# Patient Record
Sex: MALE | Race: WHITE | Employment: FULL TIME | URBAN - METROPOLITAN AREA
[De-identification: names, ages, dates, MRNs, and addresses within clinical notes are randomized per-mention and may not be internally consistent; named-entity substitution may affect disease eponyms.]

---

## 2020-02-09 ENCOUNTER — HOSPITAL ENCOUNTER (OUTPATIENT)
Age: 60
Discharge: HOME | End: 2020-02-09
Payer: COMMERCIAL

## 2020-02-09 PROCEDURE — 80307 DRUG TEST PRSMV CHEM ANLYZR: CPT

## 2020-02-09 PROCEDURE — 36415 COLL VENOUS BLD VENIPUNCTURE: CPT

## 2020-02-12 LAB
AMPHETAMINE: NEGATIVE NG/ML
BARBITURATES: NEGATIVE NG/ML
BENZODIAZEPINES: NEGATIVE NG/ML
BUPRENORPHINE: NEGATIVE NG/ML
COCAINE: NEGATIVE NG/ML
DRUGS OF ABUSE COMMENT: NORMAL
METHADONE: NEGATIVE NG/ML
METHAMPHETAMINE: NEGATIVE NG/ML
OPIATES: NEGATIVE NG/ML
OXYCODONE: NEGATIVE NG/ML
PHENCYCLIDINE: NEGATIVE NG/ML
THC: NEGATIVE NG/ML

## 2021-08-26 ENCOUNTER — HOSPITAL ENCOUNTER (EMERGENCY)
Age: 61
Discharge: LEFT BEFORE DISCHARGE | End: 2021-08-26
Payer: COMMERCIAL

## 2021-08-26 VITALS
RESPIRATION RATE: 14 BRPM | TEMPERATURE: 97.8 F | DIASTOLIC BLOOD PRESSURE: 90 MMHG | WEIGHT: 137 LBS | HEIGHT: 69 IN | HEART RATE: 68 BPM | OXYGEN SATURATION: 95 % | SYSTOLIC BLOOD PRESSURE: 147 MMHG | BODY MASS INDEX: 20.29 KG/M2

## 2021-08-26 RX ORDER — INSULIN GLARGINE 100 [IU]/ML
12 INJECTION, SOLUTION SUBCUTANEOUS 2 TIMES DAILY
COMMUNITY
Start: 2021-08-19

## 2021-08-26 RX ORDER — INSULIN ASPART 100 [IU]/ML
INJECTION, SOLUTION INTRAVENOUS; SUBCUTANEOUS
Status: ON HOLD | COMMUNITY
Start: 2021-08-20 | End: 2021-12-13 | Stop reason: SDUPTHER

## 2021-08-26 RX ORDER — FLUOXETINE HYDROCHLORIDE 20 MG/1
40 CAPSULE ORAL 2 TIMES DAILY
COMMUNITY

## 2021-08-26 RX ORDER — SILDENAFIL 100 MG/1
TABLET, FILM COATED ORAL
COMMUNITY
Start: 2021-08-19

## 2021-08-26 ASSESSMENT — PAIN SCALES - GENERAL: PAINLEVEL_OUTOF10: 7

## 2021-11-30 ENCOUNTER — APPOINTMENT (OUTPATIENT)
Dept: GENERAL RADIOLOGY | Age: 61
DRG: 637 | End: 2021-11-30
Payer: COMMERCIAL

## 2021-11-30 ENCOUNTER — HOSPITAL ENCOUNTER (INPATIENT)
Age: 61
LOS: 13 days | Discharge: HOME | DRG: 637 | End: 2021-12-13
Attending: EMERGENCY MEDICINE | Admitting: INTERNAL MEDICINE
Payer: COMMERCIAL

## 2021-11-30 DIAGNOSIS — E10.10 TYPE 1 DIABETES MELLITUS WITH KETOACIDOSIS WITHOUT COMA (HCC): Primary | ICD-10-CM

## 2021-11-30 DIAGNOSIS — J96.01 ACUTE RESPIRATORY FAILURE WITH HYPOXIA (HCC): ICD-10-CM

## 2021-11-30 DIAGNOSIS — J18.9 PNEUMONIA OF LEFT LOWER LOBE DUE TO INFECTIOUS ORGANISM: ICD-10-CM

## 2021-11-30 LAB
-: ABNORMAL
ABSOLUTE EOS #: 0 K/UL (ref 0–0.44)
ABSOLUTE IMMATURE GRANULOCYTE: 0.26 K/UL (ref 0–0.3)
ABSOLUTE LYMPH #: 2.1 K/UL (ref 1.1–3.7)
ABSOLUTE MONO #: 1.83 K/UL (ref 0.1–1.2)
ACETAMINOPHEN LEVEL: <5 UG/ML (ref 10–30)
ALLEN TEST: ABNORMAL
AMORPHOUS: ABNORMAL
AMPHETAMINE SCREEN URINE: NEGATIVE
ANION GAP SERPL CALCULATED.3IONS-SCNC: 21 MMOL/L (ref 9–17)
ANION GAP SERPL CALCULATED.3IONS-SCNC: ABNORMAL MMOL/L (ref 9–17)
ANION GAP SERPL CALCULATED.3IONS-SCNC: ABNORMAL MMOL/L (ref 9–17)
ANION GAP: 16 MMOL/L (ref 7–16)
BACTERIA: ABNORMAL
BARBITURATE SCREEN URINE: NEGATIVE
BASOPHILS # BLD: 0 % (ref 0–2)
BASOPHILS ABSOLUTE: 0 K/UL (ref 0–0.2)
BENZODIAZEPINE SCREEN, URINE: NEGATIVE
BETA-HYDROXYBUTYRATE: 14.69 MMOL/L (ref 0.02–0.27)
BILIRUBIN URINE: NEGATIVE
BUN BLDV-MCNC: 78 MG/DL (ref 6–20)
BUN BLDV-MCNC: 86 MG/DL (ref 6–20)
BUN BLDV-MCNC: 96 MG/DL (ref 6–20)
BUN/CREAT BLD: ABNORMAL (ref 9–20)
BUPRENORPHINE URINE: ABNORMAL
CALCIUM SERPL-MCNC: 7.2 MG/DL (ref 8.6–10.4)
CALCIUM SERPL-MCNC: 7.3 MG/DL (ref 8.6–10.4)
CALCIUM SERPL-MCNC: 8.7 MG/DL (ref 8.6–10.4)
CANNABINOID SCREEN URINE: POSITIVE
CARBOXYHEMOGLOBIN: 1.2 % (ref 0–5)
CASTS UA: ABNORMAL /LPF (ref 0–2)
CASTS UA: ABNORMAL /LPF (ref 0–2)
CHLORIDE BLD-SCNC: 105 MMOL/L (ref 98–107)
CHLORIDE BLD-SCNC: 113 MMOL/L (ref 98–107)
CHLORIDE BLD-SCNC: 82 MMOL/L (ref 98–107)
CO2: 8 MMOL/L (ref 20–31)
CO2: <6 MMOL/L (ref 20–31)
CO2: <6 MMOL/L (ref 20–31)
COCAINE METABOLITE, URINE: NEGATIVE
COLOR: YELLOW
COMMENT UA: ABNORMAL
CREAT SERPL-MCNC: 1.68 MG/DL (ref 0.7–1.2)
CREAT SERPL-MCNC: 1.92 MG/DL (ref 0.7–1.2)
CREAT SERPL-MCNC: 2.65 MG/DL (ref 0.7–1.2)
CRYSTALS, UA: ABNORMAL /HPF
DIFFERENTIAL TYPE: ABNORMAL
EOSINOPHILS RELATIVE PERCENT: 0 % (ref 1–4)
EPITHELIAL CELLS UA: ABNORMAL /HPF (ref 0–5)
ETHANOL PERCENT: <0.01 %
ETHANOL: <10 MG/DL
FIO2: 4
FIO2: ABNORMAL
FIO2: ABNORMAL
GFR AFRICAN AMERICAN: 30 ML/MIN
GFR AFRICAN AMERICAN: 44 ML/MIN
GFR AFRICAN AMERICAN: 51 ML/MIN
GFR NON-AFRICAN AMERICAN: 22 ML/MIN
GFR NON-AFRICAN AMERICAN: 25 ML/MIN
GFR NON-AFRICAN AMERICAN: 36 ML/MIN
GFR NON-AFRICAN AMERICAN: 42 ML/MIN
GFR SERPL CREATININE-BSD FRML MDRD: 27 ML/MIN
GFR SERPL CREATININE-BSD FRML MDRD: ABNORMAL ML/MIN/{1.73_M2}
GLUCOSE BLD-MCNC: 1009 MG/DL (ref 70–99)
GLUCOSE BLD-MCNC: 240 MG/DL (ref 75–110)
GLUCOSE BLD-MCNC: 242 MG/DL (ref 75–110)
GLUCOSE BLD-MCNC: 253 MG/DL (ref 75–110)
GLUCOSE BLD-MCNC: 286 MG/DL (ref 74–100)
GLUCOSE BLD-MCNC: 298 MG/DL (ref 75–110)
GLUCOSE BLD-MCNC: 337 MG/DL (ref 75–110)
GLUCOSE BLD-MCNC: 350 MG/DL (ref 70–99)
GLUCOSE BLD-MCNC: 405 MG/DL (ref 75–110)
GLUCOSE BLD-MCNC: 450 MG/DL (ref 75–110)
GLUCOSE BLD-MCNC: 505 MG/DL (ref 70–99)
GLUCOSE BLD-MCNC: 621 MG/DL (ref 70–99)
GLUCOSE BLD-MCNC: 715 MG/DL (ref 70–99)
GLUCOSE BLD-MCNC: 737 MG/DL (ref 70–99)
GLUCOSE BLD-MCNC: >700 MG/DL (ref 74–100)
GLUCOSE URINE: ABNORMAL
HCO3 VENOUS: 4.5 MMOL/L (ref 22–29)
HCO3 VENOUS: 5.2 MMOL/L (ref 24–30)
HCT VFR BLD CALC: 38.2 % (ref 40.7–50.3)
HEMOGLOBIN: 11.8 G/DL (ref 13–17)
IMMATURE GRANULOCYTES: 1 %
KETONES, URINE: ABNORMAL
LACTIC ACID, WHOLE BLOOD: 1.1 MMOL/L (ref 0.7–2.1)
LEUKOCYTE ESTERASE, URINE: NEGATIVE
LYMPHOCYTES # BLD: 8 % (ref 24–43)
MAGNESIUM: 2.6 MG/DL (ref 1.6–2.6)
MAGNESIUM: 2.6 MG/DL (ref 1.6–2.6)
MCH RBC QN AUTO: 29.4 PG (ref 25.2–33.5)
MCHC RBC AUTO-ENTMCNC: 30.9 G/DL (ref 28.4–34.8)
MCV RBC AUTO: 95 FL (ref 82.6–102.9)
MDMA URINE: ABNORMAL
METHADONE SCREEN, URINE: NEGATIVE
METHAMPHETAMINE, URINE: ABNORMAL
METHEMOGLOBIN: ABNORMAL % (ref 0–1.5)
MODE: ABNORMAL
MONOCYTES # BLD: 7 % (ref 3–12)
MORPHOLOGY: NORMAL
MUCUS: ABNORMAL
NEGATIVE BASE EXCESS, ART: 14 (ref 0–2)
NEGATIVE BASE EXCESS, VEN: 26.2 MMOL/L (ref 0–2)
NEGATIVE BASE EXCESS, VEN: 27 (ref 0–2)
NITRITE, URINE: NEGATIVE
NOTIFICATION TIME: ABNORMAL
NOTIFICATION: ABNORMAL
NRBC AUTOMATED: 0 PER 100 WBC
O2 DEVICE/FLOW/%: ABNORMAL
O2 SAT, VEN: 58 % (ref 60–85)
O2 SAT, VEN: 88.4 % (ref 60–85)
OPIATES, URINE: NEGATIVE
OTHER OBSERVATIONS UA: ABNORMAL
OXYCODONE SCREEN URINE: NEGATIVE
OXYHEMOGLOBIN: ABNORMAL % (ref 95–98)
PATIENT TEMP: 37
PATIENT TEMP: ABNORMAL
PATIENT TEMP: ABNORMAL
PCO2, VEN, TEMP ADJ: ABNORMAL MMHG (ref 39–55)
PCO2, VEN: 22.2 MM HG (ref 41–51)
PCO2, VEN: 24.4 (ref 39–55)
PDW BLD-RTO: 13.7 % (ref 11.8–14.4)
PEEP/CPAP: ABNORMAL
PH UA: 5 (ref 5–8)
PH VENOUS: 6.91 (ref 7.32–7.43)
PH VENOUS: 6.96 (ref 7.32–7.42)
PH, VEN, TEMP ADJ: ABNORMAL (ref 7.32–7.42)
PHENCYCLIDINE, URINE: NEGATIVE
PHOSPHORUS: 2.7 MG/DL (ref 2.5–4.5)
PHOSPHORUS: 4 MG/DL (ref 2.5–4.5)
PLATELET # BLD: 367 K/UL (ref 138–453)
PLATELET ESTIMATE: ABNORMAL
PMV BLD AUTO: 9.4 FL (ref 8.1–13.5)
PO2, VEN, TEMP ADJ: ABNORMAL MMHG (ref 30–50)
PO2, VEN: 48.5 MM HG (ref 30–50)
PO2, VEN: 81.1 (ref 30–50)
POC BUN: 92 MG/DL (ref 8–26)
POC CHLORIDE: 97 MMOL/L (ref 98–107)
POC CREATININE: 2.91 MG/DL (ref 0.51–1.19)
POC HCO3: 14.4 MMOL/L (ref 21–28)
POC HEMATOCRIT: 37 % (ref 41–53)
POC HEMOGLOBIN: 12.5 G/DL (ref 13.5–17.5)
POC IONIZED CALCIUM: 1.09 MMOL/L (ref 1.15–1.33)
POC LACTIC ACID: 1.39 MMOL/L (ref 0.56–1.39)
POC O2 SATURATION: 83 % (ref 94–98)
POC PCO2 TEMP: ABNORMAL MM HG
POC PCO2 TEMP: ABNORMAL MM HG
POC PCO2: 42.6 MM HG (ref 35–48)
POC PH TEMP: ABNORMAL
POC PH TEMP: ABNORMAL
POC PH: 7.14 (ref 7.35–7.45)
POC PO2 TEMP: ABNORMAL MM HG
POC PO2 TEMP: ABNORMAL MM HG
POC PO2: 61.6 MM HG (ref 83–108)
POC POTASSIUM: 6.2 MMOL/L (ref 3.5–5.1)
POC SODIUM: 118 MMOL/L (ref 138–146)
POC TCO2: 6 MMOL/L (ref 22–30)
POSITIVE BASE EXCESS, ART: ABNORMAL (ref 0–3)
POSITIVE BASE EXCESS, VEN: ABNORMAL (ref 0–3)
POSITIVE BASE EXCESS, VEN: ABNORMAL MMOL/L (ref 0–2)
POTASSIUM SERPL-SCNC: 4.1 MMOL/L (ref 3.7–5.3)
POTASSIUM SERPL-SCNC: 4.2 MMOL/L (ref 3.7–5.3)
POTASSIUM SERPL-SCNC: 6.2 MMOL/L (ref 3.7–5.3)
PROPOXYPHENE, URINE: ABNORMAL
PROTEIN UA: ABNORMAL
PSV: ABNORMAL
PT. POSITION: ABNORMAL
RBC # BLD: 4.02 M/UL (ref 4.21–5.77)
RBC # BLD: ABNORMAL 10*6/UL
RBC UA: ABNORMAL /HPF (ref 0–2)
RENAL EPITHELIAL, UA: ABNORMAL /HPF
RESPIRATORY RATE: ABNORMAL
SALICYLATE LEVEL: <1 MG/DL (ref 3–10)
SAMPLE SITE: ABNORMAL
SARS-COV-2, RAPID: NOT DETECTED
SEG NEUTROPHILS: 84 % (ref 36–65)
SEGMENTED NEUTROPHILS ABSOLUTE COUNT: 22.01 K/UL (ref 1.5–8.1)
SET RATE: ABNORMAL
SODIUM BLD-SCNC: 123 MMOL/L (ref 135–144)
SODIUM BLD-SCNC: 135 MMOL/L (ref 135–144)
SODIUM BLD-SCNC: 142 MMOL/L (ref 135–144)
SPECIFIC GRAVITY UA: 1.02 (ref 1–1.03)
SPECIMEN DESCRIPTION: NORMAL
TCO2 (CALC), ART: ABNORMAL MMOL/L (ref 22–29)
TEST INFORMATION: ABNORMAL
TEXT FOR RESPIRATORY: ABNORMAL
TOTAL CO2, VENOUS: ABNORMAL MMOL/L (ref 23–30)
TOTAL HB: ABNORMAL G/DL (ref 12–16)
TOTAL RATE: ABNORMAL
TOXIC TRICYCLIC SC,BLOOD: NEGATIVE
TRICHOMONAS: ABNORMAL
TRICYCLIC ANTIDEPRESSANTS, UR: ABNORMAL
TURBIDITY: ABNORMAL
URINE HGB: ABNORMAL
UROBILINOGEN, URINE: NORMAL
VT: ABNORMAL
WBC # BLD: 26.2 K/UL (ref 3.5–11.3)
WBC # BLD: ABNORMAL 10*3/UL
WBC UA: ABNORMAL /HPF (ref 0–5)
YEAST: ABNORMAL

## 2021-11-30 PROCEDURE — 2500000003 HC RX 250 WO HCPCS

## 2021-11-30 PROCEDURE — 85014 HEMATOCRIT: CPT

## 2021-11-30 PROCEDURE — 87641 MR-STAPH DNA AMP PROBE: CPT

## 2021-11-30 PROCEDURE — 80048 BASIC METABOLIC PNL TOTAL CA: CPT

## 2021-11-30 PROCEDURE — 2580000003 HC RX 258: Performed by: HEALTH CARE PROVIDER

## 2021-11-30 PROCEDURE — 82947 ASSAY GLUCOSE BLOOD QUANT: CPT

## 2021-11-30 PROCEDURE — 2700000000 HC OXYGEN THERAPY PER DAY

## 2021-11-30 PROCEDURE — 99285 EMERGENCY DEPT VISIT HI MDM: CPT

## 2021-11-30 PROCEDURE — 36415 COLL VENOUS BLD VENIPUNCTURE: CPT

## 2021-11-30 PROCEDURE — 85025 COMPLETE CBC W/AUTO DIFF WBC: CPT

## 2021-11-30 PROCEDURE — 81001 URINALYSIS AUTO W/SCOPE: CPT

## 2021-11-30 PROCEDURE — 82010 KETONE BODYS QUAN: CPT

## 2021-11-30 PROCEDURE — 80051 ELECTROLYTE PANEL: CPT

## 2021-11-30 PROCEDURE — 80307 DRUG TEST PRSMV CHEM ANLYZR: CPT

## 2021-11-30 PROCEDURE — 82565 ASSAY OF CREATININE: CPT

## 2021-11-30 PROCEDURE — 80143 DRUG ASSAY ACETAMINOPHEN: CPT

## 2021-11-30 PROCEDURE — 6370000000 HC RX 637 (ALT 250 FOR IP): Performed by: HEALTH CARE PROVIDER

## 2021-11-30 PROCEDURE — 6360000002 HC RX W HCPCS: Performed by: HEALTH CARE PROVIDER

## 2021-11-30 PROCEDURE — 94660 CPAP INITIATION&MGMT: CPT

## 2021-11-30 PROCEDURE — 87635 SARS-COV-2 COVID-19 AMP PRB: CPT

## 2021-11-30 PROCEDURE — 84100 ASSAY OF PHOSPHORUS: CPT

## 2021-11-30 PROCEDURE — 82803 BLOOD GASES ANY COMBINATION: CPT

## 2021-11-30 PROCEDURE — 71045 X-RAY EXAM CHEST 1 VIEW: CPT

## 2021-11-30 PROCEDURE — 99291 CRITICAL CARE FIRST HOUR: CPT | Performed by: INTERNAL MEDICINE

## 2021-11-30 PROCEDURE — 83605 ASSAY OF LACTIC ACID: CPT

## 2021-11-30 PROCEDURE — 82330 ASSAY OF CALCIUM: CPT

## 2021-11-30 PROCEDURE — 6370000000 HC RX 637 (ALT 250 FOR IP): Performed by: GENERAL PRACTICE

## 2021-11-30 PROCEDURE — 83735 ASSAY OF MAGNESIUM: CPT

## 2021-11-30 PROCEDURE — 2580000003 HC RX 258: Performed by: GENERAL PRACTICE

## 2021-11-30 PROCEDURE — G0480 DRUG TEST DEF 1-7 CLASSES: HCPCS

## 2021-11-30 PROCEDURE — 80179 DRUG ASSAY SALICYLATE: CPT

## 2021-11-30 PROCEDURE — 37799 UNLISTED PX VASCULAR SURGERY: CPT

## 2021-11-30 PROCEDURE — 82805 BLOOD GASES W/O2 SATURATION: CPT

## 2021-11-30 PROCEDURE — 2500000003 HC RX 250 WO HCPCS: Performed by: GENERAL PRACTICE

## 2021-11-30 PROCEDURE — 6360000002 HC RX W HCPCS: Performed by: STUDENT IN AN ORGANIZED HEALTH CARE EDUCATION/TRAINING PROGRAM

## 2021-11-30 PROCEDURE — 6360000002 HC RX W HCPCS

## 2021-11-30 PROCEDURE — 94761 N-INVAS EAR/PLS OXIMETRY MLT: CPT

## 2021-11-30 PROCEDURE — 51702 INSERT TEMP BLADDER CATH: CPT

## 2021-11-30 PROCEDURE — 84520 ASSAY OF UREA NITROGEN: CPT

## 2021-11-30 PROCEDURE — 2000000000 HC ICU R&B

## 2021-11-30 PROCEDURE — 93005 ELECTROCARDIOGRAM TRACING: CPT | Performed by: GENERAL PRACTICE

## 2021-11-30 PROCEDURE — 2500000003 HC RX 250 WO HCPCS: Performed by: HEALTH CARE PROVIDER

## 2021-11-30 RX ORDER — POTASSIUM CHLORIDE 7.45 MG/ML
10 INJECTION INTRAVENOUS PRN
Status: DISCONTINUED | OUTPATIENT
Start: 2021-11-30 | End: 2021-11-30

## 2021-11-30 RX ORDER — MAGNESIUM SULFATE 1 G/100ML
1000 INJECTION INTRAVENOUS PRN
Status: DISCONTINUED | OUTPATIENT
Start: 2021-11-30 | End: 2021-11-30

## 2021-11-30 RX ORDER — ONDANSETRON 2 MG/ML
4 INJECTION INTRAMUSCULAR; INTRAVENOUS EVERY 6 HOURS PRN
Status: DISCONTINUED | OUTPATIENT
Start: 2021-11-30 | End: 2021-12-13 | Stop reason: HOSPADM

## 2021-11-30 RX ORDER — LORAZEPAM 2 MG/ML
1 INJECTION INTRAMUSCULAR ONCE
Status: COMPLETED | OUTPATIENT
Start: 2021-11-30 | End: 2021-11-30

## 2021-11-30 RX ORDER — LISINOPRIL 10 MG/1
10 TABLET ORAL DAILY
Status: ON HOLD | COMMUNITY
End: 2021-12-13 | Stop reason: HOSPADM

## 2021-11-30 RX ORDER — 0.9 % SODIUM CHLORIDE 0.9 %
1000 INTRAVENOUS SOLUTION INTRAVENOUS ONCE
Status: COMPLETED | OUTPATIENT
Start: 2021-11-30 | End: 2021-11-30

## 2021-11-30 RX ORDER — LORAZEPAM 2 MG/ML
INJECTION INTRAMUSCULAR
Status: COMPLETED
Start: 2021-11-30 | End: 2021-11-30

## 2021-11-30 RX ORDER — SODIUM CHLORIDE 9 MG/ML
25 INJECTION, SOLUTION INTRAVENOUS PRN
Status: DISCONTINUED | OUTPATIENT
Start: 2021-11-30 | End: 2021-12-13 | Stop reason: HOSPADM

## 2021-11-30 RX ORDER — ACETAMINOPHEN 650 MG/1
650 SUPPOSITORY RECTAL EVERY 6 HOURS PRN
Status: DISCONTINUED | OUTPATIENT
Start: 2021-11-30 | End: 2021-12-13 | Stop reason: HOSPADM

## 2021-11-30 RX ORDER — ACETAMINOPHEN 325 MG/1
650 TABLET ORAL EVERY 6 HOURS PRN
Status: DISCONTINUED | OUTPATIENT
Start: 2021-11-30 | End: 2021-12-13 | Stop reason: HOSPADM

## 2021-11-30 RX ORDER — SODIUM CHLORIDE 0.9 % (FLUSH) 0.9 %
5-40 SYRINGE (ML) INJECTION PRN
Status: DISCONTINUED | OUTPATIENT
Start: 2021-11-30 | End: 2021-12-13 | Stop reason: HOSPADM

## 2021-11-30 RX ORDER — DEXTROSE MONOHYDRATE 25 G/50ML
12.5 INJECTION, SOLUTION INTRAVENOUS PRN
Status: DISCONTINUED | OUTPATIENT
Start: 2021-11-30 | End: 2021-12-13 | Stop reason: HOSPADM

## 2021-11-30 RX ORDER — HEPARIN SODIUM 5000 [USP'U]/ML
5000 INJECTION, SOLUTION INTRAVENOUS; SUBCUTANEOUS EVERY 8 HOURS SCHEDULED
Status: DISCONTINUED | OUTPATIENT
Start: 2021-11-30 | End: 2021-12-04

## 2021-11-30 RX ORDER — ONDANSETRON 4 MG/1
4 TABLET, ORALLY DISINTEGRATING ORAL EVERY 8 HOURS PRN
Status: DISCONTINUED | OUTPATIENT
Start: 2021-11-30 | End: 2021-12-13 | Stop reason: HOSPADM

## 2021-11-30 RX ORDER — FLUOXETINE HYDROCHLORIDE 20 MG/1
40 CAPSULE ORAL DAILY
Status: DISCONTINUED | OUTPATIENT
Start: 2021-11-30 | End: 2021-12-07

## 2021-11-30 RX ORDER — DEXTROSE AND SODIUM CHLORIDE 5; .45 G/100ML; G/100ML
INJECTION, SOLUTION INTRAVENOUS CONTINUOUS PRN
Status: DISCONTINUED | OUTPATIENT
Start: 2021-11-30 | End: 2021-12-13 | Stop reason: HOSPADM

## 2021-11-30 RX ORDER — FENTANYL CITRATE 50 UG/ML
INJECTION, SOLUTION INTRAMUSCULAR; INTRAVENOUS
Status: COMPLETED
Start: 2021-11-30 | End: 2021-11-30

## 2021-11-30 RX ORDER — NOREPINEPHRINE BIT/0.9 % NACL 16MG/250ML
2-100 INFUSION BOTTLE (ML) INTRAVENOUS CONTINUOUS
Status: DISCONTINUED | OUTPATIENT
Start: 2021-11-30 | End: 2021-12-06

## 2021-11-30 RX ORDER — POLYETHYLENE GLYCOL 3350 17 G/17G
17 POWDER, FOR SOLUTION ORAL DAILY PRN
Status: DISCONTINUED | OUTPATIENT
Start: 2021-11-30 | End: 2021-12-13 | Stop reason: HOSPADM

## 2021-11-30 RX ORDER — SODIUM CHLORIDE 0.9 % (FLUSH) 0.9 %
5-40 SYRINGE (ML) INJECTION EVERY 12 HOURS SCHEDULED
Status: DISCONTINUED | OUTPATIENT
Start: 2021-11-30 | End: 2021-12-13 | Stop reason: HOSPADM

## 2021-11-30 RX ORDER — SODIUM CHLORIDE 9 MG/ML
INJECTION, SOLUTION INTRAVENOUS CONTINUOUS
Status: DISCONTINUED | OUTPATIENT
Start: 2021-11-30 | End: 2021-11-30

## 2021-11-30 RX ORDER — NICOTINE POLACRILEX 4 MG
15 LOZENGE BUCCAL PRN
Status: DISCONTINUED | OUTPATIENT
Start: 2021-11-30 | End: 2021-12-13 | Stop reason: HOSPADM

## 2021-11-30 RX ORDER — LISINOPRIL 10 MG/1
10 TABLET ORAL DAILY
Status: DISCONTINUED | OUTPATIENT
Start: 2021-11-30 | End: 2021-11-30

## 2021-11-30 RX ORDER — 0.9 % SODIUM CHLORIDE 0.9 %
15 INTRAVENOUS SOLUTION INTRAVENOUS ONCE
Status: COMPLETED | OUTPATIENT
Start: 2021-11-30 | End: 2021-11-30

## 2021-11-30 RX ORDER — DEXTROSE MONOHYDRATE 50 MG/ML
100 INJECTION, SOLUTION INTRAVENOUS PRN
Status: DISCONTINUED | OUTPATIENT
Start: 2021-11-30 | End: 2021-12-13 | Stop reason: HOSPADM

## 2021-11-30 RX ORDER — FENTANYL CITRATE 50 UG/ML
50 INJECTION, SOLUTION INTRAMUSCULAR; INTRAVENOUS ONCE
Status: COMPLETED | OUTPATIENT
Start: 2021-11-30 | End: 2021-11-30

## 2021-11-30 RX ADMIN — SODIUM CHLORIDE 1000 ML: 9 INJECTION, SOLUTION INTRAVENOUS at 09:29

## 2021-11-30 RX ADMIN — DEXTROSE AND SODIUM CHLORIDE: 5; 450 INJECTION, SOLUTION INTRAVENOUS at 21:51

## 2021-11-30 RX ADMIN — SODIUM CHLORIDE 873 ML: 9 INJECTION, SOLUTION INTRAVENOUS at 13:35

## 2021-11-30 RX ADMIN — SODIUM CHLORIDE 4.95 UNITS/HR: 9 INJECTION, SOLUTION INTRAVENOUS at 13:50

## 2021-11-30 RX ADMIN — LORAZEPAM 1 MG: 2 INJECTION INTRAMUSCULAR; INTRAVENOUS at 19:39

## 2021-11-30 RX ADMIN — FENTANYL CITRATE 50 MCG: 50 INJECTION, SOLUTION INTRAMUSCULAR; INTRAVENOUS at 14:32

## 2021-11-30 RX ADMIN — HEPARIN SODIUM 5000 UNITS: 5000 INJECTION INTRAVENOUS; SUBCUTANEOUS at 17:06

## 2021-11-30 RX ADMIN — LORAZEPAM 1 MG: 2 INJECTION INTRAMUSCULAR; INTRAVENOUS at 12:13

## 2021-11-30 RX ADMIN — SODIUM CHLORIDE: 9 INJECTION, SOLUTION INTRAVENOUS at 15:13

## 2021-11-30 RX ADMIN — SODIUM BICARBONATE 50 MEQ: 84 INJECTION, SOLUTION INTRAVENOUS at 23:16

## 2021-11-30 RX ADMIN — SODIUM CHLORIDE 1000 ML: 9 INJECTION, SOLUTION INTRAVENOUS at 08:00

## 2021-11-30 RX ADMIN — Medication 8 MCG/MIN: at 09:57

## 2021-11-30 RX ADMIN — FAMOTIDINE 20 MG: 10 INJECTION INTRAVENOUS at 17:06

## 2021-11-30 RX ADMIN — SODIUM CHLORIDE 1000 ML: 9 INJECTION, SOLUTION INTRAVENOUS at 09:00

## 2021-11-30 RX ADMIN — Medication 50 MEQ: at 23:16

## 2021-11-30 RX ADMIN — SODIUM CHLORIDE 2.47 UNITS/HR: 9 INJECTION, SOLUTION INTRAVENOUS at 16:13

## 2021-11-30 RX ADMIN — SODIUM CHLORIDE 0.1 UNITS/KG/HR: 9 INJECTION, SOLUTION INTRAVENOUS at 09:32

## 2021-11-30 RX ADMIN — ENOXAPARIN SODIUM 30 MG: 30 INJECTION SUBCUTANEOUS at 15:13

## 2021-11-30 RX ADMIN — HEPARIN SODIUM 5000 UNITS: 5000 INJECTION INTRAVENOUS; SUBCUTANEOUS at 21:44

## 2021-11-30 RX ADMIN — LORAZEPAM 1 MG: 2 INJECTION INTRAMUSCULAR; INTRAVENOUS at 14:20

## 2021-11-30 RX ADMIN — LORAZEPAM 1 MG: 2 INJECTION INTRAMUSCULAR; INTRAVENOUS at 20:10

## 2021-11-30 RX ADMIN — LORAZEPAM 1 MG: 2 INJECTION INTRAMUSCULAR at 12:13

## 2021-11-30 RX ADMIN — SODIUM CHLORIDE: 9 INJECTION, SOLUTION INTRAVENOUS at 19:04

## 2021-11-30 RX ADMIN — SODIUM CHLORIDE, PRESERVATIVE FREE 10 ML: 5 INJECTION INTRAVENOUS at 19:34

## 2021-11-30 NOTE — CARE COORDINATION
Attempt to do initial transitional planning, pt not alert at this time, in soft restraints, no family at bedside

## 2021-11-30 NOTE — ED NOTES
Bed: 34  Expected date:   Expected time:   Means of arrival:   Comments:  JESSY Nielson 430, RN  11/30/21 7209

## 2021-11-30 NOTE — ED PROVIDER NOTES
[address] ED  Emergency Department Encounter  EmergencyMedicine Resident     Pt Beth James  MRN: [id number]  [place] 1960  Date of evaluation: 11/30/21  PCP:  MD Millie Powell       Chief Complaint   Patient presents with    Altered Mental Status    Hyperglycemia       HISTORY OF PRESENT ILLNESS  (Location/Symptom, Timing/Onset, Context/Setting, Quality, Duration, Modifying Factors, Severity.)      Honorio Erwin is a 63 y.o. male who presents with AMS. Type 1 DM and non-compliant with meds for the past 3 days. Refused to come to the ER per wife. On arrival, patient is moving all 4 extremities. Mildly tachycardic with normal blood pressure. He is confused and nonverbal.  No obvious focal deficits. Glucose reading greater than 600 on point-of-care. PAST MEDICAL / SURGICAL / SOCIAL / FAMILY HISTORY      has a past medical history of Diabetes mellitus ([facility] [address] Denies further past medical hx     has no past surgical history on file. Denies further past surgical hx    Social History     Socioeconomic History    Marital status:      Spouse name: Not on file    Number of children: Not on file    Years of education: Not on file    Highest education level: Not on file   Occupational History    Not on file   Tobacco Use    Smoking status: Never Smoker    Smokeless tobacco: Never Used   Substance and Sexual Activity    Alcohol use: Not Currently    Drug use: Not on file    Sexual activity: Not on file   Other Topics Concern    Not on file   Social History Narrative    Not on file     Social Determinants of Health     Financial Resource Strain:     Difficulty of Paying Living Expenses: Not on file   Food Insecurity:     Worried About Running Out of Food in the Last Year: Not on file    [place] of Food in the Last Year: Not on file   Transportation Needs:     Lack of Transportation (Medical):  Not on file    Lack of Transportation (Non-Medical): Not on file   Physical Activity:     Days of Exercise per Week: Not on file    Minutes of Exercise per Session: Not on file   Stress:     Feeling of Stress : Not on file   Social Connections:     Frequency of Communication with Friends and Family: Not on file    Frequency of Social Gatherings with Friends and Family: Not on file    Attends Restoration Services: Not on file    Active Member of [address] [place] [address] or Organizations: Not on file    Attends Club or Organization Meetings: Not on file    Marital Status: Not on file   Intimate Partner Violence:     Fear of Current or Ex-Partner: Not on file    Emotionally Abused: Not on file    Physically Abused: Not on file    Sexually Abused: Not on file   Housing Stability:     Unable to Pay for Housing in the Last Year: Not on file    Number of [place] in the Last Year: Not on file    Unstable Housing in the Last Year: Not on file       History reviewed. No pertinent family history. Allergies:  Patient has no known allergies. Home Medications:  Prior to Admission medications    Medication Sig Start Date End Date Taking? Authorizing Provider   NOVOLOG FLEXPEN 100 UNIT/ML injection pen  8/20/21   Historical Provider, MD   LANTUS 100 UNIT/ML injection vial  8/19/21   Historical Provider, MD   FLUoxetine (PROZAC) 20 MG capsule Take 40 mg by mouth daily    Historical Provider, MD   sildenafil (VIAGRA) 100 MG tablet  8/19/21   Historical Provider, MD       REVIEW OF SYSTEMS    (2-9 systems for level 4, 10 or more for level 5)      Review of Systems  Cannot be obtained at this time due to altered mentation  PHYSICAL EXAM   (up to 7 for level 4, 8 or more for level 5)      INITIAL VITALS:   BP (!) 85/40   Pulse 69   Resp 26   Wt 145 lb (65.8 kg)   SpO2 100% Comment: Lung sounds clear at this time. BMI 21.41 kg/m²     Physical Exam   Gen. Appearance: patient appears clinically dehydrated. Altered  Head: head atraumatic, normocephalic.   Nipples equal and reactive bilaterally  Eyes: Extraocular movements intact. No scleral icterus  Mouth: Oropharynx clear. Dry mucous membranes  Neck: Supple. No lymphadenopathy. Pulmonary: Lungs clear to auscultation bilaterally. No wheezing, rales or rhonchi   Cardiovascular: Tachycardic. Regular. No murmurs  Abdomen: Soft, mild tenderness throughout abdomen. , no guarding or rebound, normal bowel sounds  Neurology: Moving all 4 extremities. Agitated and confused.   Nonverbal   Skin: Warm, dry, well perfused        DIFFERENTIAL  DIAGNOSIS     PLAN (Otelia Hum / Rudolfo Francesca / EKG):  Orders Placed This Encounter   Procedures    XR CHEST PORTABLE    CBC Auto Differential    Basic Metabolic Panel w/ Reflex to MG    BLOOD GAS, VENOUS    BETA-HYDROXYBUTERATE    ELECTROLYTES PLUS    Hemoglobin and hematocrit, blood    CALCIUM, IONIC (POC)    HYPOGLYCEMIA TREATMENT: blood glucose less than 50 mg/dL and patient  ALERT and TOLERATING PO    HYPOGLYCEMIA TREATMENT: blood glucose less than 70 mg/dL and patient ALERT and TOLERATING PO    HYPOGLYCEMIA TREATMENT: blood glucose less than 70 mg/dL and patient NOT ALERT or NPO    Inpatient consult to Critical Care    POCT Glucose    POCT Glucose    Venous Blood Gas, POC    Creatinine W/GFR Point of Care    POCT urea (BUN)    Lactic Acid, POC    POCT Glucose    EKG 12 Lead       MEDICATIONS ORDERED:  Orders Placed This Encounter   Medications    0.9 % sodium chloride bolus    glucose (GLUTOSE) 40 % oral gel 15 g    dextrose 50 % IV solution    glucagon (rDNA) injection 1 mg    dextrose 5 % solution    insulin regular (HUMULIN R;NOVOLIN R) 100 Units in sodium chloride 0.9 % 100 mL infusion           DIAGNOSTIC RESULTS / EMERGENCY DEPARTMENT COURSE / MDM     LABS:  Results for orders placed or performed during the hospital encounter of 11/30/21   CBC Auto Differential   Result Value Ref Range    WBC 26.2 (H) 3.5 - 11.3 k/uL    RBC 4.02 (L) 4.21 - 5.77 m/uL    Hemoglobin 11.8 (L) 13.0 - 17.0 g/dL    Hematocrit 38.2 (L) 40.7 - 50.3 %    MCV 95.0 82.6 - 102.9 fL    MCH 29.4 25.2 - 33.5 pg    MCHC 30.9 28.4 - 34.8 g/dL    RDW 13.7 11.8 - 14.4 %    Platelets 707 520 - 066 k/uL    MPV 9.4 8.1 - 13.5 fL    NRBC Automated 0.0 0.0 per 100 WBC    Differential Type NOT REPORTED     WBC Morphology NOT REPORTED     RBC Morphology NOT REPORTED     Platelet Estimate NOT REPORTED     Immature Granulocytes 1 (H) 0 %    Seg Neutrophils 84 (H) 36 - 65 %    Lymphocytes 8 (L) 24 - 43 %    Monocytes 7 3 - 12 %    Eosinophils % 0 (L) 1 - 4 %    Basophils 0 0 - 2 %    Absolute Immature Granulocyte 0.26 0.00 - 0.30 k/uL    Segs Absolute 22.01 (H) 1.50 - 8.10 k/uL    Absolute Lymph # 2.10 1.10 - 3.70 k/uL    Absolute Mono # 1.83 (H) 0.10 - 1.20 k/uL    Absolute Eos # 0.00 0.00 - 0.44 k/uL    Basophils Absolute 0.00 0.00 - 0.20 k/uL    Morphology Normal    Basic Metabolic Panel w/ Reflex to MG   Result Value Ref Range    Glucose 1,009 (HH) 70 - 99 mg/dL    BUN 96 (HH) 6 - 20 mg/dL    CREATININE 2.65 (H) 0.70 - 1.20 mg/dL    Bun/Cre Ratio NOT REPORTED 9 - 20    Calcium 8.7 8.6 - 10.4 mg/dL    Sodium 123 (L) 135 - 144 mmol/L    Potassium 6.2 (HH) 3.7 - 5.3 mmol/L    Chloride 82 (L) 98 - 107 mmol/L    CO2 <6 (LL) 20 - 31 mmol/L    Anion Gap  9 - 17 mmol/L     Unable to calculate anion gap due to CO2 less than 6.     GFR Non-African American 25 (L) >60 mL/min    GFR African American 30 (L) >60 mL/min    GFR Comment          GFR Staging NOT REPORTED    BLOOD GAS, VENOUS   Result Value Ref Range    pH, Vidal 6.956 (LL) 7.320 - 7.420    pCO2, Vidal 24.4 (L) 39 - 55    pO2, Vidal 81.1 (H) 30 - 50    HCO3, Venous 5.2 (L) 24 - 30 mmol/L    Positive Base Excess, Vidal NOT REPORTED 0.0 - 2.0 mmol/L    Negative Base Excess, Vidal 26.2 (H) 0.0 - 2.0 mmol/L    O2 Sat, Vidal 88.4 (H) 60.0 - 85.0 %    Total Hb NOT REPORTED 12.0 - 16.0 g/dl    Oxyhemoglobin NOT REPORTED 95.0 - 98.0 %    Carboxyhemoglobin 1.2 0 - 5 %    Methemoglobin NOT REPORTED 0.0 - 1.5 %    Pt Temp 37.0     pH, Vidal, Temp Adj NOT REPORTED 7.320 - 7.420    pCO2, Vidal, Temp Adj NOT REPORTED 39 - 55 mmHg    pO2, Vidal, Temp Adj NOT REPORTED 30 - 50 mmHg    O2 Device/Flow/% NOT REPORTED     Respiratory Rate NOT REPORTED     Leo Test NOT REPORTED     Sample Site NOT REPORTED     Pt.  Position NOT REPORTED     Mode NOT REPORTED     Set Rate NOT REPORTED     Total Rate NOT REPORTED     VT NOT REPORTED     FIO2 INFORMATION NOT PROVIDED     Peep/Cpap NOT REPORTED     PSV NOT REPORTED     Text for Respiratory NOT REPORTED     NOTIFICATION NOT REPORTED     NOTIFICATION TIME NOT REPORTED    ELECTROLYTES PLUS   Result Value Ref Range    POC Sodium 118 (LL) 138 - 146 mmol/L    POC Potassium 6.2 (HH) 3.5 - 4.5 mmol/L    POC Chloride 97 (L) 98 - 107 mmol/L    POC TCO2 6 (LL) 22 - 30 mmol/L    Anion Gap 16 7 - 16 mmol/L   Hemoglobin and hematocrit, blood   Result Value Ref Range    POC Hemoglobin 12.5 (L) 13.5 - 17.5 g/dL    POC Hematocrit 37 (L) 41 - 53 %   CALCIUM, IONIC (POC)   Result Value Ref Range    POC Ionized Calcium 1.09 (L) 1.15 - 1.33 mmol/L   Venous Blood Gas, POC   Result Value Ref Range    pH, Vidal 6.912 (LL) 7.320 - 7.430    pCO2, Vidal 22.2 (L) 41.0 - 51.0 mm Hg    pO2, Vidal 48.5 30.0 - 50.0 mm Hg    HCO3, Venous 4.5 (L) 22.0 - 29.0 mmol/L    Total CO2, Venous NOT REPORTED 23.0 - 30.0 mmol/L    Negative Base Excess, Vidal 27 (H) 0.0 - 2.0    Positive Base Excess, Vidal NOT REPORTED 0.0 - 3.0    O2 Sat, Vidal 58 (L) 60.0 - 85.0 %    O2 Device/Flow/% NOT REPORTED     Leo Test NOT REPORTED     Sample Site NOT REPORTED     Mode NOT REPORTED     FIO2 NOT REPORTED     Pt Temp NOT REPORTED     POC pH Temp NOT REPORTED     POC pCO2 Temp NOT REPORTED mm Hg    POC pO2 Temp NOT REPORTED mm Hg   Creatinine W/GFR Point of Care   Result Value Ref Range    POC Creatinine 2.91 (H) 0.51 - 1.19 mg/dL    GFR Comment 27 (L) >60 mL/min    GFR Non-African American 22 (L) >60 mL/min    GFR Comment         POCT urea (BUN)   Result Value Ref Range    POC BUN 92 (HH) 8 - 26 mg/dL   Lactic Acid, POC   Result Value Ref Range    POC Lactic Acid 1.39 0.56 - 1.39 mmol/L   POCT Glucose   Result Value Ref Range    POC Glucose >700 (HH) 74 - 100 mg/dL       RADIOLOGY:  None    EKG  None    All EKG's are interpreted by the Emergency Department Physician who either signs or Co-signs this chart in the absence of a cardiologist.    [address] MDM:  63 y.o. male who presents with altered mentation, agitated. DKA on laboratory work-up. Started on insulin drip and 30 cc/kg IV fluid bolused. Requiring restraints. Medical ICU following and will be admitting patient. Potassium mildly elevated, however no hyperkalemia treatment at this time is EKG shows normal sinus without significant T wave changes        ED Course as of 12/03/21 0920   Tue Nov 30, 2021   0951 Patient requiring soft restraints due to pulling at equipment and lines [KENYATTA]      ED Course User Index  [KENYATTA] Martín Herring DO         PROCEDURES:  None    CONSULTS:  IP CONSULT TO CRITICAL CARE    CRITICAL CARE:  None    FINAL IMPRESSION      1. Type 1 diabetes mellitus with ketoacidosis without coma ([facility] [address]            DISPOSITION / PLAN     DISPOSITION Decision To Admit 11/30/2021 08:39:37 AM      PATIENT REFERRED TO:  No follow-up provider specified.     DISCHARGE MEDICATIONS:  New Prescriptions    No medications on file       Martín Herring DO  Emergency Medicine Resident    (Please note that portions of thisnote were completed with a voice recognition program.  Efforts were made to edit the dictations but occasionally words are mis-transcribed.)        Martín Herring DO  Resident  12/03/21 4926

## 2021-11-30 NOTE — H&P
Critical Care - History and Physical Examination    Patient's name:  Sp Hinton  Medical Record Number: [id number]  Patient's account/billing number: [de-identified]  Patient's YOB: 1960  Age: 63 y.o. Date of Admission: 11/30/2021  7:39 AM  Date of History and Physical Examination: 11/30/2021    Primary Care Physician: Nikkie Licona MD  Attending Physician: Abdiel Kunz MD     Code Status: No Order    Chief complaint:   Chief Complaint   Patient presents with    Altered Mental Status    Hyperglycemia       HISTORY OF PRESENT ILLNESS:   Sp Hinton is a 63 y.o. male history of type 1 diabetes who is noncompliant with his medication. Patient was brought in by his wife for altered mental status. Patient reportedly had not taken any of his medications last 3 days. Symptoms had started on Friday with nausea and vomiting, which progressed to complaints of of chest pain leg pain, as well as ataxia. Patient's wife found him lying naked on the floor this morning and called EMS. On arrival to ED, blood glucose was found to be 1009, potassium of 6.2 and creatinine of 2.65. Patient was hypotensive on arrival and received a total of 5 L of normal saline in the ED, but    Past Medical History:        Diagnosis Date    Diabetes mellitus ([address]     Hypertension        Past Surgical History:  History reviewed. No pertinent surgical history. Allergies:    No Known Allergies      Home Meds:   Prior to Admission medications    Medication Sig Start Date End Date Taking?  Authorizing Provider   lisinopril (PRINIVIL;ZESTRIL) 10 MG tablet Take 10 mg by mouth daily   Yes Historical Provider, MD   NOVOLOG FLEXPEN 100 UNIT/ML injection pen  8/20/21   Historical Provider, MD   LANTUS 100 UNIT/ML injection vial 12 Units 2 times daily  8/19/21      FLUoxetine (PROZAC) 20 MG capsule Take 40 mg by mouth daily    Historical Provider, MD   sildenafil (VIAGRA) 100 MG tablet  8/19/21   Historical Provider, MD Social History:   TOBACCO:   reports that he has never smoked. He has never used smokeless tobacco.  ETOH:   reports previous alcohol use. DRUGS:  reports current drug use. Drug: Marijuana Charmayne Stai). OCCUPATION:      Family History:   History reviewed. No pertinent family history. REVIEW OF SYSTEMS (ROS):  Could not be evaluated due to altered mental status    Physical Exam:    Vitals: BP (!) 85/40   Pulse 69   Resp 26   Wt 145 lb (65.8 kg)   SpO2 100% Comment: Lung sounds clear at this time. BMI 21.41 kg/m²     Last Body weight:   Wt Readings from Last 3 Encounters:   11/30/21 145 lb (65.8 kg)       Body Mass Index : Body mass index is 21.41 kg/m². PHYSICAL EXAMINATION :  Constitutional: Acutely ill-appearing,obvious distress  EENT: PERRLA, EOMI, sclera clear, anicteric, oropharynx clear, no lesions, neck supple with midline trachea. Neck: Supple, symmetrical, trachea midline, no adenopathy, thyroid symmetric, no jvd skin normal  Respiratory: tachypnea with Kussmaul breathing, lungs clear to auscultation, no wheezes or rales.  No intercostal tenderness  Cardiovascular: regular rate and rhythm, normal S1, S2, no murmur noted and 2+ pulses throughout  Abdomen: soft, nontender, nondistended, no masses or organomegaly  Extremities:  peripheral pulses normal, no pedal edema, no clubbing or cyanosis    MEDICATIONS:  Scheduled Meds:   [COMPLETED] sodium chloride  1,000 mL IntraVENous Once       Continuous Infusions:   dextrose      insulin         PRN Meds:   glucose, 15 g, PRN  dextrose, 12.5 g, PRN  glucagon (rDNA), 1 mg, PRN  dextrose, 100 mL/hr, PRN        SUPPORT DEVICES: [] Ventilator [] BIPAP  [] Nasal Cannula [x] Room Air      LABS:   Venous Blood Gas result:  pH 6.19; pCO2 22.2; pO2 48.5; HCO3 4.5; BE-27; %O2 Sat 58%  No results found for: PH, PCO2, PO2, HCO3, O2SAT    CBC:   Recent Labs     11/30/21  0800   WBC 26.2*   HGB 11.8*        BMP:    Recent Labs     11/30/21  0754 11/30/21  0800   NA  --  123*   K  --  6.2*   CL  --  82*   CO2  --  <6*   BUN  --  96*   CREATININE 2.91* 2.65*   GLUCOSE  --  1,009*     S. Calcium:  Recent Labs     11/30/21  0800   CALCIUM 8.7     S. Ionized Calcium:No results for input(s): IONCA in the last 72 hours. S. Magnesium:No results for input(s): MG in the last 72 hours. S. Phosphorus:No results for input(s): PHOS in the last 72 hours. S. Glucose:  Recent Labs     11/30/21  0754   POCGLU >700*     Glycosylated hemoglobin A1C: No results for input(s): LABA1C in the last 72 hours. INR: No results for input(s): INR in the last 72 hours. Hepatic functions: No results for input(s): ALKPHOS, ALT, AST, PROT, BILITOT, BILIDIR, LABALBU in the last 72 hours. Pancreatic functions:No results for input(s): LACTA, AMYLASE in the last 72 hours. S. Lactic Acid: No results for input(s): LACTA in the last 72 hours. Cardiac enzymes:No results for input(s): CKTOTAL, CKMB, CKMBINDEX, TROPONINI in the last 72 hours. BNP:No results for input(s): BNP in the last 72 hours. Lipid profile: No results for input(s): CHOL, TRIG, HDL, LDLCALC in the last 72 hours. Invalid input(s): LDL  Blood Gases: No results found for: PH, PCO2, PO2, HCO3, O2SAT  Thyroid functions: No results found for: TSH     Urinalysis:   No results for input(s): COLORU, PHUR, LABCAST, WBCUA, RBCUA, MUCUS, TRICHOMONAS, YEAST, BACTERIA, CLARITYU, SPECGRAV, LEUKOCYTESUR, UROBILINOGEN, BILIRUBINUR, BLOODU in the last 72 hours. Invalid input(s): NITRATE, GLUCOSEUKETONESUAMORPHOUS        RADIOLOGY:  XR CHEST PORTABLE   Final Result   No acute cardiopulmonary process. CARDIOLOGY:  Recent Cardiac Catheterization summary:   No results found for this or any previous visit. Last Echocardiogram findings:   No results found for this or any previous visit. No results found for this or any previous visit.       Assessment and Plan     There is no problem list on file for this patient.       PLAN/MEDICAL DECISION MAKING:    DKA  - Resuscitate via DKA protocol  - q4h [email or web address]    Neurologic:   · GCS 13, agitated  · 1mg Ativan given - improved    Cardiovascular:  · Hypotensive - levophed gtt  · Place central line for pressor infusion  · Place arterial line for BP monitoring  Pulmonary:  · Maintain oxygen sats >92%  · Pulmonary toilet    GI/Nutrition  · Glycolax  · Ulcer Prophylaxis: H2 blockers not indicated  · Diet:No diet orders on file  Renal/Fluid/Electrolyte  · IV Fluids: 0.9NS @ 250 mL/Hr   · I/O: In: 2000 [I.V.:2000]  · Out: -   · UOP: Monitor  · Monitor electrolytes, replace PRN   ID  WBC:   Lab Results   Component Value Date    WBC 26.2 (H) 11/30/2021   · Reactive  · Monitor for signs of infection  · Antibiotics not indicated    Hematology:  Recent Labs     11/30/21  0800   HGB 11.8*   ·  stable  Endocrine:   glucose controlled - most recent BGL is   Recent Labs     11/30/21  0800   GLUCOSE 1,009*   ·   DVT Prophylaxis  · SCD sleeves -thigh high and Heparin  Discharge Needs:  PT, OT, ST, SW and Case Management      CODE STATUS: No Order    DISPOSITION:  [x] To remain ICU: Yes  [] OK for out of ICU from Critical Care standpoint    Sophie Feliz MD, MPH  Emergency Medicine Resident  Critical Care Service

## 2021-11-30 NOTE — PROGRESS NOTES
Pharmacy Note     Renal Dose Adjustment    Yvonne Marcial is a 63 y.o. male. Pharmacist assessment of renally cleared medications. Recent Labs     11/30/21  0800   BUN 96*       Recent Labs     11/30/21  0754 11/30/21  0800   CREATININE 2.91* 2.65*       Estimated Creatinine Clearance: 25 mL/min (A) (based on SCr of 2.65 mg/dL (H)).   Estimated CrCl using Ideal Body Weight: 24 mL/min (based on IBW  70 kg)    Height:   Ht Readings from Last 1 Encounters:   No data found for Ht     Weight:  Wt Readings from Last 1 Encounters:   11/30/21 128 lb 3.2 oz (58.2 kg)       The following medication dose has been adjusted based upon renal function per P&T Guidelines:             Famotidine adjusted to 20 mg daily     Thank you  Ildefonso Santana, PharmD, [facility]  Inpatient Clinical Pharmacist  [phone]

## 2021-11-30 NOTE — PROCEDURES
Central Line Placement Procedure Note    Performed by: Silvina Wells MD    Indication: hypovolemia and centrally administered medications    Consent: Unable to be obtained due to patient's condition. Time out performed: Immediately prior to the procedure a \"time out\" was called to verify the correct patient, the correct procedure, equipment, support staff and site/side marked as required. All elements of maximal sterile barrier techniques were followed. Procedure: The patient was positioned appropriately and the skin over the right femoral vein was prepped with Chloraprep and draped in a sterile fashion. Local anesthesia was obtained by infiltration using 1% Lidocaine without epinephrine. A large bore needle was used to identify the vein with ultrasound guidance. A guide wire was then inserted into the vein through the needle. A triple lumen catheter was then inserted into the vessel over the guide wire using the Seldinger technique. All ports showed good, free flowing blood return and were flushed with saline solution. The catheter was then securely fastened to the skin with sutures and with an adhesive dressing and covered with a sterile dressing. The patient tolerated the procedure well. Complications: None        Arterial Line Placement Procedure Note          Performed by: Silvina Wells MD               Indication: metabolic derangement, arterial blood gases and cardiovascular instability    Consent: Unable to be obtained due to patient's condition. Time out performed: Immediately prior to the procedure a \"time out\" was called to verify the correct patient, the correct procedure, equipment, support staff and site/side marked as required. All elements of maximal sterile barrier techniques were followed. Procedure: The skin over the right femoral artery was prepped with Chloraprep. Local anesthesia was obtained by infiltration using 1% Lidocaine without epinephrine.   A 20 gauge arterial line catheter was then inserted, using a modified Seldinger technique, into the vessel. The transducer set was then attached and securely fastened to the skin with sutures. Waveforms on the monitor were observed and found to be adequate. The patient had good distal perfusion after the procedure. The site was then dressed in a sterile fashion. The patient tolerated the procedure well.      Complications: None

## 2021-11-30 NOTE — PROGRESS NOTES
PHARMACY NOTE:    The electrolyte replacement protocol for potassium/magnesium has been discontinued per P&T guidelines because the patient has reduced renal function (CrCl < 30 mL/min). The patient's most recent potassium & magnesium levels are:  Recent Labs     11/30/21  0800   K 6.2*     Estimated Creatinine Clearance: 25 mL/min (A) (based on SCr of 2.65 mg/dL (H)). For patients with decreased renal function (below 30ml/min) needing potassium/magnesium supplementation, please order individual bolus doses with appropriate monitoring. Please contact the inpatient pharmacy with any concerns. Thank you.   Mari Yoon, PharmD  11/30/2021  1:56 PM

## 2021-11-30 NOTE — ED PROVIDER NOTES
[facility] ED     Emergency Department     Faculty Attestation        I performed a history and physical examination of the patient and discussed management with the resident. I reviewed the residents note and agree with the documented findings and plan of care. Any areas of disagreement are noted on the chart. I was personally present for the key portions of any procedures. I have documented in the chart those procedures where I was not present during the key portions. I have reviewed the emergency nurses triage note. I agree with the chief complaint, past medical history, past surgical history, allergies, medications, social and family history as documented unless otherwise noted below. For Physician Assistant/ Nurse Practitioner cases/documentation I have personally evaluated this patient and have completed at least one if not all key elements of the E/M (history, physical exam, and MDM). Additional findings are as noted. Vital Signs: BP: (!) 85/40  Pulse: 69  Resp: 26    SpO2: 100 % (Lung sounds clear at this time.)  PCP:  Cassi Yin MD    Pertinent Comments:     Patient is a 79-year-old male with type 1 diabetes with wife worried that he was in DKA secondary to noncompliance with his insulin. Patient found to be in DKA with pH of 6.91, PCO2 22, bicarb 4.5, and blood glucose greater than 700. Potassium high at 6.2 with EKG showing some peaked T waves however no other concerning findings. Starting aggressive IV fluids given degree of dehydration with BUN of 92 over creatinine of 2.91.     Insulin drip as well        EKG Interpretation    Interpreted by emergency department physician    Rhythm: normal sinus   Rate: normal at 70 bpm  Axis: Rightward axis deviation  Conduction: normal except for slightly prolonged QTC at 481 ms  ST Segments: no acute change  T Waves: no acute change  Q Waves: no acute change    Clinical Impression: nonspecific EKG. CRITICAL CARE: There was a high probability of clinically significant/life threatening deterioration in this patient's condition which required my urgent intervention. Total critical care time was 33 minutes. This excludes any time for separately reportable procedures. This patient was evaluated in the Emergency Department for symptoms described in the history of present illness. He/she was evaluated in the context of the global COVID-19 pandemic, which necessitated consideration that the patient might be at risk for infection with the SARS-CoV-2 virus that causes COVID-19. Institutional protocols and algorithms that pertain to the evaluation of patients at risk for COVID-19 are in a state of rapid change based on information released by regulatory bodies including the CDC and federal and state organizations. These policies and algorithms were followed during the patient's care in the ED. (Please note that portions of this note were completed with a voice recognition program. Efforts were made to edit the dictations but occasionally words are mis-transcribed.  Whenever words are used in this note in any gender, they shall be construed as though they were used in the gender appropriate to the circumstances; and whenever words are used in this note in the singular or plural form, they shall be construed as though they were used in the form appropriate to the circumstances.)    MD Charlette Shelton  Attending Emergency Medicine Physician            Usha Hook MD  11/30/21 4338

## 2021-11-30 NOTE — PLAN OF CARE
Problem: Skin Integrity:  Goal: Will show no infection signs and symptoms  Description: Will show no infection signs and symptoms  11/30/2021 1744 by Jennifer Magallanes RN  Outcome: Ongoing  11/30/2021 1743 by Jennifer Magallanes RN  Outcome: Ongoing  Goal: Absence of new skin breakdown  Description: Absence of new skin breakdown  11/30/2021 1744 by Jennifer Magallanes RN  Outcome: Ongoing  11/30/2021 1743 by Jennifer Magallanes RN  Outcome: Ongoing     Problem: Falls - Risk of:  Goal: Will remain free from falls  Description: Will remain free from falls  11/30/2021 1744 by Jennifer Magallanes RN  Outcome: Ongoing  11/30/2021 1743 by Jennifer Magallanes RN  Outcome: Ongoing  Goal: Absence of physical injury  Description: Absence of physical injury  11/30/2021 1744 by Jennifer Magallanes RN  Outcome: Ongoing  11/30/2021 1743 by Jennifer Magallanes RN  Outcome: Ongoing     Problem: Fluid Volume - Imbalance:  Goal: Will remain free of signs and symptoms of dehydration  Description: Will remain free of signs and symptoms of dehydration  Outcome: Ongoing  Goal: Absence of imbalanced fluid volume signs and symptoms  Description: Absence of imbalanced fluid volume signs and symptoms  Outcome: Ongoing     Problem: Serum Glucose Level - Abnormal:  Goal: Ability to maintain appropriate glucose levels will improve  Description: Ability to maintain appropriate glucose levels will improve  Outcome: Ongoing     Problem: Injury - Acid Base Imbalance:  Goal: Acid-base balance  Description: Acid-base balance  Outcome: Not Met This Shift     Problem: Non-Violent Restraints  Goal: Removal from restraints as soon as assessed to be safe  Outcome: Completed  Goal: No harm/injury to patient while restraints in use  Outcome: Completed  Goal: Patient's dignity will be maintained  Outcome: Completed

## 2021-12-01 ENCOUNTER — APPOINTMENT (OUTPATIENT)
Dept: GENERAL RADIOLOGY | Age: 61
DRG: 637 | End: 2021-12-01
Payer: COMMERCIAL

## 2021-12-01 ENCOUNTER — ANESTHESIA EVENT (OUTPATIENT)
Dept: ICU | Age: 61
DRG: 637 | End: 2021-12-01
Payer: COMMERCIAL

## 2021-12-01 ENCOUNTER — ANESTHESIA (OUTPATIENT)
Dept: ICU | Age: 61
DRG: 637 | End: 2021-12-01
Payer: COMMERCIAL

## 2021-12-01 LAB
ABSOLUTE EOS #: <0.03 K/UL (ref 0–0.44)
ABSOLUTE IMMATURE GRANULOCYTE: 0.28 K/UL (ref 0–0.3)
ABSOLUTE LYMPH #: 0.7 K/UL (ref 1.1–3.7)
ABSOLUTE MONO #: 0.69 K/UL (ref 0.1–1.2)
ACTION: NORMAL
ALLEN TEST: ABNORMAL
ANION GAP SERPL CALCULATED.3IONS-SCNC: 10 MMOL/L (ref 9–17)
ANION GAP SERPL CALCULATED.3IONS-SCNC: 12 MMOL/L (ref 9–17)
ANION GAP SERPL CALCULATED.3IONS-SCNC: 14 MMOL/L (ref 9–17)
ANION GAP SERPL CALCULATED.3IONS-SCNC: 7 MMOL/L (ref 9–17)
ANION GAP SERPL CALCULATED.3IONS-SCNC: 8 MMOL/L (ref 9–17)
ANION GAP SERPL CALCULATED.3IONS-SCNC: 9 MMOL/L (ref 9–17)
BASOPHILS # BLD: 0 % (ref 0–2)
BASOPHILS ABSOLUTE: 0.03 K/UL (ref 0–0.2)
BUN BLDV-MCNC: 46 MG/DL (ref 6–20)
BUN BLDV-MCNC: 49 MG/DL (ref 6–20)
BUN BLDV-MCNC: 50 MG/DL (ref 6–20)
BUN BLDV-MCNC: 58 MG/DL (ref 6–20)
BUN BLDV-MCNC: 61 MG/DL (ref 6–20)
BUN BLDV-MCNC: 72 MG/DL (ref 6–20)
BUN/CREAT BLD: ABNORMAL (ref 9–20)
CALCIUM SERPL-MCNC: 6.7 MG/DL (ref 8.6–10.4)
CALCIUM SERPL-MCNC: 6.8 MG/DL (ref 8.6–10.4)
CALCIUM SERPL-MCNC: 7.2 MG/DL (ref 8.6–10.4)
CALCIUM SERPL-MCNC: 7.2 MG/DL (ref 8.6–10.4)
CALCIUM SERPL-MCNC: 7.3 MG/DL (ref 8.6–10.4)
CALCIUM SERPL-MCNC: 7.4 MG/DL (ref 8.6–10.4)
CHLORIDE BLD-SCNC: 116 MMOL/L (ref 98–107)
CHLORIDE BLD-SCNC: 117 MMOL/L (ref 98–107)
CHLORIDE BLD-SCNC: 119 MMOL/L (ref 98–107)
CHLORIDE BLD-SCNC: 122 MMOL/L (ref 98–107)
CHLORIDE BLD-SCNC: 122 MMOL/L (ref 98–107)
CHLORIDE BLD-SCNC: 123 MMOL/L (ref 98–107)
CO2: 14 MMOL/L (ref 20–31)
CO2: 16 MMOL/L (ref 20–31)
CO2: 17 MMOL/L (ref 20–31)
CO2: 17 MMOL/L (ref 20–31)
CO2: 19 MMOL/L (ref 20–31)
CO2: 20 MMOL/L (ref 20–31)
CREAT SERPL-MCNC: 0.88 MG/DL (ref 0.7–1.2)
CREAT SERPL-MCNC: 0.94 MG/DL (ref 0.7–1.2)
CREAT SERPL-MCNC: 1.02 MG/DL (ref 0.7–1.2)
CREAT SERPL-MCNC: 1.04 MG/DL (ref 0.7–1.2)
CREAT SERPL-MCNC: 1.23 MG/DL (ref 0.7–1.2)
CREAT SERPL-MCNC: 1.5 MG/DL (ref 0.7–1.2)
DATE AND TIME: NORMAL
DIFFERENTIAL TYPE: ABNORMAL
EKG ATRIAL RATE: 70 BPM
EKG P AXIS: 79 DEGREES
EKG P-R INTERVAL: 154 MS
EKG Q-T INTERVAL: 446 MS
EKG QRS DURATION: 112 MS
EKG QTC CALCULATION (BAZETT): 481 MS
EKG R AXIS: 90 DEGREES
EKG T AXIS: -31 DEGREES
EKG VENTRICULAR RATE: 70 BPM
EOSINOPHILS RELATIVE PERCENT: 0 % (ref 1–4)
FIO2: 100
FIO2: 100
FIO2: 45
FIO2: 50
FIO2: 50
GFR AFRICAN AMERICAN: 58 ML/MIN
GFR AFRICAN AMERICAN: >60 ML/MIN
GFR NON-AFRICAN AMERICAN: 48 ML/MIN
GFR NON-AFRICAN AMERICAN: >60 ML/MIN
GFR SERPL CREATININE-BSD FRML MDRD: ABNORMAL ML/MIN/{1.73_M2}
GLUCOSE BLD-MCNC: 101 MG/DL (ref 75–110)
GLUCOSE BLD-MCNC: 113 MG/DL (ref 75–110)
GLUCOSE BLD-MCNC: 114 MG/DL (ref 75–110)
GLUCOSE BLD-MCNC: 115 MG/DL (ref 70–99)
GLUCOSE BLD-MCNC: 121 MG/DL (ref 74–100)
GLUCOSE BLD-MCNC: 123 MG/DL (ref 74–100)
GLUCOSE BLD-MCNC: 123 MG/DL (ref 75–110)
GLUCOSE BLD-MCNC: 137 MG/DL (ref 75–110)
GLUCOSE BLD-MCNC: 139 MG/DL (ref 75–110)
GLUCOSE BLD-MCNC: 153 MG/DL (ref 70–99)
GLUCOSE BLD-MCNC: 174 MG/DL (ref 75–110)
GLUCOSE BLD-MCNC: 176 MG/DL (ref 75–110)
GLUCOSE BLD-MCNC: 213 MG/DL (ref 70–99)
GLUCOSE BLD-MCNC: 219 MG/DL (ref 75–110)
GLUCOSE BLD-MCNC: 252 MG/DL (ref 75–110)
GLUCOSE BLD-MCNC: 266 MG/DL (ref 70–99)
GLUCOSE BLD-MCNC: 269 MG/DL (ref 74–100)
GLUCOSE BLD-MCNC: 287 MG/DL (ref 70–99)
GLUCOSE BLD-MCNC: 289 MG/DL (ref 70–99)
GLUCOSE BLD-MCNC: 292 MG/DL (ref 74–100)
GLUCOSE BLD-MCNC: >600 MG/DL (ref 75–110)
GLUCOSE BLD-MCNC: >600 MG/DL (ref 75–110)
HCT VFR BLD CALC: 35.3 % (ref 40.7–50.3)
HEMOGLOBIN: 12.1 G/DL (ref 13–17)
IMMATURE GRANULOCYTES: 2 %
LYMPHOCYTES # BLD: 4 % (ref 24–43)
MAGNESIUM: 2 MG/DL (ref 1.6–2.6)
MAGNESIUM: 2.2 MG/DL (ref 1.6–2.6)
MAGNESIUM: 2.2 MG/DL (ref 1.6–2.6)
MAGNESIUM: 2.3 MG/DL (ref 1.6–2.6)
MAGNESIUM: 2.5 MG/DL (ref 1.6–2.6)
MAGNESIUM: 2.5 MG/DL (ref 1.6–2.6)
MCH RBC QN AUTO: 29.4 PG (ref 25.2–33.5)
MCHC RBC AUTO-ENTMCNC: 34.3 G/DL (ref 28.4–34.8)
MCV RBC AUTO: 85.7 FL (ref 82.6–102.9)
MODE: ABNORMAL
MONOCYTES # BLD: 4 % (ref 3–12)
MRSA, DNA, NASAL: NORMAL
NEGATIVE BASE EXCESS, ART: 4 (ref 0–2)
NEGATIVE BASE EXCESS, ART: 4 (ref 0–2)
NEGATIVE BASE EXCESS, ART: 5 (ref 0–2)
NEGATIVE BASE EXCESS, ART: 6 (ref 0–2)
NEGATIVE BASE EXCESS, ART: 9 (ref 0–2)
NOTIFY: NORMAL
NRBC AUTOMATED: 0 PER 100 WBC
O2 DEVICE/FLOW/%: ABNORMAL
PATIENT TEMP: ABNORMAL
PDW BLD-RTO: 13.4 % (ref 11.8–14.4)
PHOSPHORUS: 0.9 MG/DL (ref 2.5–4.5)
PHOSPHORUS: 1.6 MG/DL (ref 2.5–4.5)
PHOSPHORUS: 2.3 MG/DL (ref 2.5–4.5)
PHOSPHORUS: 2.3 MG/DL (ref 2.5–4.5)
PHOSPHORUS: 3.2 MG/DL (ref 2.5–4.5)
PHOSPHORUS: 3.3 MG/DL (ref 2.5–4.5)
PLATELET # BLD: 250 K/UL (ref 138–453)
PLATELET ESTIMATE: ABNORMAL
PMV BLD AUTO: 8.9 FL (ref 8.1–13.5)
POC HCO3: 16.5 MMOL/L (ref 21–28)
POC HCO3: 18.5 MMOL/L (ref 21–28)
POC HCO3: 19.7 MMOL/L (ref 21–28)
POC HCO3: 22.1 MMOL/L (ref 21–28)
POC HCO3: 23.9 MMOL/L (ref 21–28)
POC LACTIC ACID: 0.67 MMOL/L (ref 0.56–1.39)
POC LACTIC ACID: 0.9 MMOL/L (ref 0.56–1.39)
POC O2 SATURATION: 100 % (ref 94–98)
POC O2 SATURATION: 78 % (ref 94–98)
POC O2 SATURATION: 90 % (ref 94–98)
POC O2 SATURATION: 91 % (ref 94–98)
POC O2 SATURATION: 98 % (ref 94–98)
POC PCO2 TEMP: ABNORMAL MM HG
POC PCO2: 29.7 MM HG (ref 35–48)
POC PCO2: 30.6 MM HG (ref 35–48)
POC PCO2: 32.7 MM HG (ref 35–48)
POC PCO2: 44.2 MM HG (ref 35–48)
POC PCO2: 65.7 MM HG (ref 35–48)
POC PH TEMP: ABNORMAL
POC PH: 7.17 (ref 7.35–7.45)
POC PH: 7.31 (ref 7.35–7.45)
POC PH: 7.31 (ref 7.35–7.45)
POC PH: 7.4 (ref 7.35–7.45)
POC PH: 7.42 (ref 7.35–7.45)
POC PO2 TEMP: ABNORMAL MM HG
POC PO2: 202.6 MM HG (ref 83–108)
POC PO2: 54.2 MM HG (ref 83–108)
POC PO2: 57.4 MM HG (ref 83–108)
POC PO2: 65.1 MM HG (ref 83–108)
POC PO2: 94.3 MM HG (ref 83–108)
POSITIVE BASE EXCESS, ART: ABNORMAL (ref 0–3)
POTASSIUM SERPL-SCNC: 3.5 MMOL/L (ref 3.7–5.3)
POTASSIUM SERPL-SCNC: 3.6 MMOL/L (ref 3.7–5.3)
POTASSIUM SERPL-SCNC: 3.9 MMOL/L (ref 3.7–5.3)
POTASSIUM SERPL-SCNC: 4.1 MMOL/L (ref 3.7–5.3)
POTASSIUM SERPL-SCNC: 4.4 MMOL/L (ref 3.7–5.3)
POTASSIUM SERPL-SCNC: 4.6 MMOL/L (ref 3.7–5.3)
RBC # BLD: 4.12 M/UL (ref 4.21–5.77)
RBC # BLD: ABNORMAL 10*6/UL
READ BACK: YES
SAMPLE SITE: ABNORMAL
SEG NEUTROPHILS: 91 % (ref 36–65)
SEGMENTED NEUTROPHILS ABSOLUTE COUNT: 16.36 K/UL (ref 1.5–8.1)
SODIUM BLD-SCNC: 142 MMOL/L (ref 135–144)
SODIUM BLD-SCNC: 145 MMOL/L (ref 135–144)
SODIUM BLD-SCNC: 147 MMOL/L (ref 135–144)
SODIUM BLD-SCNC: 149 MMOL/L (ref 135–144)
SODIUM BLD-SCNC: 149 MMOL/L (ref 135–144)
SODIUM BLD-SCNC: 150 MMOL/L (ref 135–144)
SPECIMEN DESCRIPTION: NORMAL
TCO2 (CALC), ART: ABNORMAL MMOL/L (ref 22–29)
TROPONIN INTERP: ABNORMAL
TROPONIN T: ABNORMAL NG/ML
TROPONIN, HIGH SENSITIVITY: 84 NG/L (ref 0–22)
WBC # BLD: 18.1 K/UL (ref 3.5–11.3)
WBC # BLD: ABNORMAL 10*3/UL

## 2021-12-01 PROCEDURE — 94002 VENT MGMT INPAT INIT DAY: CPT

## 2021-12-01 PROCEDURE — 2500000003 HC RX 250 WO HCPCS: Performed by: HEALTH CARE PROVIDER

## 2021-12-01 PROCEDURE — 6360000002 HC RX W HCPCS: Performed by: HEALTH CARE PROVIDER

## 2021-12-01 PROCEDURE — 37799 UNLISTED PX VASCULAR SURGERY: CPT

## 2021-12-01 PROCEDURE — 6370000000 HC RX 637 (ALT 250 FOR IP): Performed by: STUDENT IN AN ORGANIZED HEALTH CARE EDUCATION/TRAINING PROGRAM

## 2021-12-01 PROCEDURE — 94660 CPAP INITIATION&MGMT: CPT

## 2021-12-01 PROCEDURE — 94761 N-INVAS EAR/PLS OXIMETRY MLT: CPT

## 2021-12-01 PROCEDURE — 3E0G76Z INTRODUCTION OF NUTRITIONAL SUBSTANCE INTO UPPER GI, VIA NATURAL OR ARTIFICIAL OPENING: ICD-10-PCS | Performed by: INTERNAL MEDICINE

## 2021-12-01 PROCEDURE — 93010 ELECTROCARDIOGRAM REPORT: CPT | Performed by: INTERNAL MEDICINE

## 2021-12-01 PROCEDURE — 2700000000 HC OXYGEN THERAPY PER DAY

## 2021-12-01 PROCEDURE — 2580000003 HC RX 258: Performed by: HEALTH CARE PROVIDER

## 2021-12-01 PROCEDURE — 82803 BLOOD GASES ANY COMBINATION: CPT

## 2021-12-01 PROCEDURE — 84484 ASSAY OF TROPONIN QUANT: CPT

## 2021-12-01 PROCEDURE — 74018 RADEX ABDOMEN 1 VIEW: CPT

## 2021-12-01 PROCEDURE — 2000000000 HC ICU R&B

## 2021-12-01 PROCEDURE — 2500000003 HC RX 250 WO HCPCS: Performed by: GENERAL PRACTICE

## 2021-12-01 PROCEDURE — 0BH17EZ INSERTION OF ENDOTRACHEAL AIRWAY INTO TRACHEA, VIA NATURAL OR ARTIFICIAL OPENING: ICD-10-PCS | Performed by: INTERNAL MEDICINE

## 2021-12-01 PROCEDURE — 71045 X-RAY EXAM CHEST 1 VIEW: CPT

## 2021-12-01 PROCEDURE — 84100 ASSAY OF PHOSPHORUS: CPT

## 2021-12-01 PROCEDURE — 83735 ASSAY OF MAGNESIUM: CPT

## 2021-12-01 PROCEDURE — 94003 VENT MGMT INPAT SUBQ DAY: CPT

## 2021-12-01 PROCEDURE — 6360000002 HC RX W HCPCS: Performed by: STUDENT IN AN ORGANIZED HEALTH CARE EDUCATION/TRAINING PROGRAM

## 2021-12-01 PROCEDURE — 2500000003 HC RX 250 WO HCPCS: Performed by: STUDENT IN AN ORGANIZED HEALTH CARE EDUCATION/TRAINING PROGRAM

## 2021-12-01 PROCEDURE — 5A1955Z RESPIRATORY VENTILATION, GREATER THAN 96 CONSECUTIVE HOURS: ICD-10-PCS | Performed by: INTERNAL MEDICINE

## 2021-12-01 PROCEDURE — 83605 ASSAY OF LACTIC ACID: CPT

## 2021-12-01 PROCEDURE — 2580000003 HC RX 258: Performed by: STUDENT IN AN ORGANIZED HEALTH CARE EDUCATION/TRAINING PROGRAM

## 2021-12-01 PROCEDURE — 80048 BASIC METABOLIC PNL TOTAL CA: CPT

## 2021-12-01 PROCEDURE — 99291 CRITICAL CARE FIRST HOUR: CPT | Performed by: INTERNAL MEDICINE

## 2021-12-01 PROCEDURE — 6370000000 HC RX 637 (ALT 250 FOR IP): Performed by: HEALTH CARE PROVIDER

## 2021-12-01 PROCEDURE — 85025 COMPLETE CBC W/AUTO DIFF WBC: CPT

## 2021-12-01 RX ORDER — DEXTROSE AND SODIUM CHLORIDE 5; .45 G/100ML; G/100ML
INJECTION, SOLUTION INTRAVENOUS CONTINUOUS
Status: DISCONTINUED | OUTPATIENT
Start: 2021-12-01 | End: 2021-12-03

## 2021-12-01 RX ORDER — LEVOFLOXACIN 5 MG/ML
750 INJECTION, SOLUTION INTRAVENOUS
Status: DISCONTINUED | OUTPATIENT
Start: 2021-12-01 | End: 2021-12-01

## 2021-12-01 RX ORDER — LEVOFLOXACIN 5 MG/ML
750 INJECTION, SOLUTION INTRAVENOUS EVERY 24 HOURS
Status: DISCONTINUED | OUTPATIENT
Start: 2021-12-02 | End: 2021-12-07

## 2021-12-01 RX ORDER — ALBUTEROL SULFATE 2.5 MG/3ML
2.5 SOLUTION RESPIRATORY (INHALATION) EVERY 4 HOURS PRN
Status: DISCONTINUED | OUTPATIENT
Start: 2021-12-01 | End: 2021-12-13 | Stop reason: HOSPADM

## 2021-12-01 RX ORDER — CLINDAMYCIN PHOSPHATE 600 MG/50ML
600 INJECTION INTRAVENOUS 3 TIMES DAILY
Status: DISCONTINUED | OUTPATIENT
Start: 2021-12-01 | End: 2021-12-08

## 2021-12-01 RX ORDER — INSULIN GLARGINE 100 [IU]/ML
20 INJECTION, SOLUTION SUBCUTANEOUS NIGHTLY
Status: DISCONTINUED | OUTPATIENT
Start: 2021-12-01 | End: 2021-12-03

## 2021-12-01 RX ORDER — POTASSIUM CHLORIDE 29.8 MG/ML
20 INJECTION INTRAVENOUS
Status: COMPLETED | OUTPATIENT
Start: 2021-12-01 | End: 2021-12-01

## 2021-12-01 RX ORDER — INSULIN GLARGINE 100 [IU]/ML
10 INJECTION, SOLUTION SUBCUTANEOUS ONCE
Status: COMPLETED | OUTPATIENT
Start: 2021-12-01 | End: 2021-12-01

## 2021-12-01 RX ORDER — PROPOFOL 10 MG/ML
5-50 INJECTION, EMULSION INTRAVENOUS
Status: DISCONTINUED | OUTPATIENT
Start: 2021-12-01 | End: 2021-12-04

## 2021-12-01 RX ADMIN — DEXTROSE AND SODIUM CHLORIDE: 5; 450 INJECTION, SOLUTION INTRAVENOUS at 16:43

## 2021-12-01 RX ADMIN — INSULIN GLARGINE 10 UNITS: 100 INJECTION, SOLUTION SUBCUTANEOUS at 09:52

## 2021-12-01 RX ADMIN — SODIUM CHLORIDE, PRESERVATIVE FREE 10 ML: 5 INJECTION INTRAVENOUS at 19:31

## 2021-12-01 RX ADMIN — HEPARIN SODIUM 5000 UNITS: 5000 INJECTION INTRAVENOUS; SUBCUTANEOUS at 06:23

## 2021-12-01 RX ADMIN — FAMOTIDINE 20 MG: 10 INJECTION INTRAVENOUS at 19:31

## 2021-12-01 RX ADMIN — INSULIN GLARGINE 20 UNITS: 100 INJECTION, SOLUTION SUBCUTANEOUS at 19:31

## 2021-12-01 RX ADMIN — INSULIN LISPRO 9 UNITS: 100 INJECTION, SOLUTION INTRAVENOUS; SUBCUTANEOUS at 17:07

## 2021-12-01 RX ADMIN — INSULIN LISPRO 5 UNITS: 100 INJECTION, SOLUTION INTRAVENOUS; SUBCUTANEOUS at 19:38

## 2021-12-01 RX ADMIN — POTASSIUM CHLORIDE 20 MEQ: 29.8 INJECTION, SOLUTION INTRAVENOUS at 08:08

## 2021-12-01 RX ADMIN — PROPOFOL 10 MCG/KG/MIN: 10 INJECTION, EMULSION INTRAVENOUS at 04:44

## 2021-12-01 RX ADMIN — Medication 15 MCG/MIN: at 15:07

## 2021-12-01 RX ADMIN — Medication 25 MCG/HR: at 14:32

## 2021-12-01 RX ADMIN — Medication: at 15:24

## 2021-12-01 RX ADMIN — DEXTROSE AND SODIUM CHLORIDE: 5; 450 INJECTION, SOLUTION INTRAVENOUS at 23:08

## 2021-12-01 RX ADMIN — PROPOFOL 50 MCG/KG/MIN: 10 INJECTION, EMULSION INTRAVENOUS at 12:50

## 2021-12-01 RX ADMIN — CLINDAMYCIN IN 5 PERCENT DEXTROSE 600 MG: 12 INJECTION, SOLUTION INTRAVENOUS at 16:43

## 2021-12-01 RX ADMIN — FLUOXETINE HYDROCHLORIDE 40 MG: 20 CAPSULE ORAL at 08:12

## 2021-12-01 RX ADMIN — SODIUM CHLORIDE, PRESERVATIVE FREE 10 ML: 5 INJECTION INTRAVENOUS at 08:13

## 2021-12-01 RX ADMIN — ACETAMINOPHEN 650 MG: 325 TABLET ORAL at 15:15

## 2021-12-01 RX ADMIN — FAMOTIDINE 20 MG: 10 INJECTION INTRAVENOUS at 08:12

## 2021-12-01 RX ADMIN — Medication: at 00:01

## 2021-12-01 RX ADMIN — PROPOFOL 40 MCG/KG/MIN: 10 INJECTION, EMULSION INTRAVENOUS at 20:07

## 2021-12-01 RX ADMIN — CLINDAMYCIN IN 5 PERCENT DEXTROSE 600 MG: 12 INJECTION, SOLUTION INTRAVENOUS at 21:00

## 2021-12-01 RX ADMIN — Medication: at 07:30

## 2021-12-01 RX ADMIN — HEPARIN SODIUM 5000 UNITS: 5000 INJECTION INTRAVENOUS; SUBCUTANEOUS at 14:30

## 2021-12-01 RX ADMIN — LEVOFLOXACIN 750 MG: 5 INJECTION, SOLUTION INTRAVENOUS at 01:32

## 2021-12-01 RX ADMIN — HEPARIN SODIUM 5000 UNITS: 5000 INJECTION INTRAVENOUS; SUBCUTANEOUS at 21:32

## 2021-12-01 RX ADMIN — POTASSIUM PHOSPHATE, MONOBASIC AND POTASSIUM PHOSPHATE, DIBASIC 30 MMOL: 224; 236 INJECTION, SOLUTION, CONCENTRATE INTRAVENOUS at 15:34

## 2021-12-01 RX ADMIN — Medication 12 MCG/MIN: at 23:08

## 2021-12-01 RX ADMIN — POTASSIUM CHLORIDE 20 MEQ: 29.8 INJECTION, SOLUTION INTRAVENOUS at 09:52

## 2021-12-01 ASSESSMENT — PULMONARY FUNCTION TESTS
PIF_VALUE: 33
PIF_VALUE: 37
PIF_VALUE: 34
PIF_VALUE: 29
PIF_VALUE: 28
PIF_VALUE: 29
PIF_VALUE: 35
PIF_VALUE: 31
PIF_VALUE: 34
PIF_VALUE: 37
PIF_VALUE: 25
PIF_VALUE: 33
PIF_VALUE: 34
PIF_VALUE: 37

## 2021-12-01 NOTE — PROGRESS NOTES
Decision was made to intubate the patient based on declining respiratory status. 0426- 10 mg etomidate  0426- 100 mg succ. 0427- 8.0 ETT placed 23 at the lip with positive color change and bilat. Breath sounds.

## 2021-12-01 NOTE — PROGRESS NOTES
Patient admitted on Mechanical Ventilator Protocol. Patients height measured at 66 for an IBW 63.8    Patient placed on the ventilator on settings as charted on flowsheeet. Ventilator Bronchodilator assessment    Breath sounds: clear/diminished  Inspiratory Pressure: 32  Plateau Pressure: 30    Patient assessed at level 1          [x]    Bronchodilator Assessment    BRONCHODILATOR ASSESSMENT SCORE  Score 0 (Home) 1 2 3 4   Breath Sounds   []  Chronic Ventilator: Patient at baseline [x]  Mild Wheezes/ Clear []  Intermittent wheezes with good air entry []  Bilateral/unilateral wheezing with diminished air entry []  Insp/Exp wheeze and/or poor aeration   Ventilator Pressures   []  Chronic Ventilator []  Insp. Pressure less than 25 cm H20 []  Insp. Pressure less than 25 cm H20 []  Insp. Pressure exceeds 25 cm H20 [x]  Insp.  Pressure exceeds 30 cm H20   Plateau Pressure []  NA   [x]  Plateau Pressure less than 4  []  Plateau Pressure less than or equal to 5 []  Plateau Pressure greater than or equal to 6 []  Plateau Pressure greater than or equal to 100 [facility]  5:17 AM

## 2021-12-01 NOTE — PLAN OF CARE
Problem: OXYGENATION/RESPIRATORY FUNCTION  Goal: Patient will maintain patent airway  12/1/2021 0846 by Deidra Burden RCP  Outcome: Ongoing     Problem: OXYGENATION/RESPIRATORY FUNCTION  Goal: Patient will achieve/maintain normal respiratory rate/effort  Description: Respiratory rate and effort will be within normal limits for the patient  12/1/2021 0846 by Deidra Burden RCP  Outcome: Ongoing     Problem: MECHANICAL VENTILATION  Goal: Patient will maintain patent airway  12/1/2021 0846 by Deidra Burden RCP  Outcome: Ongoing     Problem: MECHANICAL VENTILATION  Goal: Oral health is maintained or improved  12/1/2021 0846 by Deidra Burden RCP  Outcome: Ongoing     Problem: MECHANICAL VENTILATION  Goal: ET tube will be managed safely  12/1/2021 0846 by Deidra Burden RCP  Outcome: Ongoing     Problem: MECHANICAL VENTILATION  Goal: Ability to express needs and understand communication  12/1/2021 0846 by Deidra Burden RCP  Outcome: Ongoing     Problem: MECHANICAL VENTILATION  Goal: Mobility/activity is maintained at optimum level for patient  12/1/2021 0846 by Deidra Burden RCP  Outcome: Ongoing

## 2021-12-01 NOTE — PLAN OF CARE
Problem: OXYGENATION/RESPIRATORY FUNCTION  Goal: Patient will maintain patent airway  Outcome: Ongoing     Problem: OXYGENATION/RESPIRATORY FUNCTION  Goal: Patient will achieve/maintain normal respiratory rate/effort  Description: Respiratory rate and effort will be within normal limits for the patient  Outcome: Ongoing     Problem: MECHANICAL VENTILATION  Goal: Patient will maintain patent airway  Outcome: Ongoing     Problem: MECHANICAL VENTILATION  Goal: Oral health is maintained or improved  Outcome: Ongoing     Problem: MECHANICAL VENTILATION  Goal: ET tube will be managed safely  Outcome: Ongoing     Problem: MECHANICAL VENTILATION  Goal: Ability to express needs and understand communication  Outcome: Ongoing     Problem: MECHANICAL VENTILATION  Goal: Mobility/activity is maintained at optimum level for patient  Outcome: Ongoing     Problem: SKIN INTEGRITY  Goal: Skin integrity is maintained or improved  Outcome: Ongoing     Problem: NUTRITION  Goal: Nutritional status is improving  Outcome: Ongoing

## 2021-12-01 NOTE — PROGRESS NOTES
Pharmacy Note     Renal Dose Adjustment    Bassam Key is a 63 y.o. male. Pharmacist assessment of renally cleared medications. Recent Labs     11/30/21 1951 11/30/21 2339   BUN 78* 72*       Recent Labs     11/30/21 1951 11/30/21 2339   CREATININE 1.68* 1.50*       Estimated Creatinine Clearance: 44 mL/min (A) (based on SCr of 1.5 mg/dL (H)). Estimated CrCl using Ideal Body Weight: 44 mL/min (based on IBW 58.2 kg)    Height:   Ht Readings from Last 1 Encounters:   No data found for Ht     Weight:  Wt Readings from Last 1 Encounters:   11/30/21 128 lb 3.2 oz (58.2 kg)       The following medication dose has been adjusted based upon renal function per P&T Guidelines:             Levofloxacin 750 mg IV every 24 hours changed to levofloxacin 750 mg IV every 48 hours.

## 2021-12-01 NOTE — PLAN OF CARE
Pt in DKA with decreased mentation. On admission, agitated, received ativan x3 became drowsy. ABG showing persistent M acidosis with bicarb of 8  On Levo with hypotension, MAP around 60  Tachypnea upper 30s, HR 100s    O/E, diffuse rhonchi, no wheezing, no rales. UO 75 ml/hr  On D5 half NS with Insulin drip. CXR showing signs of aspiration pneumonitis Vs pneumonia    Plan:  Started on bicarb drip with 2 am bicarb bolus. Repeat ABG still showing hypoxia, pH of 7.1  MAP at 62 on Levo. Fluids running @250 ml/hr, good UO. Lactate normal.  No prior Echo. Decreased mentation, accessory muscle use with tachypnea and hypoxia  Decision made to intubate the patient.   Pressor support  RAAD resolving      Raven Munguia MD, PGY-3  Critical care Resident  Internal Medicine [facility]  12/1/2021 4:25 AM

## 2021-12-01 NOTE — ANESTHESIA PROCEDURE NOTES
Airway  Date/Time: 12/1/2021 4:15 AM  Urgency: elective    Airway not difficult    General Information and Staff    Patient location during procedure: OR  Anesthesiologist: Christiano Pierson MD  Resident/CRNA: PAVAN Skinner - CRNA  Performed: anesthesiologist and resident/CRNA     Indications and Patient Condition  Indications for airway management: respiratory failure and cardiovascular instability  Spontaneous ventilation: present  Sedation level: minimal  Preoxygenated: yes  Patient position: sniffing  MILS not maintained throughout  Mask difficulty assessment: vent by bag mask    Final Airway Details  Final airway type: endotracheal airway      Successful airway: ETT  Cuffed: yes   Successful intubation technique: video laryngoscopy  Endotracheal tube insertion site: oral  Blade: Rm  Blade size: #4  ETT size (mm): 8.0  Cormack-Lehane Classification: grade I - full view of glottis  Placement verified by: chest auscultation and capnometry   Measured from: lips  ETT to lips (cm): 23  Number of attempts at approach: 1  Ventilation between attempts: bag mask  Number of other approaches attempted: 0    Additional Comments  Uneventful intubation.     Non-anticipated difficult airway: yes

## 2021-12-01 NOTE — PLAN OF CARE
Problem: Skin Integrity:  Goal: Will show no infection signs and symptoms  Description: Will show no infection signs and symptoms  11/30/2021 2110 by Nayely Anderson RN  Outcome: Ongoing     Problem: Skin Integrity:  Goal: Absence of new skin breakdown  Description: Absence of new skin breakdown  11/30/2021 2110 by Nayely Anderson RN  Outcome: Ongoing     Problem: Falls - Risk of:  Goal: Will remain free from falls  Description: Will remain free from falls  11/30/2021 2110 by Nayely Anderson RN  Outcome: Ongoing     Problem: Falls - Risk of:  Goal: Absence of physical injury  Description: Absence of physical injury  11/30/2021 2110 by Nayely Anderson RN  Outcome: Ongoing     Problem: Fluid Volume - Imbalance:  Goal: Will remain free of signs and symptoms of dehydration  Description: Will remain free of signs and symptoms of dehydration  11/30/2021 2110 by Nayely Anderson RN  Outcome: Ongoing     Problem: Fluid Volume - Imbalance:  Goal: Absence of imbalanced fluid volume signs and symptoms  Description: Absence of imbalanced fluid volume signs and symptoms  11/30/2021 2110 by Nayely Anderson RN  Outcome: Ongoing     Problem: Serum Glucose Level - Abnormal:  Goal: Ability to maintain appropriate glucose levels will improve  Description: Ability to maintain appropriate glucose levels will improve  11/30/2021 2110 by Nayely Anderson RN  Outcome: Ongoing     Problem: Injury - Acid Base Imbalance:  Goal: Acid-base balance  Description: Acid-base balance  11/30/2021 2110 by Nayely Anderson RN  Outcome: Ongoing

## 2021-12-01 NOTE — PROGRESS NOTES
Date: 12/1/2021  Time: 0427  Patient identity confirmed:  Yes  Indications: respiratory failure  Preoxygenation: yes    Laryngoscope size and type Glidescope 4  Airway introducer used: No  Evac: No  ETT size:an 8.0 cuffed  Number of attempts:1   Cords visualized:  [x] Clearly  [] Poorly  Breath sounds present bilaterally: Yes   ETCO2   [x] Positive   ETT secured at  23    ETT secured with leola  Chest x-ray ordered: Yes     Difficult airway:    No       If yes, was red tape placed around ETT:   No    Was this a Code Situation:    No             BP: 102/60        Procedure performed by: Kaz Orosco RCP  4:39 AM

## 2021-12-01 NOTE — PROGRESS NOTES
Critical Care Team - Daily Progress Note      Date and time: 2021 8:15 AM  Patient's name:  Brandon Louis  Medical Record Number: [id number]  Patient's account/billing number: [de-identified]  Patient's YOB: 1960  Age: 63 y.o. Date of Admission: 2021  7:39 AM  Length of stay during current admission: 1      Primary Care Physician: Dante Hernandez MD  ICU Attending Physician: Dr. Madelaine Schroeder Status: Full Code    Reason for ICU admission: Severe DKA      SUBJECTIVE:     OVERNIGHT EVENTS:       Patient remained severely acidotic and was started on bicarbonate drip. Continue to have decreased mental status and was not responsive. Patient was transitioned to high flow oxygen, then BiPAP and then eventually intubated for airway protection. Fever:-  Temp (24hrs), Av.1 °F (36.7 °C), Min:97.4 °F (36.3 °C), Max:98.8 °F (37.1 °C)      Blood pressure: MAP 62-75  Vasopressors:Levo at 15  Systolic (40UYG), QAJ:432 , Min:102 , EDM:374     Diastolic (61DVP), SSB:86, Min:32, Max:67      Urine output in the last 24 hours:  In: 2006 [I.V.:4495]  Out: 6532 [Urine:2505]  Net since admission:+2.9L  Patient Vitals for the past 96 hrs (Last 3 readings):   Weight   21 0244 121 lb 7.6 oz (55.1 kg)   21 1200 128 lb 3.2 oz (58.2 kg)   21 0827 145 lb (65.8 kg)         ABG/Ventilator settings   PH 7.31,PCO2 44.2,PO2 202,HCO3 44.2  FIO2 50%, RR 22 ,PEEP 10,[id number]        AWAKE & FOLLOWING COMMANDS:  [x] No   [] Yes    CURRENT VENTILATION STATUS:     [x] Ventilator  [] BIPAP  [] Nasal Cannula [] Room Air        SECRETIONS Amount:  [x] Small [] Moderate  [] Large  [] None  Color:     [x] White [] Colored  [] Bloody    SEDATION:  RAAS Score:  [x] Propofol gtt  [] Versed gtt  [] Ativan gtt   [] No Sedation    PARALYZED:  [x] No    [] Yes    DIARRHEA:                [x] No                [] Yes  (C. Difficile status: [] positive [] negative                                                                                                                     [] pending)    VASOPRESSORS:  [] No    [x] Yes    If yes -   [x] Levophed       [] Dopamine     [] Vasopressin       [] Dobutamine  [] Phenylephrine         [] Epinephrine    CENTRAL LINES:     [] No   [x] Yes   (Date of Insertion:  )           If yes -     [] Right IJ     [] Left IJ [x] Right Femoral [] Left Femoral                   [] Right Subclavian [] Left Subclavian       SANTANA'S CATHETER:   [] No   [x] Yes  (Date of Insertion:   )     URINE OUTPUT:            [x] Good   [] Low              [] Anuric      OBJECTIVE:     VITAL SIGNS:  BP (!) 103/58   Pulse 94   Temp 98.2 °F (36.8 °C) (Bladder)   Resp 26   Wt 121 lb 7.6 oz (55.1 kg)   SpO2 100%   BMI 17.94 kg/m²   Tmax over 24 hours:  Temp (24hrs), Av.1 °F (36.7 °C), Min:97.4 °F (36.3 °C), Max:98.8 °F (37.1 °C)      Patient Vitals for the past 6 hrs:   BP Temp Temp src Pulse Resp SpO2 Weight   2113 -- -- -- -- 26 100 % --   2145 -- -- -- 94 (!) 37 98 % --   2130 -- -- -- 101 30 99 % --   2115 -- -- -- 95 25 100 % --   21 0700 (!) 103/58 -- -- 89 (!) 34 98 % --   2145 -- -- -- 89 (!) 38 100 % --   21 -- -- -- 88 19 100 % --   2115 -- -- -- 94 22 99 % --   21 0600 109/60 98.2 °F (36.8 °C) Bladder 93 19 100 % --   2145 -- -- -- 92 22 100 % --   21 -- -- -- 94 22 100 % --   2115 -- -- -- 92 22 100 % --   21 0500 111/60 -- -- 99 22 100 % --   21 0445 -- -- -- 110 22 100 % --   215 -- -- -- 115 22 100 % --   21 -- -- -- 102 24 100 % --   215 -- -- -- 105 22 95 % --   21 0400 (!) 131/46 98.8 °F (37.1 °C) Bladder 104 (!) 38 (!) 88 % --   215 -- -- -- 104 (!) 39 (!) 89 % --   210 -- -- -- 103 20 97 % --   21 0315 -- -- -- 101 (!) 36 97 % --   12/01/21 0300 102/60 -- -- 99 27 96 % --   12/01/21 0248 -- -- -- -- 23 -- --   12/01/21 0245 -- -- -- 100 25 99 % --   12/01/21 0244 -- -- -- -- -- -- 121 lb 7.6 oz (55.1 kg)   12/01/21 0230 -- -- -- 98 25 99 % --         Intake/Output Summary (Last 24 hours) at 12/1/2021 0815  Last data filed at 12/1/2021 0600  Gross per 24 hour   Intake 6495 ml   Output 3575 ml   Net 2920 ml     Wt Readings from Last 2 Encounters:   12/01/21 121 lb 7.6 oz (55.1 kg)     Body mass index is 17.94 kg/m². PHYSICAL EXAMINATION:    Constitutional: Intubated and sedated  EENT: ETT in place, PERRLA, EOMI, sclera clear, anicteric, oropharynx clear, no lesions, neck supple with midline trachea. Neck: Supple, symmetrical, trachea midline, no adenopathy, thyroid symmetric, no jvd skin normal  Respiratory: tachypnea, lungs clear to auscultation, no wheezes or rales.  No intercostal tenderness  Cardiovascular: regular rate and rhythm, normal S1, S2, no murmur noted and 2+ pulses throughout  Abdomen: soft, nontender, nondistended, no masses or organomegaly  Extremities:  peripheral pulses normal, no pedal edema, no clubbing or cyanosis    MEDICATIONS:    Scheduled Meds:   levofloxacin  750 mg IntraVENous Q48H    potassium chloride  20 mEq IntraVENous Q1H    sodium chloride flush  5-40 mL IntraVENous 2 times per day    FLUoxetine  40 mg Oral Daily    heparin (porcine)  5,000 Units SubCUTAneous 3 times per day    famotidine (PEPCID) injection  20 mg IntraVENous Daily     Continuous Infusions:   propofol 40 mcg/kg/min (12/01/21 0803)    dextrose      norepinephrine 14 mcg/min (12/01/21 0802)    sodium chloride      dextrose 5 % and 0.45 % NaCl Stopped (12/01/21 0000)    insulin 0.53 Units/hr (12/01/21 0626)    sodium bicarbonate infusion 150 mL/hr at 12/01/21 0730     PRN Meds:   albuterol, 2.5 mg, Q4H PRN  glucose, 15 g, PRN  dextrose, 12.5 g, PRN  glucagon (rDNA), 1 mg, PRN  dextrose, 100 mL/hr, PRN  dextrose, 12.5 g, PRN  polyethylene glycol, 17 g, Daily PRN  sodium chloride flush, 5-40 mL, PRN  sodium chloride, 25 mL, PRN  ondansetron, 4 mg, Q8H PRN   Or  ondansetron, 4 mg, Q6H PRN  acetaminophen, 650 mg, Q6H PRN   Or  acetaminophen, 650 mg, Q6H PRN  dextrose 5 % and 0.45 % NaCl, , Continuous PRN          VENT SETTINGS (Comprehensive) (if applicable):  Vent Information  $Ventilation: $Initial Day  Skin Assessment: Clean, dry, & intact  Suction Catheter Diameter: 14  Vent Type: Servo i  Vent Mode: PRVC  Vt Ordered: 510 mL  Rate Set: 22 bmp  FiO2 : 50 %  SpO2: 100 %  SpO2/FiO2 ratio: 200  Sensitivity: 2  PEEP/CPAP: 10  I Time/ I Time %: 0.9 s  Humidification Source: HME  Nitric Oxide/Epoprostenol In Use?: No  Mask Type: Full face mask  Mask Size: Medium  Additional Respiratory  Assessments  Pulse: 94  Resp: 26  SpO2: 100 %  End Tidal CO2: 34 (%)  Position: Semi-Powell's  Humidification Source: HME  Oral Care Completed?: Yes  Oral Care: Mouth suctioned  Subglottic Suction Done?: No    ABGs:     Laboratory findings:    Complete Blood Count:   Recent Labs     11/30/21  0800 12/01/21  0431   WBC 26.2* 18.1*   HGB 11.8* 12.1*   HCT 38.2* 35.3*    250        Last 3 Blood Glucose:   Recent Labs     11/30/21 1951 11/30/21 2339 12/01/21  0431   GLUCOSE 350* 266* 115*        PT/INR:  No results found for: PROTIME, INR  PTT:  No results found for: APTT, PTT    Comprehensive Metabolic Profile:   Recent Labs     11/30/21 1951 11/30/21 2339 12/01/21  0431    147* 150*   K 4.2 4.1 3.5*   * 119* 123*   CO2 8* 14* 20   BUN 78* 72* 61*   CREATININE 1.68* 1.50* 1.23*   GLUCOSE 350* 266* 115*   CALCIUM 7.2* 7.3* 7.4*      Magnesium:   Lab Results   Component Value Date    MG 2.5 12/01/2021     Phosphorus:   Lab Results   Component Value Date    PHOS 2.3 12/01/2021     Ionized Calcium: No results found for: CAION     Urinalysis:     Troponin: No results for input(s): TROPONINI in the last 72 hours.    Microbiology:    Cultures during this admission:     Blood cultures:                 [] None drawn      [x] Negative             []  Positive (Details:  )  Urine Culture:                   [] None drawn      [x] Negative             []  Positive (Details:  )  Sputum Culture:               [] None drawn       [x] Negative             []  Positive (Details:  )   Endotracheal aspirate:     [] None drawn       [x] Negative             []  Positive (Details:  )     Other pertinent Labs:       Radiology/Imaging:     Chest Xray (12/1/2021):    ASSESSMENT:     · Active Problems:  ·   Type 1 diabetes mellitus with ketoacidosis without coma ([facility] [address]  · Resolved Problems:  ·   * No resolved hospital problems. *  ·   ·         PLAN:     DKA  - AG now closed and BG normalized  - Resuscitate via DKA protocol  - Wean bicarb drip as able    Neurologic:   · Wean sedation  · Consider CT if still unresponsive     Cardiovascular:  · Hypotensive - levophed gtt  · Wean pressors as able    Pulmonary:  · Wean vent settings as able and extubated if possible     GI/Nutrition  · Glycolax  · Ulcer Prophylaxis: H2 blockers  · Diet: NPO    Renal/Fluid/Electrolyte  · IV Fluids: change to D5 1/2 NS @150  · I/O: In: 2000 [I.V.:2000]  · Out: - 3575, 1.9 to 3.6cc/kg/hr  · UOP: Monitor  · Monitor electrolytes, replace PRN   ID  WBC:         · Lab Results   Component Value Date     WBC 18.1 from 26.2 (H) 12/01/2021   · Possible aspiration PNA  · On Levaquin     Hematology:      · Recent Labs     12/01/21  0800   HGB 12.1   ·  stable  Endocrine:   ·  BG now normalized    DVT Prophylaxis  · SCD sleeves -thigh high and Heparin    Discharge Needs:  PT, OT, ST, SW and Case Management       CODE STATUS: No Order     DISPOSITION:  [x]? To remain ICU: Yes  []? OK for out of ICU from [address] standpoint    1. Feeding Diet: Diet NPO    2. Fluids D5 1/2 NS @150    3. Family updated     4. Analgesic acetaminophen    5.  Sedation propofol    6. Thrombo-prophylaxis LMW heparin     7. Mobility difficult to assess     8. Heads up 30 Degrees    9. Ulcer prophylaxis ? Pepcid    10. Glycemic control insulin    11. Spontaneous breathing trial  100% tube compensation and PEEP 5 cm water    12. Bowel regimen/urine output Glycolax     13. Indwelling catheter/lines R fem TLC, R fem A-line, PIV, Choi, ETT    14.  De-escalation Wean sedation and vent - extubated if possible.       Marry Huynh MD, MPH  Emergency Medicine Resident  Critical Care Service                12/1/2021, 8:15 AM

## 2021-12-02 ENCOUNTER — APPOINTMENT (OUTPATIENT)
Dept: GENERAL RADIOLOGY | Age: 61
DRG: 637 | End: 2021-12-02
Payer: COMMERCIAL

## 2021-12-02 LAB
ABSOLUTE EOS #: 0 K/UL (ref 0–0.4)
ABSOLUTE IMMATURE GRANULOCYTE: 0.12 K/UL (ref 0–0.3)
ABSOLUTE LYMPH #: 0.83 K/UL (ref 1–4.8)
ABSOLUTE MONO #: 0.12 K/UL (ref 0.1–0.8)
ALBUMIN SERPL-MCNC: 2.4 G/DL (ref 3.5–5.2)
ALBUMIN/GLOBULIN RATIO: 1 (ref 1–2.5)
ALLEN TEST: ABNORMAL
ALP BLD-CCNC: 108 U/L (ref 40–129)
ALT SERPL-CCNC: 127 U/L (ref 5–41)
ANION GAP SERPL CALCULATED.3IONS-SCNC: 7 MMOL/L (ref 9–17)
ANION GAP SERPL CALCULATED.3IONS-SCNC: 8 MMOL/L (ref 9–17)
AST SERPL-CCNC: 109 U/L
BASOPHILS # BLD: 0 % (ref 0–2)
BASOPHILS ABSOLUTE: 0 K/UL (ref 0–0.2)
BILIRUB SERPL-MCNC: 0.74 MG/DL (ref 0.3–1.2)
BILIRUBIN DIRECT: 0.23 MG/DL
BILIRUBIN, INDIRECT: 0.51 MG/DL (ref 0–1)
BUN BLDV-MCNC: 26 MG/DL (ref 6–20)
BUN BLDV-MCNC: 30 MG/DL (ref 6–20)
BUN BLDV-MCNC: 40 MG/DL (ref 6–20)
BUN BLDV-MCNC: 42 MG/DL (ref 6–20)
BUN/CREAT BLD: ABNORMAL (ref 9–20)
CALCIUM SERPL-MCNC: 6.7 MG/DL (ref 8.6–10.4)
CALCIUM SERPL-MCNC: 6.9 MG/DL (ref 8.6–10.4)
CALCIUM SERPL-MCNC: 6.9 MG/DL (ref 8.6–10.4)
CALCIUM SERPL-MCNC: 7.2 MG/DL (ref 8.6–10.4)
CHLORIDE BLD-SCNC: 116 MMOL/L (ref 98–107)
CHLORIDE BLD-SCNC: 118 MMOL/L (ref 98–107)
CHLORIDE BLD-SCNC: 119 MMOL/L (ref 98–107)
CHLORIDE BLD-SCNC: 119 MMOL/L (ref 98–107)
CO2: 17 MMOL/L (ref 20–31)
CO2: 19 MMOL/L (ref 20–31)
CO2: 19 MMOL/L (ref 20–31)
CO2: 20 MMOL/L (ref 20–31)
CREAT SERPL-MCNC: 0.63 MG/DL (ref 0.7–1.2)
CREAT SERPL-MCNC: 0.68 MG/DL (ref 0.7–1.2)
CREAT SERPL-MCNC: 0.83 MG/DL (ref 0.7–1.2)
CREAT SERPL-MCNC: 0.89 MG/DL (ref 0.7–1.2)
DIFFERENTIAL TYPE: ABNORMAL
EOSINOPHILS RELATIVE PERCENT: 0 % (ref 1–4)
FIO2: 50
GFR AFRICAN AMERICAN: >60 ML/MIN
GFR NON-AFRICAN AMERICAN: >60 ML/MIN
GFR SERPL CREATININE-BSD FRML MDRD: ABNORMAL ML/MIN/{1.73_M2}
GLOBULIN: ABNORMAL G/DL (ref 1.5–3.8)
GLUCOSE BLD-MCNC: 114 MG/DL (ref 70–99)
GLUCOSE BLD-MCNC: 121 MG/DL (ref 75–110)
GLUCOSE BLD-MCNC: 123 MG/DL (ref 75–110)
GLUCOSE BLD-MCNC: 142 MG/DL (ref 70–99)
GLUCOSE BLD-MCNC: 184 MG/DL (ref 70–99)
GLUCOSE BLD-MCNC: 189 MG/DL (ref 74–100)
GLUCOSE BLD-MCNC: 66 MG/DL (ref 75–110)
GLUCOSE BLD-MCNC: 83 MG/DL (ref 75–110)
GLUCOSE BLD-MCNC: 94 MG/DL (ref 70–99)
HCT VFR BLD CALC: 33.6 % (ref 40.7–50.3)
HEMOGLOBIN: 11.8 G/DL (ref 13–17)
IMMATURE GRANULOCYTES: 1 %
LYMPHOCYTES # BLD: 7 % (ref 24–44)
MAGNESIUM: 2 MG/DL (ref 1.6–2.6)
MAGNESIUM: 2.1 MG/DL (ref 1.6–2.6)
MAGNESIUM: 2.1 MG/DL (ref 1.6–2.6)
MAGNESIUM: 2.2 MG/DL (ref 1.6–2.6)
MCH RBC QN AUTO: 29.4 PG (ref 25.2–33.5)
MCHC RBC AUTO-ENTMCNC: 35.1 G/DL (ref 28.4–34.8)
MCV RBC AUTO: 83.6 FL (ref 82.6–102.9)
MODE: ABNORMAL
MONOCYTES # BLD: 1 % (ref 1–7)
MORPHOLOGY: NORMAL
NEGATIVE BASE EXCESS, ART: 4 (ref 0–2)
NRBC AUTOMATED: 0 PER 100 WBC
O2 DEVICE/FLOW/%: ABNORMAL
PATIENT TEMP: 38.1
PDW BLD-RTO: 14.1 % (ref 11.8–14.4)
PHOSPHORUS: 1.4 MG/DL (ref 2.5–4.5)
PHOSPHORUS: 1.7 MG/DL (ref 2.5–4.5)
PHOSPHORUS: 1.7 MG/DL (ref 2.5–4.5)
PHOSPHORUS: 2.2 MG/DL (ref 2.5–4.5)
PLATELET # BLD: 148 K/UL (ref 138–453)
PLATELET ESTIMATE: ABNORMAL
PMV BLD AUTO: 9.7 FL (ref 8.1–13.5)
POC HCO3: 20.6 MMOL/L (ref 21–28)
POC O2 SATURATION: 99 % (ref 94–98)
POC PCO2 TEMP: 35 MM HG
POC PCO2: 33.5 MM HG (ref 35–48)
POC PH TEMP: 7.38
POC PH: 7.4 (ref 7.35–7.45)
POC PO2 TEMP: 123 MM HG
POC PO2: 115.5 MM HG (ref 83–108)
POSITIVE BASE EXCESS, ART: ABNORMAL (ref 0–3)
POTASSIUM SERPL-SCNC: 3.7 MMOL/L (ref 3.7–5.3)
POTASSIUM SERPL-SCNC: 4 MMOL/L (ref 3.7–5.3)
POTASSIUM SERPL-SCNC: 4.1 MMOL/L (ref 3.7–5.3)
POTASSIUM SERPL-SCNC: 4.3 MMOL/L (ref 3.7–5.3)
RBC # BLD: 4.02 M/UL (ref 4.21–5.77)
RBC # BLD: ABNORMAL 10*6/UL
SAMPLE SITE: ABNORMAL
SEG NEUTROPHILS: 91 % (ref 36–66)
SEGMENTED NEUTROPHILS ABSOLUTE COUNT: 10.83 K/UL (ref 1.8–7.7)
SODIUM BLD-SCNC: 143 MMOL/L (ref 135–144)
SODIUM BLD-SCNC: 143 MMOL/L (ref 135–144)
SODIUM BLD-SCNC: 146 MMOL/L (ref 135–144)
SODIUM BLD-SCNC: 146 MMOL/L (ref 135–144)
TCO2 (CALC), ART: ABNORMAL MMOL/L (ref 22–29)
TOTAL PROTEIN: 4.9 G/DL (ref 6.4–8.3)
WBC # BLD: 11.9 K/UL (ref 3.5–11.3)
WBC # BLD: ABNORMAL 10*3/UL

## 2021-12-02 PROCEDURE — 82803 BLOOD GASES ANY COMBINATION: CPT

## 2021-12-02 PROCEDURE — 99291 CRITICAL CARE FIRST HOUR: CPT | Performed by: INTERNAL MEDICINE

## 2021-12-02 PROCEDURE — 6370000000 HC RX 637 (ALT 250 FOR IP): Performed by: STUDENT IN AN ORGANIZED HEALTH CARE EDUCATION/TRAINING PROGRAM

## 2021-12-02 PROCEDURE — 37799 UNLISTED PX VASCULAR SURGERY: CPT

## 2021-12-02 PROCEDURE — 82947 ASSAY GLUCOSE BLOOD QUANT: CPT

## 2021-12-02 PROCEDURE — 2500000003 HC RX 250 WO HCPCS: Performed by: STUDENT IN AN ORGANIZED HEALTH CARE EDUCATION/TRAINING PROGRAM

## 2021-12-02 PROCEDURE — 6370000000 HC RX 637 (ALT 250 FOR IP): Performed by: HEALTH CARE PROVIDER

## 2021-12-02 PROCEDURE — 2500000003 HC RX 250 WO HCPCS: Performed by: GENERAL PRACTICE

## 2021-12-02 PROCEDURE — 80048 BASIC METABOLIC PNL TOTAL CA: CPT

## 2021-12-02 PROCEDURE — 2000000000 HC ICU R&B

## 2021-12-02 PROCEDURE — 94761 N-INVAS EAR/PLS OXIMETRY MLT: CPT

## 2021-12-02 PROCEDURE — 6360000002 HC RX W HCPCS: Performed by: STUDENT IN AN ORGANIZED HEALTH CARE EDUCATION/TRAINING PROGRAM

## 2021-12-02 PROCEDURE — 83735 ASSAY OF MAGNESIUM: CPT

## 2021-12-02 PROCEDURE — 80076 HEPATIC FUNCTION PANEL: CPT

## 2021-12-02 PROCEDURE — 85025 COMPLETE CBC W/AUTO DIFF WBC: CPT

## 2021-12-02 PROCEDURE — 6360000002 HC RX W HCPCS: Performed by: HEALTH CARE PROVIDER

## 2021-12-02 PROCEDURE — 94003 VENT MGMT INPAT SUBQ DAY: CPT

## 2021-12-02 PROCEDURE — 84100 ASSAY OF PHOSPHORUS: CPT

## 2021-12-02 PROCEDURE — 2700000000 HC OXYGEN THERAPY PER DAY

## 2021-12-02 PROCEDURE — 2580000003 HC RX 258: Performed by: HEALTH CARE PROVIDER

## 2021-12-02 PROCEDURE — 2580000003 HC RX 258: Performed by: STUDENT IN AN ORGANIZED HEALTH CARE EDUCATION/TRAINING PROGRAM

## 2021-12-02 PROCEDURE — 71045 X-RAY EXAM CHEST 1 VIEW: CPT

## 2021-12-02 RX ORDER — CALCIUM GLUCONATE 20 MG/ML
1000 INJECTION, SOLUTION INTRAVENOUS ONCE
Status: COMPLETED | OUTPATIENT
Start: 2021-12-02 | End: 2021-12-02

## 2021-12-02 RX ADMIN — LEVOFLOXACIN 750 MG: 5 INJECTION, SOLUTION INTRAVENOUS at 01:43

## 2021-12-02 RX ADMIN — DEXTROSE AND SODIUM CHLORIDE: 5; 450 INJECTION, SOLUTION INTRAVENOUS at 13:05

## 2021-12-02 RX ADMIN — CLINDAMYCIN IN 5 PERCENT DEXTROSE 600 MG: 12 INJECTION, SOLUTION INTRAVENOUS at 21:01

## 2021-12-02 RX ADMIN — FLUOXETINE HYDROCHLORIDE 40 MG: 20 CAPSULE ORAL at 08:18

## 2021-12-02 RX ADMIN — POTASSIUM & SODIUM PHOSPHATES POWDER PACK 280-160-250 MG 500 MG: 280-160-250 PACK at 16:19

## 2021-12-02 RX ADMIN — SODIUM CHLORIDE, PRESERVATIVE FREE 10 ML: 5 INJECTION INTRAVENOUS at 21:01

## 2021-12-02 RX ADMIN — Medication 25 MCG/HR: at 13:48

## 2021-12-02 RX ADMIN — CLINDAMYCIN IN 5 PERCENT DEXTROSE 600 MG: 12 INJECTION, SOLUTION INTRAVENOUS at 14:29

## 2021-12-02 RX ADMIN — FAMOTIDINE 20 MG: 10 INJECTION INTRAVENOUS at 08:50

## 2021-12-02 RX ADMIN — SODIUM CHLORIDE, PRESERVATIVE FREE 10 ML: 5 INJECTION INTRAVENOUS at 08:18

## 2021-12-02 RX ADMIN — POTASSIUM & SODIUM PHOSPHATES POWDER PACK 280-160-250 MG 500 MG: 280-160-250 PACK at 23:36

## 2021-12-02 RX ADMIN — FAMOTIDINE 20 MG: 10 INJECTION INTRAVENOUS at 21:01

## 2021-12-02 RX ADMIN — POTASSIUM PHOSPHATE, MONOBASIC AND POTASSIUM PHOSPHATE, DIBASIC 30 MMOL: 224; 236 INJECTION, SOLUTION, CONCENTRATE INTRAVENOUS at 10:07

## 2021-12-02 RX ADMIN — HEPARIN SODIUM 5000 UNITS: 5000 INJECTION INTRAVENOUS; SUBCUTANEOUS at 21:01

## 2021-12-02 RX ADMIN — CLINDAMYCIN IN 5 PERCENT DEXTROSE 600 MG: 12 INJECTION, SOLUTION INTRAVENOUS at 08:19

## 2021-12-02 RX ADMIN — DEXTROSE MONOHYDRATE 12.5 G: 25 INJECTION, SOLUTION INTRAVENOUS at 17:47

## 2021-12-02 RX ADMIN — HEPARIN SODIUM 5000 UNITS: 5000 INJECTION INTRAVENOUS; SUBCUTANEOUS at 14:29

## 2021-12-02 RX ADMIN — PROPOFOL 30 MCG/KG/MIN: 10 INJECTION, EMULSION INTRAVENOUS at 03:48

## 2021-12-02 RX ADMIN — ACETAMINOPHEN 650 MG: 325 TABLET ORAL at 16:19

## 2021-12-02 RX ADMIN — CALCIUM GLUCONATE 1000 MG: 20 INJECTION, SOLUTION INTRAVENOUS at 16:06

## 2021-12-02 RX ADMIN — HEPARIN SODIUM 5000 UNITS: 5000 INJECTION INTRAVENOUS; SUBCUTANEOUS at 05:46

## 2021-12-02 ASSESSMENT — PULMONARY FUNCTION TESTS
PIF_VALUE: 24
PIF_VALUE: 25
PIF_VALUE: 25
PIF_VALUE: 24
PIF_VALUE: 31
PIF_VALUE: 26
PIF_VALUE: 23
PIF_VALUE: 25
PIF_VALUE: 25
PIF_VALUE: 31
PIF_VALUE: 28
PIF_VALUE: 26
PIF_VALUE: 25
PIF_VALUE: 47
PIF_VALUE: 25
PIF_VALUE: 29
PIF_VALUE: 20
PIF_VALUE: 28
PIF_VALUE: 25
PIF_VALUE: 26
PIF_VALUE: 25
PIF_VALUE: 26
PIF_VALUE: 25
PIF_VALUE: 26
PIF_VALUE: 24
PIF_VALUE: 28
PIF_VALUE: 57
PIF_VALUE: 25
PIF_VALUE: 32
PIF_VALUE: 26
PIF_VALUE: 23
PIF_VALUE: 24
PIF_VALUE: 24
PIF_VALUE: 26
PIF_VALUE: 32

## 2021-12-02 NOTE — PLAN OF CARE
Problem: OXYGENATION/RESPIRATORY FUNCTION  Goal: Patient will maintain patent airway  12/2/2021 0808 by GRACIELA TijerinaP  Outcome: Ongoing  12/1/2021 2105 by Ruddy Heller RN  Outcome: Ongoing  12/1/2021 2029 by GRACIELA NewmanP  Outcome: Ongoing  Goal: Patient will achieve/maintain normal respiratory rate/effort  Description: Respiratory rate and effort will be within normal limits for the patient  12/2/2021 8319 by Cheri Ordoñez, RCP  Outcome: Ongoing  12/1/2021 2105 by Ruddy Heller RN  Outcome: Ongoing  12/1/2021 2029 by Yao Esquivel RCP  Outcome: Ongoing     Problem: MECHANICAL VENTILATION  Goal: Patient will maintain patent airway  12/2/2021 0808 by Cheri Ordoñez RCP  Outcome: Ongoing  12/1/2021 2105 by Ruddy Heller RN  Outcome: Ongoing  12/1/2021 2029 by Yao Esquivel RCP  Outcome: Ongoing  Goal: Oral health is maintained or improved  12/2/2021 0808 by Cheri Ordoñez RCP  Outcome: Ongoing  12/1/2021 2105 by Ruddy Heller RN  Outcome: Ongoing  12/1/2021 2029 by Yao Esquivel RCP  Outcome: Ongoing  Goal: ET tube will be managed safely  12/2/2021 0808 by Cheri Ordoñez RCP  Outcome: Ongoing  12/1/2021 2105 by Ruddy Heller RN  Outcome: Ongoing  12/1/2021 2029 by Yao Esquivel RCP  Outcome: Ongoing  Goal: Ability to express needs and understand communication  12/2/2021 0808 by Cheri Ordoñez RCP  Outcome: Ongoing  12/1/2021 2105 by Ruddy Heller RN  Outcome: Ongoing  12/1/2021 2029 by GRACIELA NewmanP  Outcome: Ongoing  Goal: Mobility/activity is maintained at optimum level for patient  12/2/2021 5882 by Cheri Ordoñez RCP  Outcome: Ongoing  12/1/2021 2105 by Ruddy Heller RN  Outcome: Ongoing  12/1/2021 2029 by Yao Esquivel RCP  Outcome: Ongoing

## 2021-12-02 NOTE — PLAN OF CARE
Problem: Non-Violent Restraints  Goal: Removal from restraints as soon as assessed to be safe  Outcome: Ongoing     Problem: Non-Violent Restraints  Goal: No harm/injury to patient while restraints in use  Outcome: Ongoing     Problem: Non-Violent Restraints  Goal: Patient's dignity will be maintained  Outcome: Ongoing     Problem: Skin Integrity:  Goal: Will show no infection signs and symptoms  Description: Will show no infection signs and symptoms  12/1/2021 2105 by Louie Chacko RN  Outcome: Ongoing     Problem: Skin Integrity:  Goal: Absence of new skin breakdown  Description: Absence of new skin breakdown  12/1/2021 2105 by Louie Chacko RN  Outcome: Ongoing     Problem: Falls - Risk of:  Goal: Will remain free from falls  Description: Will remain free from falls  Outcome: Ongoing     Problem: Falls - Risk of:  Goal: Absence of physical injury  Description: Absence of physical injury  Outcome: Ongoing     Problem: Fluid Volume - Imbalance:  Goal: Will remain free of signs and symptoms of dehydration  Description: Will remain free of signs and symptoms of dehydration  Outcome: Ongoing     Problem: Fluid Volume - Imbalance:  Goal: Absence of imbalanced fluid volume signs and symptoms  Description: Absence of imbalanced fluid volume signs and symptoms  Outcome: Ongoing     Problem: Serum Glucose Level - Abnormal:  Goal: Ability to maintain appropriate glucose levels will improve  Description: Ability to maintain appropriate glucose levels will improve  Outcome: Ongoing     Problem: Injury - Acid Base Imbalance:  Goal: Acid-base balance  Description: Acid-base balance  Outcome: Ongoing     Problem: OXYGENATION/RESPIRATORY FUNCTION  Goal: Patient will maintain patent airway  12/1/2021 2105 by Louie Chacko RN  Outcome: Ongoing     Problem: OXYGENATION/RESPIRATORY FUNCTION  Goal: Patient will achieve/maintain normal respiratory rate/effort  Description: Respiratory rate and effort will be within normal limits for the patient  12/1/2021 2105 by Harmeet Estevez RN  Outcome: Ongoing     Problem: MECHANICAL VENTILATION  Goal: Patient will maintain patent airway  12/1/2021 2105 by Harmeet Estevez RN  Outcome: Ongoing     Problem: MECHANICAL VENTILATION  Goal: Oral health is maintained or improved  12/1/2021 2105 by Harmeet Estevez RN  Outcome: Ongoing     Problem: MECHANICAL VENTILATION  Goal: ET tube will be managed safely  12/1/2021 2105 by Harmeet Estevez RN  Outcome: Ongoing     Problem: MECHANICAL VENTILATION  Goal: Ability to express needs and understand communication  12/1/2021 2105 by Harmeet Estevez RN  Outcome: Ongoing     Problem: MECHANICAL VENTILATION  Goal: Mobility/activity is maintained at optimum level for patient  12/1/2021 2105 by Harmeet Estevez RN  Outcome: Ongoing     Problem: SKIN INTEGRITY  Goal: Skin integrity is maintained or improved  12/1/2021 2105 by Harmeet Estevez RN  Outcome: Ongoing     Problem: NUTRITION  Goal: Nutritional status is improving  Outcome: Ongoing

## 2021-12-02 NOTE — PROGRESS NOTES
Family updated:    Writer called the spouse at the given number, patient name and date of birth was confirmed from the spouse. Spouse was updated about yesterday and today proceedings. She was informed about why the patient is admitted to the ICU and how he is doing right now. All the questions were answered appropriately at the best of writer's knowledge. Spouse said that probably she will be here tomorrow to visit the patient in person.       Noel Park  PGY-2  Family Medicine     12/2/2021  5:37 PM

## 2021-12-02 NOTE — PLAN OF CARE
Problem: Non-Violent Restraints  Goal: No harm/injury to patient while restraints in use  Outcome: Met This Shift     Problem: Non-Violent Restraints  Goal: Patient's dignity will be maintained  Outcome: Met This Shift     Problem: Skin Integrity:  Goal: Will show no infection signs and symptoms  Description: Will show no infection signs and symptoms  Outcome: Ongoing     Problem: Skin Integrity:  Goal: Absence of new skin breakdown  Description: Absence of new skin breakdown  Outcome: Ongoing     Problem: Falls - Risk of:  Goal: Will remain free from falls  Description: Will remain free from falls  Outcome: Ongoing     Problem: Falls - Risk of:  Goal: Absence of physical injury  Description: Absence of physical injury  Outcome: Ongoing     Problem: Fluid Volume - Imbalance:  Goal: Will remain free of signs and symptoms of dehydration  Description: Will remain free of signs and symptoms of dehydration  Outcome: Ongoing     Problem: Fluid Volume - Imbalance:  Goal: Absence of imbalanced fluid volume signs and symptoms  Description: Absence of imbalanced fluid volume signs and symptoms  Outcome: Ongoing     Problem: Serum Glucose Level - Abnormal:  Goal: Ability to maintain appropriate glucose levels will improve  Description: Ability to maintain appropriate glucose levels will improve  Outcome: Ongoing     Problem: Injury - Acid Base Imbalance:  Goal: Acid-base balance  Description: Acid-base balance  Outcome: Ongoing     Problem: OXYGENATION/RESPIRATORY FUNCTION  Goal: Patient will maintain patent airway  12/2/2021 1827 by Alvaro Daniel RN  Outcome: Ongoing     Problem: OXYGENATION/RESPIRATORY FUNCTION  Goal: Patient will achieve/maintain normal respiratory rate/effort  Description: Respiratory rate and effort will be within normal limits for the patient  12/2/2021 1827 by Esdras Daniel RN  Outcome: Ongoing     Problem: MECHANICAL VENTILATION  Goal: Patient will maintain patent airway  12/2/2021 1827 by Alvaro Larry ROHAN Daniel  Outcome: Ongoing     Problem: MECHANICAL VENTILATION  Goal: Oral health is maintained or improved  12/2/2021 1827 by Lisa Oropeza RN  Outcome: Ongoing     Problem: MECHANICAL VENTILATION  Goal: ET tube will be managed safely  12/2/2021 1827 by Lisa Oropeza RN  Outcome: Ongoing     Problem: MECHANICAL VENTILATION  Goal: Ability to express needs and understand communication  12/2/2021 1827 by Lisa Oropeza RN  Outcome: Ongoing     Problem: MECHANICAL VENTILATION  Goal: Mobility/activity is maintained at optimum level for patient  12/2/2021 1827 by Lisa Oropeza RN  Outcome: Ongoing     Problem: SKIN INTEGRITY  Goal: Skin integrity is maintained or improved  12/2/2021 1827 by Esdras Daniel RN  Outcome: Ongoing     Problem: NUTRITION  Goal: Nutritional status is improving  Outcome: Ongoing     Problem: Non-Violent Restraints  Goal: Removal from restraints as soon as assessed to be safe  Outcome: Not Met This Shift

## 2021-12-02 NOTE — PLAN OF CARE
Problem: Skin Integrity:  Goal: Will show no infection signs and symptoms  Description: Will show no infection signs and symptoms  Outcome: Ongoing     Problem: Skin Integrity:  Goal: Absence of new skin breakdown  Description: Absence of new skin breakdown  Outcome: Ongoing     Problem: OXYGENATION/RESPIRATORY FUNCTION  Goal: Patient will maintain patent airway  12/1/2021 2029 by Sandra Wright, RCP  Outcome: Ongoing     Problem: OXYGENATION/RESPIRATORY FUNCTION  Goal: Patient will achieve/maintain normal respiratory rate/effort  Description: Respiratory rate and effort will be within normal limits for the patient  12/1/2021 2029 by Sandra Zafar 43, RCP  Outcome: Ongoing     Problem: MECHANICAL VENTILATION  Goal: Patient will maintain patent airway  12/1/2021 2029 by Sandra Wright, RCP  Outcome: Ongoing     Problem: MECHANICAL VENTILATION  Goal: Oral health is maintained or improved  12/1/2021 2029 by Sandra Zafar 43, RCP  Outcome: Ongoing     Problem: MECHANICAL VENTILATION  Goal: ET tube will be managed safely  12/1/2021 2029 by Sandra Zafar 43, RCP  Outcome: Ongoing     Problem: MECHANICAL VENTILATION  Goal: Ability to express needs and understand communication  12/1/2021 2029 by Sandra Zafar 43, RCP  Outcome: Ongoing     Problem: MECHANICAL VENTILATION  Goal: Mobility/activity is maintained at optimum level for patient  12/1/2021 2029 by Sandra Zafar 43, RCP  Outcome: Ongoing

## 2021-12-02 NOTE — PROGRESS NOTES
Critical Care Team - Daily Progress Note      Date and time: 12/2/2021 8:30 AM  Patient's name:  Hannah Beavers  Medical Record Number: [id number]  Patient's account/billing number: [de-identified]  Patient's YOB: 1960  Age: 63 y.o. Date of Admission: 11/30/2021  7:39 AM  Length of stay during current admission: 2      Primary Care Physician: Savanah Mancilla MD    Code Status: Full Code    Reason for ICU admission:  DKA with coma      SUBJECTIVE:     OVERNIGHT EVENTS:       No acute events mentioned by the team and RN. Patient vent requirement is down trending, patient did have a tmax of 100.3. Patient was started on fentanyl, is already on propofol. UO is 1315mL in last shift. F.A[facility]. [facility] B.I.D.  Feeding Diet: Diet NPO   Fluids: D% and 1/2NS @ 125 mL   Family: Will update later   Analgesic: Fentanyl and Propofol   Sedation: Fentanyl and Propofol   Thrombo-prophylaxis: [] Enoxaparin, [x] Unfract. Heparin Subcutaneously, [] EPC Cuffs   Mobility: None   Heads up: 30 degree   Ulcer prophylaxis: [] PPI Agent, [x] T5Dcofo, [] Sucralfate, [] Other:   Glycemic control: Good   Spontaneous breathing trial: None, on sedation holiday   Bowel regimen/urine output: 1315 mL per last shift   Indwelling catheter/lines: CVC right femoral 11/30/2021, cath 11/30/2021   De-escalation: Sedation holiday, will see how patient do on that and try SBT.            AWAKE & FOLLOWING COMMANDS:  [x] No   [] Yes    CURRENT VENTILATION STATUS:     [x] Ventilator  [] BIPAP  [] Nasal Cannula [] Room Air        SECRETIONS Amount:  [x] Small [] Moderate  [] Large  [] None  Color:     [x] White [] Colored  [] Bloody    SEDATION:  Fentanyl and propofol  [x] Propofol gtt  [] Versed gtt  [] Ativan gtt   [] No Sedation    PARALYZED:  [x] No    [] Yes    DIARRHEA:                [x] No                [] Yes  (C. Difficile status: [] positive [] negative                                                                                                                     [] pending)    VASOPRESSORS:  [] No    [x] Yes    If yes -   [x] Levophed       [] Dopamine     [] Vasopressin       [] Dobutamine  [] Phenylephrine         [] Epinephrine    CENTRAL LINES:     [] No   [] Yes   (Date of Insertion:   )           If yes -     [] Right IJ     [] Left IJ [x] Right Femoral [] Left Femoral                   [] Right Subclavian [] Left Subclavian        SANTANA'S CATHETER:   [] No   [x] Yes  Ureteral cathetar(Date of Insertion:  2021 )     URINE OUTPUT:            [x] Good   [] Low              [] Anuric      OBJECTIVE:     VITAL SIGNS:  /66   Pulse 79   Temp 100.2 °F (37.9 °C) (Bladder)   Resp 28   Wt 128 lb 1.4 oz (58.1 kg)   SpO2 100%   BMI 18.92 kg/m²   Tmax over 24 hours:  Temp (24hrs), Av.1 °F (37.8 °C), Min:99.5 °F (37.5 °C), Max:100.6 °F (38.1 °C)      Patient Vitals for the past 6 hrs:   BP Temp Temp src Pulse Resp SpO2 Weight   21 0816 -- -- -- -- 28 100 % --   21 0807 -- -- -- 79 28 100 % --   21 0645 -- -- -- 82 28 100 % --   21 0630 -- -- -- 82 28 100 % --   21 0615 -- -- -- 82 24 100 % --   21 0600 115/66 100.2 °F (37.9 °C) Bladder 81 26 100 % --   21 0545 -- -- -- 82 20 100 % --   21 0530 -- -- -- 82 28 100 % --   21 0515 -- -- -- 82 21 100 % --   21 0500 133/70 -- -- 82 22 100 % --   21 0445 -- -- -- 79 25 100 % --   21 0431 -- -- -- -- -- -- 128 lb 1.4 oz (58.1 kg)   21 0430 -- -- -- 82 19 100 % --   21 0415 -- -- -- 84 21 100 % --   21 0400 115/69 100.6 °F (38.1 °C) Bladder 83 22 100 % --   21 0345 -- -- -- 84 25 100 % --   21 0330 -- -- -- 84 22 100 % --   21 0315 -- -- -- 84 25 100 % --   21 0300 104/66 -- -- 85 28 100 % --   21 0245 -- -- -- 85 22 100 % -- Intake/Output Summary (Last 24 hours) at 12/2/2021 0830  Last data filed at 12/2/2021 0600  Gross per 24 hour   Intake 3832.33 ml   Output 1260 ml   Net 2572.33 ml     Wt Readings from Last 2 Encounters:   12/02/21 128 lb 1.4 oz (58.1 kg)     Body mass index is 18.92 kg/m². PHYSICAL EXAMINATION:  Constitutional: Intubated and sedated  Neck: Supple, symmetrical, trachea midline, no adenopathy,  no jvd, skin normal  Respiratory: clear to auscultation, no wheezes or rales and unlabored breathing.   Cardiovascular: regular rate and rhythm, normal S1, S2, no murmur noted   Abdomen: soft, nontender, nondistended, no masses or organomegaly  Extremities:  peripheral pulses normal, no pedal edema, no clubbing or cyanosis     Any additional physical findings:    MEDICATIONS:    Scheduled Meds:   famotidine (PEPCID) injection  20 mg IntraVENous BID    levofloxacin  750 mg IntraVENous Q24H    clindamycin (CLEOCIN) IV  600 mg IntraVENous TID    insulin lispro  0-18 Units SubCUTAneous TID WC    insulin lispro  0-9 Units SubCUTAneous Nightly    insulin glargine  20 Units SubCUTAneous Nightly    sodium chloride flush  5-40 mL IntraVENous 2 times per day    FLUoxetine  40 mg Oral Daily    heparin (porcine)  5,000 Units SubCUTAneous 3 times per day     Continuous Infusions:   propofol Stopped (12/02/21 0745)    fentaNYL Stopped (12/02/21 0746)    dextrose 5 % and 0.45 % NaCl 125 mL/hr at 12/01/21 2308    dextrose      norepinephrine 8 mcg/min (12/02/21 0554)    sodium chloride      dextrose 5 % and 0.45 % NaCl Stopped (12/01/21 0000)     PRN Meds:   albuterol, 2.5 mg, Q4H PRN  glucose, 15 g, PRN  dextrose, 12.5 g, PRN  glucagon (rDNA), 1 mg, PRN  dextrose, 100 mL/hr, PRN  dextrose, 12.5 g, PRN  polyethylene glycol, 17 g, Daily PRN  sodium chloride flush, 5-40 mL, PRN  sodium chloride, 25 mL, PRN  ondansetron, 4 mg, Q8H PRN   Or  ondansetron, 4 mg, Q6H PRN  acetaminophen, 650 mg, Q6H PRN Or  acetaminophen, 650 mg, Q6H PRN  dextrose 5 % and 0.45 % NaCl, , Continuous PRN          VENT SETTINGS (Comprehensive) (if applicable):  Vent Information  $Ventilation: $Subsequent Day  Skin Assessment: Clean, dry, & intact  Suction Catheter Diameter: 14  Equipment Changed: Expiratory Filter  Vent Type: Servo i  Vent Mode: PRVC  Vt Ordered: 510 mL  Rate Set: 28 bmp  FiO2 : 40 %  SpO2: 100 %  SpO2/FiO2 ratio: 250  Sensitivity: 2  PEEP/CPAP: 8  I Time/ I Time %: 0.7 s  Humidification Source: HME  Nitric Oxide/Epoprostenol In Use?: No  Mask Type: Full face mask  Mask Size: Medium  Additional Respiratory  Assessments  Pulse: 79  Resp: 28  SpO2: 100 %  End Tidal CO2: 22 (%)  Position: Semi-Powell's  Humidification Source: HME  Oral Care Completed?: Yes  Oral Care: Mouth swabbed, Mouth moisturizer, Mouth suctioned, Lip moisturizer applied, Suction toothette  Subglottic Suction Done?: Yes      Laboratory findings:    Complete Blood Count:   Recent Labs     11/30/21  0800 12/01/21  0431 12/02/21  0409   WBC 26.2* 18.1* 11.9*   HGB 11.8* 12.1* 11.8*   HCT 38.2* 35.3* 33.6*    250 148        Last 3 Blood Glucose:   Recent Labs     12/01/21 1939 12/02/21  0409 12/02/21  0750   GLUCOSE 287* 184* 142*        PT/INR:  No results found for: PROTIME, INR  PTT:  No results found for: APTT, PTT    Comprehensive Metabolic Profile:   Recent Labs     12/01/21  1939 12/02/21  0409 12/02/21  0750    143 146*   K 4.4 4.3 4.1   * 118* 119*   CO2 17* 17* 19*   BUN 46* 42* 40*   CREATININE 1.04 0.89 0.83   GLUCOSE 287* 184* 142*   CALCIUM 6.7* 6.7* 6.9*      Magnesium:   Lab Results   Component Value Date    MG 2.2 12/02/2021     Phosphorus:   Lab Results   Component Value Date    PHOS 1.4 12/02/2021     Ionized Calcium: No results found for: CAION     Urinalysis:     Troponin: No results for input(s): TROPONINI in the last 72 hours.     Microbiology:    Cultures during this admission:     Blood cultures: [] None drawn      [] Negative             []  Positive (Details:  )  Urine Culture:                   [] None drawn      [] Negative             []  Positive (Details:  )  Sputum Culture:               [] None drawn       [] Negative             []  Positive (Details:  )   Endotracheal aspirate:     [] None drawn       [] Negative             []  Positive (Details:  )     Other pertinent Labs:       Radiology/Imaging:       ASSESSMENT:     Active Problems:    Type 1 diabetes mellitus with ketoacidosis without coma ([facility] [address]  Resolved Problems:    * No resolved hospital problems. *            PLAN:     Neurological:  - Intubated and sedated on propofol and fentanyl, now on sedation holiday. - Wean sedation as needed.   -We will consider CT if patient is not responding well to SBT and sedation holiday. Cardiovascular:  -BP under control.  -Intermittently was on pressor, now off pressors. Pulmonary:  -Wean vent setting as needed. GI/nutrition  N.p.o.  Pepcid 20 mg daily. GlycoLax as needed for bowel movements. Renal/fluids/electrolyte  - NS and D5 @ 125 mL  - Na: 146 Cl: 119 PO4: 1.4, Ca: 6.9  - Will replace per protocol, will check albumin to assess the hypocalcemia.  - I/O: 4583/1315: +[id number]    ID:   - WBC: 18.1 --> 11.9 (T)  - Concern of aspiration PNA. - Levofloxacin 750 mg daily  - Clindamycin 600 mg TID.     DVT: Heparin 5000 units TID        Katie Bailon MD      Department of [facility]         12/2/2021, 8:30 AM

## 2021-12-03 ENCOUNTER — APPOINTMENT (OUTPATIENT)
Dept: GENERAL RADIOLOGY | Age: 61
DRG: 637 | End: 2021-12-03
Payer: COMMERCIAL

## 2021-12-03 LAB
ABSOLUTE EOS #: 0.13 K/UL (ref 0–0.44)
ABSOLUTE IMMATURE GRANULOCYTE: 0.13 K/UL (ref 0–0.3)
ABSOLUTE LYMPH #: 1.06 K/UL (ref 1.1–3.7)
ABSOLUTE MONO #: 0.53 K/UL (ref 0.1–1.2)
ALLEN TEST: ABNORMAL
ALLEN TEST: ABNORMAL
ANION GAP SERPL CALCULATED.3IONS-SCNC: 6 MMOL/L (ref 9–17)
BASOPHILS # BLD: 0 % (ref 0–2)
BASOPHILS ABSOLUTE: 0 K/UL (ref 0–0.2)
BUN BLDV-MCNC: 24 MG/DL (ref 6–20)
BUN/CREAT BLD: ABNORMAL (ref 9–20)
CALCIUM IONIZED: 1.04 MMOL/L (ref 1.13–1.33)
CALCIUM SERPL-MCNC: 7.2 MG/DL (ref 8.6–10.4)
CHLORIDE BLD-SCNC: 115 MMOL/L (ref 98–107)
CO2: 20 MMOL/L (ref 20–31)
CREAT SERPL-MCNC: 0.5 MG/DL (ref 0.7–1.2)
DIFFERENTIAL TYPE: ABNORMAL
EOSINOPHILS RELATIVE PERCENT: 1 % (ref 1–4)
FIO2: 30
FIO2: 30
GFR AFRICAN AMERICAN: >60 ML/MIN
GFR NON-AFRICAN AMERICAN: >60 ML/MIN
GFR SERPL CREATININE-BSD FRML MDRD: ABNORMAL ML/MIN/{1.73_M2}
GFR SERPL CREATININE-BSD FRML MDRD: ABNORMAL ML/MIN/{1.73_M2}
GLUCOSE BLD-MCNC: 110 MG/DL (ref 75–110)
GLUCOSE BLD-MCNC: 121 MG/DL (ref 75–110)
GLUCOSE BLD-MCNC: 138 MG/DL (ref 70–99)
GLUCOSE BLD-MCNC: 197 MG/DL (ref 75–110)
GLUCOSE BLD-MCNC: 200 MG/DL (ref 74–100)
GLUCOSE BLD-MCNC: 231 MG/DL (ref 75–110)
GLUCOSE BLD-MCNC: 247 MG/DL (ref 75–110)
HCT VFR BLD CALC: 30 % (ref 40.7–50.3)
HEMOGLOBIN: 10.2 G/DL (ref 13–17)
IMMATURE GRANULOCYTES: 1 %
LYMPHOCYTES # BLD: 8 % (ref 24–43)
MAGNESIUM: 1.9 MG/DL (ref 1.6–2.6)
MCH RBC QN AUTO: 28.8 PG (ref 25.2–33.5)
MCHC RBC AUTO-ENTMCNC: 34 G/DL (ref 28.4–34.8)
MCV RBC AUTO: 84.7 FL (ref 82.6–102.9)
MODE: ABNORMAL
MODE: ABNORMAL
MONOCYTES # BLD: 4 % (ref 3–12)
MORPHOLOGY: ABNORMAL
NEGATIVE BASE EXCESS, ART: 2 (ref 0–2)
NEGATIVE BASE EXCESS, ART: 3 (ref 0–2)
NRBC AUTOMATED: 0 PER 100 WBC
O2 DEVICE/FLOW/%: ABNORMAL
O2 DEVICE/FLOW/%: ABNORMAL
PATIENT TEMP: ABNORMAL
PATIENT TEMP: ABNORMAL
PDW BLD-RTO: 14.6 % (ref 11.8–14.4)
PHOSPHORUS: 1.7 MG/DL (ref 2.5–4.5)
PLATELET # BLD: ABNORMAL K/UL (ref 138–453)
PLATELET ESTIMATE: ABNORMAL
PLATELET, FLUORESCENCE: 97 K/UL (ref 138–453)
PLATELET, IMMATURE FRACTION: 2.4 % (ref 1.1–10.3)
PMV BLD AUTO: ABNORMAL FL (ref 8.1–13.5)
POC HCO3: 20.1 MMOL/L (ref 21–28)
POC HCO3: 21 MMOL/L (ref 21–28)
POC LACTIC ACID: 0.9 MMOL/L (ref 0.56–1.39)
POC O2 SATURATION: 92 % (ref 94–98)
POC O2 SATURATION: 98 % (ref 94–98)
POC PCO2 TEMP: ABNORMAL MM HG
POC PCO2 TEMP: ABNORMAL MM HG
POC PCO2: 26.1 MM HG (ref 35–48)
POC PCO2: 32.4 MM HG (ref 35–48)
POC PH TEMP: ABNORMAL
POC PH TEMP: ABNORMAL
POC PH: 7.42 (ref 7.35–7.45)
POC PH: 7.5 (ref 7.35–7.45)
POC PO2 TEMP: ABNORMAL MM HG
POC PO2 TEMP: ABNORMAL MM HG
POC PO2: 62.1 MM HG (ref 83–108)
POC PO2: 84.9 MM HG (ref 83–108)
POSITIVE BASE EXCESS, ART: ABNORMAL (ref 0–3)
POSITIVE BASE EXCESS, ART: ABNORMAL (ref 0–3)
POTASSIUM SERPL-SCNC: 3.6 MMOL/L (ref 3.7–5.3)
RBC # BLD: 3.54 M/UL (ref 4.21–5.77)
RBC # BLD: ABNORMAL 10*6/UL
SAMPLE SITE: ABNORMAL
SAMPLE SITE: ABNORMAL
SEG NEUTROPHILS: 86 % (ref 36–65)
SEGMENTED NEUTROPHILS ABSOLUTE COUNT: 11.35 K/UL (ref 1.5–8.1)
SODIUM BLD-SCNC: 141 MMOL/L (ref 135–144)
TCO2 (CALC), ART: ABNORMAL MMOL/L (ref 22–29)
TCO2 (CALC), ART: ABNORMAL MMOL/L (ref 22–29)
WBC # BLD: 13.2 K/UL (ref 3.5–11.3)
WBC # BLD: ABNORMAL 10*3/UL

## 2021-12-03 PROCEDURE — 82330 ASSAY OF CALCIUM: CPT

## 2021-12-03 PROCEDURE — 6360000002 HC RX W HCPCS: Performed by: HEALTH CARE PROVIDER

## 2021-12-03 PROCEDURE — 6360000002 HC RX W HCPCS: Performed by: STUDENT IN AN ORGANIZED HEALTH CARE EDUCATION/TRAINING PROGRAM

## 2021-12-03 PROCEDURE — 37799 UNLISTED PX VASCULAR SURGERY: CPT

## 2021-12-03 PROCEDURE — 2000000000 HC ICU R&B

## 2021-12-03 PROCEDURE — 82803 BLOOD GASES ANY COMBINATION: CPT

## 2021-12-03 PROCEDURE — 2580000003 HC RX 258: Performed by: STUDENT IN AN ORGANIZED HEALTH CARE EDUCATION/TRAINING PROGRAM

## 2021-12-03 PROCEDURE — 82947 ASSAY GLUCOSE BLOOD QUANT: CPT

## 2021-12-03 PROCEDURE — 80048 BASIC METABOLIC PNL TOTAL CA: CPT

## 2021-12-03 PROCEDURE — 2500000003 HC RX 250 WO HCPCS: Performed by: STUDENT IN AN ORGANIZED HEALTH CARE EDUCATION/TRAINING PROGRAM

## 2021-12-03 PROCEDURE — 6370000000 HC RX 637 (ALT 250 FOR IP): Performed by: HEALTH CARE PROVIDER

## 2021-12-03 PROCEDURE — 83735 ASSAY OF MAGNESIUM: CPT

## 2021-12-03 PROCEDURE — 83605 ASSAY OF LACTIC ACID: CPT

## 2021-12-03 PROCEDURE — 84100 ASSAY OF PHOSPHORUS: CPT

## 2021-12-03 PROCEDURE — 2700000000 HC OXYGEN THERAPY PER DAY

## 2021-12-03 PROCEDURE — 99291 CRITICAL CARE FIRST HOUR: CPT | Performed by: INTERNAL MEDICINE

## 2021-12-03 PROCEDURE — 6370000000 HC RX 637 (ALT 250 FOR IP): Performed by: STUDENT IN AN ORGANIZED HEALTH CARE EDUCATION/TRAINING PROGRAM

## 2021-12-03 PROCEDURE — 94003 VENT MGMT INPAT SUBQ DAY: CPT

## 2021-12-03 PROCEDURE — 2580000003 HC RX 258: Performed by: HEALTH CARE PROVIDER

## 2021-12-03 PROCEDURE — 85025 COMPLETE CBC W/AUTO DIFF WBC: CPT

## 2021-12-03 PROCEDURE — 71045 X-RAY EXAM CHEST 1 VIEW: CPT

## 2021-12-03 PROCEDURE — 94761 N-INVAS EAR/PLS OXIMETRY MLT: CPT

## 2021-12-03 PROCEDURE — 85055 RETICULATED PLATELET ASSAY: CPT

## 2021-12-03 RX ORDER — MAGNESIUM SULFATE IN WATER 40 MG/ML
2000 INJECTION, SOLUTION INTRAVENOUS ONCE
Status: COMPLETED | OUTPATIENT
Start: 2021-12-03 | End: 2021-12-03

## 2021-12-03 RX ORDER — LORAZEPAM 2 MG/1
4 TABLET ORAL EVERY 4 HOURS
Status: DISCONTINUED | OUTPATIENT
Start: 2021-12-03 | End: 2021-12-04

## 2021-12-03 RX ORDER — CALCIUM GLUCONATE 20 MG/ML
2000 INJECTION, SOLUTION INTRAVENOUS ONCE
Status: DISCONTINUED | OUTPATIENT
Start: 2021-12-03 | End: 2021-12-03

## 2021-12-03 RX ORDER — INSULIN GLARGINE 100 [IU]/ML
15 INJECTION, SOLUTION SUBCUTANEOUS NIGHTLY
Status: DISCONTINUED | OUTPATIENT
Start: 2021-12-03 | End: 2021-12-04

## 2021-12-03 RX ADMIN — CLINDAMYCIN IN 5 PERCENT DEXTROSE 600 MG: 12 INJECTION, SOLUTION INTRAVENOUS at 08:57

## 2021-12-03 RX ADMIN — CLINDAMYCIN IN 5 PERCENT DEXTROSE 600 MG: 12 INJECTION, SOLUTION INTRAVENOUS at 20:30

## 2021-12-03 RX ADMIN — SODIUM CHLORIDE, PRESERVATIVE FREE 10 ML: 5 INJECTION INTRAVENOUS at 20:30

## 2021-12-03 RX ADMIN — INSULIN LISPRO 3 UNITS: 100 INJECTION, SOLUTION INTRAVENOUS; SUBCUTANEOUS at 20:31

## 2021-12-03 RX ADMIN — PROPOFOL 10 MCG/KG/MIN: 10 INJECTION, EMULSION INTRAVENOUS at 00:00

## 2021-12-03 RX ADMIN — PROPOFOL 10 MCG/KG/MIN: 10 INJECTION, EMULSION INTRAVENOUS at 22:15

## 2021-12-03 RX ADMIN — FAMOTIDINE 20 MG: 10 INJECTION INTRAVENOUS at 20:30

## 2021-12-03 RX ADMIN — DEXTROSE AND SODIUM CHLORIDE: 5; 450 INJECTION, SOLUTION INTRAVENOUS at 09:06

## 2021-12-03 RX ADMIN — INSULIN LISPRO 6 UNITS: 100 INJECTION, SOLUTION INTRAVENOUS; SUBCUTANEOUS at 08:15

## 2021-12-03 RX ADMIN — LEVOFLOXACIN 750 MG: 5 INJECTION, SOLUTION INTRAVENOUS at 01:33

## 2021-12-03 RX ADMIN — SODIUM CHLORIDE, PRESERVATIVE FREE 10 ML: 5 INJECTION INTRAVENOUS at 08:59

## 2021-12-03 RX ADMIN — HEPARIN SODIUM 5000 UNITS: 5000 INJECTION INTRAVENOUS; SUBCUTANEOUS at 13:30

## 2021-12-03 RX ADMIN — DEXTROSE AND SODIUM CHLORIDE: 5; 450 INJECTION, SOLUTION INTRAVENOUS at 22:16

## 2021-12-03 RX ADMIN — CLINDAMYCIN IN 5 PERCENT DEXTROSE 600 MG: 12 INJECTION, SOLUTION INTRAVENOUS at 13:35

## 2021-12-03 RX ADMIN — LORAZEPAM 4 MG: 2 TABLET ORAL at 18:18

## 2021-12-03 RX ADMIN — INSULIN GLARGINE 15 UNITS: 100 INJECTION, SOLUTION SUBCUTANEOUS at 20:31

## 2021-12-03 RX ADMIN — LORAZEPAM 4 MG: 2 TABLET ORAL at 14:30

## 2021-12-03 RX ADMIN — SODIUM PHOSPHATE, MONOBASIC, MONOHYDRATE 30 MMOL: 276; 142 INJECTION, SOLUTION INTRAVENOUS at 09:22

## 2021-12-03 RX ADMIN — HEPARIN SODIUM 5000 UNITS: 5000 INJECTION INTRAVENOUS; SUBCUTANEOUS at 21:57

## 2021-12-03 RX ADMIN — FLUOXETINE HYDROCHLORIDE 40 MG: 20 CAPSULE ORAL at 08:57

## 2021-12-03 RX ADMIN — HEPARIN SODIUM 5000 UNITS: 5000 INJECTION INTRAVENOUS; SUBCUTANEOUS at 05:19

## 2021-12-03 RX ADMIN — LORAZEPAM 4 MG: 2 TABLET ORAL at 21:57

## 2021-12-03 RX ADMIN — MAGNESIUM SULFATE 2000 MG: 2 INJECTION INTRAVENOUS at 09:15

## 2021-12-03 RX ADMIN — FAMOTIDINE 20 MG: 10 INJECTION INTRAVENOUS at 09:22

## 2021-12-03 RX ADMIN — INSULIN LISPRO 3 UNITS: 100 INJECTION, SOLUTION INTRAVENOUS; SUBCUTANEOUS at 12:04

## 2021-12-03 ASSESSMENT — PULMONARY FUNCTION TESTS
PIF_VALUE: 33
PIF_VALUE: 26
PIF_VALUE: 27
PIF_VALUE: 17
PIF_VALUE: 38
PIF_VALUE: 20
PIF_VALUE: 27
PIF_VALUE: 33
PIF_VALUE: 29
PIF_VALUE: 29
PIF_VALUE: 16
PIF_VALUE: 23
PIF_VALUE: 17
PIF_VALUE: 28
PIF_VALUE: 34
PIF_VALUE: 24
PIF_VALUE: 25
PIF_VALUE: 22
PIF_VALUE: 13
PIF_VALUE: 17
PIF_VALUE: 30
PIF_VALUE: 21
PIF_VALUE: 31
PIF_VALUE: 30
PIF_VALUE: 26
PIF_VALUE: 25
PIF_VALUE: 14
PIF_VALUE: 28
PIF_VALUE: 19
PIF_VALUE: 27
PIF_VALUE: 28
PIF_VALUE: 29
PIF_VALUE: 21
PIF_VALUE: 32
PIF_VALUE: 22
PIF_VALUE: 28
PIF_VALUE: 28
PIF_VALUE: 31
PIF_VALUE: 36
PIF_VALUE: 31
PIF_VALUE: 38
PIF_VALUE: 31
PIF_VALUE: 18
PIF_VALUE: 50
PIF_VALUE: 30
PIF_VALUE: 19
PIF_VALUE: 30
PIF_VALUE: 24
PIF_VALUE: 25
PIF_VALUE: 27

## 2021-12-03 NOTE — PLAN OF CARE
Pt.on CMV,FiO2-30%,PEEP of 8,lungs sound diminished,suctioning small amount of thick beige secretion. VSS,afebrile,abd.soft,bowel sound hypoactive,tube feeding at 40cc/hr with goal of 50-tolerating well. Pt.does not follow,continues on propofol and fentanyl gtt.   Problem: Non-Violent Restraints  Goal: Removal from restraints as soon as assessed to be safe  10/7/0034 8733 by Jeronimo Rodriguez, RN  Outcome: Ongoing  18/8/0975 3934 by Jeronimo Rodriguez, RN  Outcome: Ongoing  11/3/8537 2597 by Jeronimo Rodriguez, RN  Outcome: Ongoing  Goal: No harm/injury to patient while restraints in use  12/8/5714 1797 by Jeronimo Rodriguez, RN  Outcome: Ongoing  13/4/8883 4096 by Jeronimo Rodriguez, RN  Outcome: Ongoing  65/5/8153 7878 by Jeronimo Rodriguez, RN  Outcome: Ongoing  Goal: Patient's dignity will be maintained  82/4/7859 3105 by Jeronimo Rodriguez, RN  Outcome: Ongoing  59/5/2755 5253 by Jeronimo Rodriguez, RN  Outcome: Ongoing  65/3/9938 4731 by Jeronimo Rodriguez, RN  Outcome: Ongoing     Problem: Skin Integrity:  Goal: Will show no infection signs and symptoms  Description: Will show no infection signs and symptoms  23/6/4436 3111 by Jeronimo Rodriguez, RN  Outcome: Ongoing  21/6/1180 4870 by Jeronimo Rodriguez, RN  Outcome: Ongoing  85/9/4792 8255 by Jeronimo Rodriguez, RN  Outcome: Ongoing  Goal: Absence of new skin breakdown  Description: Absence of new skin breakdown  90/1/0078 0576 by Jeronimo Rodriguez, RN  Outcome: Ongoing  37/4/7273 2358 by Jeronimo Rodriguez, RN  Outcome: Ongoing  10/7/9703 2872 by Jeronimo Rodriguez, RN  Outcome: Ongoing     Problem: Falls - Risk of:  Goal: Will remain free from falls  Description: Will remain free from falls  85/7/5686 5214 by Jeronimo Rodriguez, RN  Outcome: Ongoing  51/1/8140 7428 by Jeronimo Rodriguez, RN  Outcome: Ongoing  07/9/2311 3642 by Jeronimo Rodriguez, RN  Outcome: Ongoing  Goal: Absence of physical injury  Description: Absence of physical injury  51/3/5461 9951 by Jeronimo Rodriguez, RN  Outcome: Ongoing  12/3/2021 4079 by Rain Glez RN  Outcome: Ongoing  61/1/5594 6913 by Rain Glez RN  Outcome: Ongoing     Problem: Fluid Volume - Imbalance:  Goal: Will remain free of signs and symptoms of dehydration  Description: Will remain free of signs and symptoms of dehydration  90/8/9525 4201 by Rain Glez RN  Outcome: Ongoing  78/3/5293 9835 by Rain Glez RN  Outcome: Ongoing  26/9/8001 8014 by Rain Glez RN  Outcome: Ongoing  Goal: Absence of imbalanced fluid volume signs and symptoms  Description: Absence of imbalanced fluid volume signs and symptoms  64/8/7081 8260 by Rain Glez RN  Outcome: Ongoing  79/5/9920 1374 by Rain Glez RN  Outcome: Ongoing  33/2/5083 2136 by Rain Glez RN  Outcome: Ongoing     Problem: Serum Glucose Level - Abnormal:  Goal: Ability to maintain appropriate glucose levels will improve  Description: Ability to maintain appropriate glucose levels will improve  90/8/4024 7055 by Rain Glez RN  Outcome: Ongoing  16/6/3318 9677 by Rain Glez RN  Outcome: Ongoing  53/7/3998 8410 by Rain Glez RN  Outcome: Ongoing     Problem: Injury - Acid Base Imbalance:  Goal: Acid-base balance  Description: Acid-base balance  14/2/2781 6099 by Rain Glez RN  Outcome: Ongoing  27/7/0886 2304 by Rain Glez RN  Outcome: Ongoing  66/8/8180 4595 by Rain Glez RN  Outcome: Ongoing     Problem: OXYGENATION/RESPIRATORY FUNCTION  Goal: Patient will maintain patent airway  56/2/8254 8218 by Rain Glez RN  Outcome: Ongoing  01/9/9745 9414 by Rain Glez RN  Outcome: Ongoing  82/9/2471 8896 by Rain Glez RN  Outcome: Ongoing  12/3/2021 0814 by Eagle Wilcox RCP  Outcome: Ongoing  Goal: Patient will achieve/maintain normal respiratory rate/effort  Description: Respiratory rate and effort will be within normal limits for the patient  87/8/8568 2574 by Rain Glez, ROHAN  Outcome: Ongoing  43/1/4482 7059 by Rain Glez RN  Outcome: Ongoing  73/0/5499 1936 by Carri Edge RN  Outcome: Ongoing  12/3/2021 0814 by Jacqueline Harris RCP  Outcome: Ongoing     Problem: MECHANICAL VENTILATION  Goal: Patient will maintain patent airway  37/6/3824 6929 by Carri Edge RN  Outcome: Ongoing  83/3/7850 8612 by Carri Edge RN  Outcome: Ongoing  58/6/8341 8425 by Carri Edge RN  Outcome: Ongoing  12/3/2021 0814 by Jacqueline Harris RCP  Outcome: Ongoing  Goal: Oral health is maintained or improved  41/0/9834 1493 by Carri Edge RN  Outcome: Ongoing  59/1/5626 7838 by Carri Edge RN  Outcome: Ongoing  21/2/5841 0478 by Carri Edge RN  Outcome: Ongoing  12/3/2021 0814 by Jacqueline Harris RCP  Outcome: Ongoing  Goal: ET tube will be managed safely  19/3/9914 6680 by Carri Edge RN  Outcome: Ongoing  16/7/3553 5906 by Carri Edge RN  Outcome: Ongoing  82/2/5866 8407 by Carri Edge RN  Outcome: Ongoing  12/3/2021 0814 by Jacqueline Harris RCP  Outcome: Ongoing  Goal: Ability to express needs and understand communication  56/2/9095 1841 by Carri Edge RN  Outcome: Ongoing  34/9/8898 3411 by Carri Edge RN  Outcome: Ongoing  61/0/7019 8527 by Carri Edge RN  Outcome: Ongoing  12/3/2021 0814 by Jacqueline Harris RCP  Outcome: Ongoing  Goal: Mobility/activity is maintained at optimum level for patient  19/4/8543 4806 by Carri Edge RN  Outcome: Ongoing  97/0/2403 8269 by Carri Edge RN  Outcome: Ongoing  19/3/4297 6873 by Carri Edge RN  Outcome: Ongoing  12/3/2021 0814 by Jacqueline Harris RCP  Outcome: Ongoing     Problem: SKIN INTEGRITY  Goal: Skin integrity is maintained or improved  89/9/8399 9788 by Carri Edge RN  Outcome: Ongoing  67/7/7307 2986 by Carri Edge RN  Outcome: Ongoing  07/0/0802 9816 by Carri Edge RN  Outcome: Ongoing  12/3/2021 0814 by Jacqueline Harris RCP  Outcome: Ongoing     Problem: NUTRITION  Goal: Nutritional status is

## 2021-12-03 NOTE — CARE COORDINATION
Received message from ED SW that wife called last evening stating she needed a ride here today as pt was having a procedure and he \"may \"  Spoke with RN and she stated wife called at 7:30a and stated she was already here and no procedures planned.   Seems wife is talking about when pt is extubated  SW available if needs arise

## 2021-12-03 NOTE — PLAN OF CARE
Problem: OXYGENATION/RESPIRATORY FUNCTION  Goal: Patient will maintain patent airway  12/2/2021 2000 by Esther Granda RCP  Outcome: Ongoing     Problem: OXYGENATION/RESPIRATORY FUNCTION  Goal: Patient will achieve/maintain normal respiratory rate/effort  Description: Respiratory rate and effort will be within normal limits for the patient  12/2/2021 2000 by Esther Granda RCP  Outcome: Ongoing     Problem: MECHANICAL VENTILATION  Goal: Patient will maintain patent airway  12/2/2021 2000 by Esther Granda RCP  Outcome: Ongoing     Problem: MECHANICAL VENTILATION  Goal: Oral health is maintained or improved  12/2/2021 2000 by Esther Granda RCP  Outcome: Ongoing     Problem: MECHANICAL VENTILATION  Goal: ET tube will be managed safely  12/2/2021 2000 by Esther Granda RCP  Outcome: Ongoing     Problem: MECHANICAL VENTILATION  Goal: Ability to express needs and understand communication  12/2/2021 2000 by Esther Granda RCP  Outcome: Ongoing     Problem: MECHANICAL VENTILATION  Goal: Mobility/activity is maintained at optimum level for patient  12/2/2021 2000 by Esther Granda RCP  Outcome: Ongoing     Problem: SKIN INTEGRITY  Goal: Skin integrity is maintained or improved  12/2/2021 2000 by Esther Granda RCP  Outcome: Ongoing

## 2021-12-03 NOTE — PROGRESS NOTES
12/03/21 0803   Vent Information   Rate Set 20 bmp     RR decreased per morning ABG. Repeat ABG in 1 hour.

## 2021-12-03 NOTE — CARE COORDINATION
Attempted to complete initial Intake/Assessment. Pt currently intubated/sedated.   Contacted patient's wife Alexa Murray ([phone]), left  requesting CB

## 2021-12-03 NOTE — PROGRESS NOTES
SECRETIONS Amount:  [x] Small [] Moderate  [] Large  [] None  Color:     [x] White [] Colored  [] Bloody    SEDATION:  Fentanyl and propofol  [x] Propofol gtt  [] Versed gtt  [] Ativan gtt   [] No Sedation    PARALYZED:  [x] No    [] Yes    DIARRHEA:                [x] No                [] Yes  (C. Difficile status: [] positive                                                                                                                       [] negative                                                                                                                     [] pending)    VASOPRESSORS:  [x] No    [x] Yes    If yes -   [] Levophed       [] Dopamine     [] Vasopressin       [] Dobutamine  [] Phenylephrine         [] Epinephrine    CENTRAL LINES:     [] No   [] Yes   (Date of Insertion:   )           If yes -     [] Right IJ     [] Left IJ [x] Right Femoral [] Left Femoral                   [] Right Subclavian [] Left Subclavian        SANTANA'S CATHETER:   [] No   [x] Yes  Ureteral cathetar(Date of Insertion:  2021 )     URINE OUTPUT:            [x] Good   [] Low              [] Anuric      OBJECTIVE:     VITAL SIGNS:  /62   Pulse 73   Temp 100 °F (37.8 °C) (Bladder)   Resp 20   Wt 132 lb 7.9 oz (60.1 kg)   SpO2 100%   BMI 19.57 kg/m²   Tmax over 24 hours:  Temp (24hrs), Av.1 °F (37.8 °C), Min:99.9 °F (37.7 °C), Max:100.4 °F (38 °C)      Patient Vitals for the past 6 hrs:   BP Pulse Resp SpO2 Weight   21 0803 -- 73 20 100 % --   21 0600 118/62 74 28 100 % 132 lb 7.9 oz (60.1 kg)   21 0545 -- 73 28 100 % --   21 0530 -- 73 22 100 % --   21 0515 -- 73 22 100 % --   21 0500 122/67 76 30 100 % --   21 0445 -- 74 22 100 % --   21 0430 -- 74 26 100 % --   21 0415 -- 74 27 100 % --   21 0400 125/67 74 26 100 % --   21 0345 -- 75 30 100 % --   21 0330 -- 74 25 100 % --         Intake/Output Summary (Last 24 hours) at 12/3/2021 0917  Last data filed at 12/3/2021 0400  Gross per 24 hour   Intake 3321.27 ml   Output 1405 ml   Net 1916.27 ml     Wt Readings from Last 2 Encounters:   12/03/21 132 lb 7.9 oz (60.1 kg)     Body mass index is 19.57 kg/m². PHYSICAL EXAMINATION:  Constitutional: Intubated and sedated  Neck: Supple, symmetrical, trachea midline, no adenopathy,  no jvd, skin normal  Respiratory: clear to auscultation, no wheezes or rales and unlabored breathing.   Cardiovascular: regular rate and rhythm, normal S1, S2, no murmur noted   Abdomen: soft, nontender, nondistended, no masses or organomegaly  Extremities:  peripheral pulses normal, no pedal edema, no clubbing or cyanosis     Any additional physical findings:    MEDICATIONS:    Scheduled Meds:   magnesium sulfate  2,000 mg IntraVENous Once    sodium phosphate IVPB  30 mmol IntraVENous Once    famotidine (PEPCID) injection  20 mg IntraVENous BID    levofloxacin  750 mg IntraVENous Q24H    clindamycin (CLEOCIN) IV  600 mg IntraVENous TID    insulin lispro  0-18 Units SubCUTAneous TID WC    insulin lispro  0-9 Units SubCUTAneous Nightly    [Held by provider] insulin glargine  20 Units SubCUTAneous Nightly    sodium chloride flush  5-40 mL IntraVENous 2 times per day    FLUoxetine  40 mg Oral Daily    heparin (porcine)  5,000 Units SubCUTAneous 3 times per day     Continuous Infusions:   IV infusion builder      propofol 25 mcg/kg/min (12/03/21 0300)    fentaNYL 25 mcg/hr (12/03/21 0127)    dextrose      norepinephrine Stopped (12/02/21 1354)    sodium chloride      dextrose 5 % and 0.45 % NaCl Stopped (12/01/21 0000)     PRN Meds:   albuterol, 2.5 mg, Q4H PRN  glucose, 15 g, PRN  dextrose, 12.5 g, PRN  glucagon (rDNA), 1 mg, PRN  dextrose, 100 mL/hr, PRN  dextrose, 12.5 g, PRN  polyethylene glycol, 17 g, Daily PRN  sodium chloride flush, 5-40 mL, PRN  sodium chloride, 25 mL, PRN  ondansetron, 4 mg, Q8H PRN   Or  ondansetron, 4 mg, Q6H PRN  acetaminophen, 650 mg, Q6H PRN   Or  acetaminophen, 650 mg, Q6H PRN  dextrose 5 % and 0.45 % NaCl, , Continuous PRN          VENT SETTINGS (Comprehensive) (if applicable):  Vent Information  $Ventilation: $Subsequent Day  Skin Assessment: Clean, dry, & intact  Suction Catheter Diameter: 14  Equipment Changed: HME  Vent Type: Servo i  Vent Mode: PRVC  Vt Ordered: 510 mL  Rate Set: (S) 20 bmp  FiO2 : 30 %  SpO2: 100 %  SpO2/FiO2 ratio: 333.33  Sensitivity: 2  PEEP/CPAP: 8  I Time/ I Time %: 0.85 s  Humidification Source: HME  Nitric Oxide/Epoprostenol In Use?: No  Mask Type: Full face mask  Mask Size: Medium  Additional Respiratory  Assessments  Pulse: 73  Resp: 20  SpO2: 100 %  End Tidal CO2: 30 (%)  Position: Semi-Powell's  Humidification Source: HME  Oral Care Completed?: Yes  Oral Care: Teeth brushed, Mouthwash, Suction toothette  Subglottic Suction Done?: Yes  Cuff Pressure (cm H2O):  (MLT)      Laboratory findings:    Complete Blood Count:   Recent Labs     12/01/21  0431 12/02/21  0409 12/03/21  0522   WBC 18.1* 11.9* 13.2*   HGB 12.1* 11.8* 10.2*   HCT 35.3* 33.6* 30.0*    148 See Reflexed IPF Result        Last 3 Blood Glucose:   Recent Labs     12/02/21  1449 12/02/21  1938 12/03/21  0152   GLUCOSE 114* 94 138*        PT/INR:  No results found for: PROTIME, INR  PTT:  No results found for: APTT, PTT    Comprehensive Metabolic Profile:   Recent Labs     12/02/21  0750 12/02/21  0750 12/02/21  1449 12/02/21  1938 12/03/21  0152   *   < > 146* 143 141   K 4.1   < > 4.0 3.7 3.6*   *   < > 119* 116* 115*   CO2 19*   < > 19* 20 20   BUN 40*   < > 30* 26* 24*   CREATININE 0.83   < > 0.68* 0.63* 0.50*   GLUCOSE 142*   < > 114* 94 138*   CALCIUM 6.9*   < > 6.9* 7.2* 7.2*   PROT 4.9*  --   --   --   --    LABALBU 2.4*  --   --   --   --    BILITOT 0.74  --   --   --   --    ALKPHOS 108  --   --   --   --    *  --   --   --   --    *  --   --   --   --     < > = values in this interval not displayed. Magnesium:   Lab Results   Component Value Date    MG 1.9 12/03/2021     Phosphorus:   Lab Results   Component Value Date    PHOS 1.7 12/03/2021     Ionized Calcium:   Lab Results   Component Value Date    CAION 1.04 12/03/2021        Urinalysis:     Troponin: No results for input(s): TROPONINI in the last 72 hours. Microbiology:    Cultures during this admission:     Blood cultures:                 [x] None drawn      [] Negative             []  Positive (Details:  )  Urine Culture:                   [x] None drawn      [] Negative             []  Positive (Details:  )  Sputum Culture:               [x] None drawn       [] Negative             []  Positive (Details:  )   Endotracheal aspirate:     [x] None drawn       [] Negative             []  Positive (Details:  )     Other pertinent Labs:       Radiology/Imaging:       ASSESSMENT:     Active Problems:    Type 1 diabetes mellitus with ketoacidosis without coma ([facility] [address]  Resolved Problems:    * No resolved hospital problems. *            PLAN:     Neurological:  - Intubated and sedated on propofol and fentanyl, now on sedation holiday. - Wean sedation as needed. Cardiovascular:  -BP under control.  -Intermittently was on pressor, now off pressors. Pulmonary:  - Wean vent setting as needed. - Chest x-ray done today showed bilateral airspace disease appear to be more prominent as compared to yesterday. GI/nutrition  Tube feeding started, 30 mL/h, goal is 50 mL/h. Pepcid 20 mg daily. GlycoLax as needed for bowel movements. Renal/fluids/electrolyte  - NS and D5 with 40 mEq K@ 75 mL  - Na: 146 Cl: 119 PO4: 1.7, Ca: 7.2, corrected calcium: 8.5  - I/O: 4583/1315: +3268    ID:   - WBC:  11.9 --> 13.2  - Concern of aspiration PNA. - Levofloxacin 750 mg daily  - Clindamycin 600 mg TID.     Hematological:  - WBC: 11.5 --> 13.2 (T)  - Hb: 10.2, down trended likely secondary to dilution, will continue to monitor.  - Platelets: 148--->97 (T), repeat platelets ordered.     DVT: Heparin 5000 units TID        Ezequiel Barakat MD      [facility] of [facility]         12/3/2021, 9:17 AM

## 2021-12-03 NOTE — PLAN OF CARE
Problem: Non-Violent Restraints  Goal: No harm/injury to patient while restraints in use  12/2/2021 2021 by Ethan Chau RN  Outcome: Ongoing  12/2/2021 1827 by Suzi Shelton RN  Outcome: Met This Shift  Goal: Patient's dignity will be maintained  12/2/2021 2021 by Ethan Chau RN  Outcome: Ongoing  12/2/2021 1827 by Suzi Shelton RN  Outcome: Met This Shift     Problem: Skin Integrity:  Goal: Will show no infection signs and symptoms  Description: Will show no infection signs and symptoms  12/2/2021 2021 by Ethan Chau RN  Outcome: Ongoing  12/2/2021 1827 by Suzi Shelton RN  Outcome: Ongoing  Goal: Absence of new skin breakdown  Description: Absence of new skin breakdown  12/2/2021 2021 by Ethan Chau RN  Outcome: Ongoing  12/2/2021 1827 by Suzi Shelton RN  Outcome: Ongoing     Problem: Falls - Risk of:  Goal: Will remain free from falls  Description: Will remain free from falls  12/2/2021 2021 by Ethan Chau RN  Outcome: Ongoing  12/2/2021 1827 by Suzi Shelton RN  Outcome: Ongoing  Goal: Absence of physical injury  Description: Absence of physical injury  12/2/2021 2021 by Ethan Chau RN  Outcome: Ongoing  12/2/2021 1827 by Suzi Shelton RN  Outcome: Ongoing     Problem: Fluid Volume - Imbalance:  Goal: Will remain free of signs and symptoms of dehydration  Description: Will remain free of signs and symptoms of dehydration  12/2/2021 2021 by Ethan Chau RN  Outcome: Ongoing  12/2/2021 1827 by Suzi Shelton RN  Outcome: Ongoing  Goal: Absence of imbalanced fluid volume signs and symptoms  Description: Absence of imbalanced fluid volume signs and symptoms  12/2/2021 2021 by Ethan Chau RN  Outcome: Ongoing  12/2/2021 1827 by Suzi Shelton RN  Outcome: Ongoing     Problem: Serum Glucose Level - Abnormal:  Goal: Ability to maintain appropriate glucose levels will improve  Description: Ability to maintain appropriate glucose levels will improve  12/2/2021 2021 by Ethan Chau RN  Outcome: Ongoing  12/2/2021 1827 by Sonal Salmon RN  Outcome: Ongoing     Problem: Injury - Acid Base Imbalance:  Goal: Acid-base balance  Description: Acid-base balance  12/2/2021 2021 by Viktor Allison RN  Outcome: Ongoing  12/2/2021 1827 by Sonal Salmon RN  Outcome: Ongoing     Problem: OXYGENATION/RESPIRATORY FUNCTION  Goal: Patient will maintain patent airway  12/2/2021 2021 by Viktro Allison RN  Outcome: Ongoing  12/2/2021 2000 by GRACIELA NickP  Outcome: Ongoing  12/2/2021 1827 by Sonal Salmon RN  Outcome: Ongoing  12/2/2021 0808 by Ren Collins RCP  Outcome: Ongoing  Goal: Patient will achieve/maintain normal respiratory rate/effort  Description: Respiratory rate and effort will be within normal limits for the patient  12/2/2021 2021 by Viktor Allison RN  Outcome: Ongoing  12/2/2021 2000 by GRACIELA NickP  Outcome: Ongoing  12/2/2021 1827 by Sonal Salmon RN  Outcome: Ongoing  12/2/2021 0808 by Ren Collins RCP  Outcome: Ongoing     Problem: MECHANICAL VENTILATION  Goal: Patient will maintain patent airway  12/2/2021 2021 by Viktor Allison RN  Outcome: Ongoing  12/2/2021 2000 by GRACIELA NickP  Outcome: Ongoing  12/2/2021 1827 by Sonal Salmon RN  Outcome: Ongoing  12/2/2021 0808 by Ren Collins RCP  Outcome: Ongoing  Goal: Oral health is maintained or improved  12/2/2021 2021 by Viktor Allison RN  Outcome: Ongoing  12/2/2021 2000 by Gem GRACIELA WatkinsP  Outcome: Ongoing  12/2/2021 1827 by Sonal Salmon RN  Outcome: Ongoing  12/2/2021 0808 by Ren Collins RCP  Outcome: Ongoing  Goal: ET tube will be managed safely  12/2/2021 2021 by Viktor Allison RN  Outcome: Ongoing  12/2/2021 2000 by Gembrenden Watkins RCP  Outcome: Ongoing  12/2/2021 1827 by Sonal Salmon RN  Outcome: Ongoing  12/2/2021 0808 by Ren Collins RCP  Outcome: Ongoing  Goal: Ability to express needs and understand communication  12/2/2021 2021 by Viktor Allison RN  Outcome: Ongoing  12/2/2021 2000 by Brandy Burrell RCP  Outcome: Ongoing  12/2/2021 1827 by Sarah Escudero RN  Outcome: Ongoing  12/2/2021 0808 by Feliciana Mcburney, RCP  Outcome: Ongoing  Goal: Mobility/activity is maintained at optimum level for patient  12/2/2021 2021 by Wally Barbosa RN  Outcome: Ongoing  12/2/2021 2000 by Brandy Burrell RCP  Outcome: Ongoing  12/2/2021 1827 by Sarah Escudero RN  Outcome: Ongoing  12/2/2021 0808 by Feliciana Mcburney, RCP  Outcome: Ongoing     Problem: SKIN INTEGRITY  Goal: Skin integrity is maintained or improved  12/2/2021 2021 by Wally Barbosa RN  Outcome: Ongoing  12/2/2021 2000 by Brandy Burrell RCP  Outcome: Ongoing  12/2/2021 1827 by Sarah Escudero RN  Outcome: Ongoing  12/2/2021 0808 by Feliciana Mcburney, RCP  Outcome: Ongoing     Problem: NUTRITION  Goal: Nutritional status is improving  12/2/2021 2021 by Wally Barbosa RN  Outcome: Ongoing  12/2/2021 1827 by Sarah Escudero RN  Outcome: Ongoing     Problem: Non-Violent Restraints  Goal: Removal from restraints as soon as assessed to be safe  12/2/2021 2021 by Wally Barbosa RN  Outcome: Not Met This Shift  12/2/2021 1827 by Sarah Escudero RN  Outcome: Not Met This Shift

## 2021-12-03 NOTE — ED NOTES
Received call from spouse, Saturnino Rubinstein [phone], who inquired initially on transportation to the hospital as her  is having a procedure tomorrow and \"may \". She reported that the patient was her primary caregiver as she has multiple sclerosis. She confirmed she is able to walk short distances and has the ability to enter and exit safely from a vehicle. Her friend is unable to assist with transportation tomorrow. She has contacted [employer], but was advised it could take 30 days for the application process. Rule out for senior transportation services. She asserts that [employer] and [employer] not accept cash or credit because they are booked with Medicaid cases\". Offered to try alternative cab services, but when asked if she can pay privately, she denied. Conferred with the MICU RN who denied  acknowledgement of potential end of life procedure or discussion tomorrow, only noting that the patient is in the MICU. MICU RN noted that it was reported in previous shifts that the spouse was present with the patient during his hospitalization. Correspondence made with the inpatient social workers, requesting follow up on 12/3/2021 for further assessment.      Taylor ABBASIS, ACSW, [address] 80, RAMONW  21

## 2021-12-03 NOTE — PLAN OF CARE
Problem: Non-Violent Restraints  Goal: No harm/injury to patient while restraints in use  90/8/6567 2530 by Reuben Paniagua RN  Outcome: Met This Shift  69/2/4259 3728 by Reuben Paniagua RN  Outcome: Ongoing  56/5/0277 5086 by Reuben Paniagua RN  Outcome: Ongoing  00/5/2591 4715 by Reuben Paniagua RN  Outcome: Ongoing

## 2021-12-03 NOTE — CARE COORDINATION
Case Management Initial Discharge Plan  Hiren             Met with:spouse Nura Cervantes to discuss discharge plans. Pt is intubated and sedated  Information verified: address, contacts, phone number, , insurance Yes  Insurance Provider: Christine Champion 150    Emergency Contact/Next of Kin name & number: Patient's wife Nura Cervantes [phone]   Who are involved in patient's support system? Family    PCP: Toni Cadena  Date of last visit: 3-4 months ago      Discharge Planning    Living Arrangements:  Spouse/Significant Other     Home has 2 stories  3 stairs to climb to get into front door, 13 stairs to climb to reach second floor  Location of bedroom/bathroom in home  Upper    Patient able to perform ADL's:Independent    Current Services (outpatient & in home) None  DME equipment: None  DME provider: N/A    Is patient receiving oral anticoagulation therapy? No    If indicated:   Physician managing anticoagulation treatment: N/A  Where does patient obtain lab work for ATC treatment? N/A      Potential Assistance Needed:  Ryan Gar, Transitional Care, Outpatient PT/OT, Transportation    Patient agreeable to home care: Yes  Freedom of choice provided:  yes    Prior SNF/Rehab Placement and Facility: None  Agreeable to SNF/Rehab: Yes  [place] of choice provided: yes     Evaluation: yes    Expected Discharge date:       Patient expects to be discharged to:   Home    If home: is the family and/or caregiver wiling & able to provide support at home? Yes  Who will be providing this support?  Patient's wife    Follow Up Appointment: Best Day/ Time:      Transportation provider: Patient does all the driving, his wife has MS  Transportation arrangements needed for discharge: Yes, will need cab if going home    Readmission Risk              Risk of Unplanned Readmission:  18             Does patient have a readmission risk score greater than 14?: Yes  If yes, follow-up appointment must be made within 7 days of discharge. Goals of Care: Safety      Educated patient's wife on transitional options, provided freedom of choice and are agreeable with plan      Discharge Plan: Goal is home, will need cab. Monitor for needs, potential SNF vs ARU  Wife is inquiring if SW can assist with her transport to/from the hospital.  She has MS and can not drive, fixed income.   Used cab previous three days          Electronically signed by Sabrina Clay RN on 12/3/21 at 12:30 PM EST

## 2021-12-03 NOTE — PLAN OF CARE
Problem: OXYGENATION/RESPIRATORY FUNCTION  Goal: Patient will maintain patent airway  12/3/2021 0814 by Jeremias Hennessy RCP  Outcome: Ongoing  12/2/2021 2021 by Vinay Frank RN  Outcome: Ongoing  12/2/2021 2000 by Ashley Cassidy RCP  Outcome: Ongoing  12/2/2021 1827 by Cristina Ho RN  Outcome: Ongoing  Goal: Patient will achieve/maintain normal respiratory rate/effort  Description: Respiratory rate and effort will be within normal limits for the patient  12/3/2021 0814 by Jeremias Hennessy RCP  Outcome: Ongoing  12/2/2021 2021 by Vinay Frank RN  Outcome: Ongoing  12/2/2021 2000 by Ashley Cassidy RCP  Outcome: Ongoing  12/2/2021 1827 by Cristina Ho RN  Outcome: Ongoing     Problem: MECHANICAL VENTILATION  Goal: Patient will maintain patent airway  12/3/2021 0814 by Jeremias Hennessy RCP  Outcome: Ongoing  12/2/2021 2021 by Vinay Frank RN  Outcome: Ongoing  12/2/2021 2000 by Ashley Cassidy RCP  Outcome: Ongoing  12/2/2021 1827 by Cristina Ho RN  Outcome: Ongoing  Goal: Oral health is maintained or improved  12/3/2021 0814 by GRACIELA MarieP  Outcome: Ongoing  12/2/2021 2021 by Vinay Frank RN  Outcome: Ongoing  12/2/2021 2000 by Ashley Cassidy RCP  Outcome: Ongoing  12/2/2021 1827 by Cristina Ho RN  Outcome: Ongoing  Goal: ET tube will be managed safely  12/3/2021 0814 by Jeremias Hennessy RCP  Outcome: Ongoing  12/2/2021 2021 by Vinay Frank RN  Outcome: Ongoing  12/2/2021 2000 by Ashley Cassidy RCP  Outcome: Ongoing  12/2/2021 1827 by Cristina Ho RN  Outcome: Ongoing  Goal: Ability to express needs and understand communication  12/3/2021 0814 by GRACIELA MarieP  Outcome: Ongoing  12/2/2021 2021 by Vinay Frank RN  Outcome: Ongoing  12/2/2021 2000 by Ashley Cassidy RCP  Outcome: Ongoing  12/2/2021 1827 by Cristina Ho RN  Outcome: Ongoing  Goal: Mobility/activity is maintained at optimum level for patient  12/3/2021 0814 by Chilo Goins JOJO Tejada  Outcome: Ongoing  12/2/2021 2021 by Zachery Carpenter RN  Outcome: Ongoing  12/2/2021 2000 by Jay Jay Mckeon RCP  Outcome: Ongoing  12/2/2021 1827 by Kev Billy RN  Outcome: Ongoing     Problem: SKIN INTEGRITY  Goal: Skin integrity is maintained or improved  12/3/2021 0814 by Louie Flores RCP  Outcome: Ongoing  12/2/2021 2021 by Zachery Carpenter RN  Outcome: Ongoing  12/2/2021 2000 by Jay Jay Mckeon RCP  Outcome: Ongoing  12/2/2021 1827 by Kev Billy RN  Outcome: Ongoing

## 2021-12-04 ENCOUNTER — APPOINTMENT (OUTPATIENT)
Dept: GENERAL RADIOLOGY | Age: 61
DRG: 637 | End: 2021-12-04
Payer: COMMERCIAL

## 2021-12-04 LAB
ABSOLUTE EOS #: 0.13 K/UL (ref 0–0.4)
ABSOLUTE IMMATURE GRANULOCYTE: 0 K/UL (ref 0–0.3)
ABSOLUTE LYMPH #: 0.51 K/UL (ref 1–4.8)
ABSOLUTE MONO #: 1.15 K/UL (ref 0.1–0.8)
ALLEN TEST: POSITIVE
ANION GAP SERPL CALCULATED.3IONS-SCNC: 9 MMOL/L (ref 9–17)
BASOPHILS # BLD: 0 % (ref 0–2)
BASOPHILS ABSOLUTE: 0 K/UL (ref 0–0.2)
BUN BLDV-MCNC: 22 MG/DL (ref 6–20)
BUN/CREAT BLD: ABNORMAL (ref 9–20)
CALCIUM SERPL-MCNC: 7.3 MG/DL (ref 8.6–10.4)
CHLORIDE BLD-SCNC: 115 MMOL/L (ref 98–107)
CO2: 19 MMOL/L (ref 20–31)
CREAT SERPL-MCNC: 0.58 MG/DL (ref 0.7–1.2)
DIFFERENTIAL TYPE: ABNORMAL
EOSINOPHILS RELATIVE PERCENT: 1 % (ref 1–4)
FIO2: 30
GFR AFRICAN AMERICAN: >60 ML/MIN
GFR NON-AFRICAN AMERICAN: >60 ML/MIN
GFR SERPL CREATININE-BSD FRML MDRD: ABNORMAL ML/MIN/{1.73_M2}
GFR SERPL CREATININE-BSD FRML MDRD: ABNORMAL ML/MIN/{1.73_M2}
GLUCOSE BLD-MCNC: 118 MG/DL (ref 75–110)
GLUCOSE BLD-MCNC: 164 MG/DL (ref 75–110)
GLUCOSE BLD-MCNC: 173 MG/DL (ref 75–110)
GLUCOSE BLD-MCNC: 205 MG/DL (ref 75–110)
GLUCOSE BLD-MCNC: 323 MG/DL (ref 75–110)
GLUCOSE BLD-MCNC: 361 MG/DL (ref 70–99)
GLUCOSE BLD-MCNC: 397 MG/DL (ref 74–100)
HCT VFR BLD CALC: 32.2 % (ref 40.7–50.3)
HEMOGLOBIN: 10.5 G/DL (ref 13–17)
IMMATURE GRANULOCYTES: 0 %
LYMPHOCYTES # BLD: 4 % (ref 24–44)
MCH RBC QN AUTO: 29.2 PG (ref 25.2–33.5)
MCHC RBC AUTO-ENTMCNC: 32.6 G/DL (ref 28.4–34.8)
MCV RBC AUTO: 89.4 FL (ref 82.6–102.9)
MODE: ABNORMAL
MONOCYTES # BLD: 9 % (ref 1–7)
MORPHOLOGY: ABNORMAL
NEGATIVE BASE EXCESS, ART: 2 (ref 0–2)
NRBC AUTOMATED: 0 PER 100 WBC
O2 DEVICE/FLOW/%: ABNORMAL
PATIENT TEMP: ABNORMAL
PDW BLD-RTO: 15 % (ref 11.8–14.4)
PHOSPHORUS: 2.4 MG/DL (ref 2.5–4.5)
PHOSPHORUS: 3.8 MG/DL (ref 2.5–4.5)
PLATELET # BLD: 109 K/UL (ref 138–453)
PLATELET ESTIMATE: ABNORMAL
PMV BLD AUTO: 10.5 FL (ref 8.1–13.5)
POC HCO3: 22.1 MMOL/L (ref 21–28)
POC LACTIC ACID: 0.95 MMOL/L (ref 0.56–1.39)
POC O2 SATURATION: 92 % (ref 94–98)
POC PCO2 TEMP: ABNORMAL MM HG
POC PCO2: 34.3 MM HG (ref 35–48)
POC PH TEMP: ABNORMAL
POC PH: 7.42 (ref 7.35–7.45)
POC PO2 TEMP: ABNORMAL MM HG
POC PO2: 62.4 MM HG (ref 83–108)
POSITIVE BASE EXCESS, ART: ABNORMAL (ref 0–3)
POTASSIUM SERPL-SCNC: 4.7 MMOL/L (ref 3.7–5.3)
RBC # BLD: 3.6 M/UL (ref 4.21–5.77)
RBC # BLD: ABNORMAL 10*6/UL
SAMPLE SITE: ABNORMAL
SEG NEUTROPHILS: 86 % (ref 36–66)
SEGMENTED NEUTROPHILS ABSOLUTE COUNT: 11.01 K/UL (ref 1.8–7.7)
SODIUM BLD-SCNC: 143 MMOL/L (ref 135–144)
TCO2 (CALC), ART: ABNORMAL MMOL/L (ref 22–29)
WBC # BLD: 12.8 K/UL (ref 3.5–11.3)
WBC # BLD: ABNORMAL 10*3/UL

## 2021-12-04 PROCEDURE — 2500000003 HC RX 250 WO HCPCS: Performed by: STUDENT IN AN ORGANIZED HEALTH CARE EDUCATION/TRAINING PROGRAM

## 2021-12-04 PROCEDURE — 36600 WITHDRAWAL OF ARTERIAL BLOOD: CPT

## 2021-12-04 PROCEDURE — 85025 COMPLETE CBC W/AUTO DIFF WBC: CPT

## 2021-12-04 PROCEDURE — 6360000002 HC RX W HCPCS: Performed by: STUDENT IN AN ORGANIZED HEALTH CARE EDUCATION/TRAINING PROGRAM

## 2021-12-04 PROCEDURE — 82947 ASSAY GLUCOSE BLOOD QUANT: CPT

## 2021-12-04 PROCEDURE — 2580000003 HC RX 258: Performed by: STUDENT IN AN ORGANIZED HEALTH CARE EDUCATION/TRAINING PROGRAM

## 2021-12-04 PROCEDURE — 2000000000 HC ICU R&B

## 2021-12-04 PROCEDURE — 37799 UNLISTED PX VASCULAR SURGERY: CPT

## 2021-12-04 PROCEDURE — 6360000002 HC RX W HCPCS: Performed by: HEALTH CARE PROVIDER

## 2021-12-04 PROCEDURE — 94003 VENT MGMT INPAT SUBQ DAY: CPT

## 2021-12-04 PROCEDURE — 6370000000 HC RX 637 (ALT 250 FOR IP): Performed by: STUDENT IN AN ORGANIZED HEALTH CARE EDUCATION/TRAINING PROGRAM

## 2021-12-04 PROCEDURE — 83605 ASSAY OF LACTIC ACID: CPT

## 2021-12-04 PROCEDURE — 82803 BLOOD GASES ANY COMBINATION: CPT

## 2021-12-04 PROCEDURE — 2700000000 HC OXYGEN THERAPY PER DAY

## 2021-12-04 PROCEDURE — 71045 X-RAY EXAM CHEST 1 VIEW: CPT

## 2021-12-04 PROCEDURE — 80048 BASIC METABOLIC PNL TOTAL CA: CPT

## 2021-12-04 PROCEDURE — 2580000003 HC RX 258: Performed by: HEALTH CARE PROVIDER

## 2021-12-04 PROCEDURE — 84100 ASSAY OF PHOSPHORUS: CPT

## 2021-12-04 PROCEDURE — 99291 CRITICAL CARE FIRST HOUR: CPT | Performed by: INTERNAL MEDICINE

## 2021-12-04 PROCEDURE — 86022 PLATELET ANTIBODIES: CPT

## 2021-12-04 PROCEDURE — 6370000000 HC RX 637 (ALT 250 FOR IP): Performed by: HEALTH CARE PROVIDER

## 2021-12-04 PROCEDURE — 36415 COLL VENOUS BLD VENIPUNCTURE: CPT

## 2021-12-04 PROCEDURE — 94761 N-INVAS EAR/PLS OXIMETRY MLT: CPT

## 2021-12-04 RX ORDER — INSULIN GLARGINE 100 [IU]/ML
25 INJECTION, SOLUTION SUBCUTANEOUS NIGHTLY
Status: DISCONTINUED | OUTPATIENT
Start: 2021-12-04 | End: 2021-12-04

## 2021-12-04 RX ORDER — FUROSEMIDE 10 MG/ML
20 INJECTION INTRAMUSCULAR; INTRAVENOUS ONCE
Status: DISCONTINUED | OUTPATIENT
Start: 2021-12-04 | End: 2021-12-04

## 2021-12-04 RX ORDER — DEXTROSE, SODIUM CHLORIDE, AND POTASSIUM CHLORIDE 5; .45; .3 G/100ML; G/100ML; G/100ML
INJECTION INTRAVENOUS CONTINUOUS
Status: DISCONTINUED | OUTPATIENT
Start: 2021-12-04 | End: 2021-12-04

## 2021-12-04 RX ORDER — FONDAPARINUX SODIUM 2.5 MG/.5ML
2.5 INJECTION SUBCUTANEOUS DAILY
Status: DISCONTINUED | OUTPATIENT
Start: 2021-12-04 | End: 2021-12-11

## 2021-12-04 RX ORDER — FUROSEMIDE 10 MG/ML
20 INJECTION INTRAMUSCULAR; INTRAVENOUS DAILY
Status: COMPLETED | OUTPATIENT
Start: 2021-12-04 | End: 2021-12-06

## 2021-12-04 RX ORDER — INSULIN GLARGINE 100 [IU]/ML
10 INJECTION, SOLUTION SUBCUTANEOUS NIGHTLY
Status: DISCONTINUED | OUTPATIENT
Start: 2021-12-04 | End: 2021-12-12

## 2021-12-04 RX ORDER — LORAZEPAM 2 MG/1
2 TABLET ORAL EVERY 4 HOURS
Status: DISCONTINUED | OUTPATIENT
Start: 2021-12-04 | End: 2021-12-05

## 2021-12-04 RX ORDER — FONDAPARINUX SODIUM 7.5 MG/.6ML
7.5 INJECTION SUBCUTANEOUS DAILY
Status: DISCONTINUED | OUTPATIENT
Start: 2021-12-04 | End: 2021-12-04

## 2021-12-04 RX ORDER — INSULIN GLARGINE 100 [IU]/ML
20 INJECTION, SOLUTION SUBCUTANEOUS NIGHTLY
Status: DISCONTINUED | OUTPATIENT
Start: 2021-12-04 | End: 2021-12-04

## 2021-12-04 RX ORDER — SODIUM CHLORIDE 9 MG/ML
INJECTION, SOLUTION INTRAVENOUS CONTINUOUS
Status: DISCONTINUED | OUTPATIENT
Start: 2021-12-04 | End: 2021-12-04

## 2021-12-04 RX ORDER — DEXMEDETOMIDINE HYDROCHLORIDE 4 UG/ML
.2-1.4 INJECTION, SOLUTION INTRAVENOUS CONTINUOUS
Status: DISCONTINUED | OUTPATIENT
Start: 2021-12-04 | End: 2021-12-13

## 2021-12-04 RX ADMIN — DEXMEDETOMIDINE HYDROCHLORIDE 0.2 MCG/KG/HR: 4 INJECTION, SOLUTION INTRAVENOUS at 11:28

## 2021-12-04 RX ADMIN — INSULIN LISPRO 3 UNITS: 100 INJECTION, SOLUTION INTRAVENOUS; SUBCUTANEOUS at 11:50

## 2021-12-04 RX ADMIN — SODIUM CHLORIDE, PRESERVATIVE FREE 10 ML: 5 INJECTION INTRAVENOUS at 20:23

## 2021-12-04 RX ADMIN — INSULIN LISPRO 6 UNITS: 100 INJECTION, SOLUTION INTRAVENOUS; SUBCUTANEOUS at 20:19

## 2021-12-04 RX ADMIN — FUROSEMIDE 20 MG: 10 INJECTION, SOLUTION INTRAMUSCULAR; INTRAVENOUS at 12:35

## 2021-12-04 RX ADMIN — LORAZEPAM 3 MG: 1 TABLET ORAL at 12:10

## 2021-12-04 RX ADMIN — FONDAPARINUX SODIUM 2.5 MG: 2.5 INJECTION, SOLUTION SUBCUTANEOUS at 12:37

## 2021-12-04 RX ADMIN — LORAZEPAM 4 MG: 2 TABLET ORAL at 06:08

## 2021-12-04 RX ADMIN — ONDANSETRON 4 MG: 2 INJECTION INTRAMUSCULAR; INTRAVENOUS at 20:29

## 2021-12-04 RX ADMIN — CLINDAMYCIN IN 5 PERCENT DEXTROSE 600 MG: 12 INJECTION, SOLUTION INTRAVENOUS at 14:04

## 2021-12-04 RX ADMIN — FAMOTIDINE 20 MG: 10 INJECTION INTRAVENOUS at 08:42

## 2021-12-04 RX ADMIN — Medication 25 MCG/HR: at 02:02

## 2021-12-04 RX ADMIN — HEPARIN SODIUM 5000 UNITS: 5000 INJECTION INTRAVENOUS; SUBCUTANEOUS at 06:08

## 2021-12-04 RX ADMIN — LORAZEPAM 2 MG: 2 TABLET ORAL at 20:09

## 2021-12-04 RX ADMIN — DEXMEDETOMIDINE HYDROCHLORIDE 0.5 MCG/KG/HR: 4 INJECTION, SOLUTION INTRAVENOUS at 22:36

## 2021-12-04 RX ADMIN — LEVOFLOXACIN 750 MG: 5 INJECTION, SOLUTION INTRAVENOUS at 01:54

## 2021-12-04 RX ADMIN — INSULIN GLARGINE 10 UNITS: 100 INJECTION, SOLUTION SUBCUTANEOUS at 20:19

## 2021-12-04 RX ADMIN — CLINDAMYCIN IN 5 PERCENT DEXTROSE 600 MG: 12 INJECTION, SOLUTION INTRAVENOUS at 20:25

## 2021-12-04 RX ADMIN — LORAZEPAM 4 MG: 2 TABLET ORAL at 01:54

## 2021-12-04 RX ADMIN — SODIUM PHOSPHATE, MONOBASIC, MONOHYDRATE 30 MMOL: 276; 142 INJECTION, SOLUTION INTRAVENOUS at 11:56

## 2021-12-04 RX ADMIN — FLUOXETINE HYDROCHLORIDE 40 MG: 20 CAPSULE ORAL at 08:42

## 2021-12-04 RX ADMIN — CLINDAMYCIN IN 5 PERCENT DEXTROSE 600 MG: 12 INJECTION, SOLUTION INTRAVENOUS at 08:42

## 2021-12-04 RX ADMIN — SODIUM CHLORIDE, PRESERVATIVE FREE 10 ML: 5 INJECTION INTRAVENOUS at 08:42

## 2021-12-04 RX ADMIN — POTASSIUM CHLORIDE, DEXTROSE MONOHYDRATE AND SODIUM CHLORIDE: 300; 5; 450 INJECTION, SOLUTION INTRAVENOUS at 02:57

## 2021-12-04 RX ADMIN — INSULIN LISPRO 3 UNITS: 100 INJECTION, SOLUTION INTRAVENOUS; SUBCUTANEOUS at 16:16

## 2021-12-04 RX ADMIN — INSULIN LISPRO 12 UNITS: 100 INJECTION, SOLUTION INTRAVENOUS; SUBCUTANEOUS at 07:51

## 2021-12-04 RX ADMIN — FAMOTIDINE 20 MG: 10 INJECTION INTRAVENOUS at 20:23

## 2021-12-04 RX ADMIN — LORAZEPAM 2 MG: 2 TABLET ORAL at 23:55

## 2021-12-04 ASSESSMENT — PULMONARY FUNCTION TESTS
PIF_VALUE: 14
PIF_VALUE: 22
PIF_VALUE: 17
PIF_VALUE: 20
PIF_VALUE: 22
PIF_VALUE: 13
PIF_VALUE: 21
PIF_VALUE: 11
PIF_VALUE: 16
PIF_VALUE: 16
PIF_VALUE: 18
PIF_VALUE: 14
PIF_VALUE: 27
PIF_VALUE: 18
PIF_VALUE: 16
PIF_VALUE: 21
PIF_VALUE: 25
PIF_VALUE: 24
PIF_VALUE: 12
PIF_VALUE: 12
PIF_VALUE: 19
PIF_VALUE: 18
PIF_VALUE: 16
PIF_VALUE: 16
PIF_VALUE: 15
PIF_VALUE: 14
PIF_VALUE: 19
PIF_VALUE: 14
PIF_VALUE: 19
PIF_VALUE: 13
PIF_VALUE: 16
PIF_VALUE: 28

## 2021-12-04 NOTE — PLAN OF CARE
Problem: OXYGENATION/RESPIRATORY FUNCTION  Goal: Patient will maintain patent airway  12/4/2021 0913 by Jenny Rai RCP  Outcome: Ongoing     Problem: OXYGENATION/RESPIRATORY FUNCTION  Goal: Patient will achieve/maintain normal respiratory rate/effort  Description: Respiratory rate and effort will be within normal limits for the patient  12/4/2021 0913 by Jenny Rai RCP  Outcome: Ongoing     Problem: MECHANICAL VENTILATION  Goal: Patient will maintain patent airway  12/4/2021 0913 by Jenny Rai RCP  Outcome: Ongoing     Problem: MECHANICAL VENTILATION  Goal: Oral health is maintained or improved  12/4/2021 0913 by Jenny Rai RCP  Outcome: Ongoing     Problem: MECHANICAL VENTILATION  Goal: ET tube will be managed safely  12/4/2021 0913 by Jenny Rai RCP  Outcome: Ongoing     Problem: MECHANICAL VENTILATION  Goal: Ability to express needs and understand communication  12/4/2021 0913 by Jenny Rai RCP  Outcome: Ongoing     Problem: MECHANICAL VENTILATION  Goal: Mobility/activity is maintained at optimum level for patient  12/4/2021 0913 by Jenny Rai RCP  Outcome: Ongoing     Problem: SKIN INTEGRITY  Goal: Skin integrity is maintained or improved  12/4/2021 0913 by Jenny Rai RCP  Outcome: Ongoing

## 2021-12-04 NOTE — PROGRESS NOTES
Critical Care Team - Daily Progress Note      Date and time: 2021 8:44 AM  Patient's name:  Alicia Monet  Medical Record Number: [id number]  Patient's account/billing number: [de-identified]  Patient's YOB: 1960  Age: 63 y.o. Date of Admission: 2021  7:39 AM  Length of stay during current admission: 4      Primary Care Physician: Liset Arredondo    Code Status: Full Code    Reason for ICU admission:  DKA with coma      SUBJECTIVE:     OVERNIGHT EVENTS:      No acute events mentioned by the RN. Patient Is still intubated and sedated on fentanyl. Patient was started on scheduled ativan, sedation requirement are going down. Tmax: 99.5     Labs showed patient potassium of 4.7, PO4: 2.4, M.1   Glucose: 361. WBC: 12.8 downtrended as compared to yesterday, hemoglobin around the baseline. Platelet count 060>155>576>72>766. DC heparin, added fondaparinux, HIT panel sent. F.A.S.T. M. H.U.G.S. B.I.D.     ADULT TUBE FEEDING; Orogastric; Diabetic; Continuous; 55; Yes; 10; Q 6 hours; 55; 30; Before and after each bolus   Fluids: DC fluids   Family: Will update later   Analgesic: Precedex and ativan   Sedation: Precedex and ativan   Thrombo-prophylaxis: [] Enoxaparin, [] Unfract.  Heparin Subcutaneously, [] EPC Cuffs, Fondaparinux   Mobility: None   Heads up: 30 degree   Ulcer prophylaxis: [] PPI Agent, [x] [id number], [] Sucralfate, [] Other:   Glycemic control: 361, adjusted lantus with HDISS   Spontaneous breathing trial: None, on sedation holiday   Bowel regimen/urine output: 1315 mL per last shift   Indwelling catheter/lines: Peripheral IV  De-escalation: Dc propofol, fentanyl, dc fluids         AWAKE & FOLLOWING COMMANDS:  [x] No   [] Yes    CURRENT VENTILATION STATUS:     [x] Ventilator  [] BIPAP  [] Nasal Cannula [] Room Air        SECRETIONS Amount:  [x] Small [] Moderate  [] Large  [] None  Color:     [x] White [] Colored  [] Bloody    SEDATION:  Fentanyl and propofol  [x] Propofol gtt  [] Versed gtt  [] Ativan gtt   [] No Sedation    PARALYZED:  [x] No    [] Yes    DIARRHEA:                [x] No                [] Yes  (C. Difficile status: [] positive                                                                                                                       [] negative                                                                                                                     [] pending)    VASOPRESSORS:  [x] No    [x] Yes    If yes -   [] Levophed       [] Dopamine     [] Vasopressin       [] Dobutamine  [] Phenylephrine         [] Epinephrine    CENTRAL LINES:     [] No   [] Yes   (Date of Insertion:   )           If yes -     [] Right IJ     [] Left IJ [x] Right Femoral [] Left Femoral                   [] Right Subclavian [] Left Subclavian        SANTANA'S CATHETER:   [] No   [x] Yes  Ureteral cathetar(Date of Insertion:  2021 )     URINE OUTPUT:            [x] Good   [] Low              [] Anuric      OBJECTIVE:     VITAL SIGNS:  /60   Pulse 79   Temp 99.5 °F (37.5 °C) (Oral)   Resp 20   Wt 131 lb 13.4 oz (59.8 kg)   SpO2 100%   BMI 19.47 kg/m²   Tmax over 24 hours:  Temp (24hrs), Av.2 °F (37.3 °C), Min:98.8 °F (37.1 °C), Max:99.5 °F (37.5 °C)      Patient Vitals for the past 6 hrs:   BP Temp Temp src Pulse Resp SpO2 Weight   21 0615 117/60 -- -- 79 20 100 % --   21 0600 123/63 99.5 °F (37.5 °C) Oral 80 22 100 % 131 lb 13.4 oz (59.8 kg)   21 0545 136/65 -- -- 80 19 99 % --   21 0530 (!) 117/59 -- -- 82 19 99 % --   21 0515 130/74 -- -- 81 19 100 % --   21 0500 (!) 113/57 -- -- 79 20 99 % --   21 0446 -- -- -- -- 19 100 % --   21 0445 121/62 -- -- 79 21 100 % --   21 0430 (!) 121/59 -- -- 80 19 99 % --   21 0415 129/63 -- -- 83 22 99 % --   21 0400 (!) 162/69 99.5 °F (37.5 °C) Oral 87 29 98 % --   21 0345 (!) 115/58 -- -- 79 21 99 % --   21 0330 (!) 105/58 -- -- 80 22 98 % --   12/04/21 0315 (!) 109/56 -- -- 80 22 97 % --   12/04/21 0305 -- -- -- 83 23 95 % --   12/04/21 0300 126/61 -- -- 82 20 97 % --   12/04/21 0245 121/64 -- -- 84 19 97 % --         Intake/Output Summary (Last 24 hours) at 12/4/2021 0844  Last data filed at 12/4/2021 0400  Gross per 24 hour   Intake 3333.47 ml   Output 1375 ml   Net 1958.47 ml     Wt Readings from Last 2 Encounters:   12/04/21 131 lb 13.4 oz (59.8 kg)     Body mass index is 19.47 kg/m². PHYSICAL EXAMINATION:  Constitutional: Intubated and sedated  Neck: Supple, symmetrical, trachea midline, no adenopathy,  no jvd, skin normal  Respiratory: clear to auscultation, no wheezes or rales and unlabored breathing.   Cardiovascular: regular rate and rhythm, normal S1, S2, no murmur noted   Abdomen: soft, nontender, nondistended, no masses or organomegaly  Extremities:  peripheral pulses normal, no pedal edema, no clubbing or cyanosis     Any additional physical findings:    MEDICATIONS:    Scheduled Meds:   insulin glargine  20 Units SubCUTAneous Nightly    LORazepam  4 mg Oral Q4H    famotidine (PEPCID) injection  20 mg IntraVENous BID    levofloxacin  750 mg IntraVENous Q24H    clindamycin (CLEOCIN) IV  600 mg IntraVENous TID    insulin lispro  0-18 Units SubCUTAneous TID     insulin lispro  0-9 Units SubCUTAneous Nightly    sodium chloride flush  5-40 mL IntraVENous 2 times per day    FLUoxetine  40 mg Oral Daily    heparin (porcine)  5,000 Units SubCUTAneous 3 times per day     Continuous Infusions:   propofol Stopped (12/04/21 0500)    fentaNYL Stopped (12/04/21 0813)    dextrose      norepinephrine Stopped (12/02/21 1354)    sodium chloride      dextrose 5 % and 0.45 % NaCl Stopped (12/01/21 0000)     PRN Meds:   albuterol, 2.5 mg, Q4H PRN  glucose, 15 g, PRN  dextrose, 12.5 g, PRN  glucagon (rDNA), 1 mg, PRN  dextrose, 100 mL/hr, PRN  dextrose, 12.5 g, PRN  polyethylene glycol, 17 g, Daily PRN  sodium chloride flush, 5-40 mL, PRN  sodium chloride, 25 mL, PRN  ondansetron, 4 mg, Q8H PRN   Or  ondansetron, 4 mg, Q6H PRN  acetaminophen, 650 mg, Q6H PRN   Or  acetaminophen, 650 mg, Q6H PRN  dextrose 5 % and 0.45 % NaCl, , Continuous PRN          VENT SETTINGS (Comprehensive) (if applicable):  Vent Information  $Ventilation: $Subsequent Day  Skin Assessment: Clean, dry, & intact  Suction Catheter Diameter: 14  Equipment Changed: HME  Vent Type: Servo i  Vent Mode: PRVC  Vt Ordered: 510 mL  Rate Set: 20 bmp  FiO2 : 30 %  SpO2: 100 %  SpO2/FiO2 ratio: 333.33  Sensitivity: 2  PEEP/CPAP: 8  I Time/ I Time %: 0.85 s  Humidification Source: HME  Nitric Oxide/Epoprostenol In Use?: No  Mask Type: Full face mask  Mask Size: Medium  Additional Respiratory  Assessments  Pulse: 79  Resp: 20  SpO2: 100 %  End Tidal CO2: 39 (%)  Position: Semi-Powell's  Humidification Source: HME  Oral Care Completed?: Yes  Oral Care: Lip moisturizer applied, Mouth moisturizer, Mouth suctioned  Subglottic Suction Done?: No  Cuff Pressure (cm H2O):  (MLT)      Laboratory findings:    Complete Blood Count:   Recent Labs     12/02/21  0409 12/03/21  0522 12/04/21  0516   WBC 11.9* 13.2* 12.8*   HGB 11.8* 10.2* 10.5*   HCT 33.6* 30.0* 32.2*    See Reflexed IPF Result 109*        Last 3 Blood Glucose:   Recent Labs     12/02/21  1938 12/03/21  0152 12/04/21  0516   GLUCOSE 94 138* 361*        PT/INR:  No results found for: PROTIME, INR  PTT:  No results found for: APTT, PTT    Comprehensive Metabolic Profile:   Recent Labs     12/02/21  0750 12/02/21  1449 12/02/21  1938 12/03/21  0152 12/04/21  0516   *   < > 143 141 143   K 4.1   < > 3.7 3.6* 4.7   *   < > 116* 115* 115*   CO2 19*   < > 20 20 19*   BUN 40*   < > 26* 24* 22*   CREATININE 0.83   < > 0.63* 0.50* 0.58*   GLUCOSE 142*   < > 94 138* 361*   CALCIUM 6.9*   < > 7.2* 7.2* 7.3*   PROT 4.9*  --   --   --   --    LABALBU 2.4*  --   --   --   --    BILITOT 0.74  --   --   --   --    ALKPHOS 108 --   --   --   --    *  --   --   --   --    *  --   --   --   --     < > = values in this interval not displayed. Magnesium:   Lab Results   Component Value Date    MG 1.9 12/03/2021     Phosphorus:   Lab Results   Component Value Date    PHOS 1.7 12/03/2021     Ionized Calcium:   Lab Results   Component Value Date    CAION 1.04 12/03/2021        Urinalysis:     Troponin: No results for input(s): TROPONINI in the last 72 hours. Microbiology:    Cultures during this admission:     Blood cultures:                 [x] None drawn      [] Negative             []  Positive (Details:  )  Urine Culture:                   [x] None drawn      [] Negative             []  Positive (Details:  )  Sputum Culture:               [x] None drawn       [] Negative             []  Positive (Details:  )   Endotracheal aspirate:     [x] None drawn       [] Negative             []  Positive (Details:  )     Other pertinent Labs:       Radiology/Imaging:       ASSESSMENT:     Active Problems:    Type 1 diabetes mellitus with ketoacidosis without coma ([facility] [address]  Resolved Problems:    * No resolved hospital problems. *            PLAN:     Neurological:  - Intubated and sedated on Precedex and Ativan. Dc fentanyl and propofol (12/4)   - Decreased ativan from 4 mg to 3 mg q4h. - Wean sedation as tolerated. Cardiovascular:  - BP under control.  - Intermittently was on pressor, now off pressors. Pulmonary:  - Wean vent setting as needed. - Chest x-ray done today showed bilateral airspace disease appear to be more prominent as compared to yesterday. GI/nutrition  Tube feeding @ 55ml/h. Pepcid 20 mg daily. GlycoLax as needed for bowel movements. Renal/fluids/electrolyte  - DC IVF  - Na: 143 Cl: 119 PO4: 2.4, Ca: 7.3, corrected calcium: 8.5  - I/O: 573/825: -252 per last 24 h. Positive fluid balance. - Added lasix 20 mg daily for 3 doses. Will monitor creatinine.   - Replace as needed.      ID:   - WBC: 13.2--->12.8 (T)  - CXR showed Bibasilar effusion with left basilar consolidation.   - Concern of aspiration PNA. - Levofloxacin 750 mg daily  - Clindamycin 600 mg TID. Hematological:  - WBC: 13.2--->12.8 (T)  - Hb: 10.5, down trended likely secondary to dilution, will continue to monitor.  - Platelet count 416>116>245>33>763.  - Concern of HIT, HIT panel sent. - On fondaparinux.              Jannet Dent MD      Department of [facility]         12/4/2021, 8:44 AM

## 2021-12-04 NOTE — PROGRESS NOTES
Comprehensive Nutrition Assessment    Type and Reason for Visit:  Initial (New Tube Feeding)    Nutrition Recommendations/Plan: Modify Diabetic (Glucerna 1.5) TF goal rate to 45 mL/hr to more appropriately meet estimated needs. Will provide 1620 kcals, 89 gm protein per day. Monitor TF tolerance/adequacy of intakes - modify as needed. Will monitor labs, weights, and plan of care. Nutrition Assessment:  Pt seen due to new Tube Feeding order. Pt admitted with AMS and hyperglycemia. PMH includes: Type 1 DM, HTN. Pt was intubated on 12/1. Currently remains intubated and sedated. Propofol running at low rate ot 3.3 mL/hr. Pt has been started on Diabetic TF - currently running at ordered goal rate of 55 mL/hr. RN reports pt tolerating TF well. Discussed decreasing goal rate to 45 mL/hr to more appropriately meet estimated nutrition needs. No recorded BM since admission. Labs reviewed: Phos 2.4 mg/dL, Glucose 323-397 mg/dL. Meds reviewed. Malnutrition Assessment:  Malnutrition Status:  Insufficient data    Context:  Chronic Illness     Findings of the 6 clinical characteristics of malnutrition:  Energy Intake:  Mild decrease in energy intake - Meeting estimated needs with Tube Feedings. Weight Loss:  Unable to assess     Body Fat Loss:  1 - Mild body fat loss Orbital   Muscle Mass Loss:  1 - Mild muscle mass loss Temples (temporalis)  Fluid Accumulation:  No significant fluid accumulation   Strength:  Not Performed    Estimated Daily Nutrient Needs:  Energy (kcal):  25-28 kcal/kg = 9707-7221 kcals/day; Weight Used for Energy Requirements:  Current     Protein (g):  1.1-1.3 gm/kg = 80-95 gm pro/day; Weight Used for Protein Requirements:  Ideal        Fluid (ml/day):  30 mL/kg = 1800 mL/day or per MD; Method Used for Fluid Requirements:  ml/Kg      Nutrition Related Findings:  Labs reviewed. Meds: Lasix, Lantus, Humalog SS, 30 mmol NaPhos replacement given. No recorded BM since admission. EST    Contact: [phone]

## 2021-12-04 NOTE — PROGRESS NOTES
Family Update: Wife was called and updated on patient progress till this time. All questions were addressed to the best of writer knowledge.        Noel Campbell  PGY-2  Family Medicine     12/4/2021  1:27 PM

## 2021-12-04 NOTE — PLAN OF CARE
Problem: Non-Violent Restraints  Goal: No harm/injury to patient while restraints in use  Outcome: Met This Shift  Goal: Patient's dignity will be maintained  Outcome: Met This Shift     Problem: Skin Integrity:  Goal: Will show no infection signs and symptoms  Description: Will show no infection signs and symptoms  Outcome: Ongoing  Goal: Absence of new skin breakdown  Description: Absence of new skin breakdown  Outcome: Ongoing     Problem: Falls - Risk of:  Goal: Will remain free from falls  Description: Will remain free from falls  Outcome: Ongoing  Goal: Absence of physical injury  Description: Absence of physical injury  Outcome: Ongoing     Problem: Fluid Volume - Imbalance:  Goal: Will remain free of signs and symptoms of dehydration  Description: Will remain free of signs and symptoms of dehydration  Outcome: Ongoing  Goal: Absence of imbalanced fluid volume signs and symptoms  Description: Absence of imbalanced fluid volume signs and symptoms  Outcome: Ongoing     Problem: Serum Glucose Level - Abnormal:  Goal: Ability to maintain appropriate glucose levels will improve  Description: Ability to maintain appropriate glucose levels will improve  Outcome: Ongoing     Problem: Injury - Acid Base Imbalance:  Goal: Acid-base balance  Description: Acid-base balance  Outcome: Ongoing     Problem: OXYGENATION/RESPIRATORY FUNCTION  Goal: Patient will maintain patent airway  12/4/2021 1050 by Deadra Councilman  Outcome: Ongoing  12/4/2021 0913 by Evan Duarte RCP  Outcome: Ongoing  12/3/2021 2130 by Kenneth Winter RCP  Outcome: Ongoing  Goal: Patient will achieve/maintain normal respiratory rate/effort  Description: Respiratory rate and effort will be within normal limits for the patient  12/4/2021 1050 by Deadra Councilman  Outcome: Ongoing  12/4/2021 0913 by Evan Duarte RCP  Outcome: Ongoing  12/3/2021 2130 by Kenneth Winter RCP  Outcome: Ongoing     Problem: MECHANICAL VENTILATION  Goal: Patient will maintain patent airway  12/4/2021 1050 by Lora Jaimes  Outcome: Ongoing  12/4/2021 0913 by Mary Ann Barr [facility]  Outcome: Ongoing  12/3/2021 2130 by Andrey Maxwell [facility]  Outcome: Ongoing  Goal: Oral health is maintained or improved  12/4/2021 1050 by Lora Jaimes  Outcome: Ongoing  12/4/2021 0913 by Mary Ann Barr [facility]  Outcome: Ongoing  12/3/2021 2130 by Andrey Maxwell [facility]  Outcome: Ongoing  Goal: ET tube will be managed safely  12/4/2021 1050 by Lora Jaimes  Outcome: Ongoing  12/4/2021 0913 by Mary Ann Barr [facility]  Outcome: Ongoing  12/3/2021 2130 by Andrey Maxwell [facility]  Outcome: Ongoing  Goal: Ability to express needs and understand communication  12/4/2021 1050 by Lora Jaimes  Outcome: Ongoing  12/4/2021 0913 by Mary Ann Barr [facility]  Outcome: Ongoing  12/3/2021 2130 by Andrey Maxwell [facility]  Outcome: Ongoing  Goal: Mobility/activity is maintained at optimum level for patient  12/4/2021 1050 by Lora Jaimes  Outcome: Ongoing  12/4/2021 0913 by Mary Ann Barr [facility]  Outcome: Ongoing  12/3/2021 2130 by Andrey Maxwell [facility]  Outcome: Ongoing     Problem: SKIN INTEGRITY  Goal: Skin integrity is maintained or improved  12/4/2021 1050 by Lora Jaimes  Outcome: Ongoing  12/4/2021 0913 by Mary Ann Barr [facility]  Outcome: Ongoing  12/3/2021 2130 by Andrey January [facility]  Outcome: Ongoing     Problem: NUTRITION  Goal: Nutritional status is improving  Outcome: Ongoing     Problem: Non-Violent Restraints  Goal: Removal from restraints as soon as assessed to be safe  Outcome: Not Met This Shift

## 2021-12-04 NOTE — PLAN OF CARE
Problem: Non-Violent Restraints  Goal: No harm/injury to patient while restraints in use  12/3/2021 1927 by Janene Oakes RN  Outcome: Ongoing  77/9/3704 6485 by Jeronimo Rodriguez, RN  Outcome: Met This Shift  77/7/8633 2436 by Jeronimo Rodriguez, RN  Outcome: Ongoing  87/1/8642 2280 by Jeronimo Rodriguez, RN  Outcome: Ongoing  84/4/4359 0092 by Jeronimo Rodriguez, RN  Outcome: Ongoing  Goal: Patient's dignity will be maintained  12/3/2021 1927 by Janene Oakes, RN  Outcome: Ongoing  41/9/3569 1394 by Jeronimo Rodriguez, RN  Outcome: Met This Shift  94/6/5255 8238 by Jeronimo Rodriguez, RN  Outcome: Ongoing  06/1/6226 8409 by Jeronimo Rodriguez, RN  Outcome: Ongoing  38/4/2527 5174 by Jeronimo Rodriguez, RN  Outcome: Ongoing     Problem: Skin Integrity:  Goal: Will show no infection signs and symptoms  Description: Will show no infection signs and symptoms  12/3/2021 1927 by Janene Oakes RN  Outcome: Ongoing  99/3/6355 0741 by Jeronimo Rodriguez, RN  Outcome: Ongoing  53/8/0791 7200 by Jeronimo Rodriguez, RN  Outcome: Ongoing  36/3/1131 8832 by Jeronimo Rodriguez, RN  Outcome: Ongoing  Goal: Absence of new skin breakdown  Description: Absence of new skin breakdown  12/3/2021 1927 by Janene Oakes RN  Outcome: Ongoing  77/1/2837 1373 by Jeronimo Rodriguez, RN  Outcome: Ongoing  29/9/6972 3399 by Jeronimo Rodriguez, RN  Outcome: Ongoing  36/6/5670 7059 by Jeronimo Rodriguez, RN  Outcome: Ongoing     Problem: Falls - Risk of:  Goal: Will remain free from falls  Description: Will remain free from falls  12/3/2021 1927 by Janene Oakes RN  Outcome: Ongoing  32/3/6403 2272 by Jeronimo Rodriguez, RN  Outcome: Ongoing  96/9/2870 4969 by Jeronimo Rodriguez, RN  Outcome: Ongoing  56/2/3116 4095 by Jeronimo Rodriguez, RN  Outcome: Ongoing  Goal: Absence of physical injury  Description: Absence of physical injury  12/3/2021 1927 by Janene Oakes, RN  Outcome: Ongoing  07/2/9595 5640 by Jeronimo Rodriguez RN  Outcome: Ongoing  14/8/1646 0338 by Jeronimo Rodriguez, RN  Outcome: Ongoing  89/5/4705 8152 by Nayana Noyola RN  Outcome: Ongoing     Problem: Fluid Volume - Imbalance:  Goal: Will remain free of signs and symptoms of dehydration  Description: Will remain free of signs and symptoms of dehydration  12/3/2021 1927 by Romulo Martinez RN  Outcome: Ongoing  27/1/1136 1602 by Nayana Noyola RN  Outcome: Ongoing  79/2/6660 0283 by Nayana Nyoola RN  Outcome: Ongoing  22/9/0775 6720 by Nayana Noyola RN  Outcome: Ongoing  Goal: Absence of imbalanced fluid volume signs and symptoms  Description: Absence of imbalanced fluid volume signs and symptoms  12/3/2021 1927 by Romulo Martinez RN  Outcome: Ongoing  81/3/5815 0329 by Nayana Noyola RN  Outcome: Ongoing  30/8/7136 4008 by Nayana Noyola RN  Outcome: Ongoing  71/1/3191 9781 by Nayana Noyola RN  Outcome: Ongoing     Problem: Serum Glucose Level - Abnormal:  Goal: Ability to maintain appropriate glucose levels will improve  Description: Ability to maintain appropriate glucose levels will improve  12/3/2021 1927 by Romulo Martinez RN  Outcome: Ongoing  01/3/0041 3322 by Nayana Noyola RN  Outcome: Ongoing  43/0/6978 1786 by Nayana Noyola RN  Outcome: Ongoing  35/1/3064 5688 by Nayana Noyola RN  Outcome: Ongoing     Problem: Injury - Acid Base Imbalance:  Goal: Acid-base balance  Description: Acid-base balance  12/3/2021 1927 by Romulo Martinez RN  Outcome: Ongoing  90/3/3682 4977 by Nayana Noyola RN  Outcome: Ongoing  09/4/7536 1059 by Nayana Noyola RN  Outcome: Ongoing  00/3/3630 4954 by Nayana Noyola RN  Outcome: Ongoing     Problem: OXYGENATION/RESPIRATORY FUNCTION  Goal: Patient will maintain patent airway  12/3/2021 1927 by Romulo Martinez RN  Outcome: Ongoing  38/3/8974 9630 by Nayana Noyola RN  Outcome: Ongoing  46/0/7555 6049 by Nayana Noyola RN  Outcome: Ongoing  48/3/3100 6312 by Nayana Noyola RN  Outcome: Ongoing  12/3/2021 0814 by Nilda Singleton RCP  Outcome: Ongoing  Goal: Patient will achieve/maintain normal respiratory rate/effort  Description: Respiratory rate and effort will be within normal limits for the patient  12/3/2021 1927 by Romulo Martinez RN  Outcome: Ongoing  61/4/7980 2245 by Nayana Noyola RN  Outcome: Ongoing  37/5/5865 1779 by Nayana Noyola RN  Outcome: Ongoing  49/6/3680 1687 by Nayana Noyola RN  Outcome: Ongoing  12/3/2021 0814 by GRACIELA ClarkP  Outcome: Ongoing     Problem: MECHANICAL VENTILATION  Goal: Patient will maintain patent airway  12/3/2021 1927 by Romulo Martinez RN  Outcome: Ongoing  06/9/4798 4053 by Nayana Noyola RN  Outcome: Ongoing  04/3/1684 6838 by Nayana Noyola RN  Outcome: Ongoing  31/4/7146 7003 by Nayana Noyola RN  Outcome: Ongoing  12/3/2021 0814 by Nilda Singleton RCP  Outcome: Ongoing  Goal: Oral health is maintained or improved  12/3/2021 1927 by Romulo Martinez RN  Outcome: Ongoing  48/0/4558 6119 by Nayana Noyola RN  Outcome: Ongoing  30/0/7997 1735 by Nayana Noyola RN  Outcome: Ongoing  34/1/6170 1733 by Nayana Noyola RN  Outcome: Ongoing  12/3/2021 0814 by Nilda Singleton RCP  Outcome: Ongoing  Goal: ET tube will be managed safely  12/3/2021 1927 by Romulo Martinez RN  Outcome: Ongoing  26/6/7391 1443 by Nayana Noyola RN  Outcome: Ongoing  79/5/6071 9760 by Nayana Noyola RN  Outcome: Ongoing  96/1/0284 2286 by Nayana Noyola RN  Outcome: Ongoing  12/3/2021 0814 by Nilda Singleton RCP  Outcome: Ongoing  Goal: Ability to express needs and understand communication  12/3/2021 1927 by Romulo Martinez RN  Outcome: Ongoing  56/5/6398 8006 by Nayana Noyola RN  Outcome: Ongoing  18/5/1245 1590 by Nayana Noyola RN  Outcome: Ongoing  67/6/7780 1287 by Nayana Noyola RN  Outcome: Ongoing  12/3/2021 0814 by Nilda Singleton RCP  Outcome: Ongoing  Goal: Mobility/activity is maintained at optimum level for patient  12/3/2021 1927 by Romulo Martinez RN  Outcome: Ongoing  21/6/4540 3358 by Crissie Soulier

## 2021-12-04 NOTE — PLAN OF CARE
Problem: OXYGENATION/RESPIRATORY FUNCTION  Goal: Patient will maintain patent airway  12/3/2021 2130 by Tanner Munoz RCP  Outcome: Ongoing     Problem: OXYGENATION/RESPIRATORY FUNCTION  Goal: Patient will achieve/maintain normal respiratory rate/effort  Description: Respiratory rate and effort will be within normal limits for the patient  12/3/2021 2130 by Tanner Munoz RCP  Outcome: Ongoing     Problem: MECHANICAL VENTILATION  Goal: Oral health is maintained or improved  12/3/2021 2130 by Tanner Munoz RCP  Outcome: Ongoing     Problem: MECHANICAL VENTILATION  Goal: ET tube will be managed safely  12/3/2021 2130 by Tanner Munoz RCP  Outcome: Ongoing     Problem: MECHANICAL VENTILATION  Goal: Ability to express needs and understand communication  12/3/2021 2130 by Tanner Munoz RCP  Outcome: Ongoing     Problem: MECHANICAL VENTILATION  Goal: Mobility/activity is maintained at optimum level for patient  12/3/2021 2130 by Tanner Munoz RCP  Outcome: Ongoing     Problem: SKIN INTEGRITY  Goal: Skin integrity is maintained or improved  12/3/2021 2130 by Tanner Munoz RCP  Outcome: Ongoing

## 2021-12-05 LAB
ABSOLUTE EOS #: 0.13 K/UL (ref 0–0.4)
ABSOLUTE IMMATURE GRANULOCYTE: 0 K/UL (ref 0–0.3)
ABSOLUTE LYMPH #: 1.15 K/UL (ref 1–4.8)
ABSOLUTE MONO #: 1.41 K/UL (ref 0.1–0.8)
ALLEN TEST: ABNORMAL
ANION GAP SERPL CALCULATED.3IONS-SCNC: 7 MMOL/L (ref 9–17)
BASOPHILS # BLD: 0 % (ref 0–2)
BASOPHILS ABSOLUTE: 0 K/UL (ref 0–0.2)
BUN BLDV-MCNC: 29 MG/DL (ref 6–20)
BUN/CREAT BLD: ABNORMAL (ref 9–20)
CALCIUM SERPL-MCNC: 7.8 MG/DL (ref 8.6–10.4)
CHLORIDE BLD-SCNC: 116 MMOL/L (ref 98–107)
CO2: 23 MMOL/L (ref 20–31)
CREAT SERPL-MCNC: 0.71 MG/DL (ref 0.7–1.2)
DIFFERENTIAL TYPE: ABNORMAL
EOSINOPHILS RELATIVE PERCENT: 1 % (ref 1–4)
ESTIMATED AVERAGE GLUCOSE: 286 MG/DL
FIO2: ABNORMAL
GFR AFRICAN AMERICAN: >60 ML/MIN
GFR NON-AFRICAN AMERICAN: >60 ML/MIN
GFR SERPL CREATININE-BSD FRML MDRD: ABNORMAL ML/MIN/{1.73_M2}
GFR SERPL CREATININE-BSD FRML MDRD: ABNORMAL ML/MIN/{1.73_M2}
GLUCOSE BLD-MCNC: 113 MG/DL (ref 75–110)
GLUCOSE BLD-MCNC: 146 MG/DL (ref 75–110)
GLUCOSE BLD-MCNC: 154 MG/DL (ref 75–110)
GLUCOSE BLD-MCNC: 160 MG/DL (ref 75–110)
GLUCOSE BLD-MCNC: 217 MG/DL (ref 70–99)
GLUCOSE BLD-MCNC: 230 MG/DL (ref 74–100)
GLUCOSE BLD-MCNC: 70 MG/DL (ref 75–110)
GLUCOSE BLD-MCNC: 72 MG/DL (ref 75–110)
HBA1C MFR BLD: 11.6 % (ref 4–6)
HCT VFR BLD CALC: 30.3 % (ref 40.7–50.3)
HEMOGLOBIN: 10 G/DL (ref 13–17)
HEPARIN INDUCED PLATELET ANTIBODY: 0.23 O.D. (ref 0–0.4)
IMMATURE GRANULOCYTES: 0 %
LYMPHOCYTES # BLD: 9 % (ref 24–44)
MCH RBC QN AUTO: 29.3 PG (ref 25.2–33.5)
MCHC RBC AUTO-ENTMCNC: 33 G/DL (ref 28.4–34.8)
MCV RBC AUTO: 88.9 FL (ref 82.6–102.9)
MODE: ABNORMAL
MONOCYTES # BLD: 11 % (ref 1–7)
MORPHOLOGY: ABNORMAL
NEGATIVE BASE EXCESS, ART: ABNORMAL (ref 0–2)
NRBC AUTOMATED: 0 PER 100 WBC
O2 DEVICE/FLOW/%: ABNORMAL
PATIENT TEMP: ABNORMAL
PDW BLD-RTO: 14.9 % (ref 11.8–14.4)
PLATELET # BLD: 144 K/UL (ref 138–453)
PLATELET ESTIMATE: ABNORMAL
PMV BLD AUTO: 10.9 FL (ref 8.1–13.5)
POC HCO3: 25.9 MMOL/L (ref 21–28)
POC O2 SATURATION: 94 % (ref 94–98)
POC PCO2 TEMP: ABNORMAL MM HG
POC PCO2: 38.2 MM HG (ref 35–48)
POC PH TEMP: ABNORMAL
POC PH: 7.44 (ref 7.35–7.45)
POC PO2 TEMP: ABNORMAL MM HG
POC PO2: 69.5 MM HG (ref 83–108)
POSITIVE BASE EXCESS, ART: 2 (ref 0–3)
POTASSIUM SERPL-SCNC: 4.1 MMOL/L (ref 3.7–5.3)
RBC # BLD: 3.41 M/UL (ref 4.21–5.77)
RBC # BLD: ABNORMAL 10*6/UL
SAMPLE SITE: ABNORMAL
SEG NEUTROPHILS: 79 % (ref 36–66)
SEGMENTED NEUTROPHILS ABSOLUTE COUNT: 10.11 K/UL (ref 1.8–7.7)
SODIUM BLD-SCNC: 146 MMOL/L (ref 135–144)
TCO2 (CALC), ART: ABNORMAL MMOL/L (ref 22–29)
WBC # BLD: 12.8 K/UL (ref 3.5–11.3)
WBC # BLD: ABNORMAL 10*3/UL

## 2021-12-05 PROCEDURE — 99291 CRITICAL CARE FIRST HOUR: CPT | Performed by: INTERNAL MEDICINE

## 2021-12-05 PROCEDURE — 80048 BASIC METABOLIC PNL TOTAL CA: CPT

## 2021-12-05 PROCEDURE — 83036 HEMOGLOBIN GLYCOSYLATED A1C: CPT

## 2021-12-05 PROCEDURE — 6370000000 HC RX 637 (ALT 250 FOR IP): Performed by: HEALTH CARE PROVIDER

## 2021-12-05 PROCEDURE — 2500000003 HC RX 250 WO HCPCS: Performed by: STUDENT IN AN ORGANIZED HEALTH CARE EDUCATION/TRAINING PROGRAM

## 2021-12-05 PROCEDURE — 6360000002 HC RX W HCPCS: Performed by: HEALTH CARE PROVIDER

## 2021-12-05 PROCEDURE — 6370000000 HC RX 637 (ALT 250 FOR IP): Performed by: STUDENT IN AN ORGANIZED HEALTH CARE EDUCATION/TRAINING PROGRAM

## 2021-12-05 PROCEDURE — 36600 WITHDRAWAL OF ARTERIAL BLOOD: CPT

## 2021-12-05 PROCEDURE — 2000000000 HC ICU R&B

## 2021-12-05 PROCEDURE — 85025 COMPLETE CBC W/AUTO DIFF WBC: CPT

## 2021-12-05 PROCEDURE — 6360000002 HC RX W HCPCS: Performed by: STUDENT IN AN ORGANIZED HEALTH CARE EDUCATION/TRAINING PROGRAM

## 2021-12-05 PROCEDURE — 6360000002 HC RX W HCPCS

## 2021-12-05 PROCEDURE — 82803 BLOOD GASES ANY COMBINATION: CPT

## 2021-12-05 PROCEDURE — 94761 N-INVAS EAR/PLS OXIMETRY MLT: CPT

## 2021-12-05 PROCEDURE — 94003 VENT MGMT INPAT SUBQ DAY: CPT

## 2021-12-05 PROCEDURE — 2700000000 HC OXYGEN THERAPY PER DAY

## 2021-12-05 PROCEDURE — 82947 ASSAY GLUCOSE BLOOD QUANT: CPT

## 2021-12-05 PROCEDURE — 36415 COLL VENOUS BLD VENIPUNCTURE: CPT

## 2021-12-05 PROCEDURE — 2500000003 HC RX 250 WO HCPCS: Performed by: HEALTH CARE PROVIDER

## 2021-12-05 PROCEDURE — 2580000003 HC RX 258: Performed by: HEALTH CARE PROVIDER

## 2021-12-05 RX ORDER — LORAZEPAM 1 MG/1
4 TABLET ORAL EVERY 6 HOURS
Status: DISCONTINUED | OUTPATIENT
Start: 2021-12-05 | End: 2021-12-06

## 2021-12-05 RX ORDER — MIDAZOLAM HYDROCHLORIDE 2 MG/2ML
2 INJECTION, SOLUTION INTRAMUSCULAR; INTRAVENOUS ONCE
Status: COMPLETED | OUTPATIENT
Start: 2021-12-05 | End: 2021-12-05

## 2021-12-05 RX ORDER — LORAZEPAM 2 MG/ML
2 INJECTION INTRAMUSCULAR ONCE
Status: COMPLETED | OUTPATIENT
Start: 2021-12-05 | End: 2021-12-05

## 2021-12-05 RX ORDER — LORAZEPAM 2 MG/ML
INJECTION INTRAMUSCULAR
Status: COMPLETED
Start: 2021-12-05 | End: 2021-12-05

## 2021-12-05 RX ORDER — MIDAZOLAM HYDROCHLORIDE 1 MG/ML
INJECTION INTRAMUSCULAR; INTRAVENOUS
Status: COMPLETED
Start: 2021-12-05 | End: 2021-12-05

## 2021-12-05 RX ORDER — LORAZEPAM 2 MG/1
4 TABLET ORAL EVERY 6 HOURS
Status: DISCONTINUED | OUTPATIENT
Start: 2021-12-05 | End: 2021-12-05

## 2021-12-05 RX ORDER — LORAZEPAM 2 MG/1
2 TABLET ORAL EVERY 4 HOURS
Status: CANCELLED | OUTPATIENT
Start: 2021-12-05

## 2021-12-05 RX ADMIN — FONDAPARINUX SODIUM 2.5 MG: 2.5 INJECTION, SOLUTION SUBCUTANEOUS at 07:46

## 2021-12-05 RX ADMIN — DEXTROSE MONOHYDRATE 12.5 G: 25 INJECTION, SOLUTION INTRAVENOUS at 11:08

## 2021-12-05 RX ADMIN — CLINDAMYCIN IN 5 PERCENT DEXTROSE 600 MG: 12 INJECTION, SOLUTION INTRAVENOUS at 07:46

## 2021-12-05 RX ADMIN — CLINDAMYCIN IN 5 PERCENT DEXTROSE 600 MG: 12 INJECTION, SOLUTION INTRAVENOUS at 20:31

## 2021-12-05 RX ADMIN — CLINDAMYCIN IN 5 PERCENT DEXTROSE 600 MG: 12 INJECTION, SOLUTION INTRAVENOUS at 13:02

## 2021-12-05 RX ADMIN — INSULIN LISPRO 3 UNITS: 100 INJECTION, SOLUTION INTRAVENOUS; SUBCUTANEOUS at 14:48

## 2021-12-05 RX ADMIN — DEXMEDETOMIDINE HYDROCHLORIDE 1.2 MCG/KG/HR: 4 INJECTION, SOLUTION INTRAVENOUS at 20:23

## 2021-12-05 RX ADMIN — INSULIN LISPRO 6 UNITS: 100 INJECTION, SOLUTION INTRAVENOUS; SUBCUTANEOUS at 03:59

## 2021-12-05 RX ADMIN — MIDAZOLAM 2 MG: 1 INJECTION INTRAMUSCULAR; INTRAVENOUS at 02:23

## 2021-12-05 RX ADMIN — LORAZEPAM 2 MG: 2 INJECTION INTRAMUSCULAR; INTRAVENOUS at 16:23

## 2021-12-05 RX ADMIN — ACETAMINOPHEN 650 MG: 325 TABLET ORAL at 20:17

## 2021-12-05 RX ADMIN — DEXMEDETOMIDINE HYDROCHLORIDE 1.2 MCG/KG/HR: 4 INJECTION, SOLUTION INTRAVENOUS at 14:42

## 2021-12-05 RX ADMIN — INSULIN LISPRO 3 UNITS: 100 INJECTION, SOLUTION INTRAVENOUS; SUBCUTANEOUS at 07:42

## 2021-12-05 RX ADMIN — LORAZEPAM 4 MG: 1 TABLET ORAL at 13:31

## 2021-12-05 RX ADMIN — ONDANSETRON 4 MG: 2 INJECTION INTRAMUSCULAR; INTRAVENOUS at 02:13

## 2021-12-05 RX ADMIN — LORAZEPAM 2 MG: 2 TABLET ORAL at 10:23

## 2021-12-05 RX ADMIN — SODIUM CHLORIDE, PRESERVATIVE FREE 10 ML: 5 INJECTION INTRAVENOUS at 20:09

## 2021-12-05 RX ADMIN — LORAZEPAM 2 MG: 2 TABLET ORAL at 03:59

## 2021-12-05 RX ADMIN — MIDAZOLAM HYDROCHLORIDE 2 MG: 2 INJECTION, SOLUTION INTRAMUSCULAR; INTRAVENOUS at 02:23

## 2021-12-05 RX ADMIN — LORAZEPAM 4 MG: 1 TABLET ORAL at 19:34

## 2021-12-05 RX ADMIN — FLUOXETINE HYDROCHLORIDE 40 MG: 20 CAPSULE ORAL at 07:45

## 2021-12-05 RX ADMIN — DEXMEDETOMIDINE HYDROCHLORIDE 0.8 MCG/KG/HR: 4 INJECTION, SOLUTION INTRAVENOUS at 07:42

## 2021-12-05 RX ADMIN — FAMOTIDINE 20 MG: 10 INJECTION INTRAVENOUS at 08:01

## 2021-12-05 RX ADMIN — LORAZEPAM 2 MG: 2 INJECTION INTRAMUSCULAR at 16:23

## 2021-12-05 RX ADMIN — FAMOTIDINE 20 MG: 10 INJECTION INTRAVENOUS at 22:15

## 2021-12-05 RX ADMIN — FUROSEMIDE 20 MG: 10 INJECTION, SOLUTION INTRAMUSCULAR; INTRAVENOUS at 07:46

## 2021-12-05 RX ADMIN — LEVOFLOXACIN 750 MG: 5 INJECTION, SOLUTION INTRAVENOUS at 01:51

## 2021-12-05 RX ADMIN — LORAZEPAM 2 MG: 2 TABLET ORAL at 08:05

## 2021-12-05 ASSESSMENT — PAIN SCALES - WONG BAKER
WONGBAKER_NUMERICALRESPONSE: 0

## 2021-12-05 ASSESSMENT — PULMONARY FUNCTION TESTS
PIF_VALUE: 23
PIF_VALUE: 16
PIF_VALUE: 20
PIF_VALUE: 26
PIF_VALUE: 21

## 2021-12-05 ASSESSMENT — PAIN SCALES - GENERAL: PAINLEVEL_OUTOF10: 0

## 2021-12-05 NOTE — PLAN OF CARE
Problem: Non-Violent Restraints  Goal: Removal from restraints as soon as assessed to be safe  12/5/2021 0728 by Doug Martinez RN  Outcome: Ongoing  12/4/2021 2155 by Ashley Schmidt RN  Outcome: Ongoing  Goal: No harm/injury to patient while restraints in use  12/5/2021 0728 by Doug Martinez RN  Outcome: Ongoing  12/4/2021 2155 by Ashley Schmidt RN  Outcome: Ongoing  Goal: Patient's dignity will be maintained  12/5/2021 0728 by Doug Martinez RN  Outcome: Ongoing  12/4/2021 2155 by Ashley Schmidt RN  Outcome: Ongoing     Problem: Skin Integrity:  Goal: Will show no infection signs and symptoms  Description: Will show no infection signs and symptoms  12/5/2021 0728 by Doug Martinez RN  Outcome: Ongoing  12/4/2021 2155 by Ashley Schmidt RN  Outcome: Ongoing  Goal: Absence of new skin breakdown  Description: Absence of new skin breakdown  12/5/2021 0728 by Doug Martinez RN  Outcome: Ongoing  12/4/2021 2155 by Ashley Schmidt RN  Outcome: Ongoing     Problem: Falls - Risk of:  Goal: Will remain free from falls  Description: Will remain free from falls  12/5/2021 0728 by Doug Martinez RN  Outcome: Ongoing  12/4/2021 2155 by Ashley Schmidt RN  Outcome: Ongoing  Goal: Absence of physical injury  Description: Absence of physical injury  12/5/2021 0728 by Doug Martinez RN  Outcome: Ongoing  12/4/2021 2155 by Ashley Schmidt RN  Outcome: Ongoing     Problem: Fluid Volume - Imbalance:  Goal: Will remain free of signs and symptoms of dehydration  Description: Will remain free of signs and symptoms of dehydration  12/5/2021 0728 by Doug Martinez RN  Outcome: Ongoing  12/4/2021 2155 by Ashley Schmidt RN  Outcome: Ongoing  Goal: Absence of imbalanced fluid volume signs and symptoms  Description: Absence of imbalanced fluid volume signs and symptoms  12/5/2021 0728 by Doug Martinez RN  Outcome: Ongoing  12/4/2021 2155 by Ashley Schmidt RN  Outcome: Ongoing     Problem: Serum Glucose Level - Abnormal:  Goal: Ability to maintain appropriate glucose levels will improve  Description: Ability to maintain appropriate glucose levels will improve  12/5/2021 0728 by Willow Alvarez RN  Outcome: Ongoing  12/4/2021 2155 by Jake Marsh RN  Outcome: Ongoing     Problem: Injury - Acid Base Imbalance:  Goal: Acid-base balance  Description: Acid-base balance  12/5/2021 0728 by Willow Alvarez RN  Outcome: Ongoing  12/4/2021 2155 by Jake Marsh RN  Outcome: Ongoing     Problem: OXYGENATION/RESPIRATORY FUNCTION  Goal: Patient will maintain patent airway  12/5/2021 0728 by Willow Alvarez RN  Outcome: Ongoing  12/4/2021 2155 by Jake Marsh RN  Outcome: Ongoing  Goal: Patient will achieve/maintain normal respiratory rate/effort  Description: Respiratory rate and effort will be within normal limits for the patient  12/5/2021 3806 by Willow Alvarez RN  Outcome: Ongoing  12/4/2021 2155 by Jake Marsh RN  Outcome: Ongoing     Problem: MECHANICAL VENTILATION  Goal: Patient will maintain patent airway  12/5/2021 0728 by Willow Alvarez RN  Outcome: Ongoing  12/4/2021 2155 by Jake Marsh RN  Outcome: Ongoing  Goal: Oral health is maintained or improved  12/5/2021 0728 by Willow Alvarez RN  Outcome: Ongoing  12/4/2021 2155 by Jake Marsh RN  Outcome: Ongoing  Goal: ET tube will be managed safely  12/5/2021 0728 by Willow Alvarez RN  Outcome: Ongoing  12/4/2021 2155 by Jake Marsh RN  Outcome: Ongoing  Goal: Ability to express needs and understand communication  12/5/2021 0728 by Willow Alvarez RN  Outcome: Ongoing  12/4/2021 2155 by Jake Marsh RN  Outcome: Ongoing  Goal: Mobility/activity is maintained at optimum level for patient  12/5/2021 7206 by Willow Alvarez RN  Outcome: Ongoing  12/4/2021 2155 by Jake Marsh RN  Outcome: Ongoing     Problem: SKIN INTEGRITY  Goal: Skin integrity is maintained or improved  12/5/2021 0728 by Trisha Saenz RN  Outcome: Ongoing  12/4/2021 2155 by Roxi Shoemaker RN  Outcome: Ongoing     Problem: NUTRITION  Goal: Nutritional status is improving  12/5/2021 0728 by Trisha Saenz RN  Outcome: Ongoing  12/4/2021 2155 by Roxi Shoemaker RN  Outcome: Ongoing     Problem: Nutrition  Goal: Optimal nutrition therapy  12/5/2021 0728 by Trisha Saenz RN  Outcome: Ongoing  12/4/2021 2155 by Roxi Shoemaker RN  Outcome: Ongoing

## 2021-12-05 NOTE — PROGRESS NOTES
Critical Care Team - Daily Progress Note      Date and time: 12/5/2021 8:01 AM  Patient's name:  Zaida Hendrickson  Medical Record Number: [id number]  Patient's account/billing number: [de-identified]  Patient's YOB: 1960  Age: 63 y.o. Date of Admission: 11/30/2021  7:39 AM  Length of stay during current admission: 5      Primary Care Physician: Ella Mark    Code Status: Full Code    Reason for ICU admission:  DKA with coma      SUBJECTIVE:     OVERNIGHT EVENTS:    NO acute overnight issues. Low grade fevers 100.2 F max, HR 60s,   BP stable on   ABG with P02 of 70,   WBC 12K, Hb 10 stable    Platelets started raising to 144K after stopping heparin. PRVC of 510/20/8/30%  On precedex.   Off sedation, withdraws to pain but not following commands    AWAKE & FOLLOWING COMMANDS:  [x] No   [] Yes    CURRENT VENTILATION STATUS:     [x] Ventilator  [] BIPAP  [] Nasal Cannula [] Room Air        SECRETIONS Amount:  [x] Small [] Moderate  [] Large  [] None  Color:     [x] White [] Colored  [] Bloody    SEDATION:  Fentanyl and propofol  [x] precedex gtt  [] Versed gtt  [] Ativan gtt   [] No Sedation    PARALYZED:  [x] No    [] Yes    DIARRHEA:                [x] No                [] Yes  (C. Difficile status: [] positive                                                                                                                       [] negative                                                                                                                     [] pending)    VASOPRESSORS:  [x] No    [x] Yes    If yes -   [] Levophed       [] Dopamine     [] Vasopressin       [] Dobutamine  [] Phenylephrine         [] Epinephrine    CENTRAL LINES:     [] No   [] Yes   (Date of Insertion:   )           If yes -     [] Right IJ     [] Left IJ [x] Right Femoral [] Left Femoral                   [] Right Subclavian [] Left Subclavian        SANTANA'S CATHETER:   [] No   [x] Yes  Ureteral cathetar(Date of Insertion: 2021 )     URINE OUTPUT:            [x] Good   [] Low              [] Anuric      OBJECTIVE:     VITAL SIGNS:  /62   Pulse 64   Temp 100.2 °F (37.9 °C) (Oral)   Resp 19   Ht 5' 9\" (1.753 m)   Wt 142 lb 3.2 oz (64.5 kg)   SpO2 100%   BMI 21.00 kg/m²   Tmax over 24 hours:  Temp (24hrs), Av.3 °F (37.4 °C), Min:98.1 °F (36.7 °C), Max:100.2 °F (37.9 °C)      Patient Vitals for the past 6 hrs:   BP Temp Temp src Pulse Resp SpO2   21 0700 110/62 -- -- 64 19 100 %   21 0630 -- 100.2 °F (37.9 °C) Oral 67 20 100 %   21 0600 (!) 108/59 99.7 °F (37.6 °C) Oral 68 20 100 %   21 0530 -- -- -- 69 26 100 %   21 0500 (!) 106/56 -- -- 72 25 99 %   21 0430 -- -- -- 80 23 96 %   21 0413 -- -- -- -- 22 96 %   21 0400 (!) 184/106 98.2 °F (36.8 °C) Oral 101 24 96 %   21 0330 -- -- -- 69 22 98 %   21 0300 109/62 -- -- 72 23 95 %   21 0230 (!) 109/56 -- -- 84 22 92 %   21 0225 (!) 189/77 -- -- 120 26 91 %         Intake/Output Summary (Last 24 hours) at 2021 0801  Last data filed at 2021 0750  Gross per 24 hour   Intake 2137 ml   Output 2450 ml   Net -313 ml     Wt Readings from Last 2 Encounters:   21 142 lb 3.2 oz (64.5 kg)     Body mass index is 21 kg/m². PHYSICAL EXAMINATION:  Constitutional: Intubated and sedated  Neck: Supple, symmetrical, trachea midline, no jvd, skin normal  Respiratory: Rhonchi, ventilatory breath sounds, no wheezing/rales  Cardiovascular: regular rate and rhythm, normal S1, S2, no murmur noted   Abdomen: soft, nondistended, no masses or organomegaly  Extremities:  peripheral pulses normal, no pedal edema, no clubbing or cyanosis  Neurological: Intubated, sedated. No involuntary movements. Pupils reactive. No rigidity.   Off sedation, withdraws to pain but not following commands    MEDICATIONS:    Scheduled Meds:   furosemide  20 mg IntraVENous Daily    insulin lispro  0-18 Units SubCUTAneous Q4H  fondaparinux  2.5 mg SubCUTAneous Daily    insulin glargine  10 Units SubCUTAneous Nightly    LORazepam  2 mg Oral Q4H    famotidine (PEPCID) injection  20 mg IntraVENous BID    levofloxacin  750 mg IntraVENous Q24H    clindamycin (CLEOCIN) IV  600 mg IntraVENous TID    sodium chloride flush  5-40 mL IntraVENous 2 times per day    FLUoxetine  40 mg Oral Daily     Continuous Infusions:   dexmedetomidine 0.8 mcg/kg/hr (12/05/21 0742)    dextrose      norepinephrine Stopped (12/02/21 1354)    sodium chloride      dextrose 5 % and 0.45 % NaCl Stopped (12/01/21 0000)     PRN Meds:   albuterol, 2.5 mg, Q4H PRN  glucose, 15 g, PRN  dextrose, 12.5 g, PRN  glucagon (rDNA), 1 mg, PRN  dextrose, 100 mL/hr, PRN  dextrose, 12.5 g, PRN  polyethylene glycol, 17 g, Daily PRN  sodium chloride flush, 5-40 mL, PRN  sodium chloride, 25 mL, PRN  ondansetron, 4 mg, Q8H PRN   Or  ondansetron, 4 mg, Q6H PRN  acetaminophen, 650 mg, Q6H PRN   Or  acetaminophen, 650 mg, Q6H PRN  dextrose 5 % and 0.45 % NaCl, , Continuous PRN          VENT SETTINGS (Comprehensive) (if applicable):  Vent Information  $Ventilation: $Subsequent Day  Skin Assessment: Clean, dry, & intact  Suction Catheter Diameter: 14  Equipment Changed: HME  Vent Type: Servo i  Vent Mode: PRVC  Vt Ordered: 510 mL  Rate Set: 20 bmp  Pressure Support: (S) 10 cmH20  FiO2 : 30 %  SpO2: 100 %  SpO2/FiO2 ratio: 333.33  Sensitivity: 2  PEEP/CPAP: 8  I Time/ I Time %: (S) 0.75 s  Humidification Source: HME  Nitric Oxide/Epoprostenol In Use?: No  Mask Type: Full face mask  Mask Size: Medium  Additional Respiratory  Assessments  Pulse: 64  Resp: 19  SpO2: 100 %  End Tidal CO2: 30 (%)  Position: Semi-Powell's  Humidification Source: HME  Oral Care Completed?: Yes  Oral Care: Mouthwash, Lip moisturizer applied, Mouth swabbed, Mouth moisturizer, Mouth suctioned  Subglottic Suction Done?: No  Cuff Pressure (cm H2O):  (MLT)      Laboratory findings:    Complete Blood Count: Recent Labs     12/03/21 0522 12/04/21  0516 12/05/21  0321   WBC 13.2* 12.8* 12.8*   HGB 10.2* 10.5* 10.0*   HCT 30.0* 32.2* 30.3*   PLT See Reflexed IPF Result 109* 144        Last 3 Blood Glucose:   Recent Labs     12/03/21  0152 12/04/21  0516 12/05/21  0321   GLUCOSE 138* 361* 217*        PT/INR:  No results found for: PROTIME, INR  PTT:  No results found for: APTT, PTT    Comprehensive Metabolic Profile:   Recent Labs     12/03/21 0152 12/04/21  0516 12/05/21  0321    143 146*   K 3.6* 4.7 4.1   * 115* 116*   CO2 20 19* 23   BUN 24* 22* 29*   CREATININE 0.50* 0.58* 0.71   GLUCOSE 138* 361* 217*   CALCIUM 7.2* 7.3* 7.8*      Magnesium:   Lab Results   Component Value Date    MG 1.9 12/03/2021     Phosphorus:   Lab Results   Component Value Date    PHOS 3.8 12/04/2021     Ionized Calcium:   Lab Results   Component Value Date    CAION 1.04 12/03/2021        Urinalysis:     Troponin: No results for input(s): TROPONINI in the last 72 hours. Microbiology:    Cultures during this admission:     Blood cultures:                 [x] None drawn      [] Negative             []  Positive (Details:  )  Urine Culture:                   [x] None drawn      [] Negative             []  Positive (Details:  )  Sputum Culture:               [x] None drawn       [] Negative             []  Positive (Details:  )   Endotracheal aspirate:     [x] None drawn       [] Negative             []  Positive (Details:  )     Other pertinent Labs:       Radiology/Imaging:       ASSESSMENT:     Active Problems:    Type 1 diabetes mellitus with ketoacidosis without coma ([facility] [address]  Resolved Problems:    * No resolved hospital problems. *            PLAN:     Endocrine:  DKA with encephalopathy:  Resolved. Continue Lantus 10 u nightly with HDSS. On Diabetic TFs. HbA1c pending. Neurological:  Acute metabolic encephalopathy:  - Intubated and sedated on Precedex and Ativan.   - Sedation holidays, maintaining circardian rhythm  - Wean sedation as tolerated. Cardiovascular:  - BP under control. Pulmonary:  Aspiration pneumonia:  Intubated to protect the airway. CPAP trials daily. CXR shown worsened bilateral airspace disease. Continue Levaquin and Clinda. GI/nutrition  Tube feeding @ 45ml/h. Pepcid IV 20 mg daily. GlycoLax as needed for bowel movements. Renal/fluids/electrolyte  - Net +ve 9 L  - Good UO.  - Continue IV lasix and strict I/O    ID:   Aspiration pneumonia  On Levo and Clindamycin. Decubitus sacral ulcer  Wound care consulted. Hematological:  Hb stable. Platelets recovering after stopping heparin. HIT panel pending. On fondaparinux. F.A.S.T. M. H.U.G.S. B.I.D.     ADULT TUBE FEEDING; Orogastric; Diabetic; Fluids: none   Family: Will update later   Analgesic: ativan   Sedation: Precedex and ativan   Thrombo-prophylaxis: [] Enoxaparin, [] Unfract.  Heparin Subcutaneously, [] EPC Cuffs, Fondaparinux   Mobility: None   Heads up: 30 degree   Ulcer prophylaxis: [] PPI Agent, [x] [id number], [] Sucralfate, [] Other:   Glycemic control: Good with Lantus 10 U with HDSS   Spontaneous breathing trial: on sedation holiday   Bowel regimen/urine output: 3L, overall net +9 L    Indwelling catheter/lines: Peripheral IV   De-escalation: Decrease pepcid to once daily        Alla Dowd MD      Department of [facility] [place], [place]         12/5/2021, 8:01 AM

## 2021-12-05 NOTE — PLAN OF CARE
Abnormal:  Goal: Ability to maintain appropriate glucose levels will improve  Description: Ability to maintain appropriate glucose levels will improve  12/4/2021 2155 by Tyesha Bishop RN  Outcome: Ongoing  12/4/2021 1050 by Isabel Del Cid  Outcome: Ongoing     Problem: Injury - Acid Base Imbalance:  Goal: Acid-base balance  Description: Acid-base balance  12/4/2021 2155 by Tyesha Bishop RN  Outcome: Ongoing  12/4/2021 1050 by Isabel Del Cid  Outcome: Ongoing     Problem: OXYGENATION/RESPIRATORY FUNCTION  Goal: Patient will maintain patent airway  12/4/2021 2155 by Tyesha Bishop RN  Outcome: Ongoing  12/4/2021 1050 by Isabel Del Cid  Outcome: Ongoing  12/4/2021 0913 by Bryan Taylor RCP  Outcome: Ongoing  Goal: Patient will achieve/maintain normal respiratory rate/effort  Description: Respiratory rate and effort will be within normal limits for the patient  12/4/2021 2155 by Tyesha Bishop RN  Outcome: Ongoing  12/4/2021 1050 by Isabel Del Cid  Outcome: Ongoing  12/4/2021 0913 by Bryan Taylor RCP  Outcome: Ongoing     Problem: MECHANICAL VENTILATION  Goal: Patient will maintain patent airway  12/4/2021 2155 by Tyesha Bishop RN  Outcome: Ongoing  12/4/2021 1050 by sIabel Del Cid  Outcome: Ongoing  12/4/2021 0913 by Bryan Taylor RCP  Outcome: Ongoing  Goal: Oral health is maintained or improved  12/4/2021 2155 by Tyesha Bishop RN  Outcome: Ongoing  12/4/2021 1050 by Isabel Del Cid  Outcome: Ongoing  12/4/2021 0913 by Bryan Taylor RCP  Outcome: Ongoing  Goal: ET tube will be managed safely  12/4/2021 2155 by Tyesha Bishop RN  Outcome: Ongoing  12/4/2021 1050 by Isabel Del Cid  Outcome: Ongoing  12/4/2021 0913 by Bryan Taylor RCP  Outcome: Ongoing  Goal: Ability to express needs and understand communication  12/4/2021 2155 by Tyesha Bishop RN  Outcome: Ongoing  12/4/2021 1050 by Isabel Del Cid  Outcome: Ongoing  12/4/2021 0913 by Bryan Taylor RCP  Outcome: Ongoing  Goal: Mobility/activity is maintained at optimum level for patient  12/4/2021 2155 by Parrsih Martino RN  Outcome: Ongoing  12/4/2021 1050 by Kimberly Harkins  Outcome: Ongoing  12/4/2021 0913 by Leroy Winter RCP  Outcome: Ongoing     Problem: SKIN INTEGRITY  Goal: Skin integrity is maintained or improved  12/4/2021 2155 by Parrish Martino RN  Outcome: Ongoing  12/4/2021 1050 by Kimberly Harkins  Outcome: Ongoing  12/4/2021 0913 by Leroy Winter RCP  Outcome: Ongoing     Problem: NUTRITION  Goal: Nutritional status is improving  12/4/2021 2155 by Parrish Martino RN  Outcome: Ongoing  12/4/2021 1050 by Kimberly Harkins  Outcome: Ongoing     Problem: Nutrition  Goal: Optimal nutrition therapy  12/4/2021 2155 by Parrish Martino RN  Outcome: Ongoing  12/4/2021 1133 by Michelle Peres RD, LD  Outcome: Ongoing  Note: Nutrition Problem #1: Inadequate oral intake  Intervention: Food and/or Nutrient Delivery: Continue NPO, Modify Tube Feeding (Suggest decreasing Diabetic TF goal rate to 45 mL/hr = 1620 kcals, 89 gm protein per day.)  Nutritional Goals: Meet % of estimated nutrition needs.

## 2021-12-06 ENCOUNTER — APPOINTMENT (OUTPATIENT)
Dept: GENERAL RADIOLOGY | Age: 61
DRG: 637 | End: 2021-12-06
Payer: COMMERCIAL

## 2021-12-06 PROBLEM — E44.0 MODERATE MALNUTRITION (HCC): Chronic | Status: ACTIVE | Noted: 2021-12-06

## 2021-12-06 LAB
ABSOLUTE EOS #: 0.12 K/UL (ref 0–0.4)
ABSOLUTE IMMATURE GRANULOCYTE: 0.24 K/UL (ref 0–0.3)
ABSOLUTE LYMPH #: 2.26 K/UL (ref 1–4.8)
ABSOLUTE MONO #: 1.43 K/UL (ref 0.1–0.8)
ALLEN TEST: POSITIVE
AMMONIA: 52 UMOL/L (ref 16–60)
ANION GAP SERPL CALCULATED.3IONS-SCNC: 11 MMOL/L (ref 9–17)
ANION GAP SERPL CALCULATED.3IONS-SCNC: 9 MMOL/L (ref 9–17)
BASOPHILS # BLD: 0 % (ref 0–2)
BASOPHILS ABSOLUTE: 0 K/UL (ref 0–0.2)
BUN BLDV-MCNC: 26 MG/DL (ref 6–20)
BUN BLDV-MCNC: 29 MG/DL (ref 6–20)
BUN/CREAT BLD: ABNORMAL (ref 9–20)
BUN/CREAT BLD: ABNORMAL (ref 9–20)
CALCIUM SERPL-MCNC: 7.6 MG/DL (ref 8.6–10.4)
CALCIUM SERPL-MCNC: 7.6 MG/DL (ref 8.6–10.4)
CHLORIDE BLD-SCNC: 112 MMOL/L (ref 98–107)
CHLORIDE BLD-SCNC: 119 MMOL/L (ref 98–107)
CO2: 21 MMOL/L (ref 20–31)
CO2: 24 MMOL/L (ref 20–31)
CREAT SERPL-MCNC: 0.64 MG/DL (ref 0.7–1.2)
CREAT SERPL-MCNC: 0.64 MG/DL (ref 0.7–1.2)
DIFFERENTIAL TYPE: ABNORMAL
EOSINOPHILS RELATIVE PERCENT: 1 % (ref 1–4)
FIO2: 30
GFR AFRICAN AMERICAN: >60 ML/MIN
GFR AFRICAN AMERICAN: >60 ML/MIN
GFR NON-AFRICAN AMERICAN: >60 ML/MIN
GFR NON-AFRICAN AMERICAN: >60 ML/MIN
GFR SERPL CREATININE-BSD FRML MDRD: ABNORMAL ML/MIN/{1.73_M2}
GLUCOSE BLD-MCNC: 108 MG/DL (ref 75–110)
GLUCOSE BLD-MCNC: 127 MG/DL (ref 75–110)
GLUCOSE BLD-MCNC: 128 MG/DL (ref 70–99)
GLUCOSE BLD-MCNC: 140 MG/DL (ref 75–110)
GLUCOSE BLD-MCNC: 151 MG/DL (ref 70–99)
GLUCOSE BLD-MCNC: 200 MG/DL (ref 74–100)
GLUCOSE BLD-MCNC: 226 MG/DL (ref 75–110)
GLUCOSE BLD-MCNC: 234 MG/DL (ref 75–110)
GLUCOSE BLD-MCNC: 68 MG/DL (ref 75–110)
HCT VFR BLD CALC: 30.9 % (ref 40.7–50.3)
HEMOGLOBIN: 10.1 G/DL (ref 13–17)
IMMATURE GRANULOCYTES: 2 %
LYMPHOCYTES # BLD: 19 % (ref 24–44)
MCH RBC QN AUTO: 29.1 PG (ref 25.2–33.5)
MCHC RBC AUTO-ENTMCNC: 32.7 G/DL (ref 28.4–34.8)
MCV RBC AUTO: 89 FL (ref 82.6–102.9)
MODE: ABNORMAL
MONOCYTES # BLD: 12 % (ref 1–7)
MORPHOLOGY: ABNORMAL
NEGATIVE BASE EXCESS, ART: ABNORMAL (ref 0–2)
NRBC AUTOMATED: 0 PER 100 WBC
O2 DEVICE/FLOW/%: ABNORMAL
PATIENT TEMP: ABNORMAL
PDW BLD-RTO: 14.6 % (ref 11.8–14.4)
PLATELET # BLD: 192 K/UL (ref 138–453)
PLATELET ESTIMATE: ABNORMAL
PMV BLD AUTO: 10.7 FL (ref 8.1–13.5)
POC HCO3: 27.6 MMOL/L (ref 21–28)
POC O2 SATURATION: 94 % (ref 94–98)
POC PCO2 TEMP: ABNORMAL MM HG
POC PCO2: 33.9 MM HG (ref 35–48)
POC PH TEMP: ABNORMAL
POC PH: 7.52 (ref 7.35–7.45)
POC PO2 TEMP: ABNORMAL MM HG
POC PO2: 62.9 MM HG (ref 83–108)
POSITIVE BASE EXCESS, ART: 5 (ref 0–3)
POTASSIUM SERPL-SCNC: 3.4 MMOL/L (ref 3.7–5.3)
POTASSIUM SERPL-SCNC: 3.8 MMOL/L (ref 3.7–5.3)
RBC # BLD: 3.47 M/UL (ref 4.21–5.77)
RBC # BLD: ABNORMAL 10*6/UL
SAMPLE SITE: ABNORMAL
SEG NEUTROPHILS: 66 % (ref 36–66)
SEGMENTED NEUTROPHILS ABSOLUTE COUNT: 7.85 K/UL (ref 1.8–7.7)
SODIUM BLD-SCNC: 145 MMOL/L (ref 135–144)
SODIUM BLD-SCNC: 151 MMOL/L (ref 135–144)
TCO2 (CALC), ART: ABNORMAL MMOL/L (ref 22–29)
WBC # BLD: 11.9 K/UL (ref 3.5–11.3)
WBC # BLD: ABNORMAL 10*3/UL

## 2021-12-06 PROCEDURE — 82947 ASSAY GLUCOSE BLOOD QUANT: CPT

## 2021-12-06 PROCEDURE — 2500000003 HC RX 250 WO HCPCS: Performed by: STUDENT IN AN ORGANIZED HEALTH CARE EDUCATION/TRAINING PROGRAM

## 2021-12-06 PROCEDURE — 6370000000 HC RX 637 (ALT 250 FOR IP): Performed by: HEALTH CARE PROVIDER

## 2021-12-06 PROCEDURE — 2700000000 HC OXYGEN THERAPY PER DAY

## 2021-12-06 PROCEDURE — 85025 COMPLETE CBC W/AUTO DIFF WBC: CPT

## 2021-12-06 PROCEDURE — 6370000000 HC RX 637 (ALT 250 FOR IP): Performed by: STUDENT IN AN ORGANIZED HEALTH CARE EDUCATION/TRAINING PROGRAM

## 2021-12-06 PROCEDURE — 82803 BLOOD GASES ANY COMBINATION: CPT

## 2021-12-06 PROCEDURE — 6360000002 HC RX W HCPCS: Performed by: STUDENT IN AN ORGANIZED HEALTH CARE EDUCATION/TRAINING PROGRAM

## 2021-12-06 PROCEDURE — 82140 ASSAY OF AMMONIA: CPT

## 2021-12-06 PROCEDURE — 94761 N-INVAS EAR/PLS OXIMETRY MLT: CPT

## 2021-12-06 PROCEDURE — 71045 X-RAY EXAM CHEST 1 VIEW: CPT

## 2021-12-06 PROCEDURE — 99213 OFFICE O/P EST LOW 20 MIN: CPT

## 2021-12-06 PROCEDURE — 36600 WITHDRAWAL OF ARTERIAL BLOOD: CPT

## 2021-12-06 PROCEDURE — 94003 VENT MGMT INPAT SUBQ DAY: CPT

## 2021-12-06 PROCEDURE — 2580000003 HC RX 258: Performed by: HEALTH CARE PROVIDER

## 2021-12-06 PROCEDURE — 99291 CRITICAL CARE FIRST HOUR: CPT | Performed by: INTERNAL MEDICINE

## 2021-12-06 PROCEDURE — 36415 COLL VENOUS BLD VENIPUNCTURE: CPT

## 2021-12-06 PROCEDURE — 80048 BASIC METABOLIC PNL TOTAL CA: CPT

## 2021-12-06 PROCEDURE — 2000000000 HC ICU R&B

## 2021-12-06 RX ORDER — LORAZEPAM 2 MG/1
2 TABLET ORAL EVERY 6 HOURS
Status: DISCONTINUED | OUTPATIENT
Start: 2021-12-06 | End: 2021-12-07

## 2021-12-06 RX ORDER — FAMOTIDINE 20 MG/1
20 TABLET, FILM COATED ORAL 2 TIMES DAILY
Status: DISCONTINUED | OUTPATIENT
Start: 2021-12-06 | End: 2021-12-13 | Stop reason: HOSPADM

## 2021-12-06 RX ADMIN — LEVOFLOXACIN 750 MG: 5 INJECTION, SOLUTION INTRAVENOUS at 01:46

## 2021-12-06 RX ADMIN — DEXMEDETOMIDINE HYDROCHLORIDE 1.2 MCG/KG/HR: 4 INJECTION, SOLUTION INTRAVENOUS at 00:53

## 2021-12-06 RX ADMIN — ACETAMINOPHEN 650 MG: 325 TABLET ORAL at 13:30

## 2021-12-06 RX ADMIN — FONDAPARINUX SODIUM 2.5 MG: 2.5 INJECTION, SOLUTION SUBCUTANEOUS at 08:26

## 2021-12-06 RX ADMIN — INSULIN LISPRO 3 UNITS: 100 INJECTION, SOLUTION INTRAVENOUS; SUBCUTANEOUS at 16:04

## 2021-12-06 RX ADMIN — LORAZEPAM 4 MG: 1 TABLET ORAL at 10:16

## 2021-12-06 RX ADMIN — LORAZEPAM 2 MG: 2 TABLET ORAL at 20:14

## 2021-12-06 RX ADMIN — LORAZEPAM 4 MG: 1 TABLET ORAL at 01:43

## 2021-12-06 RX ADMIN — CLINDAMYCIN IN 5 PERCENT DEXTROSE 600 MG: 12 INJECTION, SOLUTION INTRAVENOUS at 13:35

## 2021-12-06 RX ADMIN — DEXMEDETOMIDINE HYDROCHLORIDE 1 MCG/KG/HR: 4 INJECTION, SOLUTION INTRAVENOUS at 15:47

## 2021-12-06 RX ADMIN — LORAZEPAM 2 MG: 2 TABLET ORAL at 13:30

## 2021-12-06 RX ADMIN — INSULIN LISPRO 6 UNITS: 100 INJECTION, SOLUTION INTRAVENOUS; SUBCUTANEOUS at 00:11

## 2021-12-06 RX ADMIN — POTASSIUM BICARBONATE 40 MEQ: 782 TABLET, EFFERVESCENT ORAL at 20:13

## 2021-12-06 RX ADMIN — FAMOTIDINE 20 MG: 10 INJECTION INTRAVENOUS at 08:26

## 2021-12-06 RX ADMIN — FUROSEMIDE 20 MG: 10 INJECTION, SOLUTION INTRAMUSCULAR; INTRAVENOUS at 08:27

## 2021-12-06 RX ADMIN — CLINDAMYCIN IN 5 PERCENT DEXTROSE 600 MG: 12 INJECTION, SOLUTION INTRAVENOUS at 08:26

## 2021-12-06 RX ADMIN — DEXMEDETOMIDINE HYDROCHLORIDE 1.4 MCG/KG/HR: 4 INJECTION, SOLUTION INTRAVENOUS at 05:55

## 2021-12-06 RX ADMIN — INSULIN LISPRO 6 UNITS: 100 INJECTION, SOLUTION INTRAVENOUS; SUBCUTANEOUS at 03:46

## 2021-12-06 RX ADMIN — FLUOXETINE HYDROCHLORIDE 40 MG: 20 CAPSULE ORAL at 08:26

## 2021-12-06 RX ADMIN — SODIUM CHLORIDE, PRESERVATIVE FREE 10 ML: 5 INJECTION INTRAVENOUS at 08:27

## 2021-12-06 RX ADMIN — INSULIN LISPRO 6 UNITS: 100 INJECTION, SOLUTION INTRAVENOUS; SUBCUTANEOUS at 12:16

## 2021-12-06 RX ADMIN — DEXMEDETOMIDINE HYDROCHLORIDE 1.4 MCG/KG/HR: 4 INJECTION, SOLUTION INTRAVENOUS at 21:13

## 2021-12-06 RX ADMIN — CLINDAMYCIN IN 5 PERCENT DEXTROSE 600 MG: 12 INJECTION, SOLUTION INTRAVENOUS at 20:13

## 2021-12-06 RX ADMIN — FAMOTIDINE 20 MG: 20 TABLET, FILM COATED ORAL at 20:13

## 2021-12-06 ASSESSMENT — PULMONARY FUNCTION TESTS
PIF_VALUE: 21
PIF_VALUE: 19
PIF_VALUE: 21
PIF_VALUE: 20
PIF_VALUE: 21
PIF_VALUE: 24
PIF_VALUE: 13
PIF_VALUE: 20
PIF_VALUE: 21
PIF_VALUE: 27
PIF_VALUE: 28
PIF_VALUE: 16
PIF_VALUE: 15
PIF_VALUE: 16
PIF_VALUE: 20
PIF_VALUE: 15
PIF_VALUE: 22
PIF_VALUE: 20
PIF_VALUE: 21

## 2021-12-06 NOTE — PLAN OF CARE
Problem: OXYGENATION/RESPIRATORY FUNCTION  Goal: Patient will maintain patent airway  12/6/2021 0903 by Curtis Kumari RCP  Outcome: Ongoing     Problem: OXYGENATION/RESPIRATORY FUNCTION  Goal: Patient will achieve/maintain normal respiratory rate/effort  Description: Respiratory rate and effort will be within normal limits for the patient  12/6/2021 0903 by Curtis Kumari RCP  Outcome: Ongoing     Problem: MECHANICAL VENTILATION  Goal: Patient will maintain patent airway  12/6/2021 0903 by Curtis Kumari RCP  Outcome: Ongoing     Problem: MECHANICAL VENTILATION  Goal: Oral health is maintained or improved  12/6/2021 0903 by Curtis Kumari RCP  Outcome: Ongoing     Problem: MECHANICAL VENTILATION  Goal: Oral health is maintained or improved  12/6/2021 0903 by Curtis Kumari RCP  Outcome: Ongoing     Problem: MECHANICAL VENTILATION  Goal: ET tube will be managed safely  12/6/2021 0903 by Curtis Kumari RCP  Outcome: Ongoing     Problem: MECHANICAL VENTILATION  Goal: Ability to express needs and understand communication  12/6/2021 0903 by Curtis Kumari RCP  Outcome: Ongoing     Problem: MECHANICAL VENTILATION  Goal: Mobility/activity is maintained at optimum level for patient  12/6/2021 0903 by Curtis Kumari RCP  Outcome: Ongoing     Problem: SKIN INTEGRITY  Goal: Skin integrity is maintained or improved  12/6/2021 0903 by Curtis Kumari RCP  Outcome: Ongoing

## 2021-12-06 NOTE — PLAN OF CARE
Problem: Non-Violent Restraints  Goal: Removal from restraints as soon as assessed to be safe  Outcome: Ongoing  Goal: No harm/injury to patient while restraints in use  Outcome: Ongoing  Goal: Patient's dignity will be maintained  Outcome: Ongoing     Problem: Skin Integrity:  Goal: Will show no infection signs and symptoms  Description: Will show no infection signs and symptoms  Outcome: Ongoing  Goal: Absence of new skin breakdown  Description: Absence of new skin breakdown  Outcome: Ongoing     Problem: Falls - Risk of:  Goal: Will remain free from falls  Description: Will remain free from falls  Outcome: Ongoing  Goal: Absence of physical injury  Description: Absence of physical injury  Outcome: Ongoing     Problem: Fluid Volume - Imbalance:  Goal: Will remain free of signs and symptoms of dehydration  Description: Will remain free of signs and symptoms of dehydration  Outcome: Ongoing  Goal: Absence of imbalanced fluid volume signs and symptoms  Description: Absence of imbalanced fluid volume signs and symptoms  Outcome: Ongoing     Problem: Serum Glucose Level - Abnormal:  Goal: Ability to maintain appropriate glucose levels will improve  Description: Ability to maintain appropriate glucose levels will improve  Outcome: Ongoing     Problem: Injury - Acid Base Imbalance:  Goal: Acid-base balance  Description: Acid-base balance  Outcome: Ongoing     Problem: OXYGENATION/RESPIRATORY FUNCTION  Goal: Patient will maintain patent airway  12/6/2021 1821 by Yaritza Nath RN  Outcome: Ongoing  12/6/2021 0903 by Curtis Kumari RCP  Outcome: Ongoing  Goal: Patient will achieve/maintain normal respiratory rate/effort  Description: Respiratory rate and effort will be within normal limits for the patient  12/6/2021 1821 by Yaritza Nath RN  Outcome: Ongoing  12/6/2021 0903 by Curtis Kumari RCP  Outcome: Ongoing     Problem: MECHANICAL VENTILATION  Goal: Patient will maintain patent airway  12/6/2021 1821 by Maikol Madsen ROHAN Bedolla  Outcome: Ongoing  12/6/2021 0903 by Amelia Camarillo RCP  Outcome: Ongoing  Goal: Oral health is maintained or improved  12/6/2021 1821 by Adrian Barron RN  Outcome: Ongoing  12/6/2021 0903 by Amelia Camarillo RCP  Outcome: Ongoing  Goal: ET tube will be managed safely  12/6/2021 1821 by Adrian Barron RN  Outcome: Ongoing  12/6/2021 0903 by Amelia Camarillo RCP  Outcome: Ongoing  Goal: Ability to express needs and understand communication  12/6/2021 1821 by Adrian Barron RN  Outcome: Ongoing  12/6/2021 0903 by Amelia Camarillo RCP  Outcome: Ongoing  Goal: Mobility/activity is maintained at optimum level for patient  12/6/2021 1821 by Adrian Barron RN  Outcome: Ongoing  12/6/2021 0903 by Amelia Camarillo RCP  Outcome: Ongoing     Problem: SKIN INTEGRITY  Goal: Skin integrity is maintained or improved  12/6/2021 1821 by Adrian Barron RN  Outcome: Ongoing  12/6/2021 0903 by Amelia Camarillo RCP  Outcome: Ongoing     Problem: NUTRITION  Goal: Nutritional status is improving  Outcome: Ongoing     Problem: Nutrition  Goal: Optimal nutrition therapy  12/6/2021 1821 by Adrian Barron RN  Outcome: Ongoing  12/6/2021 1417 by Luis Jaramillo RD, LD  Note: Nutrition Problem #1: Inadequate oral intake  Intervention: Food and/or Nutrient Delivery: Continue NPO, Continue Current Tube Feeding  Nutritional Goals: Meet % of estimated nutrition needs.

## 2021-12-06 NOTE — PLAN OF CARE
ROHAN Coppola  Outcome: Ongoing  12/5/2021 2215 by Neto Segura RCP  Outcome: Ongoing  Goal: Oral health is maintained or improved  12/6/2021 0104 by Evie Marmolejo RN  Outcome: Ongoing  12/5/2021 2215 by Neto Segura RCP  Outcome: Ongoing  Goal: ET tube will be managed safely  12/6/2021 0104 by Evie Marmolejo RN  Outcome: Ongoing  12/5/2021 2215 by Neto Segura RCP  Outcome: Ongoing  Goal: Ability to express needs and understand communication  12/6/2021 0104 by Evie Marmolejo RN  Outcome: Ongoing  12/5/2021 2215 by Neto Segura RCP  Outcome: Ongoing  Goal: Mobility/activity is maintained at optimum level for patient  12/6/2021 0104 by Evie Marmolejo RN  Outcome: Ongoing  12/5/2021 2215 by Neto Segura RCP  Outcome: Ongoing     Problem: SKIN INTEGRITY  Goal: Skin integrity is maintained or improved  12/6/2021 0104 by vEie Marmolejo RN  Outcome: Ongoing  12/5/2021 2215 by Neto Segura RCP  Outcome: Ongoing     Problem: NUTRITION  Goal: Nutritional status is improving  12/6/2021 0104 by Evie Marmolejo RN  Outcome: Ongoing  12/5/2021 2215 by Neto Segura RCP  Outcome: Ongoing     Problem: Nutrition  Goal: Optimal nutrition therapy  12/6/2021 0104 by Evie Marmolejo RN  Outcome: Ongoing  12/5/2021 2215 by Neto Segura RCP  Outcome: Ongoing

## 2021-12-06 NOTE — PROGRESS NOTES
[facility] Wound Ostomy Continence Nurse  Consult Note       NAME:  Collin Toscano  MEDICAL RECORD NUMBER:  [id number]  AGE: 63 y.o. GENDER: male  : 1960  TODAY'S DATE:  2021    Subjective:     Reason for WOCN Evaluation and Assessment: \"sacral ulcer\"      Collin Toscano is a 63 y.o. male referred by:   [x] Physician  [] Nursing  [] Other:     Wound Identification:  Wound Type: pressure  Contributing Factors: diabetes, poor glucose control, chronic pressure, decreased mobility, shear force, decreased tissue oxygenation, malnutrition, incontinence of stool, incontinence of urine, poor hygiene and non-adherence        Per report: Found down at home. Very restless since admission despite sedation. Patient frequently moves self off of positioning wedges and down in the bed. Deep tissue injuries present as purple or maroon localized areas of discolored, intact skin or blood filled blisters due to damage of the capillaries at the bone muscle interface resulting in ischemia of the soft tissue due pressure and/or shear. The area may be preceded by tissue that is painful, firm, mushy, boggy, or warmer or cooler than adjacent tissue. Evolution may include a thin blister over a dark wound bed. The wound may further evolve and become covered by thin eschar. Evolution may be rapid exposing additional layers of tissue even with optimal treatment. These areas may \"suddenly\" appear as the visible skin color change can occur days after the initial insult and reperfusion. Careful skin assessment of bony prominences is critical to the early identification of these injuries.      Objective:      BP (!) 153/78   Pulse 74   Temp 99.5 °F (37.5 °C) (Oral)   Resp 30   Ht 5' 9\" (1.753 m)   Wt 141 lb 8.6 oz (64.2 kg)   SpO2 99%   BMI 20.90 kg/m²   Brayan Risk Score: Brayan Scale Score: 13    LABS    CBC:   Lab Results   Component Value Date    WBC 11.9 2021    RBC 3.47 2021    HGB 10.1 2021     CMP:  Albumin: Total Pressure Relief  [] Other:     Discharge Plan:  TBD    Patient/Caregiver Teaching:    [] Indicates understanding       [] Needs reinforcement  [] Unsuccessful      [] Verbal Understanding  [] Demonstrated understanding       [x] No evidence of learning  [] Refused teaching         [] N/A       Electronically signed by Ines Loyola RN, CWON on 12/6/2021 at 7:52 AM

## 2021-12-06 NOTE — FLOWSHEET NOTE
SPIRITUAL CARE DEPARTMENT - [facility]  PROGRESS NOTE    Shift date: 12/6/21  Shift day: Monday   Shift # 1    Room # 0127/0127-01   Name: Honorio Erwin            Age: 63 y.o. Gender: male          Judaism:    Place of Lutheran:     Referral: Routine Visit    Admit Date & Time: 11/30/2021  7:39 AM    PATIENT/EVENT DESCRIPTION:  Honorio Erwin is a 63 y.o. male         SPIRITUAL ASSESSMENT/INTERVENTION:  Family member did not appear to mind  presence and appeared to be anxious and coping.  was a ministry of presence. While family member did not  Engaged in manish/spirituality,  nurtured hope by expressing thoughts and prayers. SPIRITUAL CARE FOLLOW-UP PLAN:  Chaplains will remain available to offer spiritual and emotional support as needed. Electronically signed by Rafa Cotton, on 12/6/2021 at 10:53 AM.  Ryan Mendez  [phone]       12/06/21 1053   Encounter Summary   Services provided to: Family; Patient not available   Referral/Consult From: Rounding   Complexity of Encounter Moderate   Length of Encounter 15 minutes   Spiritual Assessment Completed Yes   Routine   Type Initial   Spiritual/Buddhism   Type Spiritual support   Assessment Tearful;  Anxious   Intervention Sustaining presence/ Ministry of presence   Outcome Receptive

## 2021-12-06 NOTE — PROGRESS NOTES
Comprehensive Nutrition Assessment    Type and Reason for Visit:  Reassess    Nutrition Recommendations/Plan:   - Continue NPO  - Continue tube feeding via OG tube with diabetic formula (Glucerna 1.5 Doc) with a goal rate of 45 mL/hr  - Monitor tube feeding tolerance/adequacy     Nutrition Assessment:  Patient remains on vent. Tolerating tube feeding well, per RN. Diabetic tube feeding running at goal rate of 45 mL/hr at time of visit. Labs reviewed include hypernatremia. Free water flushes of 300 mL Q4H started this morning. Blood glucose range x past 24 hours =  mg/dL. Last BM noted on 12/5. Malnutrition Assessment:  Malnutrition Status: Moderate malnutrition    Context:  Chronic Illness     Findings of the 6 clinical characteristics of malnutrition:  Energy Intake:  Mild decrease in energy intake   Weight Loss:  Unable to assess     Body Fat Loss:  1 - Mild body fat loss- Orbital   Muscle Mass Loss:  1 - Mild muscle mass loss- Temples (temporalis)  Fluid Accumulation:  1 - Mild- Generalized, Extremities   Strength:  Not Performed    Estimated Daily Nutrient Needs:  Energy (kcal):  25-28 kcal/kg = 0393-9731 kcals/day; Weight Used for Energy Requirements:  Current     Protein (g):  1.1-1.3 gm/kg = 80-95 gm pro/day; Weight Used for Protein Requirements:  Ideal        Fluid (ml/day):  30 mL/kg = 1800 mL/day or per MD; Method Used for Fluid Requirements:  ml/Kg      Nutrition Related Findings:  Labs: Na 151 (H). Meds: Lantus, Humalog. Last BM 12/5. +OG tube. +1 generalized, BLE edema.       Wounds:  Deep Tissue Injury       Current Nutrition Therapies:    Diet NPO  ADULT TUBE FEEDING; Orogastric; Diabetic; Continuous; 20; Yes; 10; Q 6 hours; 45; 30; Q 4 hours  Current Tube Feeding (TF) Orders:  · Feeding Route: Orogastric  · Formula: Diabetic (Glucerna 1.5 Doc)  · Schedule: Continuous  · Water Flushes: 300 mL Q4H (1800 mL total)  · Current TF & Flush Orders Provides: See below  · Goal TF & Flush Orders Provides: @ 45 mL/hr = 1620 kcal, 89 g protein, 2620 mL free water (from TF + flushes)    Anthropometric Measures:  · Height: 5' 9\" (175.3 cm)  · Current Body Weight: 141 lb 8.6 oz (64.2 kg)   · Admission Body Weight: 128 lb 3.2 oz (58.2 kg)    · Usual Body Weight: 137 lb 13 oz (62.5 kg) (7/23/2021)     · Ideal Body Weight: 160 lbs; % Ideal Body Weight 88.5 %   · BMI: 20.9   · BMI Categories: Normal Weight (BMI 18.5-24. 9)       Nutrition Diagnosis:   · Inadequate oral intake related to impaired respiratory function as evidenced by NPO or clear liquid status due to medical condition, intubation, nutrition support - enteral nutrition    Nutrition Interventions:   Food and/or Nutrient Delivery:  Continue NPO, Continue Current Tube Feeding  Nutrition Education/Counseling:  No recommendation at this time   Coordination of Nutrition Care:  Continue to monitor while inpatient    Goals:  Meet % of estimated nutrition needs. Nutrition Monitoring and Evaluation:   Behavioral-Environmental Outcomes:  None Identified   Food/Nutrient Intake Outcomes:  Enteral Nutrition Intake/Tolerance  Physical Signs/Symptoms Outcomes:  Biochemical Data, GI Status, Fluid Status or Edema, Nutrition Focused Physical Findings, Weight, Skin     Discharge Planning:     Too soon to determine     Electronically signed by Jesi Pickard RD, LD on 12/6/21 at 2:16 PM EST    Contact: [phone]

## 2021-12-06 NOTE — PROGRESS NOTES
Critical Care Team - Daily Progress Note      Date and time: 12/6/2021 8:48 AM  Patient's name:  Princess Moreno  Medical Record Number: [id number]  Patient's account/billing number: [de-identified]  Patient's YOB: 1960  Age: 63 y.o. Date of Admission: 11/30/2021  7:39 AM  Length of stay during current admission: 6      Primary Care Physician: Lul Salter    Code Status: Full Code    Reason for ICU admission:  DKA with coma      SUBJECTIVE:     OVERNIGHT EVENTS:    NO acute overnight issues. Na 150, not on fluids, on lasix and TFs  WBC 11k, hb stable at 10\  Platelets 462R  HIT negative    CPAP now tolerates well  ABG 7.52/33/27/62/94%  On precedex.  Po ativan  Off sedation, withdraws to pain but not following commands    AWAKE & FOLLOWING COMMANDS:  [x] No   [] Yes    CURRENT VENTILATION STATUS:     [x] Ventilator  [] BIPAP  [] Nasal Cannula [] Room Air        SECRETIONS Amount:  [x] Small [] Moderate  [] Large  [] None  Color:     [x] White [] Colored  [] Bloody    SEDATION:  Ativan po  [x] precedex gtt  [] Versed gtt  [] Ativan gtt   [] No Sedation    PARALYZED:  [x] No    [] Yes    DIARRHEA:                [x] No                [] Yes  (C. Difficile status: [] positive                                                                                                                       [] negative                                                                                                                     [] pending)    VASOPRESSORS:  [x] No    [x] Yes    If yes -   [] Levophed       [] Dopamine     [] Vasopressin       [] Dobutamine  [] Phenylephrine         [] Epinephrine    CENTRAL LINES:     [x] No   [] Yes   (Date of Insertion:   )           If yes -     [] Right IJ     [] Left IJ [] Right Femoral [] Left Femoral                   [] Right Subclavian [] Left Subclavian        SANTANA'S CATHETER:   [] No   [x] Yes  Ureteral cathetar(Date of Insertion:  11/30/2021 )     URINE OUTPUT: [x] Good   [] Low              [] Anuric      OBJECTIVE:     VITAL SIGNS:  BP (!) 153/78   Pulse 66   Temp 99.5 °F (37.5 °C) (Oral)   Resp 23   Ht 5' 9\" (1.753 m)   Wt 141 lb 8.6 oz (64.2 kg)   SpO2 97%   BMI 20.90 kg/m²   Tmax over 24 hours:  Temp (24hrs), Av.6 °F (37.6 °C), Min:98.2 °F (36.8 °C), Max:101.3 °F (38.5 °C)      Patient Vitals for the past 6 hrs:   BP Temp Temp src Pulse Resp SpO2   21 0839 -- -- -- 66 23 97 %   21 0828 -- -- -- 65 20 99 %   21 0600 (!) 153/78 99.5 °F (37.5 °C) Oral 74 30 99 %   21 0500 136/63 -- -- 75 25 97 %   21 0400 (!) 145/70 99.5 °F (37.5 °C) Oral 69 22 100 %   21 0300 (!) 155/72 -- -- 76 21 98 %         Intake/Output Summary (Last 24 hours) at 2021 0848  Last data filed at 2021 0600  Gross per 24 hour   Intake 2085 ml   Output 2550 ml   Net -465 ml     Wt Readings from Last 2 Encounters:   21 141 lb 8.6 oz (64.2 kg)     Body mass index is 20.9 kg/m². PHYSICAL EXAMINATION:  Constitutional: Intubated and sedated  Neck: Supple, symmetrical, trachea midline, no jvd, skin normal  Respiratory: Rhonchi, ventilatory breath sounds, no wheezing/rales  Cardiovascular: regular rate and rhythm, normal S1, S2, no murmur noted   Abdomen: soft, nondistended, no masses or organomegaly  Extremities:  peripheral pulses normal, no pedal edema, no clubbing or cyanosis  Neurological: Intubated, sedated. No involuntary movements. Pupils reactive. No rigidity.   Off sedation, withdraws to pain but not following commands    MEDICATIONS:    Scheduled Meds:   LORazepam  4 mg Oral Q6H    insulin lispro  0-18 Units SubCUTAneous Q4H    fondaparinux  2.5 mg SubCUTAneous Daily    insulin glargine  10 Units SubCUTAneous Nightly    famotidine (PEPCID) injection  20 mg IntraVENous BID    levofloxacin  750 mg IntraVENous Q24H    clindamycin (CLEOCIN) IV  600 mg IntraVENous TID    sodium chloride flush  5-40 mL IntraVENous 2 times per day  FLUoxetine  40 mg Oral Daily     Continuous Infusions:   dexmedetomidine Stopped (12/06/21 0816)    dextrose      norepinephrine Stopped (12/02/21 1354)    sodium chloride      dextrose 5 % and 0.45 % NaCl Stopped (12/01/21 0000)     PRN Meds:   albuterol, 2.5 mg, Q4H PRN  glucose, 15 g, PRN  dextrose, 12.5 g, PRN  glucagon (rDNA), 1 mg, PRN  dextrose, 100 mL/hr, PRN  dextrose, 12.5 g, PRN  polyethylene glycol, 17 g, Daily PRN  sodium chloride flush, 5-40 mL, PRN  sodium chloride, 25 mL, PRN  ondansetron, 4 mg, Q8H PRN   Or  ondansetron, 4 mg, Q6H PRN  acetaminophen, 650 mg, Q6H PRN   Or  acetaminophen, 650 mg, Q6H PRN  dextrose 5 % and 0.45 % NaCl, , Continuous PRN          VENT SETTINGS (Comprehensive) (if applicable):  Vent Information  $Ventilation: $Subsequent Day  Skin Assessment: Clean, dry, & intact  Suction Catheter Diameter: 14  Equipment ID: [id number]  Equipment Changed: Expiratory Filter  Vent Type: Servo i  Vent Mode: (S) CPAP  Vt Ordered: 510 mL  Rate Set: 18 bmp  Pressure Support: 8 cmH20  FiO2 : 30 %  SpO2: 97 %  SpO2/FiO2 ratio: 323.33  Sensitivity: 2  PEEP/CPAP: 5  I Time/ I Time %: 0.75 s  Humidification Source: HME  Nitric Oxide/Epoprostenol In Use?: No  Mask Type: Full face mask  Mask Size: Medium  Additional Respiratory  Assessments  Pulse: 66  Resp: 23  SpO2: 97 %  End Tidal CO2: 29 (%)  Position: Semi-Powell's  Humidification Source: HME  Oral Care Completed?: Yes  Oral Care: Mouthwash with chlorhexidine, Mouth swabbed, Suction toothette, Mouth suctioned  Subglottic Suction Done?: Yes  Cuff Pressure (cm H2O):  (MLT)      Laboratory findings:    Complete Blood Count:   Recent Labs     12/04/21  0516 12/05/21  0321 12/06/21  0453   WBC 12.8* 12.8* 11.9*   HGB 10.5* 10.0* 10.1*   HCT 32.2* 30.3* 30.9*   * 144 192        Last 3 Blood Glucose:   Recent Labs     12/04/21  0516 12/05/21  0321 12/06/21  0453   GLUCOSE 361* 217* 128*        PT/INR:  No results found for: PROTIME, INR  PTT:  No results found for: APTT, PTT    Comprehensive Metabolic Profile:   Recent Labs     12/04/21  0516 12/05/21  0321 12/06/21  0453    146* 151*   K 4.7 4.1 3.8   * 116* 119*   CO2 19* 23 21   BUN 22* 29* 29*   CREATININE 0.58* 0.71 0.64*   GLUCOSE 361* 217* 128*   CALCIUM 7.3* 7.8* 7.6*      Magnesium:   Lab Results   Component Value Date    MG 1.9 12/03/2021     Phosphorus:   Lab Results   Component Value Date    PHOS 3.8 12/04/2021     Ionized Calcium:   Lab Results   Component Value Date    CAION 1.04 12/03/2021        Urinalysis:     Troponin: No results for input(s): TROPONINI in the last 72 hours. Microbiology:    Cultures during this admission:     Blood cultures:                 [x] None drawn      [] Negative             []  Positive (Details:  )  Urine Culture:                   [x] None drawn      [] Negative             []  Positive (Details:  )  Sputum Culture:               [x] None drawn       [] Negative             []  Positive (Details:  )   Endotracheal aspirate:     [x] None drawn       [] Negative             []  Positive (Details:  )     Other pertinent Labs:       Radiology/Imaging:         PLAN:     Endocrine:  DKA with encephalopathy:  Resolved. Continue Lantus 10 u nightly with HDSS. On Diabetic TFs. HbA1c 11.6  Per family, takes Insulin daily, follows endocrinology, no recent sickness. Doesn't drink alcohol  Marijuana prescription use    Neurological:  Acute metabolic encephalopathy:  - Intubated and sedated on Precedex and Ativan. - Sedation holidays, maintaining circardian rhythm  - Wean sedation as tolerated. Cardiovascular:  - BP controlled    Pulmonary:  Aspiration pneumonia:  Intubated to protect the airway. CPAP trials daily. Tolerating. CXR repeat today  Continue Levaquin and Clinda. GI/nutrition  Tube feeding @ 45ml/h. Pepcid IV 20 mg daily. GlycoLax as needed for bowel movements. Renal/fluids/electrolyte  - Net +ve 9 L  - Good UO. 2.8 L  - Continue IV lasix and strict I/O    ID:   Aspiration pneumonia  On Levo and Clindamycin. Decubitus sacral ulcer  Wound care consulted. Hematological:  Hb stable. Platelets recovered  HIT panel negative  On fondaparinux. Chinyere Flores MD      [facility]         12/6/2021, 8:48 AM    Attending Physician Statement  I have discussed the care of Phil Mosher, including pertinent history and exam findings with the resident. I have reviewed the key elements of all parts of the encounter with the resident. I have seen and examined the patient with the resident. I agree with the assessment and plan and status of the problem list as documented. I seen the patient during my round today, chart reviewed, labs and medications reviewed overnight events noted. Patient has been given IV fluid and also been getting Lasix he was on Lasix 20 mg 3 doses. Urine output is good 2800 and last 24 hours but his sodium is increased to 151. He is on Precedex drip and he is also on scheduled Ativan when I saw him he did not follow commands and was lethargic while on ventilator. Currently his Lantus is on hold he is on tube feeding. Ventilator setting PRVC/18/510/5/30 percent and ABG 7.5 2/34/63  Will start decreasing Ativan and continue with Precedex drip. Free water is a sodium is elevated will recheck his sodium in 4 hours. We will continue with IV fluid and hold Lasix. Monitor blood sugar while Lantus is on hold. He is currently on clindamycin and levofloxacin for left lower lobe possible aspiration pneumonia. Follow-up mentation and neurological status and continue to wean off Ativan. Discussed with nursing staff, treatment and plan discussed.   Discussed with respiratory therapist.    Total critical care time caring for this patient with life threatening, unstable organ failure, including direct patient contact, management of life support systems, review of data including imaging and labs, discussions with other team members and physicians at least 35 min so far today, excluding procedures. Please note that this chart was generated using voice recognition Dragon dictation software. Although every effort was made to ensure the accuracy of this automated transcription, some errors in transcription may have occurred.      Migdalia Layton MD  12/6/2021 10:27 AM

## 2021-12-06 NOTE — PLAN OF CARE
Nutrition Problem #1: Inadequate oral intake  Intervention: Food and/or Nutrient Delivery: Continue NPO, Continue Current Tube Feeding  Nutritional Goals: Meet % of estimated nutrition needs.

## 2021-12-07 LAB
-: NORMAL
ABSOLUTE EOS #: 0.32 K/UL (ref 0–0.44)
ABSOLUTE IMMATURE GRANULOCYTE: 0.32 K/UL (ref 0–0.3)
ABSOLUTE LYMPH #: 1.91 K/UL (ref 1.1–3.7)
ABSOLUTE MONO #: 2.07 K/UL (ref 0.1–1.2)
ALLEN TEST: POSITIVE
AMORPHOUS: NORMAL
ANION GAP SERPL CALCULATED.3IONS-SCNC: 10 MMOL/L (ref 9–17)
BACTERIA: NORMAL
BASOPHILS # BLD: 0 % (ref 0–2)
BASOPHILS ABSOLUTE: 0 K/UL (ref 0–0.2)
BILIRUBIN URINE: NEGATIVE
BUN BLDV-MCNC: 24 MG/DL (ref 6–20)
BUN/CREAT BLD: ABNORMAL (ref 9–20)
CALCIUM SERPL-MCNC: 7.7 MG/DL (ref 8.6–10.4)
CASTS UA: NORMAL /LPF (ref 0–8)
CHLORIDE BLD-SCNC: 108 MMOL/L (ref 98–107)
CO2: 22 MMOL/L (ref 20–31)
COLOR: YELLOW
COMMENT UA: ABNORMAL
CREAT SERPL-MCNC: 0.56 MG/DL (ref 0.7–1.2)
CRYSTALS, UA: NORMAL /HPF
DIFFERENTIAL TYPE: ABNORMAL
EOSINOPHILS RELATIVE PERCENT: 2 % (ref 1–4)
EPITHELIAL CELLS UA: NORMAL /HPF (ref 0–5)
FIO2: 45
GFR AFRICAN AMERICAN: >60 ML/MIN
GFR NON-AFRICAN AMERICAN: >60 ML/MIN
GFR SERPL CREATININE-BSD FRML MDRD: ABNORMAL ML/MIN/{1.73_M2}
GFR SERPL CREATININE-BSD FRML MDRD: ABNORMAL ML/MIN/{1.73_M2}
GLUCOSE BLD-MCNC: 159 MG/DL (ref 75–110)
GLUCOSE BLD-MCNC: 163 MG/DL (ref 75–110)
GLUCOSE BLD-MCNC: 172 MG/DL (ref 75–110)
GLUCOSE BLD-MCNC: 179 MG/DL (ref 70–99)
GLUCOSE BLD-MCNC: 185 MG/DL (ref 75–110)
GLUCOSE BLD-MCNC: 191 MG/DL (ref 74–100)
GLUCOSE BLD-MCNC: 200 MG/DL (ref 75–110)
GLUCOSE BLD-MCNC: 214 MG/DL (ref 75–110)
GLUCOSE URINE: ABNORMAL
HCT VFR BLD CALC: 30 % (ref 40.7–50.3)
HEMOGLOBIN: 10 G/DL (ref 13–17)
IMMATURE GRANULOCYTES: 2 %
KETONES, URINE: ABNORMAL
LEGIONELLA PNEUMOPHILIA AG, URINE: NEGATIVE
LEUKOCYTE ESTERASE, URINE: NEGATIVE
LYMPHOCYTES # BLD: 12 % (ref 24–43)
MCH RBC QN AUTO: 29 PG (ref 25.2–33.5)
MCHC RBC AUTO-ENTMCNC: 33.3 G/DL (ref 28.4–34.8)
MCV RBC AUTO: 87 FL (ref 82.6–102.9)
MODE: ABNORMAL
MONOCYTES # BLD: 13 % (ref 3–12)
MORPHOLOGY: NORMAL
MUCUS: NORMAL
NEGATIVE BASE EXCESS, ART: ABNORMAL (ref 0–2)
NITRITE, URINE: NEGATIVE
NRBC AUTOMATED: 0 PER 100 WBC
O2 DEVICE/FLOW/%: ABNORMAL
OTHER OBSERVATIONS UA: NORMAL
PATIENT TEMP: ABNORMAL
PDW BLD-RTO: 13.8 % (ref 11.8–14.4)
PH UA: 7.5 (ref 5–8)
PLATELET # BLD: 229 K/UL (ref 138–453)
PLATELET ESTIMATE: ABNORMAL
PMV BLD AUTO: 11.6 FL (ref 8.1–13.5)
POC HCO3: 26.9 MMOL/L (ref 21–28)
POC O2 SATURATION: 96 % (ref 94–98)
POC PCO2 TEMP: ABNORMAL MM HG
POC PCO2: 35.4 MM HG (ref 35–48)
POC PH TEMP: ABNORMAL
POC PH: 7.49 (ref 7.35–7.45)
POC PO2 TEMP: ABNORMAL MM HG
POC PO2: 74.4 MM HG (ref 83–108)
POSITIVE BASE EXCESS, ART: 3 (ref 0–3)
POTASSIUM SERPL-SCNC: 4 MMOL/L (ref 3.7–5.3)
PROTEIN UA: ABNORMAL
RBC # BLD: 3.45 M/UL (ref 4.21–5.77)
RBC # BLD: ABNORMAL 10*6/UL
RBC UA: NORMAL /HPF (ref 0–4)
RENAL EPITHELIAL, UA: NORMAL /HPF
SAMPLE SITE: ABNORMAL
SEG NEUTROPHILS: 71 % (ref 36–65)
SEGMENTED NEUTROPHILS ABSOLUTE COUNT: 11.28 K/UL (ref 1.5–8.1)
SODIUM BLD-SCNC: 140 MMOL/L (ref 135–144)
SOURCE: NORMAL
SPECIFIC GRAVITY UA: 1.03 (ref 1–1.03)
STREP PNEUMONIAE ANTIGEN: NEGATIVE
TCO2 (CALC), ART: ABNORMAL MMOL/L (ref 22–29)
TRICHOMONAS: NORMAL
TURBIDITY: CLEAR
URINE HGB: NEGATIVE
UROBILINOGEN, URINE: NORMAL
WBC # BLD: 15.9 K/UL (ref 3.5–11.3)
WBC # BLD: ABNORMAL 10*3/UL
WBC UA: NORMAL /HPF (ref 0–5)
YEAST: NORMAL

## 2021-12-07 PROCEDURE — 86738 MYCOPLASMA ANTIBODY: CPT

## 2021-12-07 PROCEDURE — 51701 INSERT BLADDER CATHETER: CPT

## 2021-12-07 PROCEDURE — 87205 SMEAR GRAM STAIN: CPT

## 2021-12-07 PROCEDURE — 2500000003 HC RX 250 WO HCPCS: Performed by: STUDENT IN AN ORGANIZED HEALTH CARE EDUCATION/TRAINING PROGRAM

## 2021-12-07 PROCEDURE — 36600 WITHDRAWAL OF ARTERIAL BLOOD: CPT

## 2021-12-07 PROCEDURE — 6360000002 HC RX W HCPCS: Performed by: STUDENT IN AN ORGANIZED HEALTH CARE EDUCATION/TRAINING PROGRAM

## 2021-12-07 PROCEDURE — 6370000000 HC RX 637 (ALT 250 FOR IP): Performed by: STUDENT IN AN ORGANIZED HEALTH CARE EDUCATION/TRAINING PROGRAM

## 2021-12-07 PROCEDURE — 2000000000 HC ICU R&B

## 2021-12-07 PROCEDURE — 6370000000 HC RX 637 (ALT 250 FOR IP): Performed by: HEALTH CARE PROVIDER

## 2021-12-07 PROCEDURE — 85025 COMPLETE CBC W/AUTO DIFF WBC: CPT

## 2021-12-07 PROCEDURE — 87040 BLOOD CULTURE FOR BACTERIA: CPT

## 2021-12-07 PROCEDURE — 82947 ASSAY GLUCOSE BLOOD QUANT: CPT

## 2021-12-07 PROCEDURE — 6360000002 HC RX W HCPCS: Performed by: HEALTH CARE PROVIDER

## 2021-12-07 PROCEDURE — 36415 COLL VENOUS BLD VENIPUNCTURE: CPT

## 2021-12-07 PROCEDURE — 89220 SPUTUM SPECIMEN COLLECTION: CPT

## 2021-12-07 PROCEDURE — 87449 NOS EACH ORGANISM AG IA: CPT

## 2021-12-07 PROCEDURE — 81001 URINALYSIS AUTO W/SCOPE: CPT

## 2021-12-07 PROCEDURE — 94761 N-INVAS EAR/PLS OXIMETRY MLT: CPT

## 2021-12-07 PROCEDURE — 99291 CRITICAL CARE FIRST HOUR: CPT | Performed by: INTERNAL MEDICINE

## 2021-12-07 PROCEDURE — 82803 BLOOD GASES ANY COMBINATION: CPT

## 2021-12-07 PROCEDURE — 51798 US URINE CAPACITY MEASURE: CPT

## 2021-12-07 PROCEDURE — 87086 URINE CULTURE/COLONY COUNT: CPT

## 2021-12-07 PROCEDURE — 94003 VENT MGMT INPAT SUBQ DAY: CPT

## 2021-12-07 PROCEDURE — 80048 BASIC METABOLIC PNL TOTAL CA: CPT

## 2021-12-07 PROCEDURE — 87899 AGENT NOS ASSAY W/OPTIC: CPT

## 2021-12-07 PROCEDURE — 2580000003 HC RX 258: Performed by: STUDENT IN AN ORGANIZED HEALTH CARE EDUCATION/TRAINING PROGRAM

## 2021-12-07 PROCEDURE — 87070 CULTURE OTHR SPECIMN AEROBIC: CPT

## 2021-12-07 PROCEDURE — 2700000000 HC OXYGEN THERAPY PER DAY

## 2021-12-07 RX ORDER — HALOPERIDOL 5 MG/ML
5 INJECTION INTRAMUSCULAR ONCE
Status: COMPLETED | OUTPATIENT
Start: 2021-12-07 | End: 2021-12-07

## 2021-12-07 RX ORDER — LORAZEPAM 2 MG/ML
2 INJECTION INTRAMUSCULAR EVERY 4 HOURS PRN
Status: DISCONTINUED | OUTPATIENT
Start: 2021-12-07 | End: 2021-12-10

## 2021-12-07 RX ORDER — FLUOXETINE HYDROCHLORIDE 20 MG/1
40 CAPSULE ORAL 2 TIMES DAILY
Status: DISCONTINUED | OUTPATIENT
Start: 2021-12-07 | End: 2021-12-13 | Stop reason: HOSPADM

## 2021-12-07 RX ORDER — SODIUM CHLORIDE 9 MG/ML
INJECTION, SOLUTION INTRAVENOUS CONTINUOUS
Status: DISCONTINUED | OUTPATIENT
Start: 2021-12-07 | End: 2021-12-13

## 2021-12-07 RX ORDER — LORAZEPAM 2 MG/1
2 TABLET ORAL EVERY 12 HOURS
Status: DISCONTINUED | OUTPATIENT
Start: 2021-12-07 | End: 2021-12-10

## 2021-12-07 RX ADMIN — CLINDAMYCIN IN 5 PERCENT DEXTROSE 600 MG: 12 INJECTION, SOLUTION INTRAVENOUS at 20:30

## 2021-12-07 RX ADMIN — INSULIN LISPRO 3 UNITS: 100 INJECTION, SOLUTION INTRAVENOUS; SUBCUTANEOUS at 12:11

## 2021-12-07 RX ADMIN — LORAZEPAM 2 MG: 2 TABLET ORAL at 00:47

## 2021-12-07 RX ADMIN — ACETAMINOPHEN 650 MG: 325 TABLET ORAL at 14:07

## 2021-12-07 RX ADMIN — FLUOXETINE HYDROCHLORIDE 40 MG: 20 CAPSULE ORAL at 08:50

## 2021-12-07 RX ADMIN — DEXMEDETOMIDINE HYDROCHLORIDE 1.4 MCG/KG/HR: 4 INJECTION, SOLUTION INTRAVENOUS at 17:54

## 2021-12-07 RX ADMIN — INSULIN LISPRO 3 UNITS: 100 INJECTION, SOLUTION INTRAVENOUS; SUBCUTANEOUS at 20:07

## 2021-12-07 RX ADMIN — FONDAPARINUX SODIUM 2.5 MG: 2.5 INJECTION, SOLUTION SUBCUTANEOUS at 08:50

## 2021-12-07 RX ADMIN — INSULIN LISPRO 6 UNITS: 100 INJECTION, SOLUTION INTRAVENOUS; SUBCUTANEOUS at 00:17

## 2021-12-07 RX ADMIN — DEXMEDETOMIDINE HYDROCHLORIDE 1.4 MCG/KG/HR: 4 INJECTION, SOLUTION INTRAVENOUS at 07:34

## 2021-12-07 RX ADMIN — ACETAMINOPHEN 650 MG: 325 TABLET ORAL at 01:59

## 2021-12-07 RX ADMIN — DEXMEDETOMIDINE HYDROCHLORIDE 1.4 MCG/KG/HR: 4 INJECTION, SOLUTION INTRAVENOUS at 22:23

## 2021-12-07 RX ADMIN — HALOPERIDOL LACTATE 5 MG: 5 INJECTION, SOLUTION INTRAMUSCULAR at 02:54

## 2021-12-07 RX ADMIN — FLUOXETINE HYDROCHLORIDE 40 MG: 20 CAPSULE ORAL at 20:19

## 2021-12-07 RX ADMIN — INSULIN GLARGINE 10 UNITS: 100 INJECTION, SOLUTION SUBCUTANEOUS at 21:45

## 2021-12-07 RX ADMIN — FAMOTIDINE 20 MG: 20 TABLET, FILM COATED ORAL at 08:50

## 2021-12-07 RX ADMIN — CLINDAMYCIN IN 5 PERCENT DEXTROSE 600 MG: 12 INJECTION, SOLUTION INTRAVENOUS at 14:15

## 2021-12-07 RX ADMIN — LORAZEPAM 2 MG: 2 INJECTION INTRAMUSCULAR; INTRAVENOUS at 14:13

## 2021-12-07 RX ADMIN — ACETAMINOPHEN 650 MG: 325 TABLET ORAL at 20:20

## 2021-12-07 RX ADMIN — DEXMEDETOMIDINE HYDROCHLORIDE 1.4 MCG/KG/HR: 4 INJECTION, SOLUTION INTRAVENOUS at 02:46

## 2021-12-07 RX ADMIN — INSULIN LISPRO 6 UNITS: 100 INJECTION, SOLUTION INTRAVENOUS; SUBCUTANEOUS at 16:24

## 2021-12-07 RX ADMIN — LEVOFLOXACIN 750 MG: 5 INJECTION, SOLUTION INTRAVENOUS at 01:50

## 2021-12-07 RX ADMIN — INSULIN LISPRO 3 UNITS: 100 INJECTION, SOLUTION INTRAVENOUS; SUBCUTANEOUS at 08:20

## 2021-12-07 RX ADMIN — INSULIN LISPRO 3 UNITS: 100 INJECTION, SOLUTION INTRAVENOUS; SUBCUTANEOUS at 04:12

## 2021-12-07 RX ADMIN — FAMOTIDINE 20 MG: 20 TABLET, FILM COATED ORAL at 20:19

## 2021-12-07 RX ADMIN — CLINDAMYCIN IN 5 PERCENT DEXTROSE 600 MG: 12 INJECTION, SOLUTION INTRAVENOUS at 08:49

## 2021-12-07 RX ADMIN — DEXMEDETOMIDINE HYDROCHLORIDE 1.4 MCG/KG/HR: 4 INJECTION, SOLUTION INTRAVENOUS at 13:11

## 2021-12-07 RX ADMIN — LORAZEPAM 2 MG: 2 TABLET ORAL at 08:30

## 2021-12-07 RX ADMIN — LORAZEPAM 2 MG: 2 INJECTION INTRAMUSCULAR; INTRAVENOUS at 20:16

## 2021-12-07 RX ADMIN — LORAZEPAM 2 MG: 2 TABLET ORAL at 14:07

## 2021-12-07 RX ADMIN — SODIUM CHLORIDE: 9 INJECTION, SOLUTION INTRAVENOUS at 14:06

## 2021-12-07 ASSESSMENT — PULMONARY FUNCTION TESTS
PIF_VALUE: 19
PIF_VALUE: 15
PIF_VALUE: 21
PIF_VALUE: 19
PIF_VALUE: 19
PIF_VALUE: 23
PIF_VALUE: 19
PIF_VALUE: 19
PIF_VALUE: 22
PIF_VALUE: 21
PIF_VALUE: 23
PIF_VALUE: 24
PIF_VALUE: 22
PIF_VALUE: 17
PIF_VALUE: 20
PIF_VALUE: 11
PIF_VALUE: 31
PIF_VALUE: 28
PIF_VALUE: 11
PIF_VALUE: 31
PIF_VALUE: 14
PIF_VALUE: 20
PIF_VALUE: 25
PIF_VALUE: 21
PIF_VALUE: 19
PIF_VALUE: 26
PIF_VALUE: 26
PIF_VALUE: 21
PIF_VALUE: 28
PIF_VALUE: 22
PIF_VALUE: 18

## 2021-12-07 NOTE — PLAN OF CARE
Ongoing     Problem: OXYGENATION/RESPIRATORY FUNCTION  Goal: Patient will maintain patent airway  12/6/2021 2119 by Sandra Gonzales RN  Outcome: Ongoing     Problem: OXYGENATION/RESPIRATORY FUNCTION  Goal: Patient will achieve/maintain normal respiratory rate/effort  Description: Respiratory rate and effort will be within normal limits for the patient  12/6/2021 2119 by Sandra Gonzales RN  Outcome: Ongoing     Problem: MECHANICAL VENTILATION  Goal: Patient will maintain patent airway  12/6/2021 2119 by Sandra Gonzales RN  Outcome: Ongoing     Problem: MECHANICAL VENTILATION  Goal: Oral health is maintained or improved  12/6/2021 2119 by Sandra Gonzales RN  Outcome: Ongoing     Problem: MECHANICAL VENTILATION  Goal: ET tube will be managed safely  12/6/2021 2119 by Sandra Gonzales RN  Outcome: Ongoing     Problem: MECHANICAL VENTILATION  Goal: Ability to express needs and understand communication  12/6/2021 2119 by Sandra Gonzales RN  Outcome: Ongoing     Problem: MECHANICAL VENTILATION  Goal: Mobility/activity is maintained at optimum level for patient  12/6/2021 2119 by Sandra Gonzales RN  Outcome: Ongoing     Problem: SKIN INTEGRITY  Goal: Skin integrity is maintained or improved  12/6/2021 2119 by Sandra Gonzales RN  Outcome: Ongoing     Problem: NUTRITION  Goal: Nutritional status is improving  12/6/2021 2119 by Sanrda Gonzales RN  Outcome: Ongoing     Problem: Nutrition  Goal: Optimal nutrition therapy  12/6/2021 2119 by Sandra Gonzales RN  Outcome: Ongoing

## 2021-12-07 NOTE — PLAN OF CARE
Problem: OXYGENATION/RESPIRATORY FUNCTION  Goal: Patient will maintain patent airway  12/6/2021 2119 by Munira Reeves RN  Outcome: Ongoing  12/6/2021 2030 by Manas Agosto, RCP  Outcome: Ongoing  12/6/2021 1821 by Yaritza Nath RN  Outcome: Ongoing  Goal: Patient will achieve/maintain normal respiratory rate/effort  Description: Respiratory rate and effort will be within normal limits for the patient  12/6/2021 2119 by Munira Reeves RN  Outcome: Ongoing  12/6/2021 2030 by Manas Agosto, RCP  Outcome: Ongoing  12/6/2021 1821 by Yaritza Nath RN  Outcome: Ongoing     Problem: MECHANICAL VENTILATION  Goal: Patient will maintain patent airway  12/6/2021 2119 by Munira Reeves RN  Outcome: Ongoing  12/6/2021 2030 by Manas Agosto, RCP  Outcome: Ongoing  12/6/2021 1821 by Yaritza Nath RN  Outcome: Ongoing  Goal: Oral health is maintained or improved  12/6/2021 2119 by Munira Reeves RN  Outcome: Ongoing  12/6/2021 2030 by Manas Agosto, RCP  Outcome: Ongoing  12/6/2021 1821 by Yaritza Nath RN  Outcome: Ongoing  Goal: ET tube will be managed safely  12/6/2021 2119 by Munira Reeves RN  Outcome: Ongoing  12/6/2021 2030 by Manas Agosto, RCP  Outcome: Ongoing  12/6/2021 1821 by Yaritza Nath, RN  Outcome: Ongoing  Goal: Ability to express needs and understand communication  12/6/2021 2119 by Munira Reeves RN  Outcome: Ongoing  12/6/2021 2030 by Manas Agosto, RCP  Outcome: Ongoing  12/6/2021 1821 by Yaritza Nath RN  Outcome: Ongoing  Goal: Mobility/activity is maintained at optimum level for patient  12/6/2021 2119 by Munira Reeves RN  Outcome: Ongoing  12/6/2021 2030 by Manas Agosto, RCP  Outcome: Ongoing  12/6/2021 1821 by Yaritza Nath RN  Outcome: Ongoing     Problem: SKIN INTEGRITY  Goal: Skin integrity is maintained or improved  12/6/2021 2119 by Munira Reeves RN  Outcome: Ongoing  12/6/2021 2030 by Manas Agosto RCP  Outcome: Ongoing  12/6/2021 1821 by Yaritza Nath RN  Outcome: Ongoing

## 2021-12-07 NOTE — PROGRESS NOTES
Critical Care Team - Daily Progress Note      Date and time: 2021 10:20 AM  Patient's name:  Sp Hinton  Medical Record Number: [id number]  Patient's account/billing number: [de-identified]  Patient's YOB: 1960  Age: 63 y.o. Date of Admission: 2021  7:39 AM  Length of stay during current admission: 7      Primary Care Physician: Albaro Patel    Code Status: Full Code    Reason for ICU admission:  DKA with coma      SUBJECTIVE:     OVERNIGHT EVENTS:    Overnight agitation, given haldol  Seems to responding, more calm this am  Opened eyes, followed commands per RN and wife but minimally  Vitals stable  Tolerating CPAP    Decreased ativan yesterday, on precedex  Wbc WENT UP TO 15K    PRVC 510/18/5/45%   ABG 7.48/35/26/74/ 96%  OBJECTIVE:     VITAL SIGNS:  BP (!) 118/54   Pulse 62   Temp 98.6 °F (37 °C) (Oral)   Resp 19   Ht 5' 9\" (1.753 m)   Wt 131 lb 9.8 oz (59.7 kg)   SpO2 94%   BMI 19.44 kg/m²   Tmax over 24 hours:  Temp (24hrs), Av.7 °F (37.6 °C), Min:98.3 °F (36.8 °C), Max:102.2 °F (39 °C)      Patient Vitals for the past 6 hrs:   BP Temp Temp src Pulse Resp SpO2 Weight   21 0900 (!) 118/54 -- -- 62 19 94 % --   21 0800 (!) 118/54 98.6 °F (37 °C) Oral 62 20 95 % --   21 0742 -- -- -- -- 22 94 % --   21 0735 -- -- -- 62 20 95 % --   21 0700 122/61 -- -- 60 18 96 % --   21 0630 -- -- -- 60 18 97 % --   21 0600 (!) 111/54 99.3 °F (37.4 °C) Oral 61 18 96 % 131 lb 9.8 oz (59.7 kg)   21 0531 -- -- -- -- 18 95 % --   21 0530 -- -- -- 63 22 94 % --   21 0500 120/61 -- -- 63 20 94 % --   21 0430 -- -- -- 65 18 96 % --         Intake/Output Summary (Last 24 hours) at 2021 1020  Last data filed at 2021 0800  Gross per 24 hour   Intake 2909 ml   Output 2600 ml   Net 309 ml     Wt Readings from Last 2 Encounters:   21 131 lb 9.8 oz (59.7 kg)     Body mass index is 19.44 kg/m².         PHYSICAL EXAMINATION:  Constitutional: Intubated and sedated  Neck: Supple, symmetrical, trachea midline, no jvd, skin normal  Respiratory: Rhonchi, ventilatory breath sounds, no wheezing/rales  Cardiovascular: regular rate and rhythm, normal S1, S2, no murmur noted   Abdomen: soft, nondistended, no masses or organomegaly  Extremities:  peripheral pulses normal, no pedal edema, no clubbing or cyanosis  Neurological: Intubated, sedated. No involuntary movements. Pupils reactive. No rigidity.   Off sedation, withdraws to pain but not following commands    MEDICATIONS:    Scheduled Meds:   LORazepam  2 mg Oral Q6H    famotidine  20 mg Per NG tube BID    insulin lispro  0-18 Units SubCUTAneous Q4H    fondaparinux  2.5 mg SubCUTAneous Daily    insulin glargine  10 Units SubCUTAneous Nightly    levofloxacin  750 mg IntraVENous Q24H    clindamycin (CLEOCIN) IV  600 mg IntraVENous TID    sodium chloride flush  5-40 mL IntraVENous 2 times per day    FLUoxetine  40 mg Oral Daily     Continuous Infusions:   dexmedetomidine 1.4 mcg/kg/hr (12/07/21 0734)    dextrose      sodium chloride      dextrose 5 % and 0.45 % NaCl Stopped (12/01/21 0000)     PRN Meds:   albuterol, 2.5 mg, Q4H PRN  glucose, 15 g, PRN  dextrose, 12.5 g, PRN  glucagon (rDNA), 1 mg, PRN  dextrose, 100 mL/hr, PRN  dextrose, 12.5 g, PRN  polyethylene glycol, 17 g, Daily PRN  sodium chloride flush, 5-40 mL, PRN  sodium chloride, 25 mL, PRN  ondansetron, 4 mg, Q8H PRN   Or  ondansetron, 4 mg, Q6H PRN  acetaminophen, 650 mg, Q6H PRN   Or  acetaminophen, 650 mg, Q6H PRN  dextrose 5 % and 0.45 % NaCl, , Continuous PRN          VENT SETTINGS (Comprehensive) (if applicable):  Vent Information  $Ventilation: $Subsequent Day  Skin Assessment: Clean, dry, & intact  Suction Catheter Diameter: 14  Equipment ID: [id number]  Equipment Changed: HME  Vent Type: Servo i  Vent Mode: PRVC  Vt Ordered: 510 mL  Rate Set: 18 bmp  Pressure Support: 8 cmH20  FiO2 : 45 %  SpO2: 94 %  SpO2/FiO2 ratio: 208.89  Sensitivity: 2  PEEP/CPAP: 5  I Time/ I Time %: 0.75 s  Humidification Source: HME  Nitric Oxide/Epoprostenol In Use?: No  Mask Type: Full face mask  Mask Size: Medium  Additional Respiratory  Assessments  Pulse: 62  Resp: 19  SpO2: 94 %  End Tidal CO2: 30 (%)  Position: Semi-Powell's  Humidification Source: HME  Oral Care Completed?: Yes  Oral Care: Teeth brushed, Mouth swabbed, Mouth suctioned  Subglottic Suction Done?: Yes  Cuff Pressure (cm H2O):  (MLT)      Laboratory findings:    Complete Blood Count:   Recent Labs     12/05/21  0321 12/06/21  0453 12/07/21  0455   WBC 12.8* 11.9* 15.9*   HGB 10.0* 10.1* 10.0*   HCT 30.3* 30.9* 30.0*    192 229        Last 3 Blood Glucose:   Recent Labs     12/06/21  0453 12/06/21  1618 12/07/21  0455   GLUCOSE 128* 151* 179*        PT/INR:  No results found for: PROTIME, INR  PTT:  No results found for: APTT, PTT    Comprehensive Metabolic Profile:   Recent Labs     12/06/21  0453 12/06/21  1618 12/07/21  0455   * 145* 140   K 3.8 3.4* 4.0   * 112* 108*   CO2 21 24 22   BUN 29* 26* 24*   CREATININE 0.64* 0.64* 0.56*   GLUCOSE 128* 151* 179*   CALCIUM 7.6* 7.6* 7.7*      Magnesium:   Lab Results   Component Value Date    MG 1.9 12/03/2021     Phosphorus:   Lab Results   Component Value Date    PHOS 3.8 12/04/2021     Ionized Calcium:   Lab Results   Component Value Date    CAION 1.04 12/03/2021            PLAN:     Endocrine:  DKA with encephalopathy:  Resolved. Continue Lantus 10 u nightly with HDSS. On Diabetic TFs. HbA1c 11.6  Per family, takes Insulin daily, follows endocrinology, no recent sickness. Doesn't drink alcohol  Marijuana prescription use    Neurological:  Acute metabolic encephalopathy:  - Intubated and sedated on Precedex and Ativan.   - Sedation holidays, maintaining circardian rhythm  - keep ativan PRN    Cardiovascular:  - BP controlled    Pulmonary:  Aspiration pneumonia:  Intubated to protect the airway. CPAP trials daily. Tolerating. Continue Levaquin and Clinda. GI/nutrition  Tube feeding @ 45ml/h. Pepcid IV 20 mg daily. GlycoLax as needed for bowel movements. Renal/fluids/electrolyte  - Net +ve 9 L  - Good UO. 3 L  - Continue IV lasix and strict I/O    ID:   Aspiration pneumonia  On Levo and Clindamycin. Decubitus sacral ulcer  Wound care consulted. Hematological:  Hb stable. Platelets recovered  HIT panel negative  On fondaparinux. Fredy Melo MD      [facility] of [facility]         12/7/2021, 10:20 AM     Attending Physician Statement  I have discussed the care of Brandon Louis, including pertinent history and exam findings with the resident. I have reviewed the key elements of all parts of the encounter with the resident. I have seen and examined the patient with the resident. I agree with the assessment and plan and status of the problem list as documented. I seen the patient during my round today, chart reviewed, labs and medications reviewed overnight events noted     Patient does have intermittent agitation according to nursing staff restless when they try to decrease the Precedex drip and lethargic when he is on Precedex drip he is also on Ativan and not on fentanyl drip. According nursing staff when he is arousable he follows command when I saw him he did not follow commands was restless no eye contact at this time. He did have a fever spike of 102. WBC count is 15.9. He is currently on clindamycin levofloxacin. Chest x-ray actually better aeration of left lower lobe and infiltrate/atelectasis much improved. Ventilator setting PRVC/18/510/5/40 percent ABG 7.48/35/26/74/ 96%  We will reculture him again and will get urine analysis sputum and blood culture. Continue with Clinda and levofloxacin. We will check liver function test.  He is on Prozac apparently did 40 mg twice daily at home.   He is on Ativan 2 mg every 6 hours will change Ativan to 2 mg every 12 hours and decrease it further. Continue daily spontaneous breathing trial when his mentation agitation is better and will extubate to  Follow-up WBC count cultures and temperature. He is on fluoxetine will discontinue levofloxacin because of possible interaction with QTC      Discussed with nursing staff, treatment and plan discussed. Discussed with respiratory therapist.    Total critical care time caring for this patient with life threatening, unstable organ failure, including direct patient contact, management of life support systems, review of data including imaging and labs, discussions with other team members and physicians at least 27  Min so far today, excluding procedures. Please note that this chart was generated using voice recognition Dragon dictation software. Although every effort was made to ensure the accuracy of this automated transcription, some errors in transcription may have occurred.      Tomer Jorge MD  12/7/2021 1:16 PM

## 2021-12-08 LAB
ABSOLUTE EOS #: 0.39 K/UL (ref 0–0.44)
ABSOLUTE IMMATURE GRANULOCYTE: 0.39 K/UL (ref 0–0.3)
ABSOLUTE LYMPH #: 2.15 K/UL (ref 1.1–3.7)
ABSOLUTE MONO #: 1.95 K/UL (ref 0.1–1.2)
ALLEN TEST: POSITIVE
ANION GAP SERPL CALCULATED.3IONS-SCNC: 10 MMOL/L (ref 9–17)
BASOPHILS # BLD: 0 % (ref 0–2)
BASOPHILS ABSOLUTE: 0 K/UL (ref 0–0.2)
BUN BLDV-MCNC: 24 MG/DL (ref 6–20)
BUN/CREAT BLD: ABNORMAL (ref 9–20)
CALCIUM SERPL-MCNC: 8 MG/DL (ref 8.6–10.4)
CHLORIDE BLD-SCNC: 109 MMOL/L (ref 98–107)
CO2: 22 MMOL/L (ref 20–31)
CREAT SERPL-MCNC: 0.6 MG/DL (ref 0.7–1.2)
CULTURE: NO GROWTH
DIFFERENTIAL TYPE: ABNORMAL
EOSINOPHILS RELATIVE PERCENT: 2 % (ref 1–4)
FIO2: 40
GFR AFRICAN AMERICAN: >60 ML/MIN
GFR NON-AFRICAN AMERICAN: >60 ML/MIN
GFR SERPL CREATININE-BSD FRML MDRD: ABNORMAL ML/MIN/{1.73_M2}
GFR SERPL CREATININE-BSD FRML MDRD: ABNORMAL ML/MIN/{1.73_M2}
GLUCOSE BLD-MCNC: 132 MG/DL (ref 75–110)
GLUCOSE BLD-MCNC: 154 MG/DL (ref 75–110)
GLUCOSE BLD-MCNC: 161 MG/DL (ref 75–110)
GLUCOSE BLD-MCNC: 175 MG/DL (ref 70–99)
GLUCOSE BLD-MCNC: 177 MG/DL (ref 74–100)
GLUCOSE BLD-MCNC: 177 MG/DL (ref 75–110)
GLUCOSE BLD-MCNC: 182 MG/DL (ref 75–110)
GLUCOSE BLD-MCNC: 222 MG/DL (ref 75–110)
HCT VFR BLD CALC: 29.9 % (ref 40.7–50.3)
HEMOGLOBIN: 9.7 G/DL (ref 13–17)
IMMATURE GRANULOCYTES: 2 %
LYMPHOCYTES # BLD: 11 % (ref 24–43)
Lab: NORMAL
MCH RBC QN AUTO: 30 PG (ref 25.2–33.5)
MCHC RBC AUTO-ENTMCNC: 32.4 G/DL (ref 28.4–34.8)
MCV RBC AUTO: 92.6 FL (ref 82.6–102.9)
MODE: ABNORMAL
MONOCYTES # BLD: 10 % (ref 3–12)
MORPHOLOGY: NORMAL
MYCOPLASMA PNEUMONIAE IGM: 0.15
NEGATIVE BASE EXCESS, ART: ABNORMAL (ref 0–2)
NRBC AUTOMATED: 0 PER 100 WBC
O2 DEVICE/FLOW/%: ABNORMAL
OSMOLALITY URINE: 656 MOSM/KG (ref 80–1300)
PATIENT TEMP: ABNORMAL
PDW BLD-RTO: 13.9 % (ref 11.8–14.4)
PLATELET # BLD: 362 K/UL (ref 138–453)
PLATELET ESTIMATE: ABNORMAL
PMV BLD AUTO: 11.5 FL (ref 8.1–13.5)
POC HCO3: 26.2 MMOL/L (ref 21–28)
POC O2 SATURATION: 97 % (ref 94–98)
POC PCO2 TEMP: ABNORMAL MM HG
POC PCO2: 32.2 MM HG (ref 35–48)
POC PH TEMP: ABNORMAL
POC PH: 7.52 (ref 7.35–7.45)
POC PO2 TEMP: ABNORMAL MM HG
POC PO2: 80.5 MM HG (ref 83–108)
POSITIVE BASE EXCESS, ART: 3 (ref 0–3)
POTASSIUM SERPL-SCNC: 4 MMOL/L (ref 3.7–5.3)
RBC # BLD: 3.23 M/UL (ref 4.21–5.77)
RBC # BLD: ABNORMAL 10*6/UL
SAMPLE SITE: ABNORMAL
SEG NEUTROPHILS: 75 % (ref 36–65)
SEGMENTED NEUTROPHILS ABSOLUTE COUNT: 14.62 K/UL (ref 1.5–8.1)
SODIUM BLD-SCNC: 141 MMOL/L (ref 135–144)
SODIUM,UR: 159 MMOL/L
SPECIMEN DESCRIPTION: NORMAL
TCO2 (CALC), ART: ABNORMAL MMOL/L (ref 22–29)
WBC # BLD: 19.5 K/UL (ref 3.5–11.3)
WBC # BLD: ABNORMAL 10*3/UL

## 2021-12-08 PROCEDURE — 6370000000 HC RX 637 (ALT 250 FOR IP): Performed by: STUDENT IN AN ORGANIZED HEALTH CARE EDUCATION/TRAINING PROGRAM

## 2021-12-08 PROCEDURE — 84300 ASSAY OF URINE SODIUM: CPT

## 2021-12-08 PROCEDURE — 2500000003 HC RX 250 WO HCPCS: Performed by: STUDENT IN AN ORGANIZED HEALTH CARE EDUCATION/TRAINING PROGRAM

## 2021-12-08 PROCEDURE — 94761 N-INVAS EAR/PLS OXIMETRY MLT: CPT

## 2021-12-08 PROCEDURE — 36415 COLL VENOUS BLD VENIPUNCTURE: CPT

## 2021-12-08 PROCEDURE — 82803 BLOOD GASES ANY COMBINATION: CPT

## 2021-12-08 PROCEDURE — 2700000000 HC OXYGEN THERAPY PER DAY

## 2021-12-08 PROCEDURE — 99291 CRITICAL CARE FIRST HOUR: CPT | Performed by: INTERNAL MEDICINE

## 2021-12-08 PROCEDURE — 80048 BASIC METABOLIC PNL TOTAL CA: CPT

## 2021-12-08 PROCEDURE — 6360000002 HC RX W HCPCS: Performed by: STUDENT IN AN ORGANIZED HEALTH CARE EDUCATION/TRAINING PROGRAM

## 2021-12-08 PROCEDURE — 2500000003 HC RX 250 WO HCPCS: Performed by: INTERNAL MEDICINE

## 2021-12-08 PROCEDURE — 2580000003 HC RX 258: Performed by: HEALTH CARE PROVIDER

## 2021-12-08 PROCEDURE — 83935 ASSAY OF URINE OSMOLALITY: CPT

## 2021-12-08 PROCEDURE — 85025 COMPLETE CBC W/AUTO DIFF WBC: CPT

## 2021-12-08 PROCEDURE — 94003 VENT MGMT INPAT SUBQ DAY: CPT

## 2021-12-08 PROCEDURE — APPSS30 APP SPLIT SHARED TIME 16-30 MINUTES: Performed by: NURSE PRACTITIONER

## 2021-12-08 PROCEDURE — 36600 WITHDRAWAL OF ARTERIAL BLOOD: CPT

## 2021-12-08 PROCEDURE — 99222 1ST HOSP IP/OBS MODERATE 55: CPT | Performed by: INTERNAL MEDICINE

## 2021-12-08 PROCEDURE — 82947 ASSAY GLUCOSE BLOOD QUANT: CPT

## 2021-12-08 PROCEDURE — 2000000000 HC ICU R&B

## 2021-12-08 RX ORDER — CLINDAMYCIN PHOSPHATE 900 MG/50ML
900 INJECTION INTRAVENOUS 3 TIMES DAILY
Status: COMPLETED | OUTPATIENT
Start: 2021-12-08 | End: 2021-12-11

## 2021-12-08 RX ORDER — 0.9 % SODIUM CHLORIDE 0.9 %
1000 INTRAVENOUS SOLUTION INTRAVENOUS ONCE
Status: DISCONTINUED | OUTPATIENT
Start: 2021-12-08 | End: 2021-12-08

## 2021-12-08 RX ADMIN — INSULIN LISPRO 3 UNITS: 100 INJECTION, SOLUTION INTRAVENOUS; SUBCUTANEOUS at 00:48

## 2021-12-08 RX ADMIN — INSULIN LISPRO 3 UNITS: 100 INJECTION, SOLUTION INTRAVENOUS; SUBCUTANEOUS at 08:20

## 2021-12-08 RX ADMIN — CLINDAMYCIN IN 5 PERCENT DEXTROSE 600 MG: 12 INJECTION, SOLUTION INTRAVENOUS at 08:20

## 2021-12-08 RX ADMIN — FLUOXETINE HYDROCHLORIDE 40 MG: 20 CAPSULE ORAL at 20:31

## 2021-12-08 RX ADMIN — INSULIN LISPRO 3 UNITS: 100 INJECTION, SOLUTION INTRAVENOUS; SUBCUTANEOUS at 20:32

## 2021-12-08 RX ADMIN — SODIUM CHLORIDE, PRESERVATIVE FREE 10 ML: 5 INJECTION INTRAVENOUS at 08:13

## 2021-12-08 RX ADMIN — CLINDAMYCIN PHOSPHATE 900 MG: 900 INJECTION, SOLUTION INTRAVENOUS at 20:31

## 2021-12-08 RX ADMIN — DEXMEDETOMIDINE HYDROCHLORIDE 1.4 MCG/KG/HR: 4 INJECTION, SOLUTION INTRAVENOUS at 13:20

## 2021-12-08 RX ADMIN — LORAZEPAM 2 MG: 2 TABLET ORAL at 14:31

## 2021-12-08 RX ADMIN — INSULIN GLARGINE 10 UNITS: 100 INJECTION, SOLUTION SUBCUTANEOUS at 20:32

## 2021-12-08 RX ADMIN — INSULIN LISPRO 6 UNITS: 100 INJECTION, SOLUTION INTRAVENOUS; SUBCUTANEOUS at 16:53

## 2021-12-08 RX ADMIN — FAMOTIDINE 20 MG: 20 TABLET, FILM COATED ORAL at 08:11

## 2021-12-08 RX ADMIN — FLUOXETINE HYDROCHLORIDE 40 MG: 20 CAPSULE ORAL at 08:11

## 2021-12-08 RX ADMIN — DEXMEDETOMIDINE HYDROCHLORIDE 1.4 MCG/KG/HR: 4 INJECTION, SOLUTION INTRAVENOUS at 03:18

## 2021-12-08 RX ADMIN — SODIUM CHLORIDE, PRESERVATIVE FREE 10 ML: 5 INJECTION INTRAVENOUS at 20:32

## 2021-12-08 RX ADMIN — LORAZEPAM 2 MG: 2 TABLET ORAL at 02:58

## 2021-12-08 RX ADMIN — FONDAPARINUX SODIUM 2.5 MG: 2.5 INJECTION, SOLUTION SUBCUTANEOUS at 08:12

## 2021-12-08 RX ADMIN — LORAZEPAM 2 MG: 2 INJECTION INTRAMUSCULAR; INTRAVENOUS at 21:10

## 2021-12-08 RX ADMIN — CLINDAMYCIN IN 5 PERCENT DEXTROSE 600 MG: 12 INJECTION, SOLUTION INTRAVENOUS at 12:42

## 2021-12-08 RX ADMIN — FAMOTIDINE 20 MG: 20 TABLET, FILM COATED ORAL at 20:31

## 2021-12-08 RX ADMIN — DEXMEDETOMIDINE HYDROCHLORIDE 1.4 MCG/KG/HR: 4 INJECTION, SOLUTION INTRAVENOUS at 23:22

## 2021-12-08 RX ADMIN — LORAZEPAM 2 MG: 2 INJECTION INTRAMUSCULAR; INTRAVENOUS at 04:30

## 2021-12-08 RX ADMIN — DEXMEDETOMIDINE HYDROCHLORIDE 1.4 MCG/KG/HR: 4 INJECTION, SOLUTION INTRAVENOUS at 08:25

## 2021-12-08 RX ADMIN — DEXMEDETOMIDINE HYDROCHLORIDE 1.4 MCG/KG/HR: 4 INJECTION, SOLUTION INTRAVENOUS at 17:54

## 2021-12-08 ASSESSMENT — PULMONARY FUNCTION TESTS
PIF_VALUE: 25
PIF_VALUE: 19
PIF_VALUE: 21
PIF_VALUE: 21
PIF_VALUE: 29
PIF_VALUE: 22
PIF_VALUE: 19
PIF_VALUE: 19
PIF_VALUE: 27
PIF_VALUE: 23
PIF_VALUE: 26
PIF_VALUE: 21
PIF_VALUE: 25
PIF_VALUE: 23
PIF_VALUE: 31
PIF_VALUE: 23
PIF_VALUE: 22
PIF_VALUE: 16
PIF_VALUE: 23
PIF_VALUE: 21
PIF_VALUE: 22
PIF_VALUE: 27
PIF_VALUE: 18
PIF_VALUE: 22
PIF_VALUE: 39

## 2021-12-08 NOTE — PROGRESS NOTES
Comprehensive Nutrition Assessment    Type and Reason for Visit:  Reassess    Nutrition Recommendations/Plan: Continue current tube feeding. Will continue to monitor TF tolerance/adequacy and care plans. Nutrition Assessment:  Pt remains on vent. Diabetic TF at 45 mL/hr and tolerating well per RN. Last BM noted: 12/6/21. Meds/Labs reviewed. Estimated Daily Nutrient Needs:  Energy (kcal):  1997-3585 kcal/day; Weight Used for Energy Requirements:  Current     Protein (g):   g pro/day; Weight Used for Protein Requirements:  Current (1.2-1.5)        Fluid (ml/day):  30 mL/kg = 1800 mL/day or per MD; Method Used for Fluid Requirements:  ml/Kg      Nutrition Related Findings:  Meds/labs reviewed      Wounds:  Deep Tissue Injury       Current Nutrition Therapies:    Diet NPO  ADULT TUBE FEEDING; Orogastric; Diabetic; Continuous; 20; Yes; 10; Q 6 hours; 45; 30; Q 4 hours  Current Tube Feeding (TF) Orders:  · Feeding Route: Orogastric  · Formula: Diabetic (Glucerna 1.5 Doc)  · Schedule: Continuous at 45 mL/hr   · Water Flushes: 300 mL Q4H (1800 mL total)  · Current TF & Flush Orders Provides: See below  · Goal TF & Flush Orders Provides: @ 45 mL/hr = 1620 kcal, 89 g protein, 2620 mL free water (from TF + flushes)    Additional Calorie Sources:   None    Anthropometric Measures:  · Height: 5' 9\" (175.3 cm)  · Current Body Weight: 140 lb 14 oz (63.9 kg)   · Admission Body Weight: 128 lb 3.2 oz (58.2 kg)    · Usual Body Weight: 137 lb 13 oz (62.5 kg) (7/23/2021)     · Ideal Body Weight: 160 lbs; % Ideal Body Weight 88 %   · BMI: 20.8  · BMI Categories: Normal Weight (BMI 18.5-24. 9)       Nutrition Diagnosis:   · Inadequate oral intake related to impaired respiratory function as evidenced by NPO or clear liquid status due to medical condition, nutrition support - enteral nutrition    Nutrition Interventions:   Food and/or Nutrient Delivery:  Continue Current Tube Feeding  Nutrition Education/Counseling:  No recommendation at this time   Coordination of Nutrition Care:  Continue to monitor while inpatient    Goals:  Meet % of estimated nutrition needs- goal achieved. Nutrition Monitoring and Evaluation:   Behavioral-Environmental Outcomes:  None Identified   Food/Nutrient Intake Outcomes:  Enteral Nutrition Intake/Tolerance  Physical Signs/Symptoms Outcomes:  Biochemical Data, Nutrition Focused Physical Findings, Skin, Weight     Discharge Planning:     Too soon to determine     Electronically signed by Navarro Chan RD, LD on 12/8/21 at 3:32 PM EST    Contact: [phone]

## 2021-12-08 NOTE — PLAN OF CARE
Problem: OXYGENATION/RESPIRATORY FUNCTION  Goal: Patient will maintain patent airway  12/7/2021 2251 by Marlon Summers RN  Outcome: Ongoing  12/7/2021 2034 by Solange Barron RCP  Outcome: Ongoing  Goal: Patient will achieve/maintain normal respiratory rate/effort  Description: Respiratory rate and effort will be within normal limits for the patient  12/7/2021 2251 by Marlon Summers RN  Outcome: Ongoing  12/7/2021 2034 by Solange Barron RCP  Outcome: Ongoing     Problem: MECHANICAL VENTILATION  Goal: Patient will maintain patent airway  12/7/2021 2251 by Marlon Summers RN  Outcome: Ongoing  12/7/2021 2034 by Solange Barron RCP  Outcome: Ongoing  Goal: Oral health is maintained or improved  12/7/2021 2251 by Marlon Summers RN  Outcome: Ongoing  12/7/2021 2034 by Solange Barron RCP  Outcome: Ongoing  Goal: ET tube will be managed safely  12/7/2021 2251 by Marlon Summers RN  Outcome: Ongoing  12/7/2021 2034 by Solange Barron RCP  Outcome: Ongoing  Goal: Ability to express needs and understand communication  12/7/2021 2251 by Marlon Summers RN  Outcome: Ongoing  12/7/2021 2034 by Solange Barron RCP  Outcome: Ongoing  Goal: Mobility/activity is maintained at optimum level for patient  12/7/2021 2251 by Marlon Summers RN  Outcome: Ongoing  12/7/2021 2034 by Solange Barron RCP  Outcome: Ongoing     Problem: SKIN INTEGRITY  Goal: Skin integrity is maintained or improved  12/7/2021 2251 by Marlon Summers RN  Outcome: Ongoing  12/7/2021 2034 by Solange Barron RCP  Outcome: Ongoing

## 2021-12-08 NOTE — PLAN OF CARE
Ongoing     Problem: SKIN INTEGRITY  Goal: Skin integrity is maintained or improved  Outcome: Ongoing     Problem: NUTRITION  Goal: Nutritional status is improving  Outcome: Ongoing     Problem: Nutrition  Goal: Optimal nutrition therapy  Outcome: Ongoing

## 2021-12-08 NOTE — PROGRESS NOTES
Critical Care Team - Daily Progress Note      Date and time: 2021 11:39 AM  Patient's name:  Yvonne Marcial  Medical Record Number: [id number]  Patient's account/billing number: [de-identified]  Patient's YOB: 1960  Age: 63 y.o. Date of Admission: 2021  7:39 AM  Length of stay during current admission: 8      Primary Care Physician: Bossman Wong    Code Status: Full Code    Reason for ICU admission:  DKA with coma      SUBJECTIVE:     OVERNIGHT EVENTS:    Overnight agitation, given ativan 3 times PRN since yesterday afternoon  Following ocmmands now  Vitals stable  Tolerating CPAP x3 hrs    Low grade fevers, WBC 18K  cx negative so far pan cx  Giving fluids. UO 1.8 L out    PRVC 510/18/5/30%   ABG 7.51/32/26/80  OBJECTIVE:     VITAL SIGNS:  BP (!) 135/52   Pulse 78   Temp 99.4 °F (37.4 °C) (Oral)   Resp 17   Ht 5' 9\" (1.753 m)   Wt 140 lb 14 oz (63.9 kg)   SpO2 93%   BMI 20.80 kg/m²   Tmax over 24 hours:  Temp (24hrs), Av.6 °F (37.6 °C), Min:98.6 °F (37 °C), Max:101.3 °F (38.5 °C)      Patient Vitals for the past 6 hrs:   BP Temp Temp src Pulse Resp SpO2 Weight   21 1100 -- 99.4 °F (37.4 °C) Oral -- -- -- --   21 0935 -- 99.5 °F (37.5 °C) Oral -- -- -- --   21 0754 -- -- -- -- 17 93 % --   21 0748 -- 99 °F (37.2 °C) Oral 78 17 96 % --   21 0700 (!) 135/52 -- -- 76 24 93 % --   21 0630 -- -- -- 76 17 94 % --   21 0600 (!) 131/57 100.2 °F (37.9 °C) Oral 74 24 94 % 140 lb 14 oz (63.9 kg)         Intake/Output Summary (Last 24 hours) at 2021 1139  Last data filed at 2021 0828  Gross per 24 hour   Intake 4150 ml   Output 1250 ml   Net 2900 ml     Wt Readings from Last 2 Encounters:   21 140 lb 14 oz (63.9 kg)     Body mass index is 20.8 kg/m².         PHYSICAL EXAMINATION:  Constitutional: Intubated and sedated  Neck: Supple, symmetrical, trachea midline, no jvd, skin normal  Respiratory: Rhonchi, ventilatory breath sounds, no wheezing/rales  Cardiovascular: regular rate and rhythm, normal S1, S2, no murmur noted   Abdomen: soft, nondistended, no masses or organomegaly  Extremities:  peripheral pulses normal, no pedal edema, no clubbing or cyanosis  Neurological: Intubated, sedated. No involuntary movements. Pupils reactive. No rigidity.   Off sedation, withdraws to pain ,following commands    MEDICATIONS:    Scheduled Meds:   LORazepam  2 mg Oral Q12H    FLUoxetine  40 mg Oral BID    famotidine  20 mg Per NG tube BID    insulin lispro  0-18 Units SubCUTAneous Q4H    fondaparinux  2.5 mg SubCUTAneous Daily    insulin glargine  10 Units SubCUTAneous Nightly    clindamycin (CLEOCIN) IV  600 mg IntraVENous TID    sodium chloride flush  5-40 mL IntraVENous 2 times per day     Continuous Infusions:   sodium chloride 50 mL/hr at 12/07/21 1406    dexmedetomidine 1.4 mcg/kg/hr (12/08/21 0825)    dextrose      sodium chloride      dextrose 5 % and 0.45 % NaCl Stopped (12/01/21 0000)     PRN Meds:   LORazepam, 2 mg, Q4H PRN  albuterol, 2.5 mg, Q4H PRN  glucose, 15 g, PRN  dextrose, 12.5 g, PRN  glucagon (rDNA), 1 mg, PRN  dextrose, 100 mL/hr, PRN  dextrose, 12.5 g, PRN  polyethylene glycol, 17 g, Daily PRN  sodium chloride flush, 5-40 mL, PRN  sodium chloride, 25 mL, PRN  ondansetron, 4 mg, Q8H PRN   Or  ondansetron, 4 mg, Q6H PRN  acetaminophen, 650 mg, Q6H PRN   Or  acetaminophen, 650 mg, Q6H PRN  dextrose 5 % and 0.45 % NaCl, , Continuous PRN          VENT SETTINGS (Comprehensive) (if applicable):  Vent Information  $Ventilation: $Subsequent Day  Skin Assessment: Clean, dry, & intact  Suction Catheter Diameter: 14  Equipment ID: [id number]  Equipment Changed: HME  Vent Type: Servo i  Vent Mode: (S) CPAP  Vt Ordered: 510 mL  Rate Set: 18 bmp  Pressure Support: (S) 6 cmH20  FiO2 : 30 %  SpO2: 93 %  SpO2/FiO2 ratio: 310  Sensitivity: 2  PEEP/CPAP: 5  I Time/ I Time %: 0.8 s  Humidification Source: HME  Nitric Oxide/Epoprostenol In Use?: No  Mask Type: Full face mask  Mask Size: Medium  Additional Respiratory  Assessments  Pulse: 78  Resp: 17  SpO2: 93 %  End Tidal CO2: 31 (%)  Position: Semi-Powell's  Humidification Source: HME  Oral Care Completed?: Yes  Oral Care: Lip moisturizer applied, Mouth swabbed, Mouth suctioned  Subglottic Suction Done?: Yes  Cuff Pressure (cm H2O):  (MLT)      Laboratory findings:    Complete Blood Count:   Recent Labs     12/06/21  0453 12/07/21  0455 12/08/21  0502   WBC 11.9* 15.9* 19.5*   HGB 10.1* 10.0* 9.7*   HCT 30.9* 30.0* 29.9*    229 362        Last 3 Blood Glucose:   Recent Labs     12/06/21  1618 12/07/21  0455 12/08/21  0502   GLUCOSE 151* 179* 175*        PT/INR:  No results found for: PROTIME, INR  PTT:  No results found for: APTT, PTT    Comprehensive Metabolic Profile:   Recent Labs     12/06/21  1618 12/07/21  0455 12/08/21  0502   * 140 141   K 3.4* 4.0 4.0   * 108* 109*   CO2 24 22 22   BUN 26* 24* 24*   CREATININE 0.64* 0.56* 0.60*   GLUCOSE 151* 179* 175*   CALCIUM 7.6* 7.7* 8.0*      Magnesium:   Lab Results   Component Value Date    MG 1.9 12/03/2021     Phosphorus:   Lab Results   Component Value Date    PHOS 3.8 12/04/2021     Ionized Calcium:   Lab Results   Component Value Date    CAION 1.04 12/03/2021            PLAN:     Endocrine:  DKA with encephalopathy:  Resolved. Continue Lantus 10 u nightly with HDSS. On Diabetic TFs. HbA1c 11.6  Per family, takes Insulin daily, follows endocrinology, no recent sickness. Doesn't drink alcohol  Marijuana prescription use    Neurological:  Acute metabolic encephalopathy:  - Intubated and sedated on Precedex and Ativan. - Sedation holidays, maintaining circardian rhythm  - keep ativan PRN    Cardiovascular:  - BP controlled    Pulmonary:  Aspiration pneumonia:  Intubated to protect the airway. CPAP trials daily. Tolerating. Continue Levaquin and Clinda. GI/nutrition  Tube feeding @ 45ml/h. Pepcid IV 20 mg daily.     GlycoLax as needed for bowel movements. Renal/fluids/electrolyte  - Net +ve 9 L  - Good UO. 1.8 L    ID:   Aspiration pneumonia  On Levo and Clindamycin. Pan cx negative, leukocytosis  Will consult ID    Decubitus sacral ulcer  Wound care following    Hematological:  Hb stable. Platelets recovered  HIT panel negative  On fondaparinux. Rob Harris MD      [facility] of [facility]         12/8/2021, 11:39 AM       Attending Physician Statement  I have discussed the care of Kaycee Parks, including pertinent history and exam findings with the resident. I have reviewed the key elements of all parts of the encounter with the resident. I have seen and examined the patient with the resident. I agree with the assessment and plan and status of the problem list as documented. I seen the patient during my round today, chart reviewed, labs and medications reviewed. He is on Precedex drip. He has been lethargic and intermittently agitated. He is on Ativan currently. He was on spontaneous breathing trial did not tolerate spontaneous breathing trial but he did for almost 2 hours. His Levophed was discontinued yesterday and is maintaining hemodynamic without pressor support. Ventilator setting PRVC/18/510/5/30 percent FiO2 and 7.5 2/32/80/26  He did have fever and had increased WBC count progressively last few days. We will repeat chest x-ray tomorrow. We will get respiratory cultures and infectious disease evaluation. We will try to wean down Precedex drip. He is currently on fondaparinux 2.5 mg once daily  Levofloxacin was stopped and he is currently on clindamycin  Continue with Lantus blood sugar is usually between 150 and 170  Discussed with nursing staff, treatment and plan discussed.   Discussed with respiratory therapist.    Total critical care time caring for this patient with life threatening, unstable organ failure, including direct patient contact, management of life support systems, review of data including imaging and labs, discussions with other team members and physicians at least 35 min so far today, excluding procedures. Please note that this chart was generated using voice recognition Dragon dictation software. Although every effort was made to ensure the accuracy of this automated transcription, some errors in transcription may have occurred.      Eleni Brito MD  12/8/2021 12:02 PM

## 2021-12-08 NOTE — CONSULTS
[facility] - Initial Consult Note  Today's Date and Time: 12/8/2021, 2:48 PM    Impression :   · Leukocytosis  · Concern for aspiration pneumonia  · DM I  · DKA  · Altered mentation  · Acute hypoxic respiratory failure    Recommendations:   · Continue clindamycin pending culture results  · May switch to cefoxitin if Gram negative bacilli are isolated from sputum    Medical Decision Making/Summary/Discussion:12/8/2021     ·   Infection Control Recommendations   · [place] Precautions      Antimicrobial Stewardship Recommendations     · Discontinuation of therapy  Coordination of Outpatient Care:   · Estimated Length of IV antimicrobials: TBD  · Patient will need Midline Catheter Insertion: no  · Patient will need PICC line Insertion:No  · Patient will need: Home IV , [place],  SNF,  LTAC: TBD  · Patient will need outpatient wound care:Yes    Chief complaint/reason for consultation:   · Leukocytosis with fevers. Aspiration pneumonia      History of Present Illness:   Nile Tilley is a 63y.o.-year-old male who was initially admitted on 11/30/2021. Patient seen at the request of Dr. Freeman aTfoya:    This patient with type 1 diabetes, initially presented to the ED on 11/30/2021 with altered mentation. According to the EHR, he had not been compliant with his insulin for the 3 days prior. Further work up revealed that the patient was experiencing toxic metabolic encephalopathy because of DKA. Lab work revealed:pH of 6.9, PCO2 22, bicarb 4.5 and a blood sugar over 700. His potassium was also elevated at 6.2 with peaked T waves on EKG. WBC was 18.1. Covid swab and MRSA nares were negative    He was also hypotensive and started on norepinephrine and intubated for airway protection. Levaquin and Clindamycin were initiated 12/1/21 for worsening CXR and concern for aspiration pneumonia.      Levaquin has since been discontinued out of concern for increased QT interval    Cultures have remained negative and CXR shows imrovement. Leukocytosis continues as do the fevers    CURRENT EVALUATION 12/8/2021     Patient evaluated and examined in the ICU. Low grade fever  VS stable     Mechanical ventilaion with 30% FiO2 and PEEP 5  Undergoing a weaning trial    The patient is restless and is not following commands at this time    Labs, X rays reviewed: 12/8/2021    BUN:24  Cr:0.6    WBC:15.9-->19.5  Hb:9.7  Plat: 362    Legionella and strep pneumo not detected    Cultures:  Urine:  · 11/6/21: No bacterial growth  Blood:  · 12/7/21 No growth thus far. Sputum :  · 12/7/21: No growth thus far  Wound:  ·     IMAGING   12/6/21        Discussed with patient, RN, family. I have personally reviewed the past medical history, past surgical history, medications, social history, and family history, and I have updated the database accordingly. Past Medical History:     Past Medical History:   Diagnosis Date    Diabetes mellitus ([facility] [address]     Hypertension        Past Surgical  History:   History reviewed. No pertinent surgical history. Medications:      LORazepam  2 mg Oral Q12H    FLUoxetine  40 mg Oral BID    famotidine  20 mg Per NG tube BID    insulin lispro  0-18 Units SubCUTAneous Q4H    fondaparinux  2.5 mg SubCUTAneous Daily    insulin glargine  10 Units SubCUTAneous Nightly    clindamycin (CLEOCIN) IV  600 mg IntraVENous TID    sodium chloride flush  5-40 mL IntraVENous 2 times per day       Social History:     Social History     Socioeconomic History    Marital status:      Spouse name: Not on file    Number of children: Not on file    Years of education: Not on file    Highest education level: Not on file   Occupational History    Not on file   Tobacco Use    Smoking status: Never Smoker    Smokeless tobacco: Never Used   Vaping Use    Vaping Use: Never used   Substance and Sexual Activity    Alcohol use: Not Currently    Drug use:  Yes Types: Marijuana (Weed)     Comment: uses edibles nightly     Sexual activity: Not on file   Other Topics Concern    Not on file   Social History Narrative    Not on file     Social Determinants of Health     Financial Resource Strain:     Difficulty of Paying Living Expenses: Not on file   Food Insecurity:     Worried About Running Out of Food in the Last Year: Not on file    [place] of Food in the Last Year: Not on file   Transportation Needs:     Lack of Transportation (Medical): Not on file    Lack of Transportation (Non-Medical): Not on file   Physical Activity:     Days of Exercise per Week: Not on file    Minutes of Exercise per Session: Not on file   Stress:     Feeling of Stress : Not on file   Social Connections:     Frequency of Communication with Friends and Family: Not on file    Frequency of Social Gatherings with Friends and Family: Not on file    Attends Restorationist Services: Not on file    Active Member of [address] [place] [address] or Organizations: Not on file    Attends Club or Organization Meetings: Not on file    Marital Status: Not on file   Intimate Partner Violence:     Fear of Current or Ex-Partner: Not on file    Emotionally Abused: Not on file    Physically Abused: Not on file    Sexually Abused: Not on file   Housing Stability:     Unable to Pay for Housing in the Last Year: Not on file    Number of [place] in the Last Year: Not on file    Unstable Housing in the Last Year: Not on file       Family History:   History reviewed. No pertinent family history. Allergies:   Patient has no known allergies. Review of Systems:     AURA-intubated and sedated 12/8/2021      Constitutional: No fevers or chills. No systemic complaints  Head: No headaches  Eyes: No double vision or blurry vision. No conjunctival inflammation. ENT: No sore throat or runny nose. . No hearing loss, tinnitus or vertigo. Cardiovascular: No chest pain or palpitations. No shortness of breath.  No MOLINA  Lung: No shortness of breath or cough. No sputum production  Abdomen: No nausea, vomiting, diarrhea, or abdominal pain. Jarek [place] No cramps. Genitourinary: No increased urinary frequency, or dysuria. No hematuria. No suprapubic or CVA pain  Musculoskeletal: No muscle aches or pains. No joint effusions, swelling or deformities  Hematologic: No bleeding or bruising. Neurologic: No headache, weakness, numbness, or tingling. Integument: No rash, no ulcers. Psychiatric: No depression. Endocrine: No polyuria, no polydipsia, no polyphagia. Physical Examination :     Patient Vitals for the past 8 hrs:   BP Temp Temp src Pulse Resp SpO2   12/08/21 1330 -- 99.5 °F (37.5 °C) Oral -- -- --   12/08/21 1200 -- 97.8 °F (36.6 °C) Oral -- -- --   12/08/21 1140 -- -- -- 82 25 94 %   12/08/21 1100 -- 99.4 °F (37.4 °C) Oral -- -- --   12/08/21 0935 -- 99.5 °F (37.5 °C) Oral -- -- --   12/08/21 0754 -- -- -- -- 17 93 %   12/08/21 0748 -- 99 °F (37.2 °C) Oral 78 17 96 %   12/08/21 0700 (!) 135/52 -- -- 76 24 93 %     General Appearance: Intubated and sedated  Head:  Normocephalic, no trauma  Eyes: Pupils equal, round, reactive to light; sclera anicteric; conjunctivae pink. No embolic phenomena. ENT: Oropharynx clear, without erythema, exudate, or thrush. No tenderness of sinuses. Mouth/throat: mucosa pink and moist. No lesions. Dentition in good repair. Neck:Supple, without lymphadenopathy. Thyroid normal, No bruits. Pulmonary/Chest: Clear to auscultation, without wheezes, rales, or rhonchi. No dullness to percussion. Cardiovascular: Regular rate and rhythm without murmurs, rubs, or gallops. Abdomen: Soft, non tender. Bowel sounds normal. No organomegaly  All four Extremities: No cyanosis, clubbing, edema, or effusions. Neurologic: Not following commands at this time  Skin: Warm and dry with good turgor. No signs of peripheral arterial or venous insufficiency. No ulcerations. Scattered bruising.  Deep tissue injury to coccyx    Medical Decision Making -Laboratory:   I have independently reviewed/ordered the following labs:    CBC with Differential:   Recent Labs     12/07/21  0455 12/08/21  0502   WBC 15.9* 19.5*   HGB 10.0* 9.7*   HCT 30.0* 29.9*    362   LYMPHOPCT 12* 11*   MONOPCT 13* 10     BMP:   Recent Labs     12/07/21  0455 12/08/21  0502    141   K 4.0 4.0   * 109*   CO2 22 22   BUN 24* 24*   CREATININE 0.56* 0.60*     Hepatic Function Panel: No results for input(s): PROT, LABALBU, BILIDIR, IBILI, BILITOT, ALKPHOS, ALT, AST in the last 72 hours. No results for input(s): RPR in the last 72 hours. No results for input(s): HIV in the last 72 hours. No results for input(s): BC in the last 72 hours.   Lab Results   Component Value Date    MUCUS NOT REPORTED 12/07/2021    RBC 3.23 12/08/2021    TRICHOMONAS NOT REPORTED 12/07/2021    WBC 19.5 12/08/2021    YEAST NOT REPORTED 12/07/2021    TURBIDITY Clear 12/07/2021     Lab Results   Component Value Date    CREATININE 0.60 12/08/2021    GLUCOSE 175 12/08/2021       Medical Decision Making-Imaging:     Narrative   EXAMINATION:   ONE XRAY VIEW OF THE CHEST       12/6/2021 10:55 am       COMPARISON:   December 4, 2021       HISTORY:   ORDERING SYSTEM PROVIDED HISTORY: evaluate pulmonary opacities   TECHNOLOGIST PROVIDED HISTORY:   evaluate pulmonary opacities       FINDINGS:   Satisfactory position of the endotracheal and enteric tubes.       There has been interval clearing of the parenchymal consolidation in the   lateral left base.  Otherwise, diffuse interstitial and somewhat ground-glass   opacities in both lungs remain as well as left retrocardiac consolidation.       No sizable pleural effusion.  No pneumothorax.       Stable cardiomediastinal silhouette.           Impression   Interval clearing of the parenchymal consolidation in the lateral left base.       Otherwise, diffuse bilateral interstitial and somewhat ground-glass opacities   remain as well as left retrocardiac consolidation.               Medical Decision [id number]:       Medical Decision Making-Other:     Note:  · Labs, medications, radiologic studies were reviewed with personal review of films  · Large amounts of data were reviewed  · Discussed with nursing Staff, Discharge planner  · Infection Control and Prevention measures reviewed  · All prior entries were reviewed  · Administer medications as ordered  · Prognosis: Guarded  · Discharge planning reviewed    Thank you for allowing us to participate in the care of this patient. Please call with questions. Dano Hill, APRN - CNP     ATTESTATION:    I have discussed the case, including pertinent history and exam findings with the APRN. I have evaluated the  History, physical findings and pictures of the patient and the key elements of the encounter have been performed by me. I have reviewed the laboratory data, other diagnostic studies and discussed them with the APRN. I have updated the medical record where necessary. I agree with the assessment, plan and orders as documented by the APRN.     Constance Saba MD.      Pager: [phone] - Office: [phone]

## 2021-12-08 NOTE — PLAN OF CARE
Problem: Non-Violent Restraints  Goal: Removal from restraints as soon as assessed to be safe  Outcome: Ongoing     Problem: Non-Violent Restraints  Goal: No harm/injury to patient while restraints in use  Outcome: Ongoing     Problem: Non-Violent Restraints  Goal: Patient's dignity will be maintained  Outcome: Ongoing     Problem: Skin Integrity:  Goal: Will show no infection signs and symptoms  Description: Will show no infection signs and symptoms  Outcome: Ongoing     Problem: Skin Integrity:  Goal: Absence of new skin breakdown  Description: Absence of new skin breakdown  Outcome: Ongoing     Problem: Falls - Risk of:  Goal: Will remain free from falls  Description: Will remain free from falls  Outcome: Ongoing     Problem: Falls - Risk of:  Goal: Absence of physical injury  Description: Absence of physical injury  Outcome: Ongoing     Problem: Fluid Volume - Imbalance:  Goal: Will remain free of signs and symptoms of dehydration  Description: Will remain free of signs and symptoms of dehydration  Outcome: Ongoing     Problem: Fluid Volume - Imbalance:  Goal: Absence of imbalanced fluid volume signs and symptoms  Description: Absence of imbalanced fluid volume signs and symptoms  Outcome: Ongoing     Problem: Serum Glucose Level - Abnormal:  Goal: Ability to maintain appropriate glucose levels will improve  Description: Ability to maintain appropriate glucose levels will improve  Outcome: Ongoing     Problem: Injury - Acid Base Imbalance:  Goal: Acid-base balance  Description: Acid-base balance  Outcome: Ongoing     Problem: OXYGENATION/RESPIRATORY FUNCTION  Goal: Patient will maintain patent airway  12/7/2021 2251 by Magdaleno Allen RN  Outcome: Ongoing     Problem: OXYGENATION/RESPIRATORY FUNCTION  Goal: Patient will achieve/maintain normal respiratory rate/effort  Description: Respiratory rate and effort will be within normal limits for the patient  12/7/2021 2251 by Magdaleno Allen RN  Outcome: Ongoing Problem: MECHANICAL VENTILATION  Goal: Patient will maintain patent airway  12/7/2021 2251 by Jacy Carrillo RN  Outcome: Ongoing     Problem: MECHANICAL VENTILATION  Goal: Oral health is maintained or improved  12/7/2021 2251 by Jacy Carrillo RN  Outcome: Ongoing     Problem: MECHANICAL VENTILATION  Goal: ET tube will be managed safely  12/7/2021 2251 by Jacy Carrillo RN  Outcome: Ongoing     Problem: MECHANICAL VENTILATION  Goal: Ability to express needs and understand communication  12/7/2021 2251 by Jacy Carrillo RN  Outcome: Ongoing     Problem: MECHANICAL VENTILATION  Goal: Mobility/activity is maintained at optimum level for patient  12/7/2021 2251 by Jacy Carrillo RN  Outcome: Ongoing     Problem: SKIN INTEGRITY  Goal: Skin integrity is maintained or improved  12/7/2021 2251 by Jacy Carrillo RN  Outcome: Ongoing     Problem: NUTRITION  Goal: Nutritional status is improving  Outcome: Ongoing     Problem: Nutrition  Goal: Optimal nutrition therapy  Outcome: Ongoing

## 2021-12-09 ENCOUNTER — APPOINTMENT (OUTPATIENT)
Dept: GENERAL RADIOLOGY | Age: 61
DRG: 637 | End: 2021-12-09
Payer: COMMERCIAL

## 2021-12-09 LAB
-: NORMAL
ABSOLUTE EOS #: 0.2 K/UL (ref 0–0.44)
ABSOLUTE IMMATURE GRANULOCYTE: 0.2 K/UL (ref 0–0.3)
ABSOLUTE LYMPH #: 2.6 K/UL (ref 1.1–3.7)
ABSOLUTE MONO #: 2.2 K/UL (ref 0.1–1.2)
ALBUMIN SERPL-MCNC: 1.6 G/DL (ref 3.5–5.2)
ALBUMIN/GLOBULIN RATIO: 0.4 (ref 1–2.5)
ALLEN TEST: POSITIVE
ALP BLD-CCNC: 110 U/L (ref 40–129)
ALT SERPL-CCNC: 25 U/L (ref 5–41)
ANION GAP SERPL CALCULATED.3IONS-SCNC: 8 MMOL/L (ref 9–17)
AST SERPL-CCNC: 24 U/L
BASOPHILS # BLD: 1 % (ref 0–2)
BASOPHILS ABSOLUTE: 0.2 K/UL (ref 0–0.2)
BILIRUB SERPL-MCNC: 0.27 MG/DL (ref 0.3–1.2)
BILIRUBIN DIRECT: <0.08 MG/DL
BILIRUBIN, INDIRECT: ABNORMAL MG/DL (ref 0–1)
BUN BLDV-MCNC: 17 MG/DL (ref 6–20)
BUN/CREAT BLD: ABNORMAL (ref 9–20)
CALCIUM SERPL-MCNC: 7.7 MG/DL (ref 8.6–10.4)
CHLORIDE BLD-SCNC: 108 MMOL/L (ref 98–107)
CO2: 22 MMOL/L (ref 20–31)
CREAT SERPL-MCNC: 0.44 MG/DL (ref 0.7–1.2)
CULTURE: ABNORMAL
DIFFERENTIAL TYPE: ABNORMAL
DIRECT EXAM: ABNORMAL
EOSINOPHILS RELATIVE PERCENT: 1 % (ref 1–4)
FIO2: 30
GFR AFRICAN AMERICAN: >60 ML/MIN
GFR NON-AFRICAN AMERICAN: >60 ML/MIN
GFR SERPL CREATININE-BSD FRML MDRD: ABNORMAL ML/MIN/{1.73_M2}
GFR SERPL CREATININE-BSD FRML MDRD: ABNORMAL ML/MIN/{1.73_M2}
GLOBULIN: ABNORMAL G/DL (ref 1.5–3.8)
GLUCOSE BLD-MCNC: 125 MG/DL (ref 75–110)
GLUCOSE BLD-MCNC: 128 MG/DL (ref 75–110)
GLUCOSE BLD-MCNC: 137 MG/DL (ref 70–99)
GLUCOSE BLD-MCNC: 137 MG/DL (ref 75–110)
GLUCOSE BLD-MCNC: 139 MG/DL (ref 75–110)
GLUCOSE BLD-MCNC: 152 MG/DL (ref 75–110)
GLUCOSE BLD-MCNC: 153 MG/DL (ref 75–110)
GLUCOSE BLD-MCNC: 163 MG/DL (ref 74–100)
GLUCOSE BLD-MCNC: 216 MG/DL (ref 75–110)
HCT VFR BLD CALC: 33.7 % (ref 40.7–50.3)
HEMOGLOBIN: 9.8 G/DL (ref 13–17)
IMMATURE GRANULOCYTES: 1 %
LYMPHOCYTES # BLD: 13 % (ref 24–43)
Lab: ABNORMAL
MCH RBC QN AUTO: 30.1 PG (ref 25.2–33.5)
MCHC RBC AUTO-ENTMCNC: 29.1 G/DL (ref 28.4–34.8)
MCV RBC AUTO: 103.4 FL (ref 82.6–102.9)
MODE: ABNORMAL
MONOCYTES # BLD: 11 % (ref 3–12)
MORPHOLOGY: ABNORMAL
NEGATIVE BASE EXCESS, ART: ABNORMAL (ref 0–2)
NRBC AUTOMATED: 0.1 PER 100 WBC
O2 DEVICE/FLOW/%: ABNORMAL
PATIENT TEMP: ABNORMAL
PDW BLD-RTO: 14.3 % (ref 11.8–14.4)
PLATELET # BLD: 329 K/UL (ref 138–453)
PLATELET ESTIMATE: ABNORMAL
PMV BLD AUTO: 12.1 FL (ref 8.1–13.5)
POC HCO3: 25.1 MMOL/L (ref 21–28)
POC O2 SATURATION: 94 % (ref 94–98)
POC PCO2 TEMP: ABNORMAL MM HG
POC PCO2: 34 MM HG (ref 35–48)
POC PH TEMP: ABNORMAL
POC PH: 7.48 (ref 7.35–7.45)
POC PO2 TEMP: ABNORMAL MM HG
POC PO2: 65 MM HG (ref 83–108)
POSITIVE BASE EXCESS, ART: 2 (ref 0–3)
POTASSIUM SERPL-SCNC: 5 MMOL/L (ref 3.7–5.3)
RBC # BLD: 3.26 M/UL (ref 4.21–5.77)
RBC # BLD: ABNORMAL 10*6/UL
REASON FOR REJECTION: NORMAL
SAMPLE SITE: ABNORMAL
SEG NEUTROPHILS: 73 % (ref 36–65)
SEGMENTED NEUTROPHILS ABSOLUTE COUNT: 14.6 K/UL (ref 1.5–8.1)
SODIUM BLD-SCNC: 138 MMOL/L (ref 135–144)
SPECIMEN DESCRIPTION: ABNORMAL
TCO2 (CALC), ART: ABNORMAL MMOL/L (ref 22–29)
TOTAL PROTEIN: 5.2 G/DL (ref 6.4–8.3)
WBC # BLD: 20 K/UL (ref 3.5–11.3)
WBC # BLD: ABNORMAL 10*3/UL
ZZ NTE CLEAN UP: ORDERED TEST: NORMAL
ZZ NTE WITH NAME CLEAN UP: SPECIMEN SOURCE: NORMAL

## 2021-12-09 PROCEDURE — 71045 X-RAY EXAM CHEST 1 VIEW: CPT

## 2021-12-09 PROCEDURE — 2500000003 HC RX 250 WO HCPCS: Performed by: STUDENT IN AN ORGANIZED HEALTH CARE EDUCATION/TRAINING PROGRAM

## 2021-12-09 PROCEDURE — 2500000003 HC RX 250 WO HCPCS: Performed by: INTERNAL MEDICINE

## 2021-12-09 PROCEDURE — 6360000002 HC RX W HCPCS: Performed by: STUDENT IN AN ORGANIZED HEALTH CARE EDUCATION/TRAINING PROGRAM

## 2021-12-09 PROCEDURE — 36415 COLL VENOUS BLD VENIPUNCTURE: CPT

## 2021-12-09 PROCEDURE — 80076 HEPATIC FUNCTION PANEL: CPT

## 2021-12-09 PROCEDURE — 6370000000 HC RX 637 (ALT 250 FOR IP): Performed by: STUDENT IN AN ORGANIZED HEALTH CARE EDUCATION/TRAINING PROGRAM

## 2021-12-09 PROCEDURE — 99291 CRITICAL CARE FIRST HOUR: CPT | Performed by: INTERNAL MEDICINE

## 2021-12-09 PROCEDURE — 36600 WITHDRAWAL OF ARTERIAL BLOOD: CPT

## 2021-12-09 PROCEDURE — 99233 SBSQ HOSP IP/OBS HIGH 50: CPT | Performed by: INTERNAL MEDICINE

## 2021-12-09 PROCEDURE — 94761 N-INVAS EAR/PLS OXIMETRY MLT: CPT

## 2021-12-09 PROCEDURE — 2580000003 HC RX 258: Performed by: STUDENT IN AN ORGANIZED HEALTH CARE EDUCATION/TRAINING PROGRAM

## 2021-12-09 PROCEDURE — 94003 VENT MGMT INPAT SUBQ DAY: CPT

## 2021-12-09 PROCEDURE — 82803 BLOOD GASES ANY COMBINATION: CPT

## 2021-12-09 PROCEDURE — 2580000003 HC RX 258: Performed by: HEALTH CARE PROVIDER

## 2021-12-09 PROCEDURE — 82947 ASSAY GLUCOSE BLOOD QUANT: CPT

## 2021-12-09 PROCEDURE — 2000000000 HC ICU R&B

## 2021-12-09 PROCEDURE — 2700000000 HC OXYGEN THERAPY PER DAY

## 2021-12-09 PROCEDURE — 80048 BASIC METABOLIC PNL TOTAL CA: CPT

## 2021-12-09 PROCEDURE — 6370000000 HC RX 637 (ALT 250 FOR IP): Performed by: INTERNAL MEDICINE

## 2021-12-09 PROCEDURE — 85025 COMPLETE CBC W/AUTO DIFF WBC: CPT

## 2021-12-09 RX ORDER — QUETIAPINE FUMARATE 25 MG/1
25 TABLET, FILM COATED ORAL 2 TIMES DAILY
Status: DISCONTINUED | OUTPATIENT
Start: 2021-12-09 | End: 2021-12-13

## 2021-12-09 RX ADMIN — CLINDAMYCIN PHOSPHATE 900 MG: 900 INJECTION, SOLUTION INTRAVENOUS at 09:10

## 2021-12-09 RX ADMIN — CLINDAMYCIN PHOSPHATE 900 MG: 900 INJECTION, SOLUTION INTRAVENOUS at 20:33

## 2021-12-09 RX ADMIN — INSULIN LISPRO 3 UNITS: 100 INJECTION, SOLUTION INTRAVENOUS; SUBCUTANEOUS at 23:37

## 2021-12-09 RX ADMIN — LORAZEPAM 2 MG: 2 TABLET ORAL at 15:17

## 2021-12-09 RX ADMIN — DEXMEDETOMIDINE HYDROCHLORIDE 1.4 MCG/KG/HR: 4 INJECTION, SOLUTION INTRAVENOUS at 03:53

## 2021-12-09 RX ADMIN — FLUOXETINE HYDROCHLORIDE 40 MG: 20 CAPSULE ORAL at 09:10

## 2021-12-09 RX ADMIN — LORAZEPAM 2 MG: 2 INJECTION INTRAMUSCULAR; INTRAVENOUS at 21:19

## 2021-12-09 RX ADMIN — FAMOTIDINE 20 MG: 20 TABLET, FILM COATED ORAL at 09:10

## 2021-12-09 RX ADMIN — QUETIAPINE FUMARATE 25 MG: 25 TABLET ORAL at 20:32

## 2021-12-09 RX ADMIN — FLUOXETINE HYDROCHLORIDE 40 MG: 20 CAPSULE ORAL at 20:32

## 2021-12-09 RX ADMIN — INSULIN LISPRO 6 UNITS: 100 INJECTION, SOLUTION INTRAVENOUS; SUBCUTANEOUS at 12:07

## 2021-12-09 RX ADMIN — INSULIN GLARGINE 10 UNITS: 100 INJECTION, SOLUTION SUBCUTANEOUS at 20:33

## 2021-12-09 RX ADMIN — DEXMEDETOMIDINE HYDROCHLORIDE 0.9 MCG/KG/HR: 4 INJECTION, SOLUTION INTRAVENOUS at 20:33

## 2021-12-09 RX ADMIN — DEXMEDETOMIDINE HYDROCHLORIDE 1.4 MCG/KG/HR: 4 INJECTION, SOLUTION INTRAVENOUS at 07:51

## 2021-12-09 RX ADMIN — INSULIN LISPRO 3 UNITS: 100 INJECTION, SOLUTION INTRAVENOUS; SUBCUTANEOUS at 17:27

## 2021-12-09 RX ADMIN — FONDAPARINUX SODIUM 2.5 MG: 2.5 INJECTION, SOLUTION SUBCUTANEOUS at 09:10

## 2021-12-09 RX ADMIN — SODIUM CHLORIDE, PRESERVATIVE FREE 10 ML: 5 INJECTION INTRAVENOUS at 09:10

## 2021-12-09 RX ADMIN — SODIUM CHLORIDE: 9 INJECTION, SOLUTION INTRAVENOUS at 17:44

## 2021-12-09 RX ADMIN — DEXMEDETOMIDINE HYDROCHLORIDE 1 MCG/KG/HR: 4 INJECTION, SOLUTION INTRAVENOUS at 12:35

## 2021-12-09 RX ADMIN — CLINDAMYCIN PHOSPHATE 900 MG: 900 INJECTION, SOLUTION INTRAVENOUS at 14:04

## 2021-12-09 RX ADMIN — FAMOTIDINE 20 MG: 20 TABLET, FILM COATED ORAL at 20:32

## 2021-12-09 RX ADMIN — LORAZEPAM 2 MG: 2 TABLET ORAL at 02:55

## 2021-12-09 RX ADMIN — SODIUM CHLORIDE: 9 INJECTION, SOLUTION INTRAVENOUS at 02:59

## 2021-12-09 ASSESSMENT — PULMONARY FUNCTION TESTS
PIF_VALUE: 30
PIF_VALUE: 23
PIF_VALUE: 23
PIF_VALUE: 30
PIF_VALUE: 12
PIF_VALUE: 33
PIF_VALUE: 25
PIF_VALUE: 26
PIF_VALUE: 31
PIF_VALUE: 23
PIF_VALUE: 25
PIF_VALUE: 30
PIF_VALUE: 30
PIF_VALUE: 24
PIF_VALUE: 29
PIF_VALUE: 21
PIF_VALUE: 25
PIF_VALUE: 26
PIF_VALUE: 28

## 2021-12-09 NOTE — PROGRESS NOTES
Critical Care Team - Daily Progress Note      Date and time: 2021 8:01 AM  Patient's name:  Katlin Trevino  Medical Record Number: [id number]  Patient's account/billing number: [de-identified]  Patient's YOB: 1960  Age: 63 y.o. Date of Admission: 2021  7:39 AM  Length of stay during current admission: 9      Primary Care Physician: Al De La Cruz    Code Status: Full Code    Reason for ICU admission:  DKA with coma      SUBJECTIVE:     OVERNIGHT EVENTS:    Overnight no ativan PRN  Only scheduled q12 hr 2 mg ativan  On precedex    ABG 7.47/34/25/65/94%  PRVC 18/510/5/40%  Precedex1.4,   K 5  WBC 20K, on clinda, Cx so far negative, ID evaluated  UO 1.6 L/day, fluids @75      Get  CXR   Change to lovenox    OBJECTIVE:     VITAL SIGNS:  BP (!) 113/58   Pulse 60   Temp 98.6 °F (37 °C) (Oral)   Resp 19   Ht 5' 9\" (1.753 m)   Wt 140 lb 14 oz (63.9 kg)   SpO2 95%   BMI 20.80 kg/m²   Tmax over 24 hours:  Temp (24hrs), Av.3 °F (37.4 °C), Min:97.8 °F (36.6 °C), Max:100 °F (37.8 °C)      Patient Vitals for the past 6 hrs:   BP Temp Temp src Pulse Resp SpO2   21 0700 (!) 113/58 -- -- 60 19 95 %   21 0630 -- -- -- 60 18 93 %   21 0600 113/62 98.6 °F (37 °C) Oral 61 19 95 %   21 0544 -- -- -- -- 20 95 %   21 0500 (!) 115/59 -- -- 62 15 94 %   21 0400 (!) 106/58 99 °F (37.2 °C) Oral 62 18 94 %   21 0310 -- -- -- 62 20 97 %   21 0300 (!) 98/55 -- -- 62 25 94 %         Intake/Output Summary (Last 24 hours) at 2021 0801  Last data filed at 2021 0400  Gross per 24 hour   Intake 4403 ml   Output 1375 ml   Net 3028 ml     Wt Readings from Last 2 Encounters:   21 140 lb 14 oz (63.9 kg)     Body mass index is 20.8 kg/m².         PHYSICAL EXAMINATION:  Constitutional: Intubated and sedated  Neck: Supple, symmetrical, trachea midline, no jvd, skin normal  Respiratory: Rhonchi, ventilatory breath sounds, no wheezing/rales  Cardiovascular: regular rate and rhythm, normal S1, S2, no murmur noted   Abdomen: soft, nondistended, no masses or organomegaly  Extremities:  peripheral pulses normal, no pedal edema, no clubbing or cyanosis  Neurological: Intubated, sedated. No involuntary movements. Pupils reactive. No rigidity.   Off sedation, withdraws to pain ,following commands    MEDICATIONS:    Scheduled Meds:   clindamycin (CLEOCIN) IV  900 mg IntraVENous TID    LORazepam  2 mg Oral Q12H    FLUoxetine  40 mg Oral BID    famotidine  20 mg Per NG tube BID    insulin lispro  0-18 Units SubCUTAneous Q4H    fondaparinux  2.5 mg SubCUTAneous Daily    insulin glargine  10 Units SubCUTAneous Nightly    sodium chloride flush  5-40 mL IntraVENous 2 times per day     Continuous Infusions:   sodium chloride 75 mL/hr at 12/09/21 0259    dexmedetomidine 1.4 mcg/kg/hr (12/09/21 0751)    dextrose      sodium chloride      dextrose 5 % and 0.45 % NaCl Stopped (12/01/21 0000)     PRN Meds:   LORazepam, 2 mg, Q4H PRN  albuterol, 2.5 mg, Q4H PRN  glucose, 15 g, PRN  dextrose, 12.5 g, PRN  glucagon (rDNA), 1 mg, PRN  dextrose, 100 mL/hr, PRN  dextrose, 12.5 g, PRN  polyethylene glycol, 17 g, Daily PRN  sodium chloride flush, 5-40 mL, PRN  sodium chloride, 25 mL, PRN  ondansetron, 4 mg, Q8H PRN   Or  ondansetron, 4 mg, Q6H PRN  acetaminophen, 650 mg, Q6H PRN   Or  acetaminophen, 650 mg, Q6H PRN  dextrose 5 % and 0.45 % NaCl, , Continuous PRN          VENT SETTINGS (Comprehensive) (if applicable):  Vent Information  $Ventilation: $Subsequent Day  Skin Assessment: Clean, dry, & intact  Suction Catheter Diameter: 14  Equipment ID: [id number]  Equipment Changed: HME  Vent Type: Servo i  Vent Mode: PRVC  Vt Ordered: 510 mL  Rate Set: 18 bmp  Pressure Support: (S) 6 cmH20  FiO2 : 40 %  SpO2: 95 %  SpO2/FiO2 ratio: 237.5  Sensitivity: 2  PEEP/CPAP: 5  I Time/ I Time %: 0.8 s  Humidification Source: HME  Nitric Oxide/Epoprostenol In Use?: No  Mask Type: Full face mask  Mask Size: Medium  Additional Respiratory  Assessments  Pulse: 60  Resp: 19  SpO2: 95 %  End Tidal CO2: 33 (%)  Position: Semi-Powell's  Humidification Source: HME  Oral Care Completed?: Yes  Oral Care: Mouth suctioned, Mouth moisturizer, Mouth swabbed  Subglottic Suction Done?: Yes  Cuff Pressure (cm H2O):  (MLT)      Laboratory findings:    Complete Blood Count:   Recent Labs     12/07/21 0455 12/08/21  0502 12/09/21  0415   WBC 15.9* 19.5* 20.0*   HGB 10.0* 9.7* 9.8*   HCT 30.0* 29.9* 33.7*    362 329        Last 3 Blood Glucose:   Recent Labs     12/07/21 0455 12/08/21  0502 12/09/21  0415   GLUCOSE 179* 175* 137*        PT/INR:  No results found for: PROTIME, INR  PTT:  No results found for: APTT, PTT    Comprehensive Metabolic Profile:   Recent Labs     12/07/21 0455 12/08/21 0502 12/09/21  0415    141 138   K 4.0 4.0 5.0   * 109* 108*   CO2 22 22 22   BUN 24* 24* 17   CREATININE 0.56* 0.60* 0.44*   GLUCOSE 179* 175* 137*   CALCIUM 7.7* 8.0* 7.7*      Magnesium:   Lab Results   Component Value Date    MG 1.9 12/03/2021     Phosphorus:   Lab Results   Component Value Date    PHOS 3.8 12/04/2021     Ionized Calcium:   Lab Results   Component Value Date    CAION 1.04 12/03/2021            PLAN:     Endocrine:  DKA with encephalopathy:  Resolved. Continue Lantus 10 u nightly with HDSS. On Diabetic TFs. HbA1c 11.6  Per family, takes Insulin daily, follows endocrinology, no recent sickness. Doesn't drink alcohol  Marijuana prescription use    Pulmonary:  Aspiration pneumonia:  Intubated to protect the airway. CPAP trials daily. Tolerating. Continue Clinda. Neurological:  Acute metabolic encephalopathy:  - Intubated and sedated on Precedex  - Sedation holidays, maintaining circardian rhythm  - keep ativan PRN    Cardiovascular:  - BP controlled    GI/nutrition  Tube feeding @ 45ml/h. Pepcid IV 20 mg daily. GlycoLax as needed for bowel movements.     Renal/fluids/electrolyte  - Good UO. 1.8 L  Fluids @75 ml/hr due to intravascular volume depletion    ID:   Aspiration pneumonia  On Levo  For total of 7 days. Now on Clindamycin. Pan cx negative, leukocytosis worsening. ID following    Decubitus sacral ulcer  Wound care following    Hematological:  Hb stable. Platelets recovered  HIT panel negative  On fondaparinux. Change to lovenox      Shaji Prado MD      Department of [facility]         12/9/2021, 8:01 AM       Attending Physician Statement  I have discussed the care of Brandon Louis, including pertinent history and exam findings with the resident. I have reviewed the key elements of all parts of the encounter with the resident. I have seen and examined the patient with the resident. I agree with the assessment and plan and status of the problem list as documented. Patient continues to be restless he is on Ativan decreasing dose of Ativan. He is on Precedex drip currently still on 1.2 t 1.4 mcg ofo Precedex. T-max of 99 to 100 and last 24 hours seen by infectious disease WBC count is 20 not much change from yesterday. Per infectious disease clindamycin was continued and he is off levofloxacin. Ventilator setting 7.47/34/65/25, PRVC/18/510/5/40%. Was on CPAP/pressure support today tolerated for less than 2 hours and then he was placed back because of tachypnea. He is intermittently agitated on Precedex drip gradually decreasing Ativan. So far respiratory culture sent yesterday is negative we will follow WBC count temperature. We will continue with Lantus and adjust Lantus. Daily spontaneous breathing trial  Will start Seroquel 25 mg twice daily    Discussed with nursing staff, treatment and plan discussed.   Discussed with respiratory therapist.    Total critical care time caring for this patient with life threatening, unstable organ failure, including direct patient contact, management of life support systems, review of data including imaging and labs, discussions with other team members and physicians at least 27  Min so far today, excluding procedures. Please note that this chart was generated using voice recognition Dragon dictation software. Although every effort was made to ensure the accuracy of this automated transcription, some errors in transcription may have occurred.      Jaquelin Barger MD  12/9/2021 11:09 AM

## 2021-12-09 NOTE — PLAN OF CARE
Problem: Non-Violent Restraints  Goal: Removal from restraints as soon as assessed to be safe  12/8/2021 1941 by Edith Reardon RN  Outcome: Ongoing     Problem: Non-Violent Restraints  Goal: No harm/injury to patient while restraints in use  12/8/2021 1941 by Edith Reardon RN  Outcome: Ongoing     Problem: Non-Violent Restraints  Goal: Patient's dignity will be maintained  12/8/2021 1941 by Edith Reardon RN  Outcome: Ongoing     Problem: Skin Integrity:  Goal: Will show no infection signs and symptoms  Description: Will show no infection signs and symptoms  12/8/2021 1941 by Edith Reardon RN  Outcome: Ongoing     Problem: Skin Integrity:  Goal: Absence of new skin breakdown  Description: Absence of new skin breakdown  12/8/2021 1941 by Edith Reardon RN  Outcome: Ongoing     Problem: Falls - Risk of:  Goal: Will remain free from falls  Description: Will remain free from falls  12/8/2021 1941 by Edith Reardon RN  Outcome: Ongoing     Problem: Falls - Risk of:  Goal: Absence of physical injury  Description: Absence of physical injury  12/8/2021 1941 by Edith Reardon RN  Outcome: Ongoing     Problem: Fluid Volume - Imbalance:  Goal: Will remain free of signs and symptoms of dehydration  Description: Will remain free of signs and symptoms of dehydration  12/8/2021 1941 by Edith Reardon RN  Outcome: Ongoing     Problem: Fluid Volume - Imbalance:  Goal: Absence of imbalanced fluid volume signs and symptoms  Description: Absence of imbalanced fluid volume signs and symptoms  12/8/2021 1941 by Edith Reardon RN  Outcome: Ongoing     Problem: Serum Glucose Level - Abnormal:  Goal: Ability to maintain appropriate glucose levels will improve  Description: Ability to maintain appropriate glucose levels will improve  12/8/2021 1941 by Edith Reardon RN  Outcome: Ongoing     Problem: Injury - Acid Base Imbalance:  Goal: Acid-base balance  Description: Acid-base balance  12/8/2021 1941 by Edith Reardon RN  Outcome: Ongoing     Problem: OXYGENATION/RESPIRATORY FUNCTION  Goal: Patient will maintain patent airway  12/8/2021 1941 by Howard Carballo RN  Outcome: Ongoing     Problem: OXYGENATION/RESPIRATORY FUNCTION  Goal: Patient will achieve/maintain normal respiratory rate/effort  Description: Respiratory rate and effort will be within normal limits for the patient  12/8/2021 1941 by Howard Carballo RN  Outcome: Ongoing     Problem: MECHANICAL VENTILATION  Goal: Patient will maintain patent airway  12/8/2021 1941 by Howard Carballo RN  Outcome: Ongoing     Problem: MECHANICAL VENTILATION  Goal: Oral health is maintained or improved  12/8/2021 1941 by Howard Carballo RN  Outcome: Ongoing     Problem: MECHANICAL VENTILATION  Goal: ET tube will be managed safely  12/8/2021 1941 by Howard Carballo RN  Outcome: Ongoing     Problem: MECHANICAL VENTILATION  Goal: Ability to express needs and understand communication  12/8/2021 1941 by Howard Carballo RN  Outcome: Ongoing     Problem: MECHANICAL VENTILATION  Goal: Mobility/activity is maintained at optimum level for patient  12/8/2021 1941 by Howard Carballo RN  Outcome: Ongoing     Problem: SKIN INTEGRITY  Goal: Skin integrity is maintained or improved  12/8/2021 1941 by Howard Carballo RN  Outcome: Ongoing     Problem: NUTRITION  Goal: Nutritional status is improving  12/8/2021 1941 by Howard Carballo RN  Outcome: Ongoing     Problem: Nutrition  Goal: Optimal nutrition therapy  12/8/2021 1941 by Howard Carballo RN  Outcome: Ongoing

## 2021-12-09 NOTE — PLAN OF CARE
Problem: OXYGENATION/RESPIRATORY FUNCTION  Goal: Patient will maintain patent airway  12/9/2021 0939 by Nini Mead RCP  Outcome: Ongoing  12/8/2021 2054 by Nitish Zelaya RCP  Outcome: Ongoing  12/8/2021 1941 by Clarence Price RN  Outcome: Ongoing  Goal: Patient will achieve/maintain normal respiratory rate/effort  Description: Respiratory rate and effort will be within normal limits for the patient  12/9/2021 0939 by Nini Mead RCP  Outcome: Ongoing  12/8/2021 2054 by Nitish Zelaya RCP  Outcome: Ongoing  12/8/2021 1941 by Clarence Price RN  Outcome: Ongoing     Problem: MECHANICAL VENTILATION  Goal: Patient will maintain patent airway  12/9/2021 0939 by Nini Mead RCP  Outcome: Ongoing  12/8/2021 2054 by Nitish Zelaya RCP  Outcome: Ongoing  12/8/2021 1941 by Clarence Price RN  Outcome: Ongoing  Goal: Oral health is maintained or improved  12/9/2021 0939 by Nini Mead RCP  Outcome: Ongoing  12/8/2021 2054 by Nitish Zelaya RCP  Outcome: Ongoing  12/8/2021 1941 by Clarence Price RN  Outcome: Ongoing  Goal: ET tube will be managed safely  12/9/2021 0939 by Nini Mead RCP  Outcome: Ongoing  12/8/2021 2054 by Nitish Zelaya RCP  Outcome: Ongoing  12/8/2021 1941 by Clarence Price RN  Outcome: Ongoing  Goal: Ability to express needs and understand communication  12/9/2021 0939 by Nini Mead RCP  Outcome: Ongoing  12/8/2021 2054 by Nitish Zelaya RCP  Outcome: Ongoing  12/8/2021 1941 by Clarence Price, ROHAN  Outcome: Ongoing  Goal: Mobility/activity is maintained at optimum level for patient  12/9/2021 0939 by Nini Mead RCP  Outcome: Ongoing  12/8/2021 2054 by Nitish Zelaya RCP  Outcome: Ongoing  12/8/2021 1941 by Clarence Price RN  Outcome: Ongoing

## 2021-12-09 NOTE — PROGRESS NOTES
increased QT interval    Cultures have remained negative and CXR shows imrovement. Leukocytosis continues as do the fevers    CURRENT EVALUATION 12/9/2021     Patient evaluated and examined in the ICU. Afebrile  VS stable      On Mechanical ventilation   On Precedex  FiO2 30-->40  PEEP 5  RR 22  02 sat 92  On CPAP trials      Labs, X rays reviewed: 12/9/2021    BUN:24  Cr:0.6    WBC:15.9-->19.5-->20  Hb:9.7-->9.8  Plat: 362-->329    Legionella and strep pneumo not detected    Cultures:  Urine:  · 11/6/21: No bacterial growth  Blood:  · 12/7/21 No growth   Sputum :  · 12/7/21: Candida albicans  Wound:  ·     IMAGING   12/6/21        Discussed with patient, RN, CC. I have personally reviewed the past medical history, past surgical history, medications, social history, and family history, and I have updated the database accordingly. Past Medical History:     Past Medical History:   Diagnosis Date    Diabetes mellitus ([facility] [address]     Hypertension        Past Surgical  History:   History reviewed. No pertinent surgical history.     Medications:      clindamycin (CLEOCIN) IV  900 mg IntraVENous TID    LORazepam  2 mg Oral Q12H    FLUoxetine  40 mg Oral BID    famotidine  20 mg Per NG tube BID    insulin lispro  0-18 Units SubCUTAneous Q4H    fondaparinux  2.5 mg SubCUTAneous Daily    insulin glargine  10 Units SubCUTAneous Nightly    sodium chloride flush  5-40 mL IntraVENous 2 times per day       Social History:     Social History     Socioeconomic History    Marital status:      Spouse name: Not on file    Number of children: Not on file    Years of education: Not on file    Highest education level: Not on file   Occupational History    Not on file   Tobacco Use    Smoking status: Never Smoker    Smokeless tobacco: Never Used   Vaping Use    Vaping Use: Never used   Substance and Sexual Activity    Alcohol use: Not Currently    Drug use: Yes     Types: Marijuana [place])     Comment: uses edibles nightly     Sexual activity: Not on file   Other Topics Concern    Not on file   Social History Narrative    Not on file     Social Determinants of Health     Financial Resource Strain:     Difficulty of Paying Living Expenses: Not on file   Food Insecurity:     Worried About Running Out of Food in the Last Year: Not on file    [place] of Food in the Last Year: Not on file   Transportation Needs:     Lack of Transportation (Medical): Not on file    Lack of Transportation (Non-Medical): Not on file   Physical Activity:     Days of Exercise per Week: Not on file    Minutes of Exercise per Session: Not on file   Stress:     Feeling of Stress : Not on file   Social Connections:     Frequency of Communication with Friends and Family: Not on file    Frequency of Social Gatherings with Friends and Family: Not on file    Attends Voodoo Services: Not on file    Active Member of [address] [place] [address] [employer] or Organizations: Not on file    Attends Club or Organization Meetings: Not on file    Marital Status: Not on file   Intimate Partner Violence:     Fear of Current or Ex-Partner: Not on file    Emotionally Abused: Not on file    Physically Abused: Not on file    Sexually Abused: Not on file   Housing Stability:     Unable to Pay for Housing in the Last Year: Not on file    Number of [place] in the Last Year: Not on file    Unstable Housing in the Last Year: Not on file       Family History:   History reviewed. No pertinent family history. Allergies:   Patient has no known allergies. Review of Systems:     AURA-intubated and sedated 12/9/2021      Constitutional: No fevers or chills. No systemic complaints  Head: No headaches  Eyes: No double vision or blurry vision. No conjunctival inflammation. ENT: No sore throat or runny nose. . No hearing loss, tinnitus or vertigo. Cardiovascular: No chest pain or palpitations. No shortness of breath. No MOLINA  Lung: No shortness of breath or cough.  No sputum production  Abdomen: No nausea, vomiting, diarrhea, or abdominal pain. [place] No cramps. Genitourinary: No increased urinary frequency, or dysuria. No hematuria. No suprapubic or CVA pain  Musculoskeletal: No muscle aches or pains. No joint effusions, swelling or deformities  Hematologic: No bleeding or bruising. Neurologic: No headache, weakness, numbness, or tingling. Integument: No rash, no ulcers. Psychiatric: No depression. Endocrine: No polyuria, no polydipsia, no polyphagia. Physical Examination :     Patient Vitals for the past 8 hrs:   BP Temp Temp src Pulse Resp SpO2   12/09/21 1400 -- -- -- -- 22 --   12/09/21 1200 121/65 98.8 °F (37.1 °C) Oral 64 18 92 %   12/09/21 1103 -- -- -- 77 20 97 %   12/09/21 0900 106/64 -- -- 58 26 96 %   12/09/21 0857 -- -- -- 59 24 92 %   12/09/21 0800 119/64 98.4 °F (36.9 °C) Oral 59 18 94 %   12/09/21 0700 (!) 113/58 -- -- 60 19 95 %     General Appearance: Intubated and sedated  Head:  Normocephalic, no trauma  Eyes: Pupils equal, round, reactive to light; sclera anicteric; conjunctivae pink. No embolic phenomena. ENT: Oropharynx clear, without erythema, exudate, or thrush. No tenderness of sinuses. Mouth/throat: mucosa pink and moist. No lesions. Dentition in good repair. Neck:Supple, without lymphadenopathy. Thyroid normal, No bruits. Pulmonary/Chest: Clear to auscultation, without wheezes, rales, or rhonchi. No dullness to percussion. Cardiovascular: Regular rate and rhythm without murmurs, rubs, or gallops. Abdomen: Soft, non tender. Bowel sounds normal. No organomegaly  All four Extremities: No cyanosis, clubbing, edema, or effusions. Neurologic: Not following commands at this time  Skin: Warm and dry with good turgor. No signs of peripheral arterial or venous insufficiency. No ulcerations. Scattered bruising.  Deep tissue injury to coccyx    Medical Decision Making -Laboratory:   I have independently reviewed/ordered the following labs:    CBC with Differential:   Recent Labs     12/08/21  0502 12/09/21  0415   WBC 19.5* 20.0*   HGB 9.7* 9.8*   HCT 29.9* 33.7*    329   LYMPHOPCT 11* 13*   MONOPCT 10 11     BMP:   Recent Labs     12/08/21  0502 12/09/21  0415    138   K 4.0 5.0   * 108*   CO2 22 22   BUN 24* 17   CREATININE 0.60* 0.44*     Hepatic Function Panel:   Recent Labs     12/09/21  1235   PROT 5.2*   LABALBU 1.6*   BILIDIR <0.08   IBILI Can not be calculated   BILITOT 0.27*   ALKPHOS 110   ALT 25   AST 24     No results for input(s): RPR in the last 72 hours. No results for input(s): HIV in the last 72 hours. No results for input(s): BC in the last 72 hours.   Lab Results   Component Value Date    MUCUS NOT REPORTED 12/07/2021    RBC 3.26 12/09/2021    TRICHOMONAS NOT REPORTED 12/07/2021    WBC 20.0 12/09/2021    YEAST NOT REPORTED 12/07/2021    TURBIDITY Clear 12/07/2021     Lab Results   Component Value Date    CREATININE 0.44 12/09/2021    GLUCOSE 137 12/09/2021       Medical Decision Making-Imaging:     Narrative   EXAMINATION:   ONE XRAY VIEW OF THE CHEST       12/6/2021 10:55 am       COMPARISON:   December 4, 2021       HISTORY:   ORDERING SYSTEM PROVIDED HISTORY: evaluate pulmonary opacities   TECHNOLOGIST PROVIDED HISTORY:   evaluate pulmonary opacities       FINDINGS:   Satisfactory position of the endotracheal and enteric tubes.       There has been interval clearing of the parenchymal consolidation in the   lateral left base.  Otherwise, diffuse interstitial and somewhat ground-glass   opacities in both lungs remain as well as left retrocardiac consolidation.       No sizable pleural effusion.  No pneumothorax.       Stable cardiomediastinal silhouette.           Impression   Interval clearing of the parenchymal consolidation in the lateral left base.       Otherwise, diffuse bilateral interstitial and somewhat ground-glass opacities   remain as well as left retrocardiac consolidation.               Medical Decision Uvshea-Mcvtodxm-Cufvl:       Medical Decision Making-Other:     Note:  · Labs, medications, radiologic studies were reviewed with personal review of films  · Large amounts of data were reviewed  · Discussed with nursing Staff, Discharge planner  · Infection Control and Prevention measures reviewed  · All prior entries were reviewed  · Administer medications as ordered  · Prognosis: Guarded  · Discharge planning reviewed    Thank you for allowing us to participate in the care of this patient. Please call with questions.     Bob Betancourt MD           Pager: [phone] - Office: [phone]

## 2021-12-09 NOTE — PLAN OF CARE
Problem: OXYGENATION/RESPIRATORY FUNCTION  Goal: Patient will maintain patent airway  12/8/2021 2054 by Pernell Nissen, RCP  Outcome: Ongoing     Problem: OXYGENATION/RESPIRATORY FUNCTION  Goal: Patient will achieve/maintain normal respiratory rate/effort  Description: Respiratory rate and effort will be within normal limits for the patient  12/8/2021 2054 by Pernell Nissen, RCP  Outcome: Ongoing     Problem: MECHANICAL VENTILATION  Goal: Patient will maintain patent airway  12/8/2021 2054 by Pernell Nissen, RCP  Outcome: Ongoing     Problem: MECHANICAL VENTILATION  Goal: Oral health is maintained or improved  12/8/2021 2054 by Pernell Nissen, RCP  Outcome: Ongoing     Problem: MECHANICAL VENTILATION  Goal: ET tube will be managed safely  12/8/2021 2054 by Pernell Nissen, RCP  Outcome: Ongoing     Problem: MECHANICAL VENTILATION  Goal: Ability to express needs and understand communication  12/8/2021 2054 by Pernell Nissen, RCP  Outcome: Ongoing     Problem: MECHANICAL VENTILATION  Goal: Mobility/activity is maintained at optimum level for patient  12/8/2021 2054 by Pernell Nissen, RCP  Outcome: Ongoing     Problem: SKIN INTEGRITY  Goal: Skin integrity is maintained or improved  12/8/2021 2054 by Pernell Nissen, RCP  Outcome: Ongoing

## 2021-12-10 LAB
ABSOLUTE EOS #: 0.17 K/UL (ref 0–0.44)
ABSOLUTE IMMATURE GRANULOCYTE: 0.17 K/UL (ref 0–0.3)
ABSOLUTE LYMPH #: 2.37 K/UL (ref 1.1–3.7)
ABSOLUTE MONO #: 1.52 K/UL (ref 0.1–1.2)
ALLEN TEST: POSITIVE
ANION GAP SERPL CALCULATED.3IONS-SCNC: 7 MMOL/L (ref 9–17)
BASOPHILS # BLD: 0 % (ref 0–2)
BASOPHILS ABSOLUTE: 0 K/UL (ref 0–0.2)
BUN BLDV-MCNC: 18 MG/DL (ref 6–20)
BUN/CREAT BLD: ABNORMAL (ref 9–20)
CALCIUM SERPL-MCNC: 7.7 MG/DL (ref 8.6–10.4)
CHLORIDE BLD-SCNC: 108 MMOL/L (ref 98–107)
CO2: 22 MMOL/L (ref 20–31)
CREAT SERPL-MCNC: 0.48 MG/DL (ref 0.7–1.2)
DIFFERENTIAL TYPE: ABNORMAL
EOSINOPHILS RELATIVE PERCENT: 1 % (ref 1–4)
FIO2: 30
GFR AFRICAN AMERICAN: >60 ML/MIN
GFR NON-AFRICAN AMERICAN: >60 ML/MIN
GFR SERPL CREATININE-BSD FRML MDRD: ABNORMAL ML/MIN/{1.73_M2}
GFR SERPL CREATININE-BSD FRML MDRD: ABNORMAL ML/MIN/{1.73_M2}
GLUCOSE BLD-MCNC: 103 MG/DL (ref 74–100)
GLUCOSE BLD-MCNC: 107 MG/DL (ref 75–110)
GLUCOSE BLD-MCNC: 119 MG/DL (ref 75–110)
GLUCOSE BLD-MCNC: 169 MG/DL (ref 75–110)
GLUCOSE BLD-MCNC: 218 MG/DL (ref 75–110)
GLUCOSE BLD-MCNC: 96 MG/DL (ref 75–110)
GLUCOSE BLD-MCNC: 99 MG/DL (ref 70–99)
HCT VFR BLD CALC: 28.6 % (ref 40.7–50.3)
HEMOGLOBIN: 9.1 G/DL (ref 13–17)
IMMATURE GRANULOCYTES: 1 %
LYMPHOCYTES # BLD: 14 % (ref 24–43)
MCH RBC QN AUTO: 29.1 PG (ref 25.2–33.5)
MCHC RBC AUTO-ENTMCNC: 31.8 G/DL (ref 28.4–34.8)
MCV RBC AUTO: 91.4 FL (ref 82.6–102.9)
MODE: ABNORMAL
MONOCYTES # BLD: 9 % (ref 3–12)
MORPHOLOGY: NORMAL
NEGATIVE BASE EXCESS, ART: ABNORMAL (ref 0–2)
NRBC AUTOMATED: 0 PER 100 WBC
O2 DEVICE/FLOW/%: ABNORMAL
PATIENT TEMP: ABNORMAL
PDW BLD-RTO: 14 % (ref 11.8–14.4)
PLATELET # BLD: 497 K/UL (ref 138–453)
PLATELET ESTIMATE: ABNORMAL
PMV BLD AUTO: 10.5 FL (ref 8.1–13.5)
POC HCO3: 25.2 MMOL/L (ref 21–28)
POC O2 SATURATION: 89 % (ref 94–98)
POC PCO2 TEMP: ABNORMAL MM HG
POC PCO2: 35.3 MM HG (ref 35–48)
POC PH TEMP: ABNORMAL
POC PH: 7.46 (ref 7.35–7.45)
POC PO2 TEMP: ABNORMAL MM HG
POC PO2: 52.9 MM HG (ref 83–108)
POSITIVE BASE EXCESS, ART: 2 (ref 0–3)
POTASSIUM SERPL-SCNC: 3.9 MMOL/L (ref 3.7–5.3)
RBC # BLD: 3.13 M/UL (ref 4.21–5.77)
RBC # BLD: ABNORMAL 10*6/UL
SAMPLE SITE: ABNORMAL
SEG NEUTROPHILS: 75 % (ref 36–65)
SEGMENTED NEUTROPHILS ABSOLUTE COUNT: 12.67 K/UL (ref 1.5–8.1)
SODIUM BLD-SCNC: 137 MMOL/L (ref 135–144)
TCO2 (CALC), ART: ABNORMAL MMOL/L (ref 22–29)
WBC # BLD: 16.9 K/UL (ref 3.5–11.3)
WBC # BLD: ABNORMAL 10*3/UL

## 2021-12-10 PROCEDURE — 6370000000 HC RX 637 (ALT 250 FOR IP): Performed by: STUDENT IN AN ORGANIZED HEALTH CARE EDUCATION/TRAINING PROGRAM

## 2021-12-10 PROCEDURE — 94003 VENT MGMT INPAT SUBQ DAY: CPT

## 2021-12-10 PROCEDURE — 99291 CRITICAL CARE FIRST HOUR: CPT | Performed by: INTERNAL MEDICINE

## 2021-12-10 PROCEDURE — 36600 WITHDRAWAL OF ARTERIAL BLOOD: CPT

## 2021-12-10 PROCEDURE — 6360000002 HC RX W HCPCS: Performed by: STUDENT IN AN ORGANIZED HEALTH CARE EDUCATION/TRAINING PROGRAM

## 2021-12-10 PROCEDURE — 36415 COLL VENOUS BLD VENIPUNCTURE: CPT

## 2021-12-10 PROCEDURE — 6370000000 HC RX 637 (ALT 250 FOR IP): Performed by: INTERNAL MEDICINE

## 2021-12-10 PROCEDURE — 82803 BLOOD GASES ANY COMBINATION: CPT

## 2021-12-10 PROCEDURE — 82947 ASSAY GLUCOSE BLOOD QUANT: CPT

## 2021-12-10 PROCEDURE — 85025 COMPLETE CBC W/AUTO DIFF WBC: CPT

## 2021-12-10 PROCEDURE — 2580000003 HC RX 258: Performed by: STUDENT IN AN ORGANIZED HEALTH CARE EDUCATION/TRAINING PROGRAM

## 2021-12-10 PROCEDURE — 99233 SBSQ HOSP IP/OBS HIGH 50: CPT | Performed by: INTERNAL MEDICINE

## 2021-12-10 PROCEDURE — 2500000003 HC RX 250 WO HCPCS: Performed by: INTERNAL MEDICINE

## 2021-12-10 PROCEDURE — 2500000003 HC RX 250 WO HCPCS: Performed by: STUDENT IN AN ORGANIZED HEALTH CARE EDUCATION/TRAINING PROGRAM

## 2021-12-10 PROCEDURE — 2000000000 HC ICU R&B

## 2021-12-10 PROCEDURE — 80048 BASIC METABOLIC PNL TOTAL CA: CPT

## 2021-12-10 PROCEDURE — 2580000003 HC RX 258: Performed by: HEALTH CARE PROVIDER

## 2021-12-10 RX ORDER — LORAZEPAM 2 MG/ML
0.5 INJECTION INTRAMUSCULAR EVERY 4 HOURS PRN
Status: DISCONTINUED | OUTPATIENT
Start: 2021-12-10 | End: 2021-12-13

## 2021-12-10 RX ADMIN — CLINDAMYCIN PHOSPHATE 900 MG: 900 INJECTION, SOLUTION INTRAVENOUS at 09:20

## 2021-12-10 RX ADMIN — SODIUM CHLORIDE, PRESERVATIVE FREE 10 ML: 5 INJECTION INTRAVENOUS at 09:20

## 2021-12-10 RX ADMIN — LORAZEPAM 0.5 MG: 2 INJECTION INTRAMUSCULAR; INTRAVENOUS at 23:32

## 2021-12-10 RX ADMIN — LORAZEPAM 2 MG: 2 TABLET ORAL at 02:46

## 2021-12-10 RX ADMIN — FLUOXETINE HYDROCHLORIDE 40 MG: 20 CAPSULE ORAL at 09:20

## 2021-12-10 RX ADMIN — DEXMEDETOMIDINE HYDROCHLORIDE 0.9 MCG/KG/HR: 4 INJECTION, SOLUTION INTRAVENOUS at 04:12

## 2021-12-10 RX ADMIN — SODIUM CHLORIDE, PRESERVATIVE FREE 10 ML: 5 INJECTION INTRAVENOUS at 20:32

## 2021-12-10 RX ADMIN — SODIUM CHLORIDE: 9 INJECTION, SOLUTION INTRAVENOUS at 05:57

## 2021-12-10 RX ADMIN — INSULIN LISPRO 3 UNITS: 100 INJECTION, SOLUTION INTRAVENOUS; SUBCUTANEOUS at 13:24

## 2021-12-10 RX ADMIN — FONDAPARINUX SODIUM 2.5 MG: 2.5 INJECTION, SOLUTION SUBCUTANEOUS at 11:45

## 2021-12-10 RX ADMIN — CLINDAMYCIN PHOSPHATE 900 MG: 900 INJECTION, SOLUTION INTRAVENOUS at 20:32

## 2021-12-10 RX ADMIN — DEXMEDETOMIDINE HYDROCHLORIDE 0.4 MCG/KG/HR: 4 INJECTION, SOLUTION INTRAVENOUS at 13:30

## 2021-12-10 RX ADMIN — CLINDAMYCIN PHOSPHATE 900 MG: 900 INJECTION, SOLUTION INTRAVENOUS at 13:30

## 2021-12-10 RX ADMIN — FAMOTIDINE 20 MG: 20 TABLET, FILM COATED ORAL at 09:20

## 2021-12-10 RX ADMIN — QUETIAPINE FUMARATE 25 MG: 25 TABLET ORAL at 09:20

## 2021-12-10 RX ADMIN — INSULIN GLARGINE 10 UNITS: 100 INJECTION, SOLUTION SUBCUTANEOUS at 20:32

## 2021-12-10 ASSESSMENT — PULMONARY FUNCTION TESTS
PIF_VALUE: 25
PIF_VALUE: 28
PIF_VALUE: 14
PIF_VALUE: 25
PIF_VALUE: 25
PIF_VALUE: 28
PIF_VALUE: 28
PIF_VALUE: 23
PIF_VALUE: 27

## 2021-12-10 ASSESSMENT — PAIN SCALES - GENERAL
PAINLEVEL_OUTOF10: 0

## 2021-12-10 ASSESSMENT — ENCOUNTER SYMPTOMS: TACHYPNEA: 1

## 2021-12-10 NOTE — PROGRESS NOTES
[facility] - Progress Note    Today's Date and Time: 12/10/2021, 12:18 PM    Impression :   · Leukocytosis  · Concern for aspiration pneumonia  · DM I  · DKA  · Altered mentation  · Acute hypoxic respiratory failure    Recommendations:   · Continue clindamycin. Stop date 12-11-21    Medical Decision Making/Summary/Discussion:12/10/2021     ·   Infection Control Recommendations   · [place] Precautions      Antimicrobial Stewardship Recommendations     · Discontinuation of therapy  Coordination of Outpatient Care:   · Estimated Length of IV antimicrobials: TBD  · Patient will need Midline Catheter Insertion: no  · Patient will need PICC line Insertion:No  · Patient will need: Home IV , [place],  SNF,  LTAC: TBD  · Patient will need outpatient wound care:Yes    Chief complaint/reason for consultation:   · Leukocytosis with fevers. Aspiration pneumonia      History of Present Illness:   Roger Martino is a 63y.o.-year-old male who was initially admitted on 11/30/2021. Patient seen at the request of Dr. Jimy Daley:    This patient with type 1 diabetes, initially presented to the ED on 11/30/2021 with altered mentation. According to the EHR, he had not been compliant with his insulin for the 3 days prior. Further work up revealed that the patient was experiencing toxic metabolic encephalopathy because of DKA. Lab work revealed:pH of 6.9, PCO2 22, bicarb 4.5 and a blood sugar over 700. His potassium was also elevated at 6.2 with peaked T waves on EKG. WBC was 18.1. Covid swab and MRSA nares were negative    He was also hypotensive and started on norepinephrine and intubated for airway protection. Levaquin and Clindamycin were initiated 12/1/21 for worsening CXR and concern for aspiration pneumonia.      Levaquin has since been discontinued out of concern for increased QT interval    Cultures have remained negative and CXR shows imrovement. Leukocytosis continues as do the fevers    CURRENT EVALUATION 12/10/2021     Patient evaluated and examined in the ICU. Afebrile  VS stable      Extubated  RR 22-->21  02 sat 92-->97      Labs, X rays reviewed: 12/10/2021    BUN:24-->18  Cr:0.6-->0.48    WBC:15.9-->19.5-->20-->16.9  Hb:9.7-->9.8-->9.1  Plat: 362-->329-->497    Legionella and strep pneumo not detected    Cultures:  Urine:  · 11/6/21: No bacterial growth  Blood:  · 12/7/21 No growth   Sputum :  · 12/7/21: Candida albicans  Wound:  ·     IMAGING   12/6/21        Discussed with patient, RN, CC. I have personally reviewed the past medical history, past surgical history, medications, social history, and family history, and I have updated the database accordingly. Past Medical History:     Past Medical History:   Diagnosis Date    Diabetes mellitus ([facility] [address]     Hypertension        Past Surgical  History:   History reviewed. No pertinent surgical history.     Medications:      QUEtiapine  25 mg Oral BID    clindamycin (CLEOCIN) IV  900 mg IntraVENous TID    FLUoxetine  40 mg Oral BID    famotidine  20 mg Per NG tube BID    insulin lispro  0-18 Units SubCUTAneous Q4H    fondaparinux  2.5 mg SubCUTAneous Daily    insulin glargine  10 Units SubCUTAneous Nightly    sodium chloride flush  5-40 mL IntraVENous 2 times per day       Social History:     Social History     Socioeconomic History    Marital status:      Spouse name: Not on file    Number of children: Not on file    Years of education: Not on file    Highest education level: Not on file   Occupational History    Not on file   Tobacco Use    Smoking status: Never Smoker    Smokeless tobacco: Never Used   Vaping Use    Vaping Use: Never used   Substance and Sexual Activity    Alcohol use: Not Currently    Drug use: Yes     Types: Marijuana (Weed)     Comment: uses edibles nightly     Sexual activity: Not on file   Other Topics Concern    Not on file   Social History Narrative    Not on file     Social Determinants of Health     Financial Resource Strain:     Difficulty of Paying Living Expenses: Not on file   Food Insecurity:     Worried About Running Out of Food in the Last Year: Not on file    [place] of Food in the Last Year: Not on file   Transportation Needs:     Lack of Transportation (Medical): Not on file    Lack of Transportation (Non-Medical): Not on file   Physical Activity:     Days of Exercise per Week: Not on file    Minutes of Exercise per Session: Not on file   Stress:     Feeling of Stress : Not on file   Social Connections:     Frequency of Communication with Friends and Family: Not on file    Frequency of Social Gatherings with Friends and Family: Not on file    Attends Baptism Services: Not on file    Active Member of [address] [place] [address] [employer] or Organizations: Not on file    Attends Club or Organization Meetings: Not on file    Marital Status: Not on file   Intimate Partner Violence:     Fear of Current or Ex-Partner: Not on file    Emotionally Abused: Not on file    Physically Abused: Not on file    Sexually Abused: Not on file   Housing Stability:     Unable to Pay for Housing in the Last Year: Not on file    Number of [place] in the Last Year: Not on file    Unstable Housing in the Last Year: Not on file       Family History:   History reviewed. No pertinent family history. Allergies:   Patient has no known allergies. Review of Systems:     AURA-intubated and sedated 12/10/2021      Constitutional: No fevers or chills. No systemic complaints  Head: No headaches  Eyes: No double vision or blurry vision. No conjunctival inflammation. ENT: No sore throat or runny nose. . No hearing loss, tinnitus or vertigo. Cardiovascular: No chest pain or palpitations. No shortness of breath. No MOLINA  Lung: No shortness of breath or cough. No sputum production  Abdomen: No nausea, vomiting, diarrhea, or abdominal pain. Dorothy Peres No cramps.   Genitourinary: No 33.7* 28.6*    497*   LYMPHOPCT 13* 14*   MONOPCT 11 9     BMP:   Recent Labs     12/09/21  0415 12/10/21  0450    137   K 5.0 3.9   * 108*   CO2 22 22   BUN 17 18   CREATININE 0.44* 0.48*     Hepatic Function Panel:   Recent Labs     12/09/21  1235   PROT 5.2*   LABALBU 1.6*   BILIDIR <0.08   IBILI Can not be calculated   BILITOT 0.27*   ALKPHOS 110   ALT 25   AST 24     No results for input(s): RPR in the last 72 hours. No results for input(s): HIV in the last 72 hours. No results for input(s): BC in the last 72 hours.   Lab Results   Component Value Date    MUCUS NOT REPORTED 12/07/2021    RBC 3.13 12/10/2021    TRICHOMONAS NOT REPORTED 12/07/2021    WBC 16.9 12/10/2021    YEAST NOT REPORTED 12/07/2021    TURBIDITY Clear 12/07/2021     Lab Results   Component Value Date    CREATININE 0.48 12/10/2021    GLUCOSE 99 12/10/2021       Medical Decision Making-Imaging:     Narrative   EXAMINATION:   ONE XRAY VIEW OF THE CHEST       12/6/2021 10:55 am       COMPARISON:   December 4, 2021       HISTORY:   ORDERING SYSTEM PROVIDED HISTORY: evaluate pulmonary opacities   TECHNOLOGIST PROVIDED HISTORY:   evaluate pulmonary opacities       FINDINGS:   Satisfactory position of the endotracheal and enteric tubes.       There has been interval clearing of the parenchymal consolidation in the   lateral left base.  Otherwise, diffuse interstitial and somewhat ground-glass   opacities in both lungs remain as well as left retrocardiac consolidation.       No sizable pleural effusion.  No pneumothorax.       Stable cardiomediastinal silhouette.           Impression   Interval clearing of the parenchymal consolidation in the lateral left base.       Otherwise, diffuse bilateral interstitial and somewhat ground-glass opacities   remain as well as left retrocardiac consolidation.               Medical Decision [id number]:       Medical Decision Making-Other:     Note:  · Labs, medications, radiologic studies were reviewed with personal review of films  · Large amounts of data were reviewed  · Discussed with nursing Staff, Discharge planner  · Infection Control and Prevention measures reviewed  · All prior entries were reviewed  · Administer medications as ordered  · Prognosis: Guarded  · Discharge planning reviewed    Thank you for allowing us to participate in the care of this patient. Please call with questions.     Skyler Royal MD           Pager: [phone] - Office: [phone]

## 2021-12-10 NOTE — PLAN OF CARE
Problem: OXYGENATION/RESPIRATORY FUNCTION  Goal: Patient will maintain patent airway  12/9/2021 2053 by Lenka Hogan RCP  Outcome: Ongoing     Problem: OXYGENATION/RESPIRATORY FUNCTION  Goal: Patient will achieve/maintain normal respiratory rate/effort  Description: Respiratory rate and effort will be within normal limits for the patient  12/9/2021 2053 by Lenka Hogan RCP  Outcome: Ongoing     Problem: MECHANICAL VENTILATION  Goal: Patient will maintain patent airway  12/9/2021 2053 by Lenka Hogan RCP  Outcome: Ongoing     Problem: MECHANICAL VENTILATION  Goal: Oral health is maintained or improved  12/9/2021 2053 by Lenka Hogan RCP  Outcome: Ongoing     Problem: MECHANICAL VENTILATION  Goal: ET tube will be managed safely  12/9/2021 2053 by Lenka Hogan RCP  Outcome: Ongoing     Problem: MECHANICAL VENTILATION  Goal: Ability to express needs and understand communication  12/9/2021 2053 by Lenka Hogan RCP  Outcome: Ongoing     Problem: MECHANICAL VENTILATION  Goal: Mobility/activity is maintained at optimum level for patient  12/9/2021 2053 by Lenka Hogan RCP  Outcome: Ongoing     Problem: SKIN INTEGRITY  Goal: Skin integrity is maintained or improved  Outcome: Ongoing

## 2021-12-10 NOTE — PLAN OF CARE
Problem: MECHANICAL VENTILATION  Goal: Patient will maintain patent airway  12/10/2021 1230 by Claude Penner, RCP  Outcome: Completed     Problem: MECHANICAL VENTILATION  Goal: Oral health is maintained or improved  12/10/2021 1230 by Claude Penner, RCP  Outcome: Completed     Problem: MECHANICAL VENTILATION  Goal: ET tube will be managed safely  12/10/2021 1230 by Claude Penner, RCP  Outcome: Completed     Problem: MECHANICAL VENTILATION  Goal: Ability to express needs and understand communication  12/10/2021 1230 by Claude Penner, RCP  Outcome: Completed     Problem: MECHANICAL VENTILATION  Goal: Mobility/activity is maintained at optimum level for patient  12/10/2021 1230 by Claude Penner, RCP  Outcome: Completed

## 2021-12-10 NOTE — PROGRESS NOTES
Comprehensive Nutrition Assessment    Type and Reason for Visit:  Reassess    Nutrition Recommendations/Plan: Start diabetic oral diet and oral nutrition supplements as able. Will continue to follow/monitor care plans. Nutrition Assessment:  Chart reviewed. Pt was extubated today. TF stopped with extubation. Pt remains NPO at this time. Meds/labs reviewed. Estimated Daily Nutrient Needs:  Energy (kcal):  5729-5536 kcal/day; Weight Used for Energy Requirements:  Current     Protein (g):   g pro/day; Weight Used for Protein Requirements:  Current (1.2-1.5)        Fluid (ml/day):  1900 mL/day; Method Used for Fluid Requirements:  ml/Kg (30)      Nutrition Related Findings:  Meds/labs reviewed      Wounds:  Pressure Injury (Unstageable - Coccyx)       Current Nutrition Therapies:    Diet NPO  ADULT TUBE FEEDING; Orogastric; Diabetic; Continuous; 20; Yes; 10; Q 6 hours; 45; 30; Q 4 hours---TF stopped with extubation  Additional Calorie Sources:   None    Anthropometric Measures:  · Height: 5' 9\" (175.3 cm)  · Current Body Weight: 141 lb 1.5 oz (64 kg)   · Admission Body Weight: 128 lb 3.2 oz (58.2 kg)    · Usual Body Weight: 137 lb 13 oz (62.5 kg) (7/23/2021)     · Ideal Body Weight: 160 lbs; % Ideal Body Weight 88.2 %   · BMI: 20.8  · BMI Categories: Normal Weight (BMI 18.5-24. 9)       Nutrition Diagnosis:   · Inadequate oral intake related to recent extubation as evidenced by NPO or clear liquid status due to medical condition    Nutrition Interventions:   Food and/or Nutrient Delivery:  Start Oral Diet/Supplements as able  Nutrition Education/Counseling:  No recommendation at this time   Coordination of Nutrition Care:  Continue to monitor while inpatient    Goals:  Meet % of estimated nutrition needs.  Progressing towards goal      Nutrition Monitoring and Evaluation:   Behavioral-Environmental Outcomes:  None Identified   Food/Nutrient Intake Outcomes:  Diet Advancement/Tolerance  Physical Signs/Symptoms Outcomes:  Biochemical Data, Chewing or Swallowing, Nutrition Focused Physical Findings, Skin, Weight     Discharge Planning:     Too soon to determine     Electronically signed by Deann Horowitz RD, GUERITA on 12/10/21 at 3:07 PM EST    Contact: [phone]

## 2021-12-10 NOTE — PROGRESS NOTES
Critical Care Team - Daily Progress Note      Date and time: 12/10/2021 9:30 AM  Patient's name:  Yvonne Marcial  Medical Record Number: [id number]  Patient's account/billing number: [de-identified]  Patient's YOB: 1960  Age: 63 y.o. Date of Admission: 2021  7:39 AM  Length of stay during current admission: 10      Primary Care Physician: Bossman Wong    Code Status: Full Code    Reason for ICU admission:  DKA with coma      SUBJECTIVE:     OVERNIGHT EVENTS:    Overnight 3 doses of ativan  On precedex max  Today sleep, not waking up to sternal rub  Weaning down precedex now    ABG 7.46/35/25/53/89%  PRVC 18/510/5/40%, now on CPAP  Precedex 0.9,   K 5  BG 100s, lantus 10 U nightly  WBC 20K, on clinda, Cx so far negative, ID evaluated  UO 1.5 L/day, fluids @75 decreased to @30 ml/d today    Respi Cx light growth of candida albicans, other Cx negative. WBC went down 20 >17K, afebrile    OBJECTIVE:     VITAL SIGNS:  BP (!) 115/55   Pulse 68   Temp 98.8 °F (37.1 °C) (Axillary)   Resp 21   Ht 5' 9\" (1.753 m)   Wt 141 lb 1.5 oz (64 kg)   SpO2 97%   BMI 20.84 kg/m²   Tmax over 24 hours:  Temp (24hrs), Av.9 °F (37.2 °C), Min:98.6 °F (37 °C), Max:99.5 °F (37.5 °C)      Patient Vitals for the past 6 hrs:   BP Temp Temp src Pulse Resp SpO2 Weight   12/10/21 0848 -- -- -- 68 21 97 % --   12/10/21 0600 (!) 115/55 98.8 °F (37.1 °C) Axillary 66 21 98 % --   12/10/21 0503 -- -- -- -- -- -- 141 lb 1.5 oz (64 kg)   12/10/21 0500 (!) 105/55 -- -- 65 18 93 % --   12/10/21 0446 -- -- -- -- 23 94 % --   12/10/21 0400 132/65 99 °F (37.2 °C) Axillary 76 26 93 % --         Intake/Output Summary (Last 24 hours) at 12/10/2021 0930  Last data filed at 12/10/2021 0500  Gross per 24 hour   Intake 4350 ml   Output 1525 ml   Net 2825 ml     Wt Readings from Last 2 Encounters:   12/10/21 141 lb 1.5 oz (64 kg)     Body mass index is 20.84 kg/m².         PHYSICAL EXAMINATION:  Constitutional: Intubated and sedated  Neck: Supple, symmetrical, trachea midline, no jvd, skin normal  Respiratory: Rhonchi, ventilatory breath sounds, no wheezing/rales  Cardiovascular: regular rate and rhythm, normal S1, S2, no murmur noted   Abdomen: soft, nondistended, no masses or organomegaly  Extremities:  peripheral pulses normal, no pedal edema, no clubbing or cyanosis  Neurological: Intubated, sedated. No involuntary movements. Pupils reactive. No rigidity.  Today more lethargic. but  Off sedation, withdraws to pain ,following commands    MEDICATIONS:    Scheduled Meds:   QUEtiapine  25 mg Oral BID    clindamycin (CLEOCIN) IV  900 mg IntraVENous TID    LORazepam  2 mg Oral Q12H    FLUoxetine  40 mg Oral BID    famotidine  20 mg Per NG tube BID    insulin lispro  0-18 Units SubCUTAneous Q4H    fondaparinux  2.5 mg SubCUTAneous Daily    insulin glargine  10 Units SubCUTAneous Nightly    sodium chloride flush  5-40 mL IntraVENous 2 times per day     Continuous Infusions:   sodium chloride 75 mL/hr at 12/10/21 0557    dexmedetomidine 0.9 mcg/kg/hr (12/10/21 0809)    dextrose      sodium chloride      dextrose 5 % and 0.45 % NaCl Stopped (12/01/21 0000)     PRN Meds:   LORazepam, 2 mg, Q4H PRN  albuterol, 2.5 mg, Q4H PRN  glucose, 15 g, PRN  dextrose, 12.5 g, PRN  glucagon (rDNA), 1 mg, PRN  dextrose, 100 mL/hr, PRN  dextrose, 12.5 g, PRN  polyethylene glycol, 17 g, Daily PRN  sodium chloride flush, 5-40 mL, PRN  sodium chloride, 25 mL, PRN  ondansetron, 4 mg, Q8H PRN   Or  ondansetron, 4 mg, Q6H PRN  acetaminophen, 650 mg, Q6H PRN   Or  acetaminophen, 650 mg, Q6H PRN  dextrose 5 % and 0.45 % NaCl, , Continuous PRN          VENT SETTINGS (Comprehensive) (if applicable):  Vent Information  $Ventilation: $Subsequent Day  Skin Assessment: Clean, dry, & intact  Suction Catheter Diameter: 14  Equipment ID: [id number]  Equipment Changed: HME  Vent Type: Servo i  Vent Mode: (S) CPAP  Vt Ordered: 510 mL  Rate Set: 18 bmp  Pressure Support: (S) 8 cmH20  FiO2 : 40 %  SpO2: 97 %  SpO2/FiO2 ratio: 242.5  Sensitivity: 2  PEEP/CPAP: 5  I Time/ I Time %: 0.8 s  Humidification Source: HME  Nitric Oxide/Epoprostenol In Use?: No  Mask Type: Full face mask  Mask Size: Medium  Additional Respiratory  Assessments  Pulse: 68  Resp: 21  SpO2: 97 %  End Tidal CO2: 33 (%)  Position: Semi-Powell's  Humidification Source: HME  Oral Care Completed?: Yes  Oral Care: Mouth swabbed  Subglottic Suction Done?: No  Cuff Pressure (cm H2O):  (MLT)      Laboratory findings:    Complete Blood Count:   Recent Labs     12/08/21  0502 12/09/21  0415 12/10/21  0450   WBC 19.5* 20.0* 16.9*   HGB 9.7* 9.8* 9.1*   HCT 29.9* 33.7* 28.6*    329 497*        Last 3 Blood Glucose:   Recent Labs     12/08/21  0502 12/09/21  0415 12/10/21  0450   GLUCOSE 175* 137* 99        PT/INR:  No results found for: PROTIME, INR  PTT:  No results found for: APTT, PTT    Comprehensive Metabolic Profile:   Recent Labs     12/08/21  0502 12/09/21  0415 12/09/21  1235 12/10/21  0450    138  --  137   K 4.0 5.0  --  3.9   * 108*  --  108*   CO2 22 22  --  22   BUN 24* 17  --  18   CREATININE 0.60* 0.44*  --  0.48*   GLUCOSE 175* 137*  --  99   CALCIUM 8.0* 7.7*  --  7.7*   PROT  --   --  5.2*  --    LABALBU  --   --  1.6*  --    BILITOT  --   --  0.27*  --    ALKPHOS  --   --  110  --    AST  --   --  24  --    ALT  --   --  25  --       Magnesium:   Lab Results   Component Value Date    MG 1.9 12/03/2021     Phosphorus:   Lab Results   Component Value Date    PHOS 3.8 12/04/2021     Ionized Calcium:   Lab Results   Component Value Date    CAION 1.04 12/03/2021            PLAN:     Endocrine:  DKA with encephalopathy:  Resolved. Continue Lantus 10 u nightly with HDSS. On Diabetic TFs. HbA1c 11.6  Per family, takes Insulin daily, follows endocrinology, no recent sickness.   Doesn't drink alcohol  Marijuana prescription use    Pulmonary:  Aspiration pneumonia:  Intubated to protect the airway. CPAP trials daily. Tolerating. Continue Clinda. Respi Cx light growth of candida albicans, other Cx negative. WBC went down 20 >17K, afebrile    Neurological:  Acute metabolic encephalopathy:  - Intubated and wean down Precedex  - Sedation holidays, maintaining circardian rhythm  - keep ativan PRN only  - started seroquel 25 BID    Cardiovascular:  - BP controlled    GI/nutrition  Tube feeding @ 45ml/h. Fluids @30 ml/hr  Pepcid PO BID    GlycoLax as needed for bowel movements. Renal/fluids/electrolyte  - Good UO. 1.5 L  Fluids @30 ml/hr due to intravascular volume depletion    ID:   Aspiration pneumonia  completed Levo total of 7 days. Now on Clindamycin. Respi Cx light growth of candida albicans, other Cx negative. WBC went down 20 >17K, afebrile  ID following    Decubitus sacral ulcer  Wound care following    Hematological:  Hb stable. Platelets recovered  HIT panel negative  On fondaparinux. Radha Noriega MD      [facility] of [facility]         12/10/2021, 9:30 AM       Attending Physician Statement  I have discussed the care of Jessica Esquivel, including pertinent history and exam findings with the resident. I have reviewed the key elements of all parts of the encounter with the resident. I have seen and examined the patient with the resident. I agree with the assessment and plan and status of the problem list as documented. No fever overnight, stable hemodynamics, T max 99.7  On Precedex 0.9 and no oral ativan and had 1 dose of ativan Iv last night  UO 1.5 L in 24 hours  CXR seen from yesterday  Arousable follows commands and intermittently restless and agitative  WBC count 16.9 decreasing on clindamycin per ID  Vent PRVc/18/510/5/40%, ABG 7.46/35/52/22  Currently CPAP 8/5/40% and respiratory rate in around mid 20s  Wean precedex gradually to off post extubation.   Extubation today as tolerating CPAP support more arousable but restless. Discussed with nursing staff, treatment and plan discussed. Discussed with respiratory therapist.    Total critical care time caring for this patient with life threatening, unstable organ failure, including direct patient contact, management of life support systems, review of data including imaging and labs, discussions with other team members and physicians at least 28  Min so far today, excluding procedures. Please note that this chart was generated using voice recognition Dragon dictation software. Although every effort was made to ensure the accuracy of this automated transcription, some errors in transcription may have occurred.      Royce Mcintosh MD  12/10/2021 11:13 AM

## 2021-12-10 NOTE — PLAN OF CARE
Nutrition Problem #1: Inadequate oral intake  Intervention: Food and/or Nutrient Delivery: Start Oral Diet, Start Oral Nutrition Supplement (as able)  Nutritional Goals: Meet % of estimated nutrition needs.

## 2021-12-10 NOTE — PLAN OF CARE
Problem: Non-Violent Restraints  Goal: Removal from restraints as soon as assessed to be safe  12/10/2021 0426 by Wagner Christy RN  Outcome: Ongoing  12/9/2021 1435 by Marlin Vieira RN  Outcome: Ongoing  Goal: No harm/injury to patient while restraints in use  12/10/2021 0426 by Wagner Christy RN  Outcome: Ongoing  12/9/2021 1435 by Marlin Vieira RN  Outcome: Ongoing  Goal: Patient's dignity will be maintained  12/10/2021 0426 by Wagner Christy RN  Outcome: Ongoing  12/9/2021 1435 by Marlin Vieira RN  Outcome: Ongoing     Problem: Skin Integrity:  Goal: Will show no infection signs and symptoms  Description: Will show no infection signs and symptoms  12/10/2021 0426 by Wagner Christy RN  Outcome: Ongoing  12/9/2021 1435 by Marlin Vieira RN  Outcome: Ongoing  Goal: Absence of new skin breakdown  Description: Absence of new skin breakdown  Outcome: Ongoing     Problem: Falls - Risk of:  Goal: Will remain free from falls  Description: Will remain free from falls  Outcome: Ongoing  Goal: Absence of physical injury  Description: Absence of physical injury  Outcome: Ongoing     Problem: Fluid Volume - Imbalance:  Goal: Will remain free of signs and symptoms of dehydration  Description: Will remain free of signs and symptoms of dehydration  Outcome: Ongoing  Goal: Absence of imbalanced fluid volume signs and symptoms  Description: Absence of imbalanced fluid volume signs and symptoms  Outcome: Ongoing     Problem: Serum Glucose Level - Abnormal:  Goal: Ability to maintain appropriate glucose levels will improve  Description: Ability to maintain appropriate glucose levels will improve  Outcome: Ongoing     Problem: Injury - Acid Base Imbalance:  Goal: Acid-base balance  Description: Acid-base balance  Outcome: Ongoing     Problem: OXYGENATION/RESPIRATORY FUNCTION  Goal: Patient will maintain patent airway  12/10/2021 0426 by Wagner hCristy RN  Outcome: Ongoing  12/9/2021 2053 by Anahi Scherer JOJO  Outcome: Ongoing  Goal: Patient will achieve/maintain normal respiratory rate/effort  Description: Respiratory rate and effort will be within normal limits for the patient  12/10/2021 0426 by Tod Montoya RN  Outcome: Ongoing  12/9/2021 2053 by Luis Almeida RCP  Outcome: Ongoing     Problem: MECHANICAL VENTILATION  Goal: Patient will maintain patent airway  12/10/2021 0426 by Tod Montoya RN  Outcome: Ongoing  12/9/2021 2053 by Luis Almeida RCP  Outcome: Ongoing  Goal: Oral health is maintained or improved  12/10/2021 0426 by Tod Montoya RN  Outcome: Ongoing  12/9/2021 2053 by Luis Almeida RCP  Outcome: Ongoing  Goal: ET tube will be managed safely  12/10/2021 0426 by Tod Montoya RN  Outcome: Ongoing  12/9/2021 2053 by Luis Almeida RCP  Outcome: Ongoing  Goal: Ability to express needs and understand communication  12/10/2021 0426 by Tod Montoya RN  Outcome: Ongoing  12/9/2021 2053 by Luis Almeida RCP  Outcome: Ongoing  Goal: Mobility/activity is maintained at optimum level for patient  12/10/2021 0426 by Tod Montoya RN  Outcome: Ongoing  12/9/2021 2053 by Luis Almeida RCP  Outcome: Ongoing     Problem: SKIN INTEGRITY  Goal: Skin integrity is maintained or improved  12/10/2021 0426 by Tod Montoya RN  Outcome: Ongoing  12/9/2021 2053 by Luis Almeida RCP  Outcome: Ongoing     Problem: NUTRITION  Goal: Nutritional status is improving  Outcome: Ongoing     Problem: Nutrition  Goal: Optimal nutrition therapy  Outcome: Ongoing

## 2021-12-10 NOTE — PLAN OF CARE
Problem: Skin Integrity:  Goal: Will show no infection signs and symptoms  Description: Will show no infection signs and symptoms  12/10/2021 1436 by David Carolina RN  Outcome: Ongoing     Problem: Falls - Risk of:  Goal: Will remain free from falls  Description: Will remain free from falls  12/10/2021 1436 by David Carolina RN  Outcome: Ongoing     Problem: Falls - Risk of:  Goal: Absence of physical injury  Description: Absence of physical injury  12/10/2021 1436 by David Carolina RN  Outcome: Ongoing     Problem: Serum Glucose Level - Abnormal:  Goal: Ability to maintain appropriate glucose levels will improve  Description: Ability to maintain appropriate glucose levels will improve  12/10/2021 1436 by David Carolina RN  Outcome: Ongoing     Problem: SKIN INTEGRITY  Goal: Skin integrity is maintained or improved  12/10/2021 1436 by David Carolina RN  Outcome: Ongoing     Problem: NUTRITION  Goal: Nutritional status is improving  12/10/2021 1436 by David Carolina RN  Outcome: Ongoing     Problem: Nutrition  Goal: Optimal nutrition therapy  12/10/2021 1436 by David Carolina RN  Outcome: Ongoing     Problem: Non-Violent Restraints  Goal: Removal from restraints as soon as assessed to be safe  12/10/2021 1436 by David Carolina RN  Outcome: Completed     Problem: Non-Violent Restraints  Goal: No harm/injury to patient while restraints in use  12/10/2021 1436 by David Carolina RN  Outcome: Completed     Problem: Non-Violent Restraints  Goal: Patient's dignity will be maintained  12/10/2021 1436 by David Carolina RN  Outcome: Completed

## 2021-12-10 NOTE — PROGRESS NOTES
Order obtained for extubation. SpO2 of 98 on 40% FiO2. Patient extubated and placed on 6 liters/min via nasal cannula. Post extubation SpO2 is 94% with HR  98 bpm and RR 21 breaths/min. Patient had strong cough that was productive of white sputum. Extubation Well tolerated by patient. .   Breath Sounds: clear    CAITLIN AGUILAR RCP   11:36 AM

## 2021-12-11 LAB
-: ABNORMAL
ABSOLUTE EOS #: 0 K/UL (ref 0–0.44)
ABSOLUTE IMMATURE GRANULOCYTE: 0.24 K/UL (ref 0–0.3)
ABSOLUTE LYMPH #: 1.9 K/UL (ref 1.1–3.7)
ABSOLUTE MONO #: 1.9 K/UL (ref 0.1–1.2)
AMORPHOUS: ABNORMAL
ANION GAP SERPL CALCULATED.3IONS-SCNC: 15 MMOL/L (ref 9–17)
BACTERIA: ABNORMAL
BASOPHILS # BLD: 0 % (ref 0–2)
BASOPHILS ABSOLUTE: 0 K/UL (ref 0–0.2)
BILIRUBIN URINE: NEGATIVE
BUN BLDV-MCNC: 14 MG/DL (ref 6–20)
BUN/CREAT BLD: ABNORMAL (ref 9–20)
CALCIUM SERPL-MCNC: 7.9 MG/DL (ref 8.6–10.4)
CASTS UA: ABNORMAL /LPF (ref 0–2)
CHLORIDE BLD-SCNC: 108 MMOL/L (ref 98–107)
CO2: 20 MMOL/L (ref 20–31)
COLOR: YELLOW
CREAT SERPL-MCNC: 0.43 MG/DL (ref 0.7–1.2)
CRYSTALS, UA: ABNORMAL /HPF
DIFFERENTIAL TYPE: ABNORMAL
EOSINOPHILS RELATIVE PERCENT: 0 % (ref 1–4)
EPITHELIAL CELLS UA: ABNORMAL /HPF (ref 0–5)
GFR AFRICAN AMERICAN: >60 ML/MIN
GFR NON-AFRICAN AMERICAN: >60 ML/MIN
GFR SERPL CREATININE-BSD FRML MDRD: ABNORMAL ML/MIN/{1.73_M2}
GFR SERPL CREATININE-BSD FRML MDRD: ABNORMAL ML/MIN/{1.73_M2}
GLUCOSE BLD-MCNC: 107 MG/DL (ref 75–110)
GLUCOSE BLD-MCNC: 117 MG/DL (ref 75–110)
GLUCOSE BLD-MCNC: 125 MG/DL (ref 75–110)
GLUCOSE BLD-MCNC: 130 MG/DL (ref 70–99)
GLUCOSE BLD-MCNC: 169 MG/DL (ref 75–110)
GLUCOSE BLD-MCNC: 180 MG/DL (ref 75–110)
GLUCOSE BLD-MCNC: 180 MG/DL (ref 75–110)
GLUCOSE BLD-MCNC: 183 MG/DL (ref 75–110)
GLUCOSE BLD-MCNC: 209 MG/DL (ref 75–110)
GLUCOSE URINE: ABNORMAL
HCT VFR BLD CALC: 32.2 % (ref 40.7–50.3)
HEMOGLOBIN: 10.6 G/DL (ref 13–17)
IMMATURE GRANULOCYTES: 1 %
KETONES, URINE: ABNORMAL
LACTIC ACID, SEPSIS WHOLE BLOOD: 1.7 MMOL/L (ref 0.5–1.9)
LACTIC ACID, SEPSIS: NORMAL MMOL/L (ref 0.5–1.9)
LEUKOCYTE ESTERASE, URINE: NEGATIVE
LYMPHOCYTES # BLD: 8 % (ref 24–43)
MCH RBC QN AUTO: 29.4 PG (ref 25.2–33.5)
MCHC RBC AUTO-ENTMCNC: 32.9 G/DL (ref 28.4–34.8)
MCV RBC AUTO: 89.4 FL (ref 82.6–102.9)
MONOCYTES # BLD: 8 % (ref 3–12)
MORPHOLOGY: NORMAL
MUCUS: ABNORMAL
NITRITE, URINE: NEGATIVE
NRBC AUTOMATED: 0 PER 100 WBC
OTHER OBSERVATIONS UA: ABNORMAL
PDW BLD-RTO: 13.7 % (ref 11.8–14.4)
PH UA: 5.5 (ref 5–8)
PLATELET # BLD: 790 K/UL (ref 138–453)
PLATELET ESTIMATE: ABNORMAL
PMV BLD AUTO: 10.3 FL (ref 8.1–13.5)
POTASSIUM SERPL-SCNC: 3.9 MMOL/L (ref 3.7–5.3)
PROTEIN UA: ABNORMAL
RBC # BLD: 3.6 M/UL (ref 4.21–5.77)
RBC # BLD: ABNORMAL 10*6/UL
RBC UA: ABNORMAL /HPF (ref 0–2)
RENAL EPITHELIAL, UA: ABNORMAL /HPF
SEG NEUTROPHILS: 83 % (ref 36–65)
SEGMENTED NEUTROPHILS ABSOLUTE COUNT: 19.76 K/UL (ref 1.5–8.1)
SODIUM BLD-SCNC: 143 MMOL/L (ref 135–144)
SPECIFIC GRAVITY UA: 1.03 (ref 1–1.03)
TRICHOMONAS: ABNORMAL
TURBIDITY: CLEAR
URINE HGB: NEGATIVE
UROBILINOGEN, URINE: NORMAL
WBC # BLD: 23.8 K/UL (ref 3.5–11.3)
WBC # BLD: ABNORMAL 10*3/UL
WBC UA: ABNORMAL /HPF (ref 0–5)
YEAST: ABNORMAL

## 2021-12-11 PROCEDURE — 80048 BASIC METABOLIC PNL TOTAL CA: CPT

## 2021-12-11 PROCEDURE — 2500000003 HC RX 250 WO HCPCS: Performed by: INTERNAL MEDICINE

## 2021-12-11 PROCEDURE — 6370000000 HC RX 637 (ALT 250 FOR IP): Performed by: INTERNAL MEDICINE

## 2021-12-11 PROCEDURE — 6370000000 HC RX 637 (ALT 250 FOR IP): Performed by: STUDENT IN AN ORGANIZED HEALTH CARE EDUCATION/TRAINING PROGRAM

## 2021-12-11 PROCEDURE — 99233 SBSQ HOSP IP/OBS HIGH 50: CPT | Performed by: INTERNAL MEDICINE

## 2021-12-11 PROCEDURE — 87086 URINE CULTURE/COLONY COUNT: CPT

## 2021-12-11 PROCEDURE — 2500000003 HC RX 250 WO HCPCS

## 2021-12-11 PROCEDURE — 85025 COMPLETE CBC W/AUTO DIFF WBC: CPT

## 2021-12-11 PROCEDURE — 1200000000 HC SEMI PRIVATE

## 2021-12-11 PROCEDURE — 83605 ASSAY OF LACTIC ACID: CPT

## 2021-12-11 PROCEDURE — 2580000003 HC RX 258: Performed by: HEALTH CARE PROVIDER

## 2021-12-11 PROCEDURE — 36415 COLL VENOUS BLD VENIPUNCTURE: CPT

## 2021-12-11 PROCEDURE — 99232 SBSQ HOSP IP/OBS MODERATE 35: CPT | Performed by: INTERNAL MEDICINE

## 2021-12-11 PROCEDURE — 99291 CRITICAL CARE FIRST HOUR: CPT | Performed by: INTERNAL MEDICINE

## 2021-12-11 PROCEDURE — 6360000002 HC RX W HCPCS: Performed by: STUDENT IN AN ORGANIZED HEALTH CARE EDUCATION/TRAINING PROGRAM

## 2021-12-11 PROCEDURE — 86403 PARTICLE AGGLUT ANTBDY SCRN: CPT

## 2021-12-11 PROCEDURE — 87186 SC STD MICRODIL/AGAR DIL: CPT

## 2021-12-11 PROCEDURE — 92610 EVALUATE SWALLOWING FUNCTION: CPT

## 2021-12-11 PROCEDURE — 82947 ASSAY GLUCOSE BLOOD QUANT: CPT

## 2021-12-11 PROCEDURE — 81001 URINALYSIS AUTO W/SCOPE: CPT

## 2021-12-11 PROCEDURE — 87040 BLOOD CULTURE FOR BACTERIA: CPT

## 2021-12-11 RX ORDER — LABETALOL HYDROCHLORIDE 5 MG/ML
5 INJECTION, SOLUTION INTRAVENOUS EVERY 6 HOURS PRN
Status: DISCONTINUED | OUTPATIENT
Start: 2021-12-11 | End: 2021-12-13 | Stop reason: HOSPADM

## 2021-12-11 RX ORDER — LISINOPRIL 10 MG/1
10 TABLET ORAL DAILY
Status: DISCONTINUED | OUTPATIENT
Start: 2021-12-11 | End: 2021-12-12

## 2021-12-11 RX ORDER — LABETALOL HYDROCHLORIDE 5 MG/ML
INJECTION, SOLUTION INTRAVENOUS
Status: COMPLETED
Start: 2021-12-11 | End: 2021-12-11

## 2021-12-11 RX ADMIN — CLINDAMYCIN PHOSPHATE 900 MG: 900 INJECTION, SOLUTION INTRAVENOUS at 14:27

## 2021-12-11 RX ADMIN — INSULIN GLARGINE 10 UNITS: 100 INJECTION, SOLUTION SUBCUTANEOUS at 20:42

## 2021-12-11 RX ADMIN — CLINDAMYCIN PHOSPHATE 900 MG: 900 INJECTION, SOLUTION INTRAVENOUS at 08:47

## 2021-12-11 RX ADMIN — INSULIN LISPRO 3 UNITS: 100 INJECTION, SOLUTION INTRAVENOUS; SUBCUTANEOUS at 12:00

## 2021-12-11 RX ADMIN — Medication 5 MG: at 05:06

## 2021-12-11 RX ADMIN — INSULIN LISPRO 3 UNITS: 100 INJECTION, SOLUTION INTRAVENOUS; SUBCUTANEOUS at 00:08

## 2021-12-11 RX ADMIN — LISINOPRIL 10 MG: 10 TABLET ORAL at 17:20

## 2021-12-11 RX ADMIN — INSULIN LISPRO 3 UNITS: 100 INJECTION, SOLUTION INTRAVENOUS; SUBCUTANEOUS at 17:20

## 2021-12-11 RX ADMIN — LABETALOL HYDROCHLORIDE 5 MG: 5 INJECTION, SOLUTION INTRAVENOUS at 05:06

## 2021-12-11 RX ADMIN — CLINDAMYCIN PHOSPHATE 900 MG: 900 INJECTION, SOLUTION INTRAVENOUS at 20:41

## 2021-12-11 RX ADMIN — QUETIAPINE FUMARATE 25 MG: 25 TABLET ORAL at 09:13

## 2021-12-11 RX ADMIN — LORAZEPAM 0.5 MG: 2 INJECTION INTRAMUSCULAR; INTRAVENOUS at 04:39

## 2021-12-11 RX ADMIN — FLUOXETINE HYDROCHLORIDE 40 MG: 20 CAPSULE ORAL at 20:42

## 2021-12-11 RX ADMIN — FLUOXETINE HYDROCHLORIDE 40 MG: 20 CAPSULE ORAL at 09:14

## 2021-12-11 RX ADMIN — FONDAPARINUX SODIUM 2.5 MG: 2.5 INJECTION, SOLUTION SUBCUTANEOUS at 08:46

## 2021-12-11 RX ADMIN — ENOXAPARIN SODIUM 40 MG: 100 INJECTION SUBCUTANEOUS at 21:01

## 2021-12-11 RX ADMIN — SODIUM CHLORIDE, PRESERVATIVE FREE 10 ML: 5 INJECTION INTRAVENOUS at 08:46

## 2021-12-11 RX ADMIN — FAMOTIDINE 20 MG: 20 TABLET, FILM COATED ORAL at 09:14

## 2021-12-11 RX ADMIN — FAMOTIDINE 20 MG: 20 TABLET, FILM COATED ORAL at 20:41

## 2021-12-11 RX ADMIN — INSULIN LISPRO 6 UNITS: 100 INJECTION, SOLUTION INTRAVENOUS; SUBCUTANEOUS at 20:42

## 2021-12-11 RX ADMIN — QUETIAPINE FUMARATE 25 MG: 25 TABLET ORAL at 20:42

## 2021-12-11 ASSESSMENT — PAIN SCALES - GENERAL
PAINLEVEL_OUTOF10: 0

## 2021-12-11 NOTE — PLAN OF CARE
Problem: Skin Integrity:  Goal: Will show no infection signs and symptoms  Description: Will show no infection signs and symptoms  12/11/2021 1734 by Lata Lewis RN  Outcome: Ongoing  12/11/2021 0939 by Christiano Gaona RN  Outcome: Ongoing  Goal: Absence of new skin breakdown  Description: Absence of new skin breakdown  12/11/2021 1734 by Lata Lewis RN  Outcome: Ongoing  12/11/2021 0939 by Christiano Gaona RN  Outcome: Ongoing     Problem: Falls - Risk of:  Goal: Will remain free from falls  Description: Will remain free from falls  12/11/2021 1734 by Lata Lewis RN  Outcome: Ongoing  12/11/2021 0939 by Christiano Gaona RN  Outcome: Ongoing  Goal: Absence of physical injury  Description: Absence of physical injury  12/11/2021 1734 by Lata Lewis RN  Outcome: Ongoing  12/11/2021 0939 by Christiano Gaona RN  Outcome: Ongoing     Problem: Fluid Volume - Imbalance:  Goal: Will remain free of signs and symptoms of dehydration  Description: Will remain free of signs and symptoms of dehydration  Outcome: Ongoing  Goal: Absence of imbalanced fluid volume signs and symptoms  Description: Absence of imbalanced fluid volume signs and symptoms  Outcome: Ongoing     Problem: Serum Glucose Level - Abnormal:  Goal: Ability to maintain appropriate glucose levels will improve  Description: Ability to maintain appropriate glucose levels will improve  12/11/2021 1734 by Lata Lewis RN  Outcome: Ongoing  12/11/2021 0939 by Christiano Gaona RN  Outcome: Ongoing     Problem: Injury - Acid Base Imbalance:  Goal: Acid-base balance  Description: Acid-base balance  Outcome: Ongoing     Problem: OXYGENATION/RESPIRATORY FUNCTION  Goal: Patient will maintain patent airway  Outcome: Ongoing  Goal: Patient will achieve/maintain normal respiratory rate/effort  Description: Respiratory rate and effort will be within normal limits for the patient  Outcome: Ongoing     Problem: SKIN INTEGRITY  Goal: Skin integrity is maintained or

## 2021-12-11 NOTE — PROGRESS NOTES
Attending Physician Statement  Please refer to resident note for details. I have discussed the case, including pertinent history and exam findings with the resident and the team.  I have seen and examined the patient and the key elements of the encounter have been performed by me. I agree with the assessment, plan and orders as documented by the resident. Review of Systems:   In addition to the pertinent positives and negatives as stated within HPI and the review of systems as documented in their notes, all other systems were reviewed when able to and are reported negative. 63years old gentleman was initially admitted to ICU for altered mental status. He was found with diabetic ketoacidosis. He was initiated on insulin drip. He is currently managed with Lantus. He was intubated and then extubated. He was also found to be having aspiration pneumonia and was on Levaquin and currently today is the last dose of clindamycin. Infectious disease is following. Patient is following all the commands. Blood sugar is stable. However he had his swallow study today and once he started eating more, his insulin dose needs to be adjusted. Blood pressure is on the higher side and resume home dose of lisinopril  Monitor platelets  ID follow nephrology recommendations. Check blood cultures and urine culture.   Monitor vitals  Add incentive spirometry        Yue Grossman MD  Attending Physician, Internal Medicine Service    Internal Medicine Residency Program  12/11/2021, 4:22 PM

## 2021-12-11 NOTE — PROGRESS NOTES
[facility] - Progress Note    Today's Date and Time: 12/11/2021, 9:27 AM    Impression :   · Leukocytosis worsening  · Concern for aspiration pneumonia  · DM I  · DKA  · Altered mentation  · Acute hypoxic respiratory failure  · Sepsis high risk. Recommendations:   · Continue clindamycin. Stop date 12-11-21  · Follow up Lactate sepsis, and blood cluture results due to new concern for sepsis. Medical Decision Making/Summary/Discussion:12/11/2021     ·   Infection Control Recommendations   · [place] Precautions      Antimicrobial Stewardship Recommendations     · Discontinuation of therapy  Coordination of Outpatient Care:   · Estimated Length of IV antimicrobials: TBD  · Patient will need Midline Catheter Insertion: no  · Patient will need PICC line Insertion:No  · Patient will need: Home IV , [place],  SNF,  LTAC: TBD  · Patient will need outpatient wound care:Yes    Chief complaint/reason for consultation:   · Leukocytosis with fevers. Aspiration pneumonia      History of Present Illness:   Roger Martino is a 63y.o.-year-old male who was initially admitted on 11/30/2021. Patient seen at the request of Dr. Jimy Daley:    This patient with type 1 diabetes, initially presented to the ED on 11/30/2021 with altered mentation. According to the EHR, he had not been compliant with his insulin for the 3 days prior. Further work up revealed that the patient was experiencing toxic metabolic encephalopathy because of DKA. Lab work revealed:pH of 6.9, PCO2 22, bicarb 4.5 and a blood sugar over 700. His potassium was also elevated at 6.2 with peaked T waves on EKG. WBC was 18.1. Covid swab and MRSA nares were negative    He was also hypotensive and started on norepinephrine and intubated for airway protection. Levaquin and Clindamycin were initiated 12/1/21 for worsening CXR and concern for aspiration pneumonia.      Levaquin has since been discontinued out of concern for increased QT interval    Cultures have remained negative and CXR shows imrovement. Leukocytosis continues as do the fevers    CURRENT EVALUATION 12/11/2021     Patient evaluated and examined in the ICU. TMAX 99.5  VS stable  Blood pressure labile      Extubated  RR 22-->21-->24  02 sat 92-->97-->96      Labs, X rays reviewed: 12/11/2021    BUN:24-->18  Cr:0.6-->0.48    WBC:15.9-->19.5-->20-->16.9-->23.8  Hb:9.7-->9.8-->9.1-->10.6  Plat: 362-->329-->497-->790    Legionella and strep pneumo not detected    Cultures:  Urine:  · 11/6/21: No bacterial growth  Blood:  · 12/7/21 No growth   Suctioned Sputum :  · 12/7/21: PRESUMPTIVE CANDIDA ALBICANS LIGHT GROWTH Abnormal   Wound:  ·     IMAGING   12/6/21        Discussed with patient, RN, CC. I have personally reviewed the past medical history, past surgical history, medications, social history, and family history, and I have updated the database accordingly. Past Medical History:     Past Medical History:   Diagnosis Date    Diabetes mellitus ([facility] [address]     Hypertension        Past Surgical  History:   History reviewed. No pertinent surgical history.     Medications:      QUEtiapine  25 mg Oral BID    clindamycin (CLEOCIN) IV  900 mg IntraVENous TID    FLUoxetine  40 mg Oral BID    famotidine  20 mg Per NG tube BID    insulin lispro  0-18 Units SubCUTAneous Q4H    fondaparinux  2.5 mg SubCUTAneous Daily    insulin glargine  10 Units SubCUTAneous Nightly    sodium chloride flush  5-40 mL IntraVENous 2 times per day       Social History:     Social History     Socioeconomic History    Marital status:      Spouse name: Not on file    Number of children: Not on file    Years of education: Not on file    Highest education level: Not on file   Occupational History    Not on file   Tobacco Use    Smoking status: Never Smoker    Smokeless tobacco: Never Used   Vaping Use    Vaping Use: Never used   Substance and Sexual Activity    Alcohol use: Not Currently    Drug use: Yes     Types: Marijuana Loida Mauro)     Comment: uses edibles nightly     Sexual activity: Not on file   Other Topics Concern    Not on file   Social History Narrative    Not on file     Social Determinants of Health     Financial Resource Strain:     Difficulty of Paying Living Expenses: Not on file   Food Insecurity:     Worried About Running Out of Food in the Last Year: Not on file    [place] of Food in the Last Year: Not on file   Transportation Needs:     Lack of Transportation (Medical): Not on file    Lack of Transportation (Non-Medical): Not on file   Physical Activity:     Days of Exercise per Week: Not on file    Minutes of Exercise per Session: Not on file   Stress:     Feeling of Stress : Not on file   Social Connections:     Frequency of Communication with Friends and Family: Not on file    Frequency of Social Gatherings with Friends and Family: Not on file    Attends Buddhism Services: Not on file    Active Member of [address] [place] [address] or Organizations: Not on file    Attends Club or Organization Meetings: Not on file    Marital Status: Not on file   Intimate Partner Violence:     Fear of Current or Ex-Partner: Not on file    Emotionally Abused: Not on file    Physically Abused: Not on file    Sexually Abused: Not on file   Housing Stability:     Unable to Pay for Housing in the Last Year: Not on file    Number of [place] in the Last Year: Not on file    Unstable Housing in the Last Year: Not on file       Family History:   History reviewed. No pertinent family history. Allergies:   Patient has no known allergies. Review of Systems:     AURA-intubated and sedated 12/11/2021      Constitutional: No fevers or chills. No systemic complaints  Head: No headaches  Eyes: No double vision or blurry vision. No conjunctival inflammation. ENT: No sore throat or runny nose. . No hearing loss, tinnitus or vertigo.   Cardiovascular: No chest pain or palpitations. No shortness of breath. No MOLINA  Lung: No shortness of breath or cough. No sputum production  Abdomen: No nausea, vomiting, diarrhea, or abdominal pain. Eric Power No cramps. Genitourinary: No increased urinary frequency, or dysuria. No hematuria. No suprapubic or CVA pain  Musculoskeletal: No muscle aches or pains. No joint effusions, swelling or deformities  Hematologic: No bleeding or bruising. Neurologic: No headache, weakness, numbness, or tingling. Integument: No rash, no ulcers. Psychiatric: No depression. Endocrine: No polyuria, no polydipsia, no polyphagia. Physical Examination :     Patient Vitals for the past 8 hrs:   BP Temp Temp src Pulse Resp SpO2 Weight   12/11/21 0800 -- 99.5 °F (37.5 °C) -- -- -- -- --   12/11/21 0700 (!) 151/67 -- -- 100 24 96 % --   12/11/21 0600 (!) 161/64 99.1 °F (37.3 °C) Oral 96 17 95 % --   12/11/21 0530 (!) 153/70 -- -- 95 20 -- --   12/11/21 0500 (!) 178/81 -- -- 118 28 93 % --   12/11/21 0451 -- -- -- -- -- -- 139 lb 5.3 oz (63.2 kg)   12/11/21 0400 (!) 158/72 99.3 °F (37.4 °C) Axillary 110 15 96 % --   12/11/21 0300 (!) 162/69 -- -- 103 19 95 % --   12/11/21 0200 (!) 140/71 99.5 °F (37.5 °C) Oral 108 18 95 % --     General Appearance: Intubated and sedated  Head:  Normocephalic, no trauma  Eyes: Pupils equal, round, reactive to light; sclera anicteric; conjunctivae pink. No embolic phenomena. ENT: Oropharynx clear, without erythema, exudate, or thrush. No tenderness of sinuses. Mouth/throat: mucosa pink and moist. No lesions. Dentition in good repair. Neck:Supple, without lymphadenopathy. Thyroid normal, No bruits. Pulmonary/Chest: Clear to auscultation, without wheezes, rales, or rhonchi. No dullness to percussion. Cardiovascular: Regular rate and rhythm without murmurs, rubs, or gallops. Abdomen: Soft, non tender. Bowel sounds normal. No organomegaly  All four Extremities: No cyanosis, clubbing, edema, or effusions.   Neurologic: Not following commands at this time  Skin: Warm and dry with good turgor. No signs of peripheral arterial or venous insufficiency. No ulcerations. Scattered bruising. Deep tissue injury to coccyx    Medical Decision Making -Laboratory:   I have independently reviewed/ordered the following labs:    CBC with Differential:   Recent Labs     12/10/21  0450 12/11/21  0740   WBC 16.9* 23.8*   HGB 9.1* 10.6*   HCT 28.6* 32.2*   * 790*   LYMPHOPCT 14* PENDING   MONOPCT 9 PENDING     BMP:   Recent Labs     12/10/21  0450 12/11/21  0740    143   K 3.9 3.9   * 108*   CO2 22 20   BUN 18 14   CREATININE 0.48* 0.43*     Hepatic Function Panel:   Recent Labs     12/09/21  1235   PROT 5.2*   LABALBU 1.6*   BILIDIR <0.08   IBILI Can not be calculated   BILITOT 0.27*   ALKPHOS 110   ALT 25   AST 24     No results for input(s): RPR in the last 72 hours. No results for input(s): HIV in the last 72 hours. No results for input(s): BC in the last 72 hours.   Lab Results   Component Value Date    MUCUS NOT REPORTED 12/07/2021    RBC 3.60 12/11/2021    TRICHOMONAS NOT REPORTED 12/07/2021    WBC 23.8 12/11/2021    YEAST NOT REPORTED 12/07/2021    TURBIDITY Clear 12/07/2021     Lab Results   Component Value Date    CREATININE 0.43 12/11/2021    GLUCOSE 130 12/11/2021       Medical Decision Making-Imaging:     Narrative   EXAMINATION:   ONE XRAY VIEW OF THE CHEST       12/6/2021 10:55 am       COMPARISON:   December 4, 2021       HISTORY:   ORDERING SYSTEM PROVIDED HISTORY: evaluate pulmonary opacities   TECHNOLOGIST PROVIDED HISTORY:   evaluate pulmonary opacities       FINDINGS:   Satisfactory position of the endotracheal and enteric tubes.       There has been interval clearing of the parenchymal consolidation in the   lateral left base.  Otherwise, diffuse interstitial and somewhat ground-glass   opacities in both lungs remain as well as left retrocardiac consolidation.       No sizable pleural effusion.  No pneumothorax.       Stable cardiomediastinal silhouette.           Impression   Interval clearing of the parenchymal consolidation in the lateral left base.       Otherwise, diffuse bilateral interstitial and somewhat ground-glass opacities   remain as well as left retrocardiac consolidation.               Medical Decision [id number]:       Medical Decision Making-Other:     Note:  · Labs, medications, radiologic studies were reviewed with personal review of films  · Large amounts of data were reviewed  · Discussed with nursing Staff, Discharge planner  · Infection Control and Prevention measures reviewed  · All prior entries were reviewed  · Administer medications as ordered  · Prognosis: Guarded  · Discharge planning reviewed    Thank you for allowing us to participate in the care of this patient. Please call with questions. Maya Curiel MD  Infectious Disease service  PGY-1 Internal Medicine residency program  [facility]    ATTESTATION:    I have discussed the case, including pertinent history and exam findings with the residents and students. I have seen and examined the patient and the key elements of the encounter have been performed by me. I was present when the student obtained his information or examined the patient. I have reviewed the laboratory data, other diagnostic studies and discussed them with the residents. I have updated the medical record where necessary. I agree with the assessment, plan and orders as documented by the resident/ student.     Todd Weaver MD.    Pager: [phone] - Office: [phone]

## 2021-12-11 NOTE — PROGRESS NOTES
Critical Care Team - Daily Progress Note      Date and time: 2021 8:43 AM  Patient's name:  Jorgito Castro  Medical Record Number: [id number]  Patient's account/billing number: [de-identified]  Patient's YOB: 1960  Age: 63 y.o. Date of Admission: 2021  7:39 AM  Length of stay during current admission: 11      Primary Care Physician: Corky Marie    Code Status: Full Code    Reason for ICU admission:  DKA with coma      SUBJECTIVE:     OVERNIGHT EVENTS:    Patient extubated   Hemodynamically stable with bp 151/67. on labetalol q 6 prn. On lisinopril 10- at home   Wbc trending upward with 16.9>23.8 . tmax of 99.9. on clindamycin . last dose today . Bc,urine culture negative   uo 3.3 l; in last 24 hour . Not on any lasix  On  lantus 10 U nightly and high dose sliding scale   Respi Cx light growth of candida albicans, other Cx negative. OBJECTIVE:     VITAL SIGNS:  BP (!) 151/67   Pulse 100   Temp 99.1 °F (37.3 °C) (Oral)   Resp 24   Ht 5' 9\" (1.753 m)   Wt 139 lb 5.3 oz (63.2 kg)   SpO2 96%   BMI 20.58 kg/m²   Tmax over 24 hours:  Temp (24hrs), Av.5 °F (37.5 °C), Min:99 °F (37.2 °C), Max:100.3 °F (37.9 °C)      Patient Vitals for the past 6 hrs:   BP Temp Temp src Pulse Resp SpO2 Weight   21 0700 (!) 151/67 -- -- 100 24 96 % --   21 0600 (!) 161/64 99.1 °F (37.3 °C) Oral 96 17 95 % --   21 0530 (!) 153/70 -- -- 95 20 -- --   21 0500 (!) 178/81 -- -- 118 28 93 % --   21 0451 -- -- -- -- -- -- 139 lb 5.3 oz (63.2 kg)   21 0400 (!) 158/72 99.3 °F (37.4 °C) Axillary 110 15 96 % --   21 0300 (!) 162/69 -- -- 103 19 95 % --         Intake/Output Summary (Last 24 hours) at 2021 0843  Last data filed at 2021 0600  Gross per 24 hour   Intake 1153.98 ml   Output 3200 ml   Net -2046.02 ml     Wt Readings from Last 2 Encounters:   21 139 lb 5.3 oz (63.2 kg)     Body mass index is 20.58 kg/m².         PHYSICAL EXAMINATION:  Constitutional: Intubated and sedated  Neck: Supple, symmetrical, trachea midline, no jvd, skin normal  Respiratory: Rhonchi, ventilatory breath sounds, no wheezing/rales  Cardiovascular: regular rate and rhythm, normal S1, S2, no murmur noted   Abdomen: soft, nondistended, no masses or organomegaly  Extremities:  peripheral pulses normal, no pedal edema, no clubbing or cyanosis  Neurological: Intubated, sedated. No involuntary movements. Pupils reactive. No rigidity.  Today more lethargic. but  Off sedation, withdraws to pain ,following commands    MEDICATIONS:    Scheduled Meds:   QUEtiapine  25 mg Oral BID    clindamycin (CLEOCIN) IV  900 mg IntraVENous TID    FLUoxetine  40 mg Oral BID    famotidine  20 mg Per NG tube BID    insulin lispro  0-18 Units SubCUTAneous Q4H    fondaparinux  2.5 mg SubCUTAneous Daily    insulin glargine  10 Units SubCUTAneous Nightly    sodium chloride flush  5-40 mL IntraVENous 2 times per day     Continuous Infusions:   sodium chloride 30 mL/hr at 12/10/21 1042    dexmedetomidine Stopped (12/10/21 1826)    dextrose      sodium chloride      dextrose 5 % and 0.45 % NaCl Stopped (12/01/21 0000)     PRN Meds:   labetalol, 5 mg, Q6H PRN  LORazepam, 0.5 mg, Q4H PRN  albuterol, 2.5 mg, Q4H PRN  glucose, 15 g, PRN  dextrose, 12.5 g, PRN  glucagon (rDNA), 1 mg, PRN  dextrose, 100 mL/hr, PRN  dextrose, 12.5 g, PRN  polyethylene glycol, 17 g, Daily PRN  sodium chloride flush, 5-40 mL, PRN  sodium chloride, 25 mL, PRN  ondansetron, 4 mg, Q8H PRN   Or  ondansetron, 4 mg, Q6H PRN  acetaminophen, 650 mg, Q6H PRN   Or  acetaminophen, 650 mg, Q6H PRN  dextrose 5 % and 0.45 % NaCl, , Continuous PRN          VENT SETTINGS (Comprehensive) (if applicable):  Vent Information  $Ventilation: $Subsequent Day  Skin Assessment: Clean, dry, & intact  Suction Catheter Diameter: 14  Equipment ID: [id number]  Equipment Changed: HME  Vent Type: Servo i  Vent Mode: (S) CPAP  Vt Ordered: 510 mL  Rate Set: 14 bmp  Pressure Support: 8 cmH20  FiO2 : 40 %  SpO2: 96 %  SpO2/FiO2 ratio: 245  Sensitivity: 2  PEEP/CPAP: 5  I Time/ I Time %: 0.8 s  Humidification Source: HME  Nitric Oxide/Epoprostenol In Use?: No  Mask Type: Full face mask  Mask Size: Medium  Additional Respiratory  Assessments  Pulse: 100  Resp: 24  SpO2: 96 %  End Tidal CO2: 33 (%)  Position: Semi-Powell's  Humidification Source: HME  Oral Care Completed?: Yes  Oral Care: Mouth swabbed  Subglottic Suction Done?: No  Cuff Pressure (cm H2O):  (MLT)      Laboratory findings:    Complete Blood Count:   Recent Labs     12/09/21  0415 12/10/21  0450 12/11/21  0740   WBC 20.0* 16.9* 23.8*   HGB 9.8* 9.1* 10.6*   HCT 33.7* 28.6* 32.2*    497* 790*        Last 3 Blood Glucose:   Recent Labs     12/09/21  0415 12/10/21  0450   GLUCOSE 137* 99        PT/INR:  No results found for: PROTIME, INR  PTT:  No results found for: APTT, PTT    Comprehensive Metabolic Profile:   Recent Labs     12/09/21  0415 12/09/21  1235 12/10/21  0450     --  137   K 5.0  --  3.9   *  --  108*   CO2 22  --  22   BUN 17  --  18   CREATININE 0.44*  --  0.48*   GLUCOSE 137*  --  99   CALCIUM 7.7*  --  7.7*   PROT  --  5.2*  --    LABALBU  --  1.6*  --    BILITOT  --  0.27*  --    ALKPHOS  --  110  --    AST  --  24  --    ALT  --  25  --       Magnesium:   Lab Results   Component Value Date    MG 1.9 12/03/2021     Phosphorus:   Lab Results   Component Value Date    PHOS 3.8 12/04/2021     Ionized Calcium:   Lab Results   Component Value Date    CAION 1.04 12/03/2021            PLAN:     Endocrine:  DKA with encephalopathy:  Resolved. Continue Lantus 10 u nightly with HDSS. On Diabetic TFs. HbA1c 11.6  Per family, takes Insulin daily, follows endocrinology, no recent sickness. Doesn't drink alcohol  Marijuana prescription use    Pulmonary:  Aspiration pneumonia:  Extubated   Continue Clinda. Respi Cx light growth of candida albicans, other Cx negative.   Wbc trending upward with 16.9>23.8 . tmax of 99.9. on clindamycin . last dose today . Bc,urine culture negative    Neurological:  Acute metabolic encephalopathy:  - Intubated and wean down Precedex  - Sedation holidays, maintaining circardian rhythm  - keep ativan PRN only  - started seroquel 25 BID    Cardiovascular:  - BP controlled    GI/nutrition  Passed swallow . Will start oral diet   Pepcid PO BID    GlycoLax as needed for bowel movements. Renal/fluids/electrolyte  - uo 3.3 l; in last 24 hour . Not on any lasix  Fluids @30 ml/hr due to intravascular volume depletion    ID:   Aspiration pneumonia  completed Levo total of 7 days. Now on Clindamycin. Respi Cx light growth of candida albicans, other Cx negative. Wbc trending upward with 16.9>23.8 . tmax of 99.9. on clindamycin . last dose today . Bc,urine culture negative   ID following    Decubitus sacral ulcer  Wound care following    Hematological:  Hb stable. Platelets recovered  HIT panel negative  On fondaparinux. Michael Henriquez MD   pgy 2 . Department of [facility]         12/11/2021, 8:43 AM     Attending Physician Statement  I have discussed the care of Simeon Maciel, including pertinent history and exam findings with the resident. I have reviewed the key elements of all parts of the encounter with the resident. I have seen and examined the patient with the resident. I agree with the assessment and plan and status of the problem list as documented. I seen the patient during my round today, chart reviewed, labs and medications reviewed overnight events noted. Overnight he did well post extubation remains on nasal cannula intermittently confused and disoriented but no worsening hypoxia or aspiration was reported. He has been taken off gradually overnight from Precedex drip. Patient is hypertensive overnight he had a T-max of 99.9 his WBC count increased to 23 from 16.9 yesterday. Cultures so far negative. Urine output is 3.3 L in last 24 hours. Blood sugar is under 200 on Lantus 10 units. His hemoglobin is stable platelet count is 104. We will start him on oral feeding. Monitor for delirium hallucination and agitation. Continue with Lantus. Continue with Seroquel 25 mg twice daily and on Prozac. We will start him on lisinopril for blood pressure control. Transfer to stepdown unit medicine team.      Discussed with nursing staff, treatment and plan discussed. Discussed with respiratory therapist.    Total critical care time caring for this patient with life threatening, unstable organ failure, including direct patient contact, management of life support systems, review of data including imaging and labs, discussions with other team members and physicians at least 27  Min so far today, excluding procedures. Please note that this chart was generated using voice recognition Dragon dictation software. Although every effort was made to ensure the accuracy of this automated transcription, some errors in transcription may have occurred.      Bella Cabrera MD  12/11/2021 11:42 AM

## 2021-12-11 NOTE — PROGRESS NOTES
Patient transferred to room 315 without incidence. All belongings (blanket, pillow, underwear) were given to daughter Manasa Horner, who will take them home with her today. Report was given to Razia Workman RN.

## 2021-12-11 NOTE — PROGRESS NOTES
Speech Language Pathology  [address]   CLINICAL BEDSIDE SWALLOW EVALUATION    NAME: Jayde Pappas  : 1960  MRN: [id number]    ADMISSION DATE: 2021  ADMITTING DIAGNOSIS: has Type 1 diabetes mellitus with ketoacidosis without coma ([address] Moderate malnutrition ([address] Acute respiratory failure with hypoxia ([address] Metabolic encephalopathy; Pneumonia of left lower lobe due to infectious organism; and Hypernatremia on their problem list.    Recent Chest Xray/CT of Chest: 2021    Impression   Interval progression nonspecific pulmonary infiltrates are typical for   COVID-19 pneumonia, but can be seen with other atypical/viral pneumonia as   well.       Life support appliances appear appropriately positioned. Date of Eval: 2021  Evaluating Therapist: LOYDA Mclean    Current Diet level:  Current Diet : NPO  Current Liquid Diet : NPO    Primary Complaint  Jayde Pappas is a 63 y.o. male history of type 1 diabetes who is noncompliant with his medication. Patient was brought in by his wife for altered mental status. Patient reportedly had not taken any of his medications last 3 days. Symptoms had started on Friday with nausea and vomiting, which progressed to complaints of of chest pain leg pain, as well as ataxia. Patient's wife found him lying naked on the floor this morning and called EMS. On arrival to ED, blood glucose was found to be 1009, potassium of 6.2 and creatinine of 2.65. Patient was hypotensive on arrival and received a total of 5 L of normal saline in the ED. *Pt intubated on , extubated 12/10. Per RN, pt failed RN swallow screen (extensive coughing w/ thin liquids).      Pain:  Pain Assessment  Pain Assessment: 0-10  Pain Level: 0    Reason for Referral  Jayde Pappas was referred for a bedside swallow evaluation to assess the efficiency of his swallow function, identify signs and symptoms of aspiration and make recommendations regarding safe Device: Nasal cannula  Communication Observation: Functional  Follows Directions: Simple  Dentition: Some missing teeth  Patient Positioning: Upright in bed  Baseline Vocal Quality: Weak, Dysphonic   Consistencies Administered: Dysphagia Pureed (Dysphagia I); Dysphagia Soft and Bite-Sized (Dysphagia III); Nectar - straw; Thin - straw    Vision/Hearing  Vision  Vision: Within Functional Limits  Hearing  Hearing: Within functional limits    Oral Motor Deficits  Oral/Motor  Oral Motor: Within functional limits    Oral Phase Dysfunction  Oral Phase  Oral Phase: WFL  Oral Phase  Oral Phase - Comment: Pt w/ mild extended mastication with soft solid, however oral transit and bolus manipulation appear WFL for all consistencies tested     Indicators of Pharyngeal Phase Dysfunction   Pharyngeal Phase  Pharyngeal Phase: WFL  Pharyngeal Phase   Pharyngeal: Pt w/ +latent throat clear x1/5 trials of mildly-thick liquids and +cough noted x2/3 trials of thin liquids. No other overt s/s of aspiration noted t/o.     Prognosis  Prognosis  Prognosis for safe diet advancement: fair  Individuals consulted  Consulted and agree with results and recommendations: Patient; RN    Education  Patient Education: yes  Patient Education Response: Verbalizes understanding; Demonstrated understanding             Therapy Time  SLP Individual Minutes  Time In: 1520  Time Out: 3253  Minutes: [address] LOY[address]  12/11/2021 12:22 PM

## 2021-12-11 NOTE — PLAN OF CARE
Problem: Skin Integrity:  Goal: Will show no infection signs and symptoms  Description: Will show no infection signs and symptoms  12/11/2021 0218 by Verenice Darby RN  Outcome: Ongoing  12/10/2021 1436 by Tien Hernandez RN  Outcome: Ongoing  Goal: Absence of new skin breakdown  Description: Absence of new skin breakdown  Outcome: Ongoing     Problem: Falls - Risk of:  Goal: Will remain free from falls  Description: Will remain free from falls  12/11/2021 0218 by Verenice Darby RN  Outcome: Ongoing  12/10/2021 1436 by Tien Hernandez RN  Outcome: Ongoing  Goal: Absence of physical injury  Description: Absence of physical injury  12/11/2021 0218 by Verenice Darby RN  Outcome: Ongoing  12/10/2021 1436 by Tien Hernandez RN  Outcome: Ongoing     Problem: Fluid Volume - Imbalance:  Goal: Will remain free of signs and symptoms of dehydration  Description: Will remain free of signs and symptoms of dehydration  Outcome: Ongoing  Goal: Absence of imbalanced fluid volume signs and symptoms  Description: Absence of imbalanced fluid volume signs and symptoms  Outcome: Ongoing     Problem: Serum Glucose Level - Abnormal:  Goal: Ability to maintain appropriate glucose levels will improve  Description: Ability to maintain appropriate glucose levels will improve  12/11/2021 0218 by Verenice Darby RN  Outcome: Ongoing  12/10/2021 1436 by Tien Hernandez RN  Outcome: Ongoing     Problem: Injury - Acid Base Imbalance:  Goal: Acid-base balance  Description: Acid-base balance  Outcome: Ongoing     Problem: OXYGENATION/RESPIRATORY FUNCTION  Goal: Patient will maintain patent airway  Outcome: Ongoing  Goal: Patient will achieve/maintain normal respiratory rate/effort  Description: Respiratory rate and effort will be within normal limits for the patient  Outcome: Ongoing     Problem: SKIN INTEGRITY  Goal: Skin integrity is maintained or improved  12/11/2021 0218 by Verenice Darby RN  Outcome: Ongoing  12/10/2021 1436 by Ashley Botello RN  Outcome: Ongoing     Problem: NUTRITION  Goal: Nutritional status is improving  12/11/2021 0218 by Harsh Smith RN  Outcome: Ongoing  12/10/2021 1436 by Ashley Botello RN  Outcome: Ongoing     Problem: Nutrition  Goal: Optimal nutrition therapy  12/11/2021 0218 by Harsh Smith RN  Outcome: Ongoing  12/10/2021 1510 by Sasha Silva RD, LD  Outcome: Ongoing  Note: Nutrition Problem #1: Inadequate oral intake  Intervention: Food and/or Nutrient Delivery: Start Oral Diet, Start Oral Nutrition Supplement (as able)  Nutritional Goals: Meet % of estimated nutrition needs.     12/10/2021 1436 by Ashley Botello RN  Outcome: Ongoing

## 2021-12-11 NOTE — PLAN OF CARE
Problem: Skin Integrity:  Goal: Will show no infection signs and symptoms  Description: Will show no infection signs and symptoms  12/11/2021 0939 by Neil Zarate RN  Outcome: Ongoing     Problem: Skin Integrity:  Goal: Absence of new skin breakdown  Description: Absence of new skin breakdown  12/11/2021 0939 by Neil Zarate RN  Outcome: Ongoing     Problem: Falls - Risk of:  Goal: Will remain free from falls  Description: Will remain free from falls  12/11/2021 0939 by Neil Zarate RN  Outcome: Ongoing     Problem: Falls - Risk of:  Goal: Absence of physical injury  Description: Absence of physical injury  12/11/2021 0939 by Neil Zarate RN  Outcome: Ongoing     Problem: Serum Glucose Level - Abnormal:  Goal: Ability to maintain appropriate glucose levels will improve  Description: Ability to maintain appropriate glucose levels will improve  12/11/2021 0939 by Neil Zarate RN  Outcome: Ongoing     Problem: SKIN INTEGRITY  Goal: Skin integrity is maintained or improved  12/11/2021 0939 by Neil Zarate RN  Outcome: Ongoing     Problem: NUTRITION  Goal: Nutritional status is improving  12/11/2021 0939 by Neil Zarate RN  Outcome: Ongoing     Problem: Nutrition  Goal: Optimal nutrition therapy  12/11/2021 0939 by Neil Zarate RN  Outcome: Ongoing     Problem: Discharge Planning:  Goal: Participates in care planning  Description: Participates in care planning  Outcome: Ongoing     Problem: Discharge Planning:  Goal: Discharged to appropriate level of care  Description: Discharged to appropriate level of care  Outcome: Ongoing     Problem: Aspiration:  Goal: Absence of aspiration  Description: Absence of aspiration  Outcome: Ongoing     Problem: Gas Exchange - Impaired:  Goal: Levels of oxygenation will improve  Description: Levels of oxygenation will improve  Outcome: Ongoing

## 2021-12-11 NOTE — PROGRESS NOTES
[address] [place] [address]     Department of Internal Medicine - Staff Internal Medicine Service   ICU PATIENT TRANSFER NOTE        Patient:  Simeon Maciel  YOB: 1960  MRN: [id number]     Acct: [de-identified]     Admit date: 11/30/2021    Code Status:-  Full code     Reason for ICU Admission:-   Diabetic ketoacidosis with coma    SUPPORT DEVICES: [] Ventilator [] BIPAP  [] Nasal Cannula [x] Room Air    Consultations:- [] Cardiology [] Nephrology  [] Hemo onco  [] GI                               [] ID [] ENT  [] Rheum [] Endo   []Physiotherapy                                 Others:-     NUTRITION:  [] NPO [] Tube Feeding (Specify: ) [] TPN  [x] PO    Central Lines:- [x] No   [] Yes           If yes - Days/Date of Insertion. Pt seen,examined and Chart reviewed. ICU COURSE:    The patient is a 63 y.o. male who was initially admitted on 11/30/2021 with DKA, type 1, not at goal [facility]) [E10.10]  Type 1 diabetes mellitus with ketoacidosis without coma [address] [address] [facility] [E10.10] and presented with altered mental status. Blood sugar were in the 1000's. Pt was found to be in DKA and intubated due to encephalopathy. Pt was started on the DKA protocol. Pt was also hypotensive and received IV fluids. IVF did not improve blood pressure and pt was started on levophed. Pt was started on Levaquin and completed 7 doses for aspiration pneumonia. COVID testing was negative. MRSA testing was negative. Currently, pt is resting comfortably in bed. He has been bridged to Lantus 10U nightly and started on HDSS. ID has been following and switched the pt from Levaquin to Clindamycin and his last dose is today. Pt has been tolerating dysphagia diet > adjust insulin accordingly. Pt has no other complaints at this time.          Physical Exam:   Vitals: BP (!) 163/65   Pulse 94   Temp 98.2 °F (36.8 °C) (Oral)   Resp 18   Ht 5' 9\" (1.753 m)   Wt 139 lb 5.3 oz (63.2 kg)   SpO2 95%   BMI 20.58 kg/m²   24 hour intake/output:  Intake/Output Summary (Last 24 hours) at 12/11/2021 1402  Last data filed at 12/11/2021 1206  Gross per 24 hour   Intake 965.98 ml   Output 2850 ml   Net -1884.02 ml     Last 3 weights:   Wt Readings from Last 3 Encounters:   12/11/21 139 lb 5.3 oz (63.2 kg)       General appearance - alert, in no distress  Mental status - alert, oriented to person, place, and time  Eyes - pupils equal and reactive, extraocular eye movements intact  Mouth - mucous membranes moist, pharynx normal without lesions  Neck - supple, no significant adenopathy  Chest - clear to auscultation, no wheezes  Heart - normal rate, regular rhythm, normal S1, S2  Abdomen - soft, nontender, nondistended  Neurological - alert, oriented, normal speech, no focal deficits   Extremities - peripheral pulses normal, no pedal edema  Skin - normal coloration and turgor, no rashes      Medications:Current Inpatient  Scheduled Meds:   enoxaparin  40 mg SubCUTAneous Daily    QUEtiapine  25 mg Oral BID    clindamycin (CLEOCIN) IV  900 mg IntraVENous TID    FLUoxetine  40 mg Oral BID    famotidine  20 mg Per NG tube BID    insulin lispro  0-18 Units SubCUTAneous Q4H    insulin glargine  10 Units SubCUTAneous Nightly    sodium chloride flush  5-40 mL IntraVENous 2 times per day     Continuous Infusions:   sodium chloride 30 mL/hr at 12/10/21 1042    dexmedetomidine Stopped (12/10/21 1826)    dextrose      sodium chloride      dextrose 5 % and 0.45 % NaCl Stopped (12/01/21 0000)     PRN Meds:labetalol, LORazepam, albuterol, glucose, dextrose, glucagon (rDNA), dextrose, dextrose, polyethylene glycol, sodium chloride flush, sodium chloride, ondansetron **OR** ondansetron, acetaminophen **OR** acetaminophen, dextrose 5 % and 0.45 % NaCl    Objective:    CBC:   Recent Labs     12/09/21  0415 12/10/21  0450 12/11/21  0740   WBC 20.0* 16.9* 23.8*   HGB 9.8* 9.1* 10.6*    497* 790*     BMP:    Recent Labs     12/09/21  0415 12/10/21  0450 12/11/21  0740    137 143   K 5.0 3.9 3.9   * 108* 108*   CO2 22 22 20   BUN 17 18 14   CREATININE 0.44* 0.48* 0.43*   GLUCOSE 137* 99 130*     Calcium:  Recent Labs     12/11/21  0740   CALCIUM 7.9*     Ionized Calcium:No results for input(s): IONCA in the last 72 hours. Magnesium:No results for input(s): MG in the last 72 hours. Phosphorus:No results for input(s): PHOS in the last 72 hours. BNP:No results for input(s): BNP in the last 72 hours. Glucose:  Recent Labs     12/11/21  0623 12/11/21  0739 12/11/21  1157   POCGLU 117* 125* 180*     HgbA1C: No results for input(s): LABA1C in the last 72 hours. INR: No results for input(s): INR in the last 72 hours. Hepatic:   Recent Labs     12/09/21  1235   ALKPHOS 110   ALT 25   AST 24   PROT 5.2*   BILITOT 0.27*   BILIDIR <0.08   LABALBU 1.6*     Amylase and Lipase:No results for input(s): LACTA, AMYLASE in the last 72 hours. Lactic Acid: No results for input(s): LACTA in the last 72 hours. CARDIAC ENZYMES:No results for input(s): CKTOTAL, CKMB, CKMBINDEX, TROPONINI in the last 72 hours. BNP: No results for input(s): BNP in the last 72 hours. Lipids: No results for input(s): CHOL, TRIG, HDL, LDLCALC in the last 72 hours. Invalid input(s): LDL  ABGs: No results found for: PH, PCO2, PO2, HCO3, O2SAT  Thyroid: No results found for: TSH     Urinalysis: No results for input(s): BACTERIA, BLOODU, CLARITYU, COLORU, PHUR, PROTEINU, RBCUA, SPECGRAV, BILIRUBINUR, NITRU, WBCUA, LEUKOCYTESUR, GLUCOSEU in the last 72 hours. Assessment:  Active Problems:    Type 1 diabetes mellitus with ketoacidosis without coma (HCC)    Moderate malnutrition (HCC)    Acute respiratory failure with hypoxia (HCC)    Metabolic encephalopathy    Pneumonia of left lower lobe due to infectious organism    Hypernatremia  Resolved Problems:    * No resolved hospital problems.  *          Plan:  DKA with encephalopathy - improved   - Pt A&OX3   - Continue to monitor glucose as pt passed swallow study and was started on diet   - Lantus 10U nightly   - High dose sliding scale   - Continue IVF therapy     Aspiration pneumonia - improving   - Continue with Clindamycin > last dose 12/11  - Continue to monitor for fevers   - Trend WBC   - Follow up on blood and urine cultures    - ID following appreciate recommendations     Hypotension - resolved     Hypertension   - Resume Lisinopril   - Continue with labetalol 5mg PRN     Type 1 diabetes mellitus - stable   - Continue to monitor glucose   - Lantus 10U nightly   - High dose sliding scale     Diet: Dysphagia > soft and bite sized with thick nectar   DVT ppx: Lovenox  GI ppx: Pepcid     Damian Wong MD             Department of Internal Medicine  [facility], [facility]           12/11/2021, 2:02 PM

## 2021-12-11 NOTE — PROGRESS NOTES
Critical care team - Resident sign-out to medicine service      Date and time: 12/11/2021 11:51 AM  Patient's name:  Jeromy Milner Record Number: [id number]  Patient's account/billing number: [de-identified]  Patient's YOB: 1960  Age: 63 y.o. Date of Admission: 11/30/2021  7:39 AM  Length of stay during current admission: 11    Primary Care Physician: Aston Miles    Code Status: Full Code    Mode of physician to physician communication:        [x] Via telephone   [] In person     Date and time of sign-out: 12/11/2021 11:51 AM    Accepting Internal Medicine resident: Dr. Don Alcantar     Accepting Medicine team: IM Team med 2  Accepting team's attending: Dr. Christine Wahl    Patient's current ICU Bed:  127    Patient's assigned bed on floor:  315        [x] Med-Surg Monitored [] Step-down       [] Psychiatry ICU       [] Psych floor     Reason for ICU admission:   DKA WITHOUT COMA     ICU course summary:   Patient Pina Wild medical history of type 1 diabetes was initially admitted to ED on 11/30/2021 with altered mentation. Blood sugar in 1000. Found to have DKA and intubated due to encephalopathy. Was started on DKA protocol. Patient was also found to have hypotension, IV fluids were started and was initially requiring Levophed as well. Covid and MRSA were negative. Patient was also started on Levaquin and completed 7 dose for aspiration pneumonia. last dose of clindamycin is today    · As of now DKA has resolved, patient bridged with  Lantus. On high-dose sliding scale AN 10 unit lantus nightly  · Hypotension has resolved. Off pressors and IV fluid at 30. · Aspiration pneumonia: Completed Levaquin, now on Clinda. Last dose today. ID following.     Procedures during patient's ICU stay:   CENTRAL LIINE     Current Vitals:   BP (!) 141/61   Pulse 86   Temp 99.8 °F (37.7 °C) (Oral)   Resp 25   Ht 5' 9\" (1.753 m)   Wt 139 lb 5.3 oz (63.2 kg)   SpO2 91%   BMI 20.58 kg/m²     Cultures:     Blood cultures: [] None drawn      [] Negative             []  Positive (Details:  )  Urine Culture:                   [] None drawn      [] Negative             []  Positive (Details:  )  Sputum Culture:               [] None drawn       [] Negative             []  Positive (Details:  )   Endotracheal aspirate:     [] None drawn       [] Negative             []  Positive (Details:  )       Consults:     1. ID    Assessment:     Patient Active Problem List    Diagnosis Date Noted    Moderate malnutrition ([facility] [address] 12/06/2021    Acute respiratory failure with hypoxia (HCC)     Metabolic encephalopathy     Pneumonia of left lower lobe due to infectious organism     Hypernatremia     Type 1 diabetes mellitus with ketoacidosis without coma ([facility] [address]        Additional assessment:        Recommended Follow-up:     1. Patient has T-max of 99.9. WBC trended upward. Blood culture, urine culture negative. respiratory culture light growth of candida . Follow on WBC. Follow ID recommendation. 2. Blood pressure 151/67. Was on labetalol every 6 as needed. Can start lisinopril 10 which is his home dose. 3. Adjust insulin   4. Switch from fandaparinox to heparin if ok with  Primary team   5. Physical therapy. 6. Resume diet  And can discontinue iv fluids . Above mentioned assessment and plan was discussed by me with the admitting medicine resident. The medicine team assigned to the patient by medicine admitting resident will be following up the patient from now onwards on the floor.      Aydin Goss MD  Internal Medicine Resident, PGY2  [address] [place] [address], [facility] [address]  12/11/2021,11:51 AM

## 2021-12-12 PROBLEM — E11.10 DKA (DIABETIC KETOACIDOSIS) (HCC): Status: ACTIVE | Noted: 2021-12-12

## 2021-12-12 LAB
ABSOLUTE EOS #: 0.19 K/UL (ref 0–0.4)
ABSOLUTE IMMATURE GRANULOCYTE: 0 K/UL (ref 0–0.3)
ABSOLUTE LYMPH #: 1.48 K/UL (ref 1–4.8)
ABSOLUTE MONO #: 2.41 K/UL (ref 0.1–0.8)
ANION GAP SERPL CALCULATED.3IONS-SCNC: 10 MMOL/L (ref 9–17)
BASOPHILS # BLD: 1 % (ref 0–2)
BASOPHILS ABSOLUTE: 0.19 K/UL (ref 0–0.2)
BUN BLDV-MCNC: 13 MG/DL (ref 6–20)
BUN/CREAT BLD: ABNORMAL (ref 9–20)
CALCIUM SERPL-MCNC: 7.7 MG/DL (ref 8.6–10.4)
CHLORIDE BLD-SCNC: 105 MMOL/L (ref 98–107)
CO2: 21 MMOL/L (ref 20–31)
CREAT SERPL-MCNC: 0.33 MG/DL (ref 0.7–1.2)
CULTURE: NORMAL
CULTURE: NORMAL
DIFFERENTIAL TYPE: ABNORMAL
EOSINOPHILS RELATIVE PERCENT: 1 % (ref 1–4)
GFR AFRICAN AMERICAN: >60 ML/MIN
GFR NON-AFRICAN AMERICAN: >60 ML/MIN
GFR SERPL CREATININE-BSD FRML MDRD: ABNORMAL ML/MIN/{1.73_M2}
GFR SERPL CREATININE-BSD FRML MDRD: ABNORMAL ML/MIN/{1.73_M2}
GLUCOSE BLD-MCNC: 128 MG/DL (ref 75–110)
GLUCOSE BLD-MCNC: 166 MG/DL (ref 75–110)
GLUCOSE BLD-MCNC: 168 MG/DL (ref 75–110)
GLUCOSE BLD-MCNC: 188 MG/DL (ref 75–110)
GLUCOSE BLD-MCNC: 281 MG/DL (ref 75–110)
GLUCOSE BLD-MCNC: 51 MG/DL (ref 70–99)
GLUCOSE BLD-MCNC: 55 MG/DL (ref 75–110)
HCT VFR BLD CALC: 29.2 % (ref 40.7–50.3)
HEMOGLOBIN: 9.5 G/DL (ref 13–17)
IMMATURE GRANULOCYTES: 0 %
LYMPHOCYTES # BLD: 8 % (ref 24–44)
Lab: NORMAL
Lab: NORMAL
MAGNESIUM: 2.2 MG/DL (ref 1.6–2.6)
MCH RBC QN AUTO: 28.8 PG (ref 25.2–33.5)
MCHC RBC AUTO-ENTMCNC: 32.5 G/DL (ref 28.4–34.8)
MCV RBC AUTO: 88.5 FL (ref 82.6–102.9)
MONOCYTES # BLD: 13 % (ref 1–7)
MORPHOLOGY: NORMAL
NRBC AUTOMATED: 0 PER 100 WBC
PDW BLD-RTO: 13.6 % (ref 11.8–14.4)
PLATELET # BLD: 940 K/UL (ref 138–453)
PLATELET ESTIMATE: ABNORMAL
PMV BLD AUTO: 9.8 FL (ref 8.1–13.5)
POTASSIUM SERPL-SCNC: 3.3 MMOL/L (ref 3.7–5.3)
RBC # BLD: 3.3 M/UL (ref 4.21–5.77)
RBC # BLD: ABNORMAL 10*6/UL
SEG NEUTROPHILS: 77 % (ref 36–66)
SEGMENTED NEUTROPHILS ABSOLUTE COUNT: 14.23 K/UL (ref 1.8–7.7)
SODIUM BLD-SCNC: 136 MMOL/L (ref 135–144)
SPECIMEN DESCRIPTION: NORMAL
SPECIMEN DESCRIPTION: NORMAL
WBC # BLD: 18.5 K/UL (ref 3.5–11.3)
WBC # BLD: ABNORMAL 10*3/UL

## 2021-12-12 PROCEDURE — 82947 ASSAY GLUCOSE BLOOD QUANT: CPT

## 2021-12-12 PROCEDURE — 6370000000 HC RX 637 (ALT 250 FOR IP): Performed by: STUDENT IN AN ORGANIZED HEALTH CARE EDUCATION/TRAINING PROGRAM

## 2021-12-12 PROCEDURE — 85025 COMPLETE CBC W/AUTO DIFF WBC: CPT

## 2021-12-12 PROCEDURE — 1200000000 HC SEMI PRIVATE

## 2021-12-12 PROCEDURE — 2580000003 HC RX 258: Performed by: STUDENT IN AN ORGANIZED HEALTH CARE EDUCATION/TRAINING PROGRAM

## 2021-12-12 PROCEDURE — 6370000000 HC RX 637 (ALT 250 FOR IP): Performed by: INTERNAL MEDICINE

## 2021-12-12 PROCEDURE — 97162 PT EVAL MOD COMPLEX 30 MIN: CPT

## 2021-12-12 PROCEDURE — 6360000002 HC RX W HCPCS: Performed by: STUDENT IN AN ORGANIZED HEALTH CARE EDUCATION/TRAINING PROGRAM

## 2021-12-12 PROCEDURE — 97166 OT EVAL MOD COMPLEX 45 MIN: CPT

## 2021-12-12 PROCEDURE — 36415 COLL VENOUS BLD VENIPUNCTURE: CPT

## 2021-12-12 PROCEDURE — 99232 SBSQ HOSP IP/OBS MODERATE 35: CPT | Performed by: INTERNAL MEDICINE

## 2021-12-12 PROCEDURE — 97535 SELF CARE MNGMENT TRAINING: CPT

## 2021-12-12 PROCEDURE — 6370000000 HC RX 637 (ALT 250 FOR IP)

## 2021-12-12 PROCEDURE — 97530 THERAPEUTIC ACTIVITIES: CPT

## 2021-12-12 PROCEDURE — 83735 ASSAY OF MAGNESIUM: CPT

## 2021-12-12 PROCEDURE — 80048 BASIC METABOLIC PNL TOTAL CA: CPT

## 2021-12-12 RX ORDER — LISINOPRIL 20 MG/1
20 TABLET ORAL DAILY
Status: DISCONTINUED | OUTPATIENT
Start: 2021-12-13 | End: 2021-12-13 | Stop reason: HOSPADM

## 2021-12-12 RX ORDER — LISINOPRIL 5 MG/1
5 TABLET ORAL ONCE
Status: COMPLETED | OUTPATIENT
Start: 2021-12-12 | End: 2021-12-12

## 2021-12-12 RX ORDER — POTASSIUM CHLORIDE 20 MEQ/1
40 TABLET, EXTENDED RELEASE ORAL ONCE
Status: DISCONTINUED | OUTPATIENT
Start: 2021-12-12 | End: 2021-12-12

## 2021-12-12 RX ORDER — INSULIN GLARGINE 100 [IU]/ML
5 INJECTION, SOLUTION SUBCUTANEOUS NIGHTLY
Status: DISCONTINUED | OUTPATIENT
Start: 2021-12-12 | End: 2021-12-13

## 2021-12-12 RX ADMIN — FAMOTIDINE 20 MG: 20 TABLET, FILM COATED ORAL at 22:08

## 2021-12-12 RX ADMIN — FLUOXETINE HYDROCHLORIDE 40 MG: 20 CAPSULE ORAL at 07:56

## 2021-12-12 RX ADMIN — POTASSIUM BICARBONATE 20 MEQ: 782 TABLET, EFFERVESCENT ORAL at 12:44

## 2021-12-12 RX ADMIN — INSULIN GLARGINE 5 UNITS: 100 INJECTION, SOLUTION SUBCUTANEOUS at 22:08

## 2021-12-12 RX ADMIN — ENOXAPARIN SODIUM 40 MG: 100 INJECTION SUBCUTANEOUS at 07:55

## 2021-12-12 RX ADMIN — INSULIN LISPRO 1 UNITS: 100 INJECTION, SOLUTION INTRAVENOUS; SUBCUTANEOUS at 12:45

## 2021-12-12 RX ADMIN — POTASSIUM BICARBONATE 40 MEQ: 782 TABLET, EFFERVESCENT ORAL at 07:55

## 2021-12-12 RX ADMIN — FAMOTIDINE 20 MG: 20 TABLET, FILM COATED ORAL at 07:56

## 2021-12-12 RX ADMIN — INSULIN LISPRO 1 UNITS: 100 INJECTION, SOLUTION INTRAVENOUS; SUBCUTANEOUS at 17:20

## 2021-12-12 RX ADMIN — QUETIAPINE FUMARATE 25 MG: 25 TABLET ORAL at 22:07

## 2021-12-12 RX ADMIN — FLUOXETINE HYDROCHLORIDE 40 MG: 20 CAPSULE ORAL at 22:08

## 2021-12-12 RX ADMIN — LISINOPRIL 10 MG: 10 TABLET ORAL at 07:58

## 2021-12-12 RX ADMIN — INSULIN LISPRO 2 UNITS: 100 INJECTION, SOLUTION INTRAVENOUS; SUBCUTANEOUS at 22:08

## 2021-12-12 RX ADMIN — INSULIN LISPRO 3 UNITS: 100 INJECTION, SOLUTION INTRAVENOUS; SUBCUTANEOUS at 00:29

## 2021-12-12 RX ADMIN — QUETIAPINE FUMARATE 25 MG: 25 TABLET ORAL at 07:56

## 2021-12-12 RX ADMIN — LISINOPRIL 5 MG: 5 TABLET ORAL at 12:44

## 2021-12-12 RX ADMIN — SODIUM CHLORIDE: 9 INJECTION, SOLUTION INTRAVENOUS at 22:16

## 2021-12-12 ASSESSMENT — PAIN SCALES - GENERAL: PAINLEVEL_OUTOF10: 0

## 2021-12-12 NOTE — PROGRESS NOTES
Physical Therapy    Facility/Department: [facility] RENAL//MED SURG  Initial Assessment    NAME: Melissa Pickard  : 1960  MRN: [id number]    Date of Service: 2021    Discharge Recommendations: Further therapy recommended at discharge. Chief Complaint   Patient presents with    Altered Mental Status    Hyperglycemia     HISTORY OF PRESENT ILLNESS:   Melissa Pickard is a 63 y.o. male history of type 1 diabetes who is noncompliant with his medication. Patient was brought in by his wife for altered mental status. Patient reportedly had not taken any of his medications for 3 days. Symptoms had started on Friday with nausea and vomiting, which progressed to complaints of of chest pain leg pain, as well as ataxia. Patient's wife found him lying naked on the floor this morning and called EMS. On arrival to ED, blood glucose was found to be 1009, potassium of 6.2 and creatinine of 2.65. Patient was hypotensive on arrival and received a total of 5 L of normal saline in the ED. PT Equipment Recommendations  Equipment Needed: Yes  Mobility Devices: Thamas Mary: Rolling    Assessment   Body structures, Functions, Activity limitations: Decreased functional mobility ; Decreased balance; Decreased ADL status; Decreased high-level IADLs; Decreased endurance; Decreased strength; Decreased posture  Assessment: Pt ambulates 4 ft with RW and min A. Pt currently benefits from 24 hr support for functional mobility d/t fall risk from decreased balance and endurance. Pt would benefit from continued therapy to promote endurance, balance, and strengthening. Prognosis: Good  Decision Making: Medium Complexity  PT Education: Goals; PT Role; Plan of Care  Barriers to Learning: none  REQUIRES PT FOLLOW UP: Yes  Activity Tolerance  Activity Tolerance: Patient limited by endurance; Patient limited by fatigue       Patient Diagnosis(es): The encounter diagnosis was Type 1 diabetes mellitus with ketoacidosis without coma ([facility] [address] has a past medical history of Diabetes mellitus ([facility] [address] and Hypertension. has no past surgical history on file. Restrictions  Restrictions/Precautions  Restrictions/Precautions: Fall Risk  Required Braces or Orthoses?: No  Position Activity Restriction  Other position/activity restrictions: Extubated 12/10  Vision/Hearing  Vision: Impaired  Vision Exceptions: Wears glasses at all times  Hearing: Within functional limits     Subjective  General  Patient assessed for rehabilitation services?: Yes  Response To Previous Treatment: Not applicable  Family / Caregiver Present: No  Follows Commands: Within Functional Limits  Subjective  Subjective: RN and pt agreeable to PT. pt agreeable and pleasant. pt supine in bed at start of session.   Pain Screening  Patient Currently in Pain: Denies  Pain Assessment  Pain Assessment: 0-10  Pain Level: 0  Vital Signs  Patient Currently in Pain: Denies       Orientation  Orientation  Overall Orientation Status: Within Functional Limits  Social/Functional History  Social/Functional History  Lives With: Spouse  Type of Home: House  Home Layout: Two level, Able to Live on Main level with bedroom/bathroom, Laundry in basement  Home Access: Stairs to enter with rails  Entrance Stairs - Number of Steps: 2  Entrance Stairs - Rails: Both  Bathroom Shower/Tub: Tub/Shower unit  Bathroom Toilet: Standard  Bathroom Equipment: Grab bars in shower, Grab bars around toilet  Home Equipment: 4 wheeled walker (pt reports no DME use at baseline)  ADL Assistance: [address]: Independent  Homemaking Responsibilities: Yes (shared with wife)  Ambulation Assistance: Independent  Transfer Assistance: Independent  Active : Yes  Mode of Transportation: SUV  Occupation: Full time employment  Type of occupation: power train, building transmissions  Leisure & Hobbies: spend time with wife  Additional Comments: Pt reports wife does not work and will be home at all times however wife does have MS. Pt reports wife uses rollator and would not be able to provide physical assistance. Wife can complete all homemaking tasks if needed  Cognition   Cognition  Overall Cognitive Status: WFL    Objective          Joint Mobility  Spine: WFL  ROM RLE: WFL  ROM LLE: WFL  ROM RUE: WFL  ROM LUE: WFL  Strength RLE  Strength RLE: Exception  Comment: grossly 4+/5  Strength LLE  Strength LLE: Exception  Comment: grossly 4+/5  Strength RUE  Strength RUE: WFL  Strength LUE  Strength LUE: WFL  Tone RLE  RLE Tone: Normotonic  Tone LLE  LLE Tone: Normotonic  Motor Control  Gross Motor?: WFL  Sensation  Overall Sensation Status: WFL (pt denies numbness/tingling at this time)  Bed mobility  Supine to Sit: Contact guard assistance  Sit to Supine: Unable to assess  Scooting: Contact guard assistance  Comment: HOB elevated. pt ends in recliner  Transfers  Sit to Stand: Contact guard assistance  Stand to sit: Contact guard assistance  Comment: RW for UE support, cues for hand placement with good return  Ambulation  Ambulation?: Yes  More Ambulation?: No  Ambulation 1  Surface: level tile  Device: Rolling Walker  Assistance: Minimal assistance  Gait Deviations: Slow Clau; Decreased step length; Decreased step height  Distance: 4 ft  Comments: Pt unsteady, ataxic, adducts BLE during ambulation, as if ambulating in tandem stance. Decreased JOSIE during ambulation.  Verbal and tactile cues to progress task  Stairs/Curb  Stairs?: No     Balance  Posture: Good  Sitting - Static: Good; -  Sitting - Dynamic: Fair; -  Standing - Static: Fair  Standing - Dynamic: Fair; -  Comments: Standing balance assessed with RW        Plan   Plan  Times per week: 5-6x  Current Treatment Recommendations: Strengthening, Transfer Training, Endurance Training, Neuromuscular Re-education, Patient/Caregiver Education & Training, ADL/Self-care Training, Equipment Evaluation, Education, & procurement, Balance Training, IADL Training, Gait Training, Home Exercise Program, Functional Mobility Training, Stair training, Safety Education & Training  Safety Devices  Type of devices:  All fall risk precautions in place, Gait belt, Call light within reach, Left in chair, Chair alarm in place, Nurse notified                                               AM-PAC Score  AM-PAC Inpatient Mobility Raw Score : 17 (12/12/21 1425)  AM-PAC Inpatient T-Scale Score : 42.13 (12/12/21 1425)  Mobility Inpatient CMS 0-100% Score: 50.57 (12/12/21 1425)  Mobility Inpatient CMS G-Code Modifier : CK (12/12/21 1425)          Goals  Short term goals  Time Frame for Short term goals: 14 visits  Short term goal 1: Complete transfers with mod I and least restrictive v no AD  Short term goal 2: Complete 300 ft of gait with mod I and least restrictive v no AD  Short term goal 3: Complete 2 steps with RHR and mod I  Short term goal 4: Participate in 30 minutes of therapy to promtoe endurance       Therapy Time   Individual Concurrent Group Co-treatment   Time In 1202         Time Out 1227         Minutes 25         Timed Code Treatment Minutes: 8 Minutes       Shala Cast, PT

## 2021-12-12 NOTE — PROGRESS NOTES
Patient c/o diet and thick liquids says they told him he did fine . Speech therapys  Note reviewed and explained to patient .  Message left for speech to come up and speak to patient

## 2021-12-12 NOTE — PLAN OF CARE
Problem: Skin Integrity:  Goal: Will show no infection signs and symptoms  Description: Will show no infection signs and symptoms  12/12/2021 1638 by Denys Roth RN  Outcome: Ongoing  12/12/2021 0643 by Mariah Barragan  Outcome: Ongoing  Goal: Absence of new skin breakdown  Description: Absence of new skin breakdown  12/12/2021 1638 by Denys Roth RN  Outcome: Ongoing  12/12/2021 0643 by Mariah Barragan  Outcome: Ongoing     Problem: Falls - Risk of:  Goal: Will remain free from falls  Description: Will remain free from falls  12/12/2021 1638 by Denys Roth RN  Outcome: Ongoing  12/12/2021 0643 by Mariah Barragan  Outcome: Ongoing  Goal: Absence of physical injury  Description: Absence of physical injury  12/12/2021 1638 by Denys Roth RN  Outcome: Ongoing  12/12/2021 0643 by Mariah Barragan  Outcome: Ongoing     Problem: Fluid Volume - Imbalance:  Goal: Will remain free of signs and symptoms of dehydration  Description: Will remain free of signs and symptoms of dehydration  12/12/2021 1638 by Denys Roth RN  Outcome: Ongoing  12/12/2021 0643 by Mariah Barragan  Outcome: Ongoing  Goal: Absence of imbalanced fluid volume signs and symptoms  Description: Absence of imbalanced fluid volume signs and symptoms  12/12/2021 1638 by Denys Roth RN  Outcome: Ongoing  12/12/2021 0643 by Mariah Barragan  Outcome: Ongoing     Problem: Serum Glucose Level - Abnormal:  Goal: Ability to maintain appropriate glucose levels will improve  Description: Ability to maintain appropriate glucose levels will improve  12/12/2021 1638 by Denys Roth RN  Outcome: Ongoing  12/12/2021 0643 by Mariah Barragan  Outcome: Ongoing     Problem: Injury - Acid Base Imbalance:  Goal: Acid-base balance  Description: Acid-base balance  12/12/2021 1638 by Denys Roth RN  Outcome: Ongoing  12/12/2021 0643 by Mariah Barragan  Outcome: Ongoing     Problem: OXYGENATION/RESPIRATORY FUNCTION  Goal: Patient will maintain patent airway  12/12/2021 1638 by Denys Roth RN  Outcome: Ongoing  12/12/2021 0643 by Anniae Pod  Outcome: Ongoing  Goal: Patient will achieve/maintain normal respiratory rate/effort  Description: Respiratory rate and effort will be within normal limits for the patient  12/12/2021 1638 by Carmen Bynum RN  Outcome: Ongoing  12/12/2021 0643 by Anniae Pod  Outcome: Ongoing     Problem: SKIN INTEGRITY  Goal: Skin integrity is maintained or improved  12/12/2021 1638 by Carmen Bynum RN  Outcome: Ongoing  12/12/2021 0643 by Anniae Pod  Outcome: Ongoing     Problem: NUTRITION  Goal: Nutritional status is improving  12/12/2021 1638 by Carmen Bynum RN  Outcome: Ongoing  12/12/2021 0643 by Anniae Pod  Outcome: Ongoing     Problem: Nutrition  Goal: Optimal nutrition therapy  12/12/2021 1638 by Carmen Bynum RN  Outcome: Ongoing  12/12/2021 0643 by Anniae Pod  Outcome: Ongoing     Problem: Discharge Planning:  Goal: Participates in care planning  Description: Participates in care planning  12/12/2021 1638 by Carmen Bynum RN  Outcome: Ongoing  12/12/2021 0643 by Daija Pod  Outcome: Ongoing  Goal: Discharged to appropriate level of care  Description: Discharged to appropriate level of care  12/12/2021 1638 by Carmen Bynum RN  Outcome: Ongoing  12/12/2021 0643 by Daija Pod  Outcome: Ongoing     Problem: Aspiration:  Goal: Absence of aspiration  Description: Absence of aspiration  12/12/2021 1638 by Carmen Bynum RN  Outcome: Ongoing  12/12/2021 0643 by Anniae Pod  Outcome: Ongoing     Problem: Gas Exchange - Impaired:  Goal: Levels of oxygenation will improve  Description: Levels of oxygenation will improve  12/12/2021 1638 by Carmen Bynum RN  Outcome: Ongoing  12/12/2021 0643 by Anniae Pod  Outcome: Ongoing

## 2021-12-12 NOTE — PLAN OF CARE
Problem: Skin Integrity:  Goal: Will show no infection signs and symptoms  Description: Will show no infection signs and symptoms  12/12/2021 0643 by Jeannie Cantor  Outcome: Ongoing  12/11/2021 1734 by Lexus Uriarte RN  Outcome: Ongoing  Goal: Absence of new skin breakdown  Description: Absence of new skin breakdown  12/12/2021 0643 by Jeannie Cantor  Outcome: Ongoing  12/11/2021 1734 by Lexus Uriarte RN  Outcome: Ongoing     Problem: Falls - Risk of:  Goal: Will remain free from falls  Description: Will remain free from falls  12/12/2021 0643 by Jeannie Cantor  Outcome: Ongoing  12/11/2021 1734 by Lexus Uriarte RN  Outcome: Ongoing  Goal: Absence of physical injury  Description: Absence of physical injury  12/12/2021 0643 by Jeannie Cantor  Outcome: Ongoing  12/11/2021 1734 by Lexus Uriarte RN  Outcome: Ongoing     Problem: Fluid Volume - Imbalance:  Goal: Will remain free of signs and symptoms of dehydration  Description: Will remain free of signs and symptoms of dehydration  12/12/2021 0643 by Jeannie Cantor  Outcome: Ongoing  12/11/2021 1734 by Lexus Uriarte RN  Outcome: Ongoing  Goal: Absence of imbalanced fluid volume signs and symptoms  Description: Absence of imbalanced fluid volume signs and symptoms  12/12/2021 0643 by Jeannie Cantor  Outcome: Ongoing  12/11/2021 1734 by Lexus Uriarte RN  Outcome: Ongoing     Problem: Serum Glucose Level - Abnormal:  Goal: Ability to maintain appropriate glucose levels will improve  Description: Ability to maintain appropriate glucose levels will improve  12/12/2021 0643 by Jeannie Cantor  Outcome: Ongoing  12/11/2021 1734 by Lexus Uriarte RN  Outcome: Ongoing     Problem: Injury - Acid Base Imbalance:  Goal: Acid-base balance  Description: Acid-base balance  12/12/2021 0643 by Jeannie Cantor  Outcome: Ongoing  12/11/2021 1734 by Lexus Uriarte RN  Outcome: Ongoing

## 2021-12-12 NOTE — PROGRESS NOTES
Occupational Therapy   Occupational Therapy Initial Assessment  Date: 2021   Patient Name: Nile Tilley  MRN: [id number]     : 1960     Chief Complaint   Patient presents with    Altered Mental Status    Hyperglycemia         Date of Service: 2021    Discharge Recommendations:  Patient would benefit from continued therapy after discharge  OT Equipment Recommendations  Equipment Needed: Yes  Mobility Devices: Robert Hilt; ADL Assistive Devices  Walker: Rolling  ADL Assistive Devices: Shower Chair with back    Assessment   Performance deficits / Impairments: Decreased functional mobility ; Decreased ADL status; Decreased endurance; Decreased balance; Decreased high-level IADLs  Assessment: Pt agreeable to OT eval this date. Pt demonstrates deficits in balance and endurance that limit independence with ADLs/IADLs and functional transfers/mobility. Pt currently requires CGA for functional transfers and Min A for functional mobility with RW use. Pt completed UB and LB dressing tasks this date with noted dynamic sitting balance deficits. See below for level of assistance re: ADLs. Pt steff benefit from continued OT services to improve independence with ADLs/IADLs and functional transfers/mobility  Prognosis: Good  Decision Making: Medium Complexity  Patient Education: Pt ed on OT role, OT POC, safety awareness, transfer training, DME use, and importance of continued OT. Good return noted  REQUIRES OT FOLLOW UP: Yes  Activity Tolerance  Activity Tolerance: Patient Tolerated treatment well  Safety Devices  Safety Devices in place: Yes  Type of devices: Nurse notified; Left in chair; Gait belt; Call light within reach; Chair alarm in place  Restraints  Initially in place: No           Patient Diagnosis(es): The encounter diagnosis was Type 1 diabetes mellitus with ketoacidosis without coma ([facility] [address] has a past medical history of Diabetes mellitus ([facility] [address] and Hypertension.    has no past surgical history on file.           Restrictions  Restrictions/Precautions  Restrictions/Precautions: Fall Risk  Required Braces or Orthoses?: No  Position Activity Restriction  Other position/activity restrictions: aspiration precautions, intubated 12/1-Extubated 12/10    Subjective   General  Patient assessed for rehabilitation services?: Yes  Family / Caregiver Present: No  General Comment  Comments: RN ok'd pt for OT eval this date. Pt agreeable to session and pleasant/cooperative throughout  Patient Currently in Pain: Denies    Social/Functional History  Social/Functional History  Lives With: Spouse  Type of Home: House  Home Layout: Two level, Able to Live on Main level with bedroom/bathroom, Laundry in basement  Home Access: Stairs to enter with rails  Entrance Stairs - Number of Steps: 2  Entrance Stairs - Rails: Both  Bathroom Shower/Tub: Tub/Shower unit  Bathroom Toilet: Standard  Bathroom Equipment: Grab bars in shower, Grab bars around toilet  Home Equipment: 4 wheeled walker (pt reports no DME use at baseline)  ADL Assistance: [address]: [address] Responsibilities: Yes (shared with wife)  Ambulation Assistance: Independent  Transfer Assistance: Independent  Active : Yes  Mode of Transportation: "[employer]  Occupation: Full time employment  Type of occupation: power train, building transmissions  [address]: spend time with wife  Additional Comments: Pt reports wife does not work and will be home at all times however wife does have MS. Pt reports wife uses rollator and would not be able to provide physical assistance.  Wife can complete all homemaking tasks if needed       Objective   Vision: Impaired  Vision Exceptions: Wears glasses at all times  Hearing: Within functional limits    Orientation  Overall Orientation Status: Within Functional Limits    Balance  Sitting Balance: Stand by assistance (SBA static and fluctuating between CGA-Min A for dynamic sitting)  Standing Balance: Contact guard assistance  Functional Mobility  Functional - Mobility Device: Rolling Walker  Activity: Other  Assist Level: Minimal assistance  Functional Mobility Comments: Pt completed short functional mobility from EOB > recliner requiring Min A and RW use; moderate unsteadiness noted     ADL  Feeding: Modified independent ; Setup (pt setup with lunch at end of session; feeding self independently)  Grooming: Modified independent ; Setup; Increased time to complete  UE Bathing: Stand by assistance; Setup; Increased time to complete  LE Bathing: Contact guard assistance; Setup; Increased time to complete  UE Dressing: Stand by assistance; Setup; Increased time to complete (pt donned gown with SBA for appropriate threading of BUE)  LE Dressing: Contact guard assistance; Setup; Increased time to complete (pt donned B socks while seated EOB requiring CGA d/t decreased dynamic sitting balance)  Toileting: Contact guard assistance; Setup;  Increased time to complete  Tone RUE  RUE Tone: Normotonic  Tone LUE  LUE Tone: Normotonic  Coordination  Movements Are Fluid And Coordinated: Yes      Bed mobility  Supine to Sit: Contact guard assistance  Sit to Supine: Unable to assess  Scooting: Contact guard assistance  Comment: HOB flat to simulate home environment; pt retired to recliner at end of session  Transfers  Sit to stand: Contact guard assistance  Stand to sit: Contact guard assistance  Transfer Comments: with 1 VC for proper hand placement, with RW    Cognition  Overall Cognitive Status: WFL        Sensation  Overall Sensation Status: WFL (pt denies numbness/tingling at this time)        LUE AROM (degrees)  LUE AROM : WFL  Left Hand AROM (degrees)  Left Hand AROM: WFL  RUE AROM (degrees)  RUE AROM : WFL  Right Hand AROM (degrees)  Right Hand AROM: WFL  LUE Strength  Gross LUE Strength: WFL  L Hand General: 4/5  LUE Strength Comment: grossly 4/5  RUE Strength  Gross RUE Strength: WFL  R Hand General: 4+/5  RUE Strength Comment: grossly 4/5                   Plan   Plan  Times per week: 3-5 x/wk  Current Treatment Recommendations: Balance Training, Functional Mobility Training, Endurance Training, Safety Education & Training, Patient/Caregiver Education & Training, Equipment Evaluation, Education, & procurement, Self-Care / ADL, Home Management Training    AM-PAC Score        AM-PAC Inpatient Daily Activity Raw Score: 20 (12/12/21 1637)  AM-PAC Inpatient ADL T-Scale Score : 42.03 (12/12/21 1637)  ADL Inpatient CMS 0-100% Score: 38.32 (12/12/21 1637)  ADL Inpatient CMS G-Code Modifier : Julieth Barbareji (12/12/21 1637)    Goals  Short term goals  Time Frame for Short term goals: By discharge, pt will:  Short term goal 1: Demo SUP for functional transfers and functional mobility with use of LRD  Short term goal 2: Demo I with UB ADLs  Short term goal 3: Demo Mod I with LB ADLs and toileting tasks with setup and AE PRN  Short term goal 4: Demo 10+ min dynamic standing and reaching outside JOSIE with unilateral hand release and SBA to improve functional independence  Short term goal 5:  Increase activity tolerance to 30+ min for improved endurance for ADL/IADL participation       Therapy Time   Individual Concurrent Group Co-treatment   Time In 1201         Time Out 1227         Minutes 26         Timed Code Treatment Minutes: [address]       Ruthie Brown OTR/L

## 2021-12-12 NOTE — PROGRESS NOTES
[facility] - Progress Note    Today's Date and Time: 12/12/2021, 1:50 PM    Impression :   · Leukocytosis worsening  · Concern for aspiration pneumonia  · DM I  · DKA  · Altered mentation  · Acute hypoxic respiratory failure  · Sepsis high risk. Recommendations:   · Monitor off antibiotics  · Completed clindamycin. Stop date 12-11-21    Medical Decision Making/Summary/Discussion:12/12/2021     ·   Infection Control Recommendations   · [place] Precautions      Antimicrobial Stewardship Recommendations     · Discontinuation of therapy  Coordination of Outpatient Care:   · Estimated Length of IV antimicrobials: TBD  · Patient will need Midline Catheter Insertion: no  · Patient will need PICC line Insertion:No  · Patient will need: Home IV , [place],  SNF,  LTAC: TBD  · Patient will need outpatient wound care:Yes    Chief complaint/reason for consultation:   · Leukocytosis with fevers. Aspiration pneumonia      History of Present Illness:   Princess Moreno is a 63y.o.-year-old male who was initially admitted on 11/30/2021. Patient seen at the request of Dr. Iliana Estes:    This patient with type 1 diabetes, initially presented to the ED on 11/30/2021 with altered mentation. According to the EHR, he had not been compliant with his insulin for the 3 days prior. Further work up revealed that the patient was experiencing toxic metabolic encephalopathy because of DKA. Lab work revealed:pH of 6.9, PCO2 22, bicarb 4.5 and a blood sugar over 700. His potassium was also elevated at 6.2 with peaked T waves on EKG. WBC was 18.1. Covid swab and MRSA nares were negative    He was also hypotensive and started on norepinephrine and intubated for airway protection. Levaquin and Clindamycin were initiated 12/1/21 for worsening CXR and concern for aspiration pneumonia.      Levaquin has since been discontinued out of concern for increased QT interval    Cultures have remained negative and CXR shows imrovement. Leukocytosis continues but improving    CURRENT EVALUATION 12/12/2021     Patient transferred out of the ICU. Afebrile  VS stable    Extubated  RR 22-->21-->24-->18  02 sat 92-->97-->95    Repeat swallow test planned     Labs, X rays reviewed: 12/12/2021    BUN:24-->18-->13  Cr:0.6-->0.48-->0.33    WBC:15.9-->19.5-->20-->16.9-->23.8-->18.5  Hb:9.7-->9.8-->9.1-->10.6-->9.5  Plat: 362-->329-->497-->790-->940    Legionella and strep pneumo not detected    Cultures:  Urine:  · 11/6/21: No bacterial growth  Blood:  · 12/7/21 No growth   Suctioned Sputum :  · 12/7/21: PRESUMPTIVE CANDIDA ALBICANS LIGHT GROWTH Abnormal   Wound:  ·     IMAGING   12/6/21        Discussed with patient, RN, CC. I have personally reviewed the past medical history, past surgical history, medications, social history, and family history, and I have updated the database accordingly. Past Medical History:     Past Medical History:   Diagnosis Date    Diabetes mellitus ([facility] [address]     Hypertension        Past Surgical  History:   History reviewed. No pertinent surgical history.     Medications:      insulin glargine  5 Units SubCUTAneous Nightly    insulin lispro  0-6 Units SubCUTAneous TID     insulin lispro  0-3 Units SubCUTAneous Nightly    [START ON 12/13/2021] lisinopril  20 mg Oral Daily    enoxaparin  40 mg SubCUTAneous Daily    QUEtiapine  25 mg Oral BID    FLUoxetine  40 mg Oral BID    famotidine  20 mg Per NG tube BID    sodium chloride flush  5-40 mL IntraVENous 2 times per day       Social History:     Social History     Socioeconomic History    Marital status:      Spouse name: Not on file    Number of children: Not on file    Years of education: Not on file    Highest education level: Not on file   Occupational History    Not on file   Tobacco Use    Smoking status: Never Smoker    Smokeless tobacco: Never Used   Vaping Use    Vaping Use: Never used   Substance and Sexual Activity    Alcohol use: Not Currently    Drug use: Yes     Types: Marijuana Loida Mauro)     Comment: uses edibles nightly     Sexual activity: Not on file   Other Topics Concern    Not on file   Social History Narrative    Not on file     Social Determinants of Health     Financial Resource Strain:     Difficulty of Paying Living Expenses: Not on file   Food Insecurity:     Worried About Running Out of Food in the Last Year: Not on file    [place] of Food in the Last Year: Not on file   Transportation Needs:     Lack of Transportation (Medical): Not on file    Lack of Transportation (Non-Medical): Not on file   Physical Activity:     Days of Exercise per Week: Not on file    Minutes of Exercise per Session: Not on file   Stress:     Feeling of Stress : Not on file   Social Connections:     Frequency of Communication with Friends and Family: Not on file    Frequency of Social Gatherings with Friends and Family: Not on file    Attends Oriental orthodox Services: Not on file    Active Member of [address] [place] [address] or Organizations: Not on file    Attends Club or Organization Meetings: Not on file    Marital Status: Not on file   Intimate Partner Violence:     Fear of Current or Ex-Partner: Not on file    Emotionally Abused: Not on file    Physically Abused: Not on file    Sexually Abused: Not on file   Housing Stability:     Unable to Pay for Housing in the Last Year: Not on file    Number of [place] in the Last Year: Not on file    Unstable Housing in the Last Year: Not on file       Family History:   History reviewed. No pertinent family history. Allergies:   Patient has no known allergies. Review of Systems:     AURA-intubated and sedated 12/12/2021      Constitutional: No fevers or chills. No systemic complaints  Head: No headaches  Eyes: No double vision or blurry vision. No conjunctival inflammation. ENT: No sore throat or runny nose. . No hearing loss, tinnitus or vertigo. Cardiovascular: No chest pain or palpitations. No shortness of breath. No MOLINA  Lung: No shortness of breath or cough. No sputum production  Abdomen: No nausea, vomiting, diarrhea, or abdominal pain. [place] No cramps. Genitourinary: No increased urinary frequency, or dysuria. No hematuria. No suprapubic or CVA pain  Musculoskeletal: No muscle aches or pains. No joint effusions, swelling or deformities  Hematologic: No bleeding or bruising. Neurologic: No headache, weakness, numbness, or tingling. Integument: No rash, no ulcers. Psychiatric: No depression. Endocrine: No polyuria, no polydipsia, no polyphagia. Physical Examination :     Patient Vitals for the past 8 hrs:   BP Temp Pulse Resp SpO2   12/12/21 1126 (!) 147/75 98.2 °F (36.8 °C) 92 18 95 %   12/12/21 0754 (!) 163/70 97.8 °F (36.6 °C) 93 17 94 %     General Appearance: Intubated and sedated  Head:  Normocephalic, no trauma  Eyes: Pupils equal, round, reactive to light; sclera anicteric; conjunctivae pink. No embolic phenomena. ENT: Oropharynx clear, without erythema, exudate, or thrush. No tenderness of sinuses. Mouth/throat: mucosa pink and moist. No lesions. Dentition in good repair. Neck:Supple, without lymphadenopathy. Thyroid normal, No bruits. Pulmonary/Chest: Clear to auscultation, without wheezes, rales, or rhonchi. No dullness to percussion. Cardiovascular: Regular rate and rhythm without murmurs, rubs, or gallops. Abdomen: Soft, non tender. Bowel sounds normal. No organomegaly  All four Extremities: No cyanosis, clubbing, edema, or effusions. Neurologic: Not following commands at this time  Skin: Warm and dry with good turgor. No signs of peripheral arterial or venous insufficiency. No ulcerations. Scattered bruising.  Deep tissue injury to coccyx    Medical Decision Making -Laboratory:   I have independently reviewed/ordered the following labs:    CBC with Differential:   Recent Labs     12/11/21  0740 12/12/21  0441   WBC 23.8* 18.5*   HGB 10.6* 9.5*   HCT 32.2* 29.2*   * 940*   LYMPHOPCT 8* 8*   MONOPCT 8 13*     BMP:   Recent Labs     12/11/21  0740 12/12/21  0441    136   K 3.9 3.3*   * 105   CO2 20 21   BUN 14 13   CREATININE 0.43* 0.33*   MG  --  2.2     Hepatic Function Panel:   No results for input(s): PROT, LABALBU, BILIDIR, IBILI, BILITOT, ALKPHOS, ALT, AST in the last 72 hours. No results for input(s): RPR in the last 72 hours. No results for input(s): HIV in the last 72 hours. No results for input(s): BC in the last 72 hours.   Lab Results   Component Value Date    MUCUS 1+ 12/11/2021    RBC 3.30 12/12/2021    TRICHOMONAS NOT REPORTED 12/11/2021    WBC 18.5 12/12/2021    YEAST FEW 12/11/2021    TURBIDITY Clear 12/11/2021     Lab Results   Component Value Date    CREATININE 0.33 12/12/2021    GLUCOSE 51 12/12/2021       Medical Decision Making-Imaging:     Narrative   EXAMINATION:   ONE XRAY VIEW OF THE CHEST       12/6/2021 10:55 am       COMPARISON:   December 4, 2021       HISTORY:   ORDERING SYSTEM PROVIDED HISTORY: evaluate pulmonary opacities   TECHNOLOGIST PROVIDED HISTORY:   evaluate pulmonary opacities       FINDINGS:   Satisfactory position of the endotracheal and enteric tubes.       There has been interval clearing of the parenchymal consolidation in the   lateral left base.  Otherwise, diffuse interstitial and somewhat ground-glass   opacities in both lungs remain as well as left retrocardiac consolidation.       No sizable pleural effusion.  No pneumothorax.       Stable cardiomediastinal silhouette.           Impression   Interval clearing of the parenchymal consolidation in the lateral left base.       Otherwise, diffuse bilateral interstitial and somewhat ground-glass opacities   remain as well as left retrocardiac consolidation.               Medical Decision [id number]:       Medical Decision Making-Other:     Note:  · Labs, medications, radiologic studies were reviewed with personal review of films  · Large amounts of data were reviewed  · Discussed with nursing Staff, Discharge planner  · Infection Control and Prevention measures reviewed  · All prior entries were reviewed  · Administer medications as ordered  · Prognosis: Guarded  · Discharge planning reviewed    Thank you for allowing us to participate in the care of this patient. Please call with questions.     Rojelio Lamb MD.    Pager: [phone] - Office: [phone]

## 2021-12-12 NOTE — PROGRESS NOTES
pressure and pt was started on levophed. Pt was started on Levaquin and completed 7 doses for aspiration pneumonia. COVID testing was negative. MRSA testing was negative. OBJECTIVE     Vital Signs:  BP (!) 162/65   Pulse 102   Temp 98.8 °F (37.1 °C) (Oral)   Resp 18   Ht 5' 9\" (1.753 m)   Wt 139 lb 5.3 oz (63.2 kg)   SpO2 96%   BMI 20.58 kg/m²     Temp (24hrs), Av.2 °F (37.3 °C), Min:98.2 °F (36.8 °C), Max:99.8 °F (37.7 °C)    In: 112   Out: 800 [Urine:800]    Physical Exam:  Physical Exam  Vitals and nursing note reviewed. Constitutional:       Appearance: Normal appearance. HENT:      Head: Normocephalic and atraumatic. Nose: Nose normal.      Mouth/Throat:      Mouth: Mucous membranes are moist.      Pharynx: Oropharynx is clear. Eyes:      Extraocular Movements: Extraocular movements intact. Conjunctiva/sclera: Conjunctivae normal.      Pupils: Pupils are equal, round, and reactive to light. Cardiovascular:      Rate and Rhythm: Normal rate and regular rhythm. Pulses: Normal pulses. Heart sounds: Normal heart sounds. No murmur heard. No friction rub. No gallop. Pulmonary:      Effort: Pulmonary effort is normal. No respiratory distress. Breath sounds: Normal breath sounds. No stridor. No wheezing, rhonchi or rales. Chest:      Chest wall: No tenderness. Abdominal:      General: Abdomen is flat. Bowel sounds are normal. There is no distension. Palpations: Abdomen is soft. There is no mass. Tenderness: There is no abdominal tenderness. There is no guarding or rebound. Hernia: No hernia is present. Musculoskeletal:         General: No swelling, tenderness, deformity or signs of injury. Normal range of motion. Cervical back: Normal range of motion. Right lower leg: No edema. Left lower leg: No edema. Skin:     General: Skin is warm. Neurological:      General: No focal deficit present.       Mental Status: He is alert and 72 hours. CARDIAC ENZYMES: No results for input(s): CKMB, CKMBINDEX, TROPONINI in the last 72 hours. Invalid input(s): CKTOTAL;3  FASTING LIPID PANEL:No results found for: CHOL, HDL, TRIG  LIVER PROFILE:   Recent Labs     12/09/21  1235   AST 24   ALT 25   BILIDIR <0.08   BILITOT 0.27*   ALKPHOS 110      MICROBIOLOGY:   Lab Results   Component Value Date/Time    CULTURE NO GROWTH <24 HRS 12/11/2021 02:58 PM       Imaging:    XR CHEST PORTABLE    Result Date: 12/9/2021  Interval progression nonspecific pulmonary infiltrates are typical for COVID-19 pneumonia, but can be seen with other atypical/viral pneumonia as well. Life support appliances appear appropriately positioned. XR CHEST PORTABLE    Result Date: 12/6/2021  Interval clearing of the parenchymal consolidation in the lateral left base. Otherwise, diffuse bilateral interstitial and somewhat ground-glass opacities remain as well as left retrocardiac consolidation. ASSESSMENT & PLAN     Assessment and Plan:    Principal Problem:    DKA (diabetic ketoacidosis) ([facility] [address]  Active Problems:    Type 1 diabetes mellitus with ketoacidosis without coma (HCC)    Moderate malnutrition (HCC)    Acute respiratory failure with hypoxia (HCC)    Metabolic encephalopathy    Pneumonia of left lower lobe due to infectious organism    Hypernatremia  Resolved Problems:    * No resolved hospital problems.  *      DKA with encephalopathy - improved   - Pt A&OX3   - Repeat swallow study   - Lantus 10 U nightly   - High dose sliding scale   - Continue IVF therapy      Aspiration pneumonia - improving   - Clindamycin completed   - Continue to monitor for fevers > afebrile   - Trend WBC   - Follow up on blood and urine cultures    - ID following appreciate recommendations      Hypotension - resolved      Hypertension   - Resume Lisinopril   - Continue with labetalol 5mg PRN      Type 1 diabetes mellitus - stable   - Continue to monitor glucose > follow up on repeat sugars and adjust insulin accordingly   - Lantus 10U nightly   - High dose sliding scale      Diet: Dysphagia > soft and bite sized with thick nectar   DVT ppx: Lovenox  GI ppx: Pepcid     PT/OT/SW: Consulted    Discharge Planning: In process       Cora Russell MD  Internal Medicine Resident, PGY-1  [facility];  Vicente Bonilla   2:17 AM 12/12/2021

## 2021-12-13 ENCOUNTER — APPOINTMENT (OUTPATIENT)
Dept: GENERAL RADIOLOGY | Age: 61
DRG: 637 | End: 2021-12-13
Payer: COMMERCIAL

## 2021-12-13 VITALS
WEIGHT: 139.33 LBS | TEMPERATURE: 97.2 F | RESPIRATION RATE: 18 BRPM | DIASTOLIC BLOOD PRESSURE: 78 MMHG | BODY MASS INDEX: 20.64 KG/M2 | OXYGEN SATURATION: 97 % | HEIGHT: 69 IN | HEART RATE: 92 BPM | SYSTOLIC BLOOD PRESSURE: 163 MMHG

## 2021-12-13 LAB
ABSOLUTE EOS #: 0.16 K/UL (ref 0–0.44)
ABSOLUTE IMMATURE GRANULOCYTE: 0.16 K/UL (ref 0–0.3)
ABSOLUTE LYMPH #: 2.75 K/UL (ref 1.1–3.7)
ABSOLUTE MONO #: 1.78 K/UL (ref 0.1–1.2)
ABSOLUTE RETIC #: 0.07 M/UL (ref 0.02–0.1)
ANION GAP SERPL CALCULATED.3IONS-SCNC: 13 MMOL/L (ref 9–17)
BASOPHILS # BLD: 0 % (ref 0–2)
BASOPHILS ABSOLUTE: 0 K/UL (ref 0–0.2)
BUN BLDV-MCNC: 11 MG/DL (ref 6–20)
BUN/CREAT BLD: ABNORMAL (ref 9–20)
CALCIUM SERPL-MCNC: 7.8 MG/DL (ref 8.6–10.4)
CHLORIDE BLD-SCNC: 104 MMOL/L (ref 98–107)
CO2: 18 MMOL/L (ref 20–31)
CREAT SERPL-MCNC: 0.35 MG/DL (ref 0.7–1.2)
DIFFERENTIAL TYPE: ABNORMAL
EOSINOPHILS RELATIVE PERCENT: 1 % (ref 1–4)
FOLATE: 11.3 NG/ML
GFR AFRICAN AMERICAN: >60 ML/MIN
GFR NON-AFRICAN AMERICAN: >60 ML/MIN
GFR SERPL CREATININE-BSD FRML MDRD: ABNORMAL ML/MIN/{1.73_M2}
GFR SERPL CREATININE-BSD FRML MDRD: ABNORMAL ML/MIN/{1.73_M2}
GLUCOSE BLD-MCNC: 107 MG/DL (ref 75–110)
GLUCOSE BLD-MCNC: 190 MG/DL (ref 75–110)
GLUCOSE BLD-MCNC: 238 MG/DL (ref 70–99)
GLUCOSE BLD-MCNC: 243 MG/DL (ref 75–110)
GLUCOSE BLD-MCNC: 243 MG/DL (ref 75–110)
GLUCOSE BLD-MCNC: 314 MG/DL (ref 75–110)
HCT VFR BLD CALC: 29 % (ref 40.7–50.3)
HEMOGLOBIN: 9.8 G/DL (ref 13–17)
IMMATURE GRANULOCYTES: 1 %
IMMATURE RETIC FRACT: NORMAL %
IRON SATURATION: 23 % (ref 20–55)
IRON: 36 UG/DL (ref 59–158)
LYMPHOCYTES # BLD: 17 % (ref 24–43)
MCH RBC QN AUTO: 30.2 PG (ref 25.2–33.5)
MCHC RBC AUTO-ENTMCNC: 33.8 G/DL (ref 28.4–34.8)
MCV RBC AUTO: 89.5 FL (ref 82.6–102.9)
MONOCYTES # BLD: 11 % (ref 3–12)
MORPHOLOGY: NORMAL
NRBC AUTOMATED: 0 PER 100 WBC
PDW BLD-RTO: 13.4 % (ref 11.8–14.4)
PLATELET # BLD: ABNORMAL K/UL (ref 138–453)
PLATELET ESTIMATE: ABNORMAL
PLATELET, FLUORESCENCE: 1008 K/UL (ref 138–453)
PLATELET, IMMATURE FRACTION: 1.4 % (ref 1.1–10.3)
PMV BLD AUTO: ABNORMAL FL (ref 8.1–13.5)
POTASSIUM SERPL-SCNC: 4.3 MMOL/L (ref 3.7–5.3)
RBC # BLD: 3.24 M/UL (ref 4.21–5.77)
RBC # BLD: ABNORMAL 10*6/UL
RETIC %: 2 % (ref 0.5–2)
RETIC HEMOGLOBIN: NORMAL PG (ref 28.2–35.7)
SEG NEUTROPHILS: 70 % (ref 36–65)
SEGMENTED NEUTROPHILS ABSOLUTE COUNT: 11.35 K/UL (ref 1.5–8.1)
SODIUM BLD-SCNC: 135 MMOL/L (ref 135–144)
TOTAL IRON BINDING CAPACITY: 157 UG/DL (ref 250–450)
UNSATURATED IRON BINDING CAPACITY: 121 UG/DL (ref 112–347)
VITAMIN B-12: 634 PG/ML (ref 232–1245)
WBC # BLD: 16.2 K/UL (ref 3.5–11.3)
WBC # BLD: ABNORMAL 10*3/UL

## 2021-12-13 PROCEDURE — 92526 ORAL FUNCTION THERAPY: CPT

## 2021-12-13 PROCEDURE — 94761 N-INVAS EAR/PLS OXIMETRY MLT: CPT

## 2021-12-13 PROCEDURE — 82746 ASSAY OF FOLIC ACID SERUM: CPT

## 2021-12-13 PROCEDURE — 85025 COMPLETE CBC W/AUTO DIFF WBC: CPT

## 2021-12-13 PROCEDURE — 83540 ASSAY OF IRON: CPT

## 2021-12-13 PROCEDURE — 94150 VITAL CAPACITY TEST: CPT

## 2021-12-13 PROCEDURE — 97116 GAIT TRAINING THERAPY: CPT

## 2021-12-13 PROCEDURE — 99232 SBSQ HOSP IP/OBS MODERATE 35: CPT | Performed by: INTERNAL MEDICINE

## 2021-12-13 PROCEDURE — 97110 THERAPEUTIC EXERCISES: CPT

## 2021-12-13 PROCEDURE — 80048 BASIC METABOLIC PNL TOTAL CA: CPT

## 2021-12-13 PROCEDURE — 6370000000 HC RX 637 (ALT 250 FOR IP): Performed by: STUDENT IN AN ORGANIZED HEALTH CARE EDUCATION/TRAINING PROGRAM

## 2021-12-13 PROCEDURE — 71046 X-RAY EXAM CHEST 2 VIEWS: CPT

## 2021-12-13 PROCEDURE — 99211 OFF/OP EST MAY X REQ PHY/QHP: CPT

## 2021-12-13 PROCEDURE — 82607 VITAMIN B-12: CPT

## 2021-12-13 PROCEDURE — 6370000000 HC RX 637 (ALT 250 FOR IP): Performed by: INTERNAL MEDICINE

## 2021-12-13 PROCEDURE — 83550 IRON BINDING TEST: CPT

## 2021-12-13 PROCEDURE — 94664 DEMO&/EVAL PT USE INHALER: CPT

## 2021-12-13 PROCEDURE — 85055 RETICULATED PLATELET ASSAY: CPT

## 2021-12-13 PROCEDURE — 82947 ASSAY GLUCOSE BLOOD QUANT: CPT

## 2021-12-13 PROCEDURE — 85045 AUTOMATED RETICULOCYTE COUNT: CPT

## 2021-12-13 PROCEDURE — 36415 COLL VENOUS BLD VENIPUNCTURE: CPT

## 2021-12-13 PROCEDURE — 6360000002 HC RX W HCPCS: Performed by: STUDENT IN AN ORGANIZED HEALTH CARE EDUCATION/TRAINING PROGRAM

## 2021-12-13 PROCEDURE — 99222 1ST HOSP IP/OBS MODERATE 55: CPT | Performed by: INTERNAL MEDICINE

## 2021-12-13 RX ORDER — ASPIRIN 81 MG/1
81 TABLET, CHEWABLE ORAL DAILY
Status: DISCONTINUED | OUTPATIENT
Start: 2021-12-13 | End: 2021-12-13 | Stop reason: HOSPADM

## 2021-12-13 RX ORDER — LISINOPRIL 20 MG/1
20 TABLET ORAL DAILY
Qty: 30 TABLET | Refills: 3 | Status: SHIPPED | OUTPATIENT
Start: 2021-12-14

## 2021-12-13 RX ORDER — INSULIN ASPART 100 [IU]/ML
2 INJECTION, SOLUTION INTRAVENOUS; SUBCUTANEOUS
Qty: 5 PEN | Refills: 3 | Status: SHIPPED | OUTPATIENT
Start: 2021-12-13

## 2021-12-13 RX ORDER — ASPIRIN 81 MG/1
81 TABLET, CHEWABLE ORAL DAILY
Qty: 30 TABLET | Refills: 3 | Status: SHIPPED | OUTPATIENT
Start: 2021-12-13 | End: 2021-12-13 | Stop reason: HOSPADM

## 2021-12-13 RX ORDER — INSULIN GLARGINE 100 [IU]/ML
12 INJECTION, SOLUTION SUBCUTANEOUS 2 TIMES DAILY
Status: DISCONTINUED | OUTPATIENT
Start: 2021-12-13 | End: 2021-12-13 | Stop reason: HOSPADM

## 2021-12-13 RX ORDER — INSULIN GLARGINE 100 [IU]/ML
7 INJECTION, SOLUTION SUBCUTANEOUS 2 TIMES DAILY
Status: DISCONTINUED | OUTPATIENT
Start: 2021-12-13 | End: 2021-12-13

## 2021-12-13 RX ORDER — QUETIAPINE FUMARATE 25 MG/1
12.5 TABLET, FILM COATED ORAL 2 TIMES DAILY
Status: DISCONTINUED | OUTPATIENT
Start: 2021-12-13 | End: 2021-12-13

## 2021-12-13 RX ADMIN — ASPIRIN 81 MG: 81 TABLET, CHEWABLE ORAL at 16:41

## 2021-12-13 RX ADMIN — LISINOPRIL 20 MG: 20 TABLET ORAL at 08:45

## 2021-12-13 RX ADMIN — INSULIN LISPRO 4 UNITS: 100 INJECTION, SOLUTION INTRAVENOUS; SUBCUTANEOUS at 09:00

## 2021-12-13 RX ADMIN — INSULIN GLARGINE 12 UNITS: 100 INJECTION, SOLUTION SUBCUTANEOUS at 09:54

## 2021-12-13 RX ADMIN — ENOXAPARIN SODIUM 40 MG: 100 INJECTION SUBCUTANEOUS at 08:45

## 2021-12-13 RX ADMIN — INSULIN LISPRO 2 UNITS: 100 INJECTION, SOLUTION INTRAVENOUS; SUBCUTANEOUS at 16:52

## 2021-12-13 RX ADMIN — FAMOTIDINE 20 MG: 20 TABLET, FILM COATED ORAL at 08:46

## 2021-12-13 RX ADMIN — FLUOXETINE HYDROCHLORIDE 40 MG: 20 CAPSULE ORAL at 08:45

## 2021-12-13 RX ADMIN — INSULIN LISPRO 1 UNITS: 100 INJECTION, SOLUTION INTRAVENOUS; SUBCUTANEOUS at 11:52

## 2021-12-13 NOTE — PROGRESS NOTES
Pulmonary Daily Progress Note      Date and time: 2021 10:22 PM  Patient's name:  Maine Man Record Number: [id number]  Patient's account/billing number: [de-identified]  Patient's YOB: 1960  Age: 63 y.o. Date of Admission: 2021  7:39 AM  Length of stay during current admission: 12      Primary Care Physician: Lul Salter    Code Status: Full Code    Reason for ICU admission:    DKA with coma/acute respiratory failure      SUBJECTIVE:     OVERNIGHT EVENTS:    Patient is status post extubation on 12/10/2021. He was transferred out of ICU on 2021. Overnight he is hemodynamically stable slightly hypertensive afebrile. He was weaned off oxygen and on room air saturating 95%. No acute delirious episodes were reported overnight he remained on Seroquel he has been off Precedex for 48 hours. According to patient he has mild sore throat not any stridor denies much cough denies wheezing denies shortness of breath at rest.  Labs shows WBC 18.5 hemoglobin 9.5 BUN is 13 creatinine 0.33      OBJECTIVE:     VITAL SIGNS:  BP (!) 159/72   Pulse 96   Temp 98.5 °F (36.9 °C) (Oral)   Resp 16   Ht 5' 9\" (1.753 m)   Wt 139 lb 5.3 oz (63.2 kg)   SpO2 92%   BMI 20.58 kg/m²   Tmax over 24 hours:  Temp (24hrs), Av.2 °F (36.8 °C), Min:97.8 °F (36.6 °C), Max:98.5 °F (36.9 °C)      Patient Vitals for the past 6 hrs:   BP Temp Temp src Pulse Resp SpO2   21 (!) 159/72 98.5 °F (36.9 °C) Oral 96 16 92 %   21 1633 (!) 160/72 98.4 °F (36.9 °C) -- 91 17 98 %         Intake/Output Summary (Last 24 hours) at 2021  Last data filed at 2021  Gross per 24 hour   Intake 782 ml   Output 1500 ml   Net -718 ml     Wt Readings from Last 2 Encounters:   21 139 lb 5.3 oz (63.2 kg)     Body mass index is 20.58 kg/m².         PHYSICAL EXAMINATION:  Constitutional: Stable not in distress look comfortable  Neck: Supple, symmetrical, trachea midline, no jvd, skin normal  Respiratory: Bilateral slightly distant breath sounds, no wheezing/rales  Cardiovascular: regular rate and rhythm, normal S1, S2, no murmur noted   Abdomen: soft, nondistended, no masses or organomegaly  Extremities:  peripheral pulses normal, no pedal edema, no clubbing or cyanosis  Neurological: Pupils reactive. No rigidity.   Alert and awake follows command move all extremities no cranial nerve or neurological deficit      MEDICATIONS:    Scheduled Meds:   insulin glargine  5 Units SubCUTAneous Nightly    insulin lispro  0-6 Units SubCUTAneous TID WC    insulin lispro  0-3 Units SubCUTAneous Nightly    [START ON 12/13/2021] lisinopril  20 mg Oral Daily    enoxaparin  40 mg SubCUTAneous Daily    QUEtiapine  25 mg Oral BID    FLUoxetine  40 mg Oral BID    famotidine  20 mg Per NG tube BID    sodium chloride flush  5-40 mL IntraVENous 2 times per day     Continuous Infusions:   sodium chloride 30 mL/hr at 12/12/21 2216    dexmedetomidine Stopped (12/10/21 1826)    dextrose      sodium chloride      dextrose 5 % and 0.45 % NaCl Stopped (12/01/21 0000)     PRN Meds:   labetalol, 5 mg, Q6H PRN  LORazepam, 0.5 mg, Q4H PRN  albuterol, 2.5 mg, Q4H PRN  glucose, 15 g, PRN  dextrose, 12.5 g, PRN  glucagon (rDNA), 1 mg, PRN  dextrose, 100 mL/hr, PRN  dextrose, 12.5 g, PRN  polyethylene glycol, 17 g, Daily PRN  sodium chloride flush, 5-40 mL, PRN  sodium chloride, 25 mL, PRN  ondansetron, 4 mg, Q8H PRN   Or  ondansetron, 4 mg, Q6H PRN  acetaminophen, 650 mg, Q6H PRN   Or  acetaminophen, 650 mg, Q6H PRN  dextrose 5 % and 0.45 % NaCl, , Continuous PRN            Laboratory findings:    Complete Blood Count:   Recent Labs     12/10/21  0450 12/11/21  0740 12/12/21  0441   WBC 16.9* 23.8* 18.5*   HGB 9.1* 10.6* 9.5*   HCT 28.6* 32.2* 29.2*   * 790* 940*        Last 3 Blood Glucose:   Recent Labs     12/10/21  0450 12/11/21  0740 12/12/21  0441   GLUCOSE 99 130* 51*        PT/INR:  No results found for: PROTIME, INR  PTT:  No results found for: APTT, PTT    Comprehensive Metabolic Profile:   Recent Labs     12/10/21  0450 12/11/21  0740 12/12/21  0441    143 136   K 3.9 3.9 3.3*   * 108* 105   CO2 22 20 21   BUN 18 14 13   CREATININE 0.48* 0.43* 0.33*   GLUCOSE 99 130* 51*   CALCIUM 7.7* 7.9* 7.7*      Magnesium:   Lab Results   Component Value Date    MG 2.2 12/12/2021     Phosphorus:   Lab Results   Component Value Date    PHOS 3.8 12/04/2021     Ionized Calcium:   Lab Results   Component Value Date    CAION 1.04 12/03/2021        Assessment:      Diabetic ketoacidosis improved. Diabetes mellitus plan  Metabolic encephalopathy. Acute hypoxic respiratory failure improved. Aspiration pneumonia. Hypertension. Thrombocytopenia improved/HIT negative    PLAN:     Patient is currently hemodynamically stable saturating well on room air. Disorientation confusion and delirium is improving. Platelet count continued to increase need follow-up. Currently on f Lovenox for DVT prophylaxis. Management of diabetes and adjustment of Lantus primary team.  Aspiration precaution. Ambulation physical therapy. Finish course of clindamycin followed up with infectious disease. Follow-up temperature and WBC count. On Seroquel 25 twice daily may decrease and wean off Seroquel. Recommend to repeat PA and lateral chest x-ray in  4 weeks for follow-up of pulmonary infiltrate. Please note that this chart was generated using voice recognition Dragon dictation software. Although every effort was made to ensure the accuracy of this automated transcription, some errors in transcription may have occurred.      Candice Diaz MD  12/12/2021 10:22 PM

## 2021-12-13 NOTE — PROGRESS NOTES
Pulmonary Daily Progress Note      Date and time: 2021 10:07 AM  Patient's name:  Bassam Key  Medical Record Number: [id number]  Patient's account/billing number: [de-identified]  Patient's YOB: 1960  Age: 63 y.o. Date of Admission: 2021  7:39 AM  Length of stay during current admission: 13      Primary Care Physician: Annabelle Alvarez    Code Status: Full Code    Reason for ICU admission:    DKA with coma/acute respiratory failure      SUBJECTIVE:     OVERNIGHT EVENTS: Patient was intubated and extubated on 12/10/2021. He was transferred to the floor before yesterday. Clinically he has been doing well he been sleeping well at night he is getting Seroquel. Blood sugar is stable good with the WBC count is improving no cough no fever does have bedsore which is being treated related with by wound care. No DVT lungs are clear patient is awake and alert. Has the vital signs are stable. Hemodynamically stable. OBJECTIVE:     VITAL SIGNS:  BP (!) 160/77   Pulse 93   Temp 98.2 °F (36.8 °C) (Oral)   Resp 16   Ht 5' 9\" (1.753 m)   Wt 139 lb 5.3 oz (63.2 kg)   SpO2 95%   BMI 20.58 kg/m²   Tmax over 24 hours:  Temp (24hrs), Av.3 °F (36.8 °C), Min:98.2 °F (36.8 °C), Max:98.5 °F (36.9 °C)      Patient Vitals for the past 6 hrs:   BP Temp Temp src Pulse Resp SpO2   21 0741 (!) 160/77 98.2 °F (36.8 °C) Oral 93 16 95 %         Intake/Output Summary (Last 24 hours) at 2021 1007  Last data filed at 2021 0718  Gross per 24 hour   Intake 782 ml   Output 2100 ml   Net -1318 ml     Wt Readings from Last 2 Encounters:   21 139 lb 5.3 oz (63.2 kg)     Body mass index is 20.58 kg/m². PHYSICAL EXAMINATION: Unchanged and unremarkable.  He is malnourished  Constitutional: Stable not in distress look comfortable  Neck: Supple, symmetrical, trachea midline, no jvd, skin normal  Respiratory: Bilateral slightly distant breath sounds, no wheezing/rales  Cardiovascular: regular rate and rhythm, normal S1, S2, no murmur noted   Abdomen: soft, nondistended, no masses or organomegaly  Extremities:  peripheral pulses normal, no pedal edema, no clubbing or cyanosis  Neurological: Pupils reactive. No rigidity.   Alert and awake follows command move all extremities no cranial nerve or neurological deficit      MEDICATIONS:    Scheduled Meds:   QUEtiapine  12.5 mg Oral BID    insulin glargine  12 Units SubCUTAneous BID    insulin lispro  0-6 Units SubCUTAneous TID WC    insulin lispro  0-3 Units SubCUTAneous Nightly    lisinopril  20 mg Oral Daily    enoxaparin  40 mg SubCUTAneous Daily    FLUoxetine  40 mg Oral BID    famotidine  20 mg Per NG tube BID    sodium chloride flush  5-40 mL IntraVENous 2 times per day     Continuous Infusions:   dextrose      sodium chloride      dextrose 5 % and 0.45 % NaCl Stopped (12/01/21 0000)     PRN Meds:   labetalol, 5 mg, Q6H PRN  albuterol, 2.5 mg, Q4H PRN  glucose, 15 g, PRN  dextrose, 12.5 g, PRN  glucagon (rDNA), 1 mg, PRN  dextrose, 100 mL/hr, PRN  dextrose, 12.5 g, PRN  polyethylene glycol, 17 g, Daily PRN  sodium chloride flush, 5-40 mL, PRN  sodium chloride, 25 mL, PRN  ondansetron, 4 mg, Q8H PRN   Or  ondansetron, 4 mg, Q6H PRN  acetaminophen, 650 mg, Q6H PRN   Or  acetaminophen, 650 mg, Q6H PRN  dextrose 5 % and 0.45 % NaCl, , Continuous PRN            Laboratory findings:    Complete Blood Count:   Recent Labs     12/11/21  0740 12/12/21 0441 12/13/21  0517   WBC 23.8* 18.5* 16.2*   HGB 10.6* 9.5* 9.8*   HCT 32.2* 29.2* 29.0*   * 940* See Reflexed IPF Result        Last 3 Blood Glucose:   Recent Labs     12/11/21  0740 12/12/21 0441 12/13/21  0517   GLUCOSE 130* 51* 238*        PT/INR:  No results found for: PROTIME, INR  PTT:  No results found for: APTT, PTT    Comprehensive Metabolic Profile:   Recent Labs     12/11/21  0740 12/12/21 0441 12/13/21  0517    136 135   K 3.9 3.3* 4.3   * 105 104   CO2 20 21 18*   BUN 14 13 11 CREATININE 0.43* 0.33* 0.35*   GLUCOSE 130* 51* 238*   CALCIUM 7.9* 7.7* 7.8*      Magnesium:   Lab Results   Component Value Date    MG 2.2 12/12/2021     Phosphorus:   Lab Results   Component Value Date    PHOS 3.8 12/04/2021     Ionized Calcium:   Lab Results   Component Value Date    CAION 1.04 12/03/2021        Assessment:      Diabetic ketoacidosis improved. Diabetes mellitus plan  Metabolic encephalopathy. Acute hypoxic respiratory failure improved. Aspiration pneumonia. Hypertension. Thrombocytopenia improved/HIT negative    PLAN:   Patient slept well. He is awake and alert. He not confused    Blood sugar is stable    Hemodynamically stable. Blood pressure is stable. He had aspiration pneumonia with no evidence of pneumonia at this time. We may consider repeating a chest x-ray for future reference. He have had metabolic encephalopathy that seems to be resolving. No evidence of ketoacidosis anymore. The thrombocytopenia has been improving there is no evidence of any HIT. Patient should be given a Covid vaccine. He should also be given a flu vaccine. Is up to the primary service to discharge whenever possible    No active pulmonary events.     Dictated with Dr. Dave Dallas MD dictation over thank you

## 2021-12-13 NOTE — PROGRESS NOTES
CLINICAL PHARMACY NOTE: MEDS TO BEDS    Total # of Prescriptions Filled: 2   The following medications were delivered to the patient:  · Lisinopril 20 mg tab  · Aspirin low dose 81 mg chew    Additional Documentation:Medications delivered to the patient in [id number] @ 3:50 pm. Cash payment. novolog insulin was too soon to fill.

## 2021-12-13 NOTE — CONSULTS
Today's Date: 12/13/2021  Patient Name: Mark Marte  Date of admission: 11/30/2021  7:39 AM  Patient's age: 63 y.o., 1960  Admission Dx: DKA, type 1, not at goal [facility]) [E10.10]  Type 1 diabetes mellitus with ketoacidosis without coma ([facility] [address] [E10.10]    Reason for Consult: management recommendations  Requesting Physician: Brenda Khanna MD    CHIEF COMPLAINT: Anticoagulation. Kidney biopsy. History Obtained From:  patient, electronic medical record    HISTORY OF PRESENT ILLNESS:      The patient is a 63 y.o.  male who is admitted to the hospital on 11/30 with altered mental status. Patient has not been taking his insulin as he was supposed to. Patient was diagnosed with DKA started on the DKA protocol. COVID-19 testing was negative. Patient required intubation for airway protection. Patient was also diagnosed with aspiration pneumonia and started on antibiotics per infectious disease team.  Patient was transferred out of the ICU on 12/11. Oncology team was consulted due to thrombocytosis. Patient CBC upon presentation on 11/30 showed WBC count of 26,000 hemoglobin 11.8 with platelet count of 3 6 7000 during the hospitalization course patient platelet count dropped and nadired at 97,000 on 12/3. Since then patient cell count has been rising and most recent platelet count is 6,546,162 with immature platelet fraction 1.4. Patient hemoglobin from today is 9.4 with MCV 89.5. Platelet count is 38.1. No previous CBCs is available for comparison the patient's baseline CBC. Past Medical History:   has a past medical history of [facility] and Hypertension. Past Surgical History:   has no past surgical history on file. Medications:    Prior to Admission medications    Medication Sig Start Date End Date Taking?  Authorizing Provider   lisinopril (PRINIVIL;ZESTRIL) 20 MG tablet Take 1 tablet by mouth daily 12/14/21  Yes Jassi Evangelista MD   aspirin 81 MG chewable tablet Take 1 tablet by mouth daily 12/13/21  Yes Yoana Ovalles MD   NOVOLOG FLEXPEN 100 UNIT/ML injection pen Inject 2 Units into the skin 3 times daily (before meals) 12/13/21  Yes Yoana Ovalles MD   LANTUS 100 UNIT/ML injection vial 12 Units 2 times daily  8/19/21      FLUoxetine (PROZAC) 20 MG capsule Take 40 mg by mouth 2 times daily     Historical Provider, MD   sildenafil (VIAGRA) 100 MG tablet  8/19/21   Historical Provider, MD     Current Facility-Administered Medications   Medication Dose Route Frequency Provider Last Rate Last Admin    insulin glargine (LANTUS) injection vial 12 Units  12 Units SubCUTAneous BID Stephanie Bryd MD   12 Units at 12/13/21 0954    aspirin chewable tablet 81 mg  81 mg Oral Daily Yoana Ovalles MD        insulin lispro (HUMALOG) injection vial 0-6 Units  0-6 Units SubCUTAneous TID WC Yoana Ovalles MD   1 Units at 12/13/21 1152    insulin lispro (HUMALOG) injection vial 0-3 Units  0-3 Units SubCUTAneous Nightly Yoana Ovalles MD   2 Units at 12/12/21 2208    lisinopril (PRINIVIL;ZESTRIL) tablet 20 mg  20 mg Oral Daily Yoana Ovalles MD   20 mg at 12/13/21 0845    labetalol (NORMODYNE;TRANDATE) injection 5 mg  5 mg IntraVENous Q6H PRN Juancarlos Linares MD   5 mg at 12/11/21 0506    enoxaparin (LOVENOX) injection 40 mg  40 mg SubCUTAneous Daily Alejandro Keyes MD   40 mg at 12/13/21 0845    FLUoxetine (PROZAC) capsule 40 mg  40 mg Oral BID Radha Mello MD   40 mg at 12/13/21 0845    famotidine (PEPCID) tablet 20 mg  20 mg Per NG tube BID Radha Mello MD   20 mg at 12/13/21 0846    albuterol (PROVENTIL) nebulizer solution 2.5 mg  2.5 mg Nebulization Q4H PRN Mariposa Vásquez MD        glucose (GLUTOSE) 40 % oral gel 15 g  15 g Oral PRN Pop Chow DO        dextrose 50 % IV solution  12.5 g IntraVENous PRN Avery Chow DO   12.5 g at 12/02/21 1747    glucagon (rDNA) injection 1 mg  1 mg IntraMUSCular PRN Pop Chow DO        dextrose 5 % solution  100 mL/hr IntraVENous PRN Michel Chow DO        dextrose 50 % IV solution  12.5 g IntraVENous PRN Jessica Sotomayor MD   12.5 g at 12/05/21 1108    polyethylene glycol (GLYCOLAX) packet 17 g  17 g Oral Daily PRN Jessica Sotomayor MD        sodium chloride flush 0.9 % injection 5-40 mL  5-40 mL IntraVENous 2 times per day Jessica Sotomayor MD   10 mL at 12/11/21 0846    sodium chloride flush 0.9 % injection 5-40 mL  5-40 mL IntraVENous PRN Jessica Sotomayor MD        0.9 % sodium chloride infusion  25 mL IntraVENous PRN Jessica Sotomayor MD        ondansetron (ZOFRAN-ODT) disintegrating tablet 4 mg  4 mg Oral Q8H PRN Jessica Sotomayor MD        Or    ondansetron [facility]) injection 4 mg  4 mg IntraVENous Q6H PRN Jessica Sotomayor MD   4 mg at 12/05/21 5256    acetaminophen (TYLENOL) tablet 650 mg  650 mg Oral Q6H PRN Jessica Sotomayor MD   650 mg at 12/07/21 2020    Or    acetaminophen (TYLENOL) suppository 650 mg  650 mg Rectal Q6H PRN Jessica Sotomayor MD        dextrose 5 % and 0.45 % sodium chloride infusion   IntraVENous Continuous PRN Jessica Sotomayor MD   Stopped at 12/01/21 0000       Allergies:  Patient has no known allergies. Social History:   reports that he has never smoked. He has never used smokeless tobacco. He reports previous alcohol use. He reports current drug use. Drug: Marijuana Meli Mustard). Family History: family history is not on file. REVIEW OF SYSTEMS:      Constitutional: No fever or chills.  No night sweats, no weight loss   Eyes: No eye discharge, double vision, or eye pain   HEENT: negative for sore mouth, sore throat, hoarseness and voice change   Respiratory: negative for cough , sputum, dyspnea, wheezing, hemoptysis, chest pain   Cardiovascular: negative for chest pain, dyspnea, palpitations, orthopnea, PND   Gastrointestinal: negative for nausea, vomiting, diarrhea, constipation, abdominal pain, Dysphagia, hematemesis and hematochezia   Genitourinary: negative for frequency, dysuria, nocturia, urinary incontinence, and hematuria   Integument: negative for rash, skin lesions, bruises. Hematologic/Lymphatic: negative for easy bruising, bleeding, lymphadenopathy, or petechiae   Endocrine: negative for heat or cold intolerance,weight changes, change in bowel habits and hair loss   Musculoskeletal: negative for myalgias, arthralgias, pain, joint swelling,and bone pain   Neurological: negative for headaches, dizziness, seizures, weakness, numbness    PHYSICAL EXAM:        BP (!) 163/78   Pulse 92   Temp 97.2 °F (36.2 °C) (Oral)   Resp 18   Ht 5' 9\" (1.753 m)   Wt 139 lb 5.3 oz (63.2 kg)   SpO2 97%   BMI 20.58 kg/m²    Temp (24hrs), Av.1 °F (36.7 °C), Min:97.2 °F (36.2 °C), Max:98.5 °F (36.9 °C)      General appearance - well appearing, no in pain or distress   Mental status - alert and cooperative   Eyes - pupils equal and reactive, extraocular eye movements intact   Ears - bilateral TM's and external ear canals normal   Mouth - mucous membranes moist, pharynx normal without lesions   Neck - supple, no significant adenopathy   Lymphatics - no palpable lymphadenopathy, no hepatosplenomegaly   Chest - clear to auscultation, no wheezes, rales or rhonchi, symmetric air entry   Heart - normal rate, regular rhythm, normal S1, S2, no murmurs  Abdomen - soft, nontender, nondistended, no masses or organomegaly   Neurological - alert, oriented, normal speech, no focal findings or movement disorder noted   Musculoskeletal - no joint tenderness, deformity or swelling   Extremities - peripheral pulses normal, no pedal edema, no clubbing or cyanosis   Skin - normal coloration and turgor, no rashes, no suspicious skin lesions noted ,      DATA:      Labs:     Results for orders placed or performed during the hospital encounter of 21   COVID-19, Rapid    Specimen: Nasopharyngeal Swab   Result Value Ref Range    Specimen Description . NASOPHARYNGEAL SWAB     SARS-CoV-2, Rapid Not Detected Not Detected   MRSA DNA Probe, Nasal    Specimen: Nasal   Result Value Ref Range    Specimen Description . NASAL SWAB     MRSA, DNA, Nasal  NEGATIVE:  MRSA DNA not detected by nucleic acid amplificati     NEGATIVE:  MRSA DNA not detected by nucleic acid amplification. Culture, Urine    Specimen: Urine, clean catch   Result Value Ref Range    Specimen Description . CLEAN CATCH URINE     Special Requests NOT REPORTED     Culture NO GROWTH    Culture, Respiratory    Specimen: Sputum, Suctioned   Result Value Ref Range    Specimen Description . SUCTIONED SPUTUM     Special Requests NOT REPORTED     Direct Exam < 10 EPITHELIAL CELLS/LPF     Direct Exam <10 NEUTROPHILS/LPF     Direct Exam NO BACTERIA SEEN     Culture PRESUMPTIVE CANDIDA ALBICANS LIGHT GROWTH (A)    Culture, Blood 1    Specimen: Blood   Result Value Ref Range    Specimen Description . BLOOD     Special Requests LT HAND 11ML     Culture NO GROWTH 5 DAYS    Culture, Blood 1    Specimen: Blood   Result Value Ref Range    Specimen Description . BLOOD     Special Requests R HAND 8ML     Culture NO GROWTH 5 DAYS    Strep Pneumoniae Antigen    Specimen: Urine, clean catch   Result Value Ref Range    Source . URINE     Strep pneumo Ag NEGATIVE    LEGIONELLA ANTIGEN, URINE    Specimen: Urine, clean catch   Result Value Ref Range    Legionella Pneumophilia Ag, Urine NEGATIVE    Culture, Blood 2    Specimen: Blood   Result Value Ref Range    Specimen Description . BLOOD     Special Requests R WRIST 1ML     Culture NO GROWTH 1 DAY    Culture, Blood 1    Specimen: Blood   Result Value Ref Range    Specimen Description . BLOOD     Special Requests L WRIST 1ML     Culture NO GROWTH 1 DAY    Culture, Urine    Specimen: Urine, clean catch   Result Value Ref Range    Specimen Description . CLEAN CATCH URINE     Special Requests NOT REPORTED     Culture CULTURE IN PROGRESS    CBC Auto Differential   Result Value Ref Range    WBC 26.2 (H) 3.5 - 11.3 k/uL    RBC 4.02 (L) 4.21 - 5.77 m/uL    Hemoglobin 11.8 (L) 13.0 - 17.0 g/dL    Hematocrit 38.2 (L) 40.7 - 50.3 %    MCV 95.0 82.6 - 102.9 fL    MCH 29.4 25.2 - 33.5 pg    MCHC 30.9 28.4 - 34.8 g/dL    RDW 13.7 11.8 - 14.4 %    Platelets 830 996 - 539 k/uL    MPV 9.4 8.1 - 13.5 fL    NRBC Automated 0.0 0.0 per 100 WBC    Differential Type NOT REPORTED     WBC Morphology NOT REPORTED     RBC Morphology NOT REPORTED     Platelet Estimate NOT REPORTED     Immature Granulocytes 1 (H) 0 %    Seg Neutrophils 84 (H) 36 - 65 %    Lymphocytes 8 (L) 24 - 43 %    Monocytes 7 3 - 12 %    Eosinophils % 0 (L) 1 - 4 %    Basophils 0 0 - 2 %    Absolute Immature Granulocyte 0.26 0.00 - 0.30 k/uL    Segs Absolute 22.01 (H) 1.50 - 8.10 k/uL    Absolute Lymph # 2.10 1.10 - 3.70 k/uL    Absolute Mono # 1.83 (H) 0.10 - 1.20 k/uL    Absolute Eos # 0.00 0.00 - 0.44 k/uL    Basophils Absolute 0.00 0.00 - 0.20 k/uL    Morphology Normal    Basic Metabolic Panel w/ Reflex to MG   Result Value Ref Range    Glucose 1,009 (HH) 70 - 99 mg/dL    BUN 96 (HH) 6 - 20 mg/dL    CREATININE 2.65 (H) 0.70 - 1.20 mg/dL    Bun/Cre Ratio NOT REPORTED 9 - 20    Calcium 8.7 8.6 - 10.4 mg/dL    Sodium 123 (L) 135 - 144 mmol/L    Potassium 6.2 (HH) 3.7 - 5.3 mmol/L    Chloride 82 (L) 98 - 107 mmol/L    CO2 <6 (LL) 20 - 31 mmol/L    Anion Gap  9 - 17 mmol/L     Unable to calculate anion gap due to CO2 less than 6.     GFR Non-African American 25 (L) >60 mL/min    GFR African American 30 (L) >60 mL/min    GFR Comment          GFR Staging NOT REPORTED    BLOOD GAS, VENOUS   Result Value Ref Range    pH, Vidal 6.956 (LL) 7.320 - 7.420    pCO2, Vidal 24.4 (L) 39 - 55    pO2, Vidal 81.1 (H) 30 - 50    HCO3, Venous 5.2 (L) 24 - 30 mmol/L    Positive Base Excess, Vidal NOT REPORTED 0.0 - 2.0 mmol/L    Negative Base Excess, Vidal 26.2 (H) 0.0 - 2.0 mmol/L    O2 Sat, Vidal 88.4 (H) 60.0 - 85.0 %    Total Hb NOT REPORTED 12.0 - 16.0 g/dl    Oxyhemoglobin NOT REPORTED 95.0 - 98.0 %    Carboxyhemoglobin 1.2 0 - 5 %    Methemoglobin NOT REPORTED 0.0 - 1.5 %    Pt Temp 37.0     pH, Vidal, Temp Adj NOT REPORTED 7.320 - 7.420    pCO2, Vidal, Temp Adj NOT REPORTED 39 - 55 mmHg    pO2, Vidal, Temp Adj NOT REPORTED 30 - 50 mmHg    O2 Device/Flow/% NOT REPORTED     Respiratory Rate NOT REPORTED     Leo Test NOT REPORTED     Sample Site NOT REPORTED     Pt. Position NOT REPORTED     Mode NOT REPORTED     Set Rate NOT REPORTED     Total Rate NOT REPORTED     VT NOT REPORTED     FIO2 INFORMATION NOT PROVIDED     Peep/Cpap NOT REPORTED     PSV NOT REPORTED     Text for Respiratory NOT REPORTED     NOTIFICATION NOT REPORTED     NOTIFICATION TIME NOT REPORTED    BETA-HYDROXYBUTERATE   Result Value Ref Range    Beta-Hydroxybutyrate 14.69 (H) 0.02 - 0.27 mmol/L   ELECTROLYTES PLUS   Result Value Ref Range    POC Sodium 118 (LL) 138 - 146 mmol/L    POC Potassium 6.2 (HH) 3.5 - 5.1 mmol/L    POC Chloride 97 (L) 98 - 107 mmol/L    POC TCO2 6 (LL) 22 - 30 mmol/L    Anion Gap 16 7 - 16 mmol/L   Hemoglobin and hematocrit, blood   Result Value Ref Range    POC Hemoglobin 12.5 (L) 13.5 - 17.5 g/dL    POC Hematocrit 37 (L) 41 - 53 %   CALCIUM, IONIC (POC)   Result Value Ref Range    POC Ionized Calcium 1.09 (L) 1.15 - 1.33 mmol/L   Urine Drug Screen   Result Value Ref Range    Amphetamine Screen, Ur NEGATIVE NEGATIVE    Barbiturate Screen, Ur NEGATIVE NEGATIVE    Benzodiazepine Screen, Urine NEGATIVE NEGATIVE    Cocaine Metabolite, Urine NEGATIVE NEGATIVE    Methadone Screen, Urine NEGATIVE NEGATIVE    Opiates, Urine NEGATIVE NEGATIVE    Phencyclidine, Urine NEGATIVE NEGATIVE    Propoxyphene, Urine NOT REPORTED NEGATIVE    Cannabinoid Scrn, Ur POSITIVE (A) NEGATIVE    Oxycodone Screen, Ur NEGATIVE NEGATIVE    Methamphetamine, Urine NOT REPORTED NEGATIVE    Tricyclic Antidepressants, Urine NOT REPORTED NEGATIVE    MDMA, Urine NOT REPORTED NEGATIVE    Buprenorphine Urine NOT REPORTED NEGATIVE    Test Information       Assay provides medical screening only.   The absence of expected drug(s) and/or metabolite(s) may indicate diluted or adulterated urine, limitations of testing or timing of collection. GLUCOSE, RANDOM   Result Value Ref Range    Glucose 715 (HH) 70 - 99 mg/dL   TOX SCR, BLD, ED   Result Value Ref Range    Acetaminophen Level <5 (L) 10 - 30 ug/mL    Ethanol <10 <10 mg/dL    Ethanol percent <6.807 <5.845 %    Salicylate Lvl <1 (L) 3 - 10 mg/dL    Toxic Tricyclic Sc,Blood NEGATIVE NEGATIVE   Urinalysis Reflex to Culture    Specimen: Urine, indwelling catheter   Result Value Ref Range    Color, UA Yellow Yellow    Turbidity UA Cloudy (A) Clear    Glucose, Ur 3+ (A) NEGATIVE    Bilirubin Urine NEGATIVE NEGATIVE    Ketones, Urine MODERATE (A) NEGATIVE    Specific Gravity, UA 1.020 1.005 - 1.030    Urine Hgb LARGE (A) NEGATIVE    pH, UA 5.0 5.0 - 8.0    Protein, UA 1+ (A) NEGATIVE    Urobilinogen, Urine Normal Normal    Nitrite, Urine NEGATIVE NEGATIVE    Leukocyte Esterase, Urine NEGATIVE NEGATIVE    Urinalysis Comments NOT REPORTED    Glucose, random   Result Value Ref Range    Glucose 737 (HH) 70 - 99 mg/dL   Microscopic Urinalysis   Result Value Ref Range    -          WBC, UA 0 TO 2 0 - 5 /HPF    RBC, UA 5 TO 10 0 - 2 /HPF    Casts UA HYALINE 0 - 2 /LPF    Casts UA 50  0 - 2 /LPF    Crystals, UA NOT REPORTED None /HPF    Epithelial Cells UA 2 TO 5 0 - 5 /HPF    Renal Epithelial, UA NOT REPORTED 0 /HPF    Bacteria, UA NOT REPORTED None    Mucus, UA 1+ (A) None    Trichomonas, UA NOT REPORTED None    Amorphous, UA NOT REPORTED None    Other Observations UA NOT REPORTED NOT REQ.     Yeast, UA NOT REPORTED None   Basic Metabolic Panel   Result Value Ref Range    Glucose 505 (HH) 70 - 99 mg/dL    BUN 86 (H) 6 - 20 mg/dL    CREATININE 1.92 (H) 0.70 - 1.20 mg/dL    Bun/Cre Ratio NOT REPORTED 9 - 20    Calcium 7.3 (L) 8.6 - 10.4 mg/dL    Sodium 135 135 - 144 mmol/L    Potassium 4.1 3.7 - 5.3 mmol/L    Chloride 105 98 - 107 mmol/L    CO2 <6 (LL) 20 - 31 mmol/L    Anion Gap 9 - 17 mmol/L     Unable to calculate anion gap due to CO2 less than 6.     GFR Non- 36 (L) >60 mL/min    GFR  44 (L) >60 mL/min    GFR Comment          GFR Staging NOT REPORTED    Basic Metabolic Panel   Result Value Ref Range    Glucose 350 (H) 70 - 99 mg/dL    BUN 78 (H) 6 - 20 mg/dL    CREATININE 1.68 (H) 0.70 - 1.20 mg/dL    Bun/Cre Ratio NOT REPORTED 9 - 20    Calcium 7.2 (L) 8.6 - 10.4 mg/dL    Sodium 142 135 - 144 mmol/L    Potassium 4.2 3.7 - 5.3 mmol/L    Chloride 113 (H) 98 - 107 mmol/L    CO2 8 (LL) 20 - 31 mmol/L    Anion Gap 21 (H) 9 - 17 mmol/L    GFR Non-African American 42 (L) >60 mL/min    GFR  51 (L) >60 mL/min    GFR Comment          GFR Staging NOT REPORTED    Basic Metabolic Panel   Result Value Ref Range    Glucose 266 (H) 70 - 99 mg/dL    BUN 72 (H) 6 - 20 mg/dL    CREATININE 1.50 (H) 0.70 - 1.20 mg/dL    Bun/Cre Ratio NOT REPORTED 9 - 20    Calcium 7.3 (L) 8.6 - 10.4 mg/dL    Sodium 147 (H) 135 - 144 mmol/L    Potassium 4.1 3.7 - 5.3 mmol/L    Chloride 119 (H) 98 - 107 mmol/L    CO2 14 (L) 20 - 31 mmol/L    Anion Gap 14 9 - 17 mmol/L    GFR Non-African American 48 (L) >60 mL/min    GFR  58 (L) >60 mL/min    GFR Comment          GFR Staging NOT REPORTED    Magnesium   Result Value Ref Range    Magnesium 2.6 1.6 - 2.6 mg/dL   Magnesium   Result Value Ref Range    Magnesium 2.6 1.6 - 2.6 mg/dL   Magnesium   Result Value Ref Range    Magnesium 2.5 1.6 - 2.6 mg/dL   Phosphorus   Result Value Ref Range    Phosphorus 4.0 2.5 - 4.5 mg/dL   Phosphorus   Result Value Ref Range    Phosphorus 2.7 2.5 - 4.5 mg/dL   Phosphorus   Result Value Ref Range    Phosphorus 3.3 2.5 - 4.5 mg/dL   Glucose, random   Result Value Ref Range    Glucose 621 (HH) 70 - 99 mg/dL   Basic Metabolic Panel   Result Value Ref Range    Glucose 115 (H) 70 - 99 mg/dL    BUN 61 (H) 6 - 20 mg/dL    CREATININE 1.23 (H) 0.70 - 1.20 mg/dL    Bun/Cre Ratio NOT REPORTED 9 - 20 Calcium 7.4 (L) 8.6 - 10.4 mg/dL    Sodium 150 (H) 135 - 144 mmol/L    Potassium 3.5 (L) 3.7 - 5.3 mmol/L    Chloride 123 (H) 98 - 107 mmol/L    CO2 20 20 - 31 mmol/L    Anion Gap 7 (L) 9 - 17 mmol/L    GFR Non-African American >60 >60 mL/min    GFR African American >60 >60 mL/min    GFR Comment          GFR Staging NOT REPORTED    Magnesium   Result Value Ref Range    Magnesium 2.5 1.6 - 2.6 mg/dL   Phosphorus   Result Value Ref Range    Phosphorus 2.3 (L) 2.5 - 4.5 mg/dL   Basic Metabolic Panel   Result Value Ref Range    Glucose 153 (H) 70 - 99 mg/dL    BUN 58 (H) 6 - 20 mg/dL    CREATININE 1.02 0.70 - 1.20 mg/dL    Bun/Cre Ratio NOT REPORTED 9 - 20    Calcium 7.2 (L) 8.6 - 10.4 mg/dL    Sodium 149 (H) 135 - 144 mmol/L    Potassium 3.6 (L) 3.7 - 5.3 mmol/L    Chloride 122 (H) 98 - 107 mmol/L    CO2 19 (L) 20 - 31 mmol/L    Anion Gap 8 (L) 9 - 17 mmol/L    GFR Non-African American >60 >60 mL/min    GFR African American >60 >60 mL/min    GFR Comment          GFR Staging NOT REPORTED    Magnesium   Result Value Ref Range    Magnesium 2.3 1.6 - 2.6 mg/dL   Phosphorus   Result Value Ref Range    Phosphorus 1.6 (L) 2.5 - 4.5 mg/dL   CBC WITH AUTO DIFFERENTIAL   Result Value Ref Range    WBC 18.1 (H) 3.5 - 11.3 k/uL    RBC 4.12 (L) 4.21 - 5.77 m/uL    Hemoglobin 12.1 (L) 13.0 - 17.0 g/dL    Hematocrit 35.3 (L) 40.7 - 50.3 %    MCV 85.7 82.6 - 102.9 fL    MCH 29.4 25.2 - 33.5 pg    MCHC 34.3 28.4 - 34.8 g/dL    RDW 13.4 11.8 - 14.4 %    Platelets 888 016 - 503 k/uL    MPV 8.9 8.1 - 13.5 fL    NRBC Automated 0.0 0.0 per 100 WBC    Differential Type NOT REPORTED     WBC Morphology NOT REPORTED     RBC Morphology NOT REPORTED     Platelet Estimate NOT REPORTED     Seg Neutrophils 91 (H) 36 - 65 %    Lymphocytes 4 (L) 24 - 43 %    Monocytes 4 3 - 12 %    Eosinophils % 0 (L) 1 - 4 %    Basophils 0 0 - 2 %    Immature Granulocytes 2 (H) 0 %    Segs Absolute 16.36 (H) 1.50 - 8.10 k/uL    Absolute Lymph # 0.70 (L) 1.10 - 3.70 k/uL    Absolute Mono # 0.69 0.10 - 1.20 k/uL    Absolute Eos # <0.03 0.00 - 0.44 k/uL    Basophils Absolute 0.03 0.00 - 0.20 k/uL    Absolute Immature Granulocyte 0.28 0.00 - 0.30 k/uL   LACTIC ACID, WHOLE BLOOD   Result Value Ref Range    Lactic Acid, Whole Blood 1.1 0.7 - 2.1 mmol/L   Basic Metabolic Panel   Result Value Ref Range    Glucose 213 (H) 70 - 99 mg/dL    BUN 50 (H) 6 - 20 mg/dL    CREATININE 0.94 0.70 - 1.20 mg/dL    Bun/Cre Ratio NOT REPORTED 9 - 20    Calcium 7.2 (L) 8.6 - 10.4 mg/dL    Sodium 149 (H) 135 - 144 mmol/L    Potassium 3.9 3.7 - 5.3 mmol/L    Chloride 122 (H) 98 - 107 mmol/L    CO2 17 (L) 20 - 31 mmol/L    Anion Gap 10 9 - 17 mmol/L    GFR Non-African American >60 >60 mL/min    GFR African American >60 >60 mL/min    GFR Comment          GFR Staging NOT REPORTED    Magnesium   Result Value Ref Range    Magnesium 2.2 1.6 - 2.6 mg/dL   Phosphorus   Result Value Ref Range    Phosphorus 0.9 (LL) 2.5 - 4.5 mg/dL   Basic Metabolic Panel   Result Value Ref Range    Glucose 289 (H) 70 - 99 mg/dL    BUN 49 (H) 6 - 20 mg/dL    CREATININE 0.88 0.70 - 1.20 mg/dL    Bun/Cre Ratio NOT REPORTED 9 - 20    Calcium 6.8 (L) 8.6 - 10.4 mg/dL    Sodium 145 (H) 135 - 144 mmol/L    Potassium 4.6 3.7 - 5.3 mmol/L    Chloride 117 (H) 98 - 107 mmol/L    CO2 16 (L) 20 - 31 mmol/L    Anion Gap 12 9 - 17 mmol/L    GFR Non-African American >60 >60 mL/min    GFR African American >60 >60 mL/min    GFR Comment          GFR Staging NOT REPORTED    Magnesium   Result Value Ref Range    Magnesium 2.0 1.6 - 2.6 mg/dL   Phosphorus   Result Value Ref Range    Phosphorus 2.3 (L) 2.5 - 4.5 mg/dL   Basic Metabolic Panel   Result Value Ref Range    Glucose 287 (H) 70 - 99 mg/dL    BUN 46 (H) 6 - 20 mg/dL    CREATININE 1.04 0.70 - 1.20 mg/dL    Bun/Cre Ratio NOT REPORTED 9 - 20    Calcium 6.7 (L) 8.6 - 10.4 mg/dL    Sodium 142 135 - 144 mmol/L    Potassium 4.4 3.7 - 5.3 mmol/L    Chloride 116 (H) 98 - 107 mmol/L    CO2 17 (L) 20 - Comment          GFR Staging NOT REPORTED    Basic Metabolic Panel   Result Value Ref Range    Glucose 142 (H) 70 - 99 mg/dL    BUN 40 (H) 6 - 20 mg/dL    CREATININE 0.83 0.70 - 1.20 mg/dL    Bun/Cre Ratio NOT REPORTED 9 - 20    Calcium 6.9 (L) 8.6 - 10.4 mg/dL    Sodium 146 (H) 135 - 144 mmol/L    Potassium 4.1 3.7 - 5.3 mmol/L    Chloride 119 (H) 98 - 107 mmol/L    CO2 19 (L) 20 - 31 mmol/L    Anion Gap 8 (L) 9 - 17 mmol/L    GFR Non-African American >60 >60 mL/min    GFR African American >60 >60 mL/min    GFR Comment          GFR Staging NOT REPORTED    Phosphorus   Result Value Ref Range    Phosphorus 1.7 (L) 2.5 - 4.5 mg/dL   Phosphorus   Result Value Ref Range    Phosphorus 1.4 (L) 2.5 - 4.5 mg/dL   Magnesium   Result Value Ref Range    Magnesium 2.1 1.6 - 2.6 mg/dL   Magnesium   Result Value Ref Range    Magnesium 2.2 1.6 - 2.6 mg/dL   Basic Metabolic Panel   Result Value Ref Range    Glucose 114 (H) 70 - 99 mg/dL    BUN 30 (H) 6 - 20 mg/dL    CREATININE 0.68 (L) 0.70 - 1.20 mg/dL    Bun/Cre Ratio NOT REPORTED 9 - 20    Calcium 6.9 (L) 8.6 - 10.4 mg/dL    Sodium 146 (H) 135 - 144 mmol/L    Potassium 4.0 3.7 - 5.3 mmol/L    Chloride 119 (H) 98 - 107 mmol/L    CO2 19 (L) 20 - 31 mmol/L    Anion Gap 8 (L) 9 - 17 mmol/L    GFR Non-African American >60 >60 mL/min    GFR African American >60 >60 mL/min    GFR Comment          GFR Staging NOT REPORTED    Phosphorus   Result Value Ref Range    Phosphorus 2.2 (L) 2.5 - 4.5 mg/dL   Magnesium   Result Value Ref Range    Magnesium 2.1 1.6 - 2.6 mg/dL   Basic Metabolic Panel   Result Value Ref Range    Glucose 94 70 - 99 mg/dL    BUN 26 (H) 6 - 20 mg/dL    CREATININE 0.63 (L) 0.70 - 1.20 mg/dL    Bun/Cre Ratio NOT REPORTED 9 - 20    Calcium 7.2 (L) 8.6 - 10.4 mg/dL    Sodium 143 135 - 144 mmol/L    Potassium 3.7 3.7 - 5.3 mmol/L    Chloride 116 (H) 98 - 107 mmol/L    CO2 20 20 - 31 mmol/L    Anion Gap 7 (L) 9 - 17 mmol/L    GFR Non-African American >60 >60 mL/min    GFR  >60 >60 mL/min    GFR Comment          GFR Staging NOT REPORTED    Phosphorus   Result Value Ref Range    Phosphorus 1.7 (L) 2.5 - 4.5 mg/dL   Magnesium   Result Value Ref Range    Magnesium 2.0 1.6 - 2.6 mg/dL   Hepatic Function Panel   Result Value Ref Range    Albumin 2.4 (L) 3.5 - 5.2 g/dL    Alkaline Phosphatase 108 40 - 129 U/L     (H) 5 - 41 U/L     (H) <40 U/L    Total Bilirubin 0.74 0.3 - 1.2 mg/dL    Bilirubin, Direct 0.23 <0.31 mg/dL    Bilirubin, Indirect 0.51 0.00 - 1.00 mg/dL    Total Protein 4.9 (L) 6.4 - 8.3 g/dL    Globulin NOT REPORTED 1.5 - 3.8 g/dL    Albumin/Globulin Ratio 1.0 1.0 - 2.5   Basic Metabolic Panel   Result Value Ref Range    Glucose 138 (H) 70 - 99 mg/dL    BUN 24 (H) 6 - 20 mg/dL    CREATININE 0.50 (L) 0.70 - 1.20 mg/dL    Bun/Cre Ratio NOT REPORTED 9 - 20    Calcium 7.2 (L) 8.6 - 10.4 mg/dL    Sodium 141 135 - 144 mmol/L    Potassium 3.6 (L) 3.7 - 5.3 mmol/L    Chloride 115 (H) 98 - 107 mmol/L    CO2 20 20 - 31 mmol/L    Anion Gap 6 (L) 9 - 17 mmol/L    GFR Non-African American >60 >60 mL/min    GFR African American >60 >60 mL/min    GFR Comment          GFR Staging NOT REPORTED    Phosphorus   Result Value Ref Range    Phosphorus 1.7 (L) 2.5 - 4.5 mg/dL   Magnesium   Result Value Ref Range    Magnesium 1.9 1.6 - 2.6 mg/dL   CBC WITH AUTO DIFFERENTIAL   Result Value Ref Range    WBC 13.2 (H) 3.5 - 11.3 k/uL    RBC 3.54 (L) 4.21 - 5.77 m/uL    Hemoglobin 10.2 (L) 13.0 - 17.0 g/dL    Hematocrit 30.0 (L) 40.7 - 50.3 %    MCV 84.7 82.6 - 102.9 fL    MCH 28.8 25.2 - 33.5 pg    MCHC 34.0 28.4 - 34.8 g/dL    RDW 14.6 (H) 11.8 - 14.4 %    Platelets See Reflexed IPF Result 138 - 453 k/uL    MPV NOT REPORTED 8.1 - 13.5 fL    NRBC Automated 0.0 0.0 per 100 WBC    Differential Type NOT REPORTED     WBC Morphology NOT REPORTED     RBC Morphology NOT REPORTED     Platelet Estimate NOT REPORTED     Immature Granulocytes 1 (H) 0 %    Seg Neutrophils 86 (H) 36 - 65 % Lymphocytes 8 (L) 24 - 43 %    Monocytes 4 3 - 12 %    Eosinophils % 1 1 - 4 %    Basophils 0 0 - 2 %    Absolute Immature Granulocyte 0.13 0.00 - 0.30 k/uL    Segs Absolute 11.35 (H) 1.50 - 8.10 k/uL    Absolute Lymph # 1.06 (L) 1.10 - 3.70 k/uL    Absolute Mono # 0.53 0.10 - 1.20 k/uL    Absolute Eos # 0.13 0.00 - 0.44 k/uL    Basophils Absolute 0.00 0.00 - 0.20 k/uL    Morphology ANISOCYTOSIS PRESENT    Calcium, Ionized   Result Value Ref Range    Calcium, Ion 1.04 (L) 1.13 - 1.33 mmol/L   Immature Platelet Fraction   Result Value Ref Range    Platelet, Immature Fraction 2.4 1.1 - 10.3 %    Platelet, Fluorescence 97 (L) 138 - 453 k/uL   Basic Metabolic Panel w/ Reflex to MG   Result Value Ref Range    Glucose 361 (H) 70 - 99 mg/dL    BUN 22 (H) 6 - 20 mg/dL    CREATININE 0.58 (L) 0.70 - 1.20 mg/dL    Bun/Cre Ratio NOT REPORTED 9 - 20    Calcium 7.3 (L) 8.6 - 10.4 mg/dL    Sodium 143 135 - 144 mmol/L    Potassium 4.7 3.7 - 5.3 mmol/L    Chloride 115 (H) 98 - 107 mmol/L    CO2 19 (L) 20 - 31 mmol/L    Anion Gap 9 9 - 17 mmol/L    GFR Non-African American >60 >60 mL/min    GFR African American >60 >60 mL/min    GFR Comment          GFR Staging NOT REPORTED    CBC WITH AUTO DIFFERENTIAL   Result Value Ref Range    WBC 12.8 (H) 3.5 - 11.3 k/uL    RBC 3.60 (L) 4.21 - 5.77 m/uL    Hemoglobin 10.5 (L) 13.0 - 17.0 g/dL    Hematocrit 32.2 (L) 40.7 - 50.3 %    MCV 89.4 82.6 - 102.9 fL    MCH 29.2 25.2 - 33.5 pg    MCHC 32.6 28.4 - 34.8 g/dL    RDW 15.0 (H) 11.8 - 14.4 %    Platelets 419 (L) 467 - 453 k/uL    MPV 10.5 8.1 - 13.5 fL    NRBC Automated 0.0 0.0 per 100 WBC    Differential Type NOT REPORTED     WBC Morphology NOT REPORTED     RBC Morphology NOT REPORTED     Platelet Estimate NOT REPORTED     Immature Granulocytes 0 0 %    Seg Neutrophils 86 (H) 36 - 66 %    Lymphocytes 4 (L) 24 - 44 %    Monocytes 9 (H) 1 - 7 %    Eosinophils % 1 1 - 4 %    Basophils 0 0 - 2 %    Absolute Immature Granulocyte 0.00 0.00 - 0.30 k/uL    Segs Absolute 11.01 (H) 1.8 - 7.7 k/uL    Absolute Lymph # 0.51 (L) 1.0 - 4.8 k/uL    Absolute Mono # 1.15 (H) 0.1 - 0.8 k/uL    Absolute Eos # 0.13 0.0 - 0.4 k/uL    Basophils Absolute 0.00 0.0 - 0.2 k/uL    Morphology ANISOCYTOSIS PRESENT    Phosphorus   Result Value Ref Range    Phosphorus 2.4 (L) 2.5 - 4.5 mg/dL   Heparin-induced Platelet Antibody   Result Value Ref Range    Heparin Induced Plt Ab 0.225 0.000 - 0.400 O. D.    Phosphorus   Result Value Ref Range    Phosphorus 3.8 2.5 - 4.5 mg/dL   Basic Metabolic Panel w/ Reflex to MG   Result Value Ref Range    Glucose 217 (H) 70 - 99 mg/dL    BUN 29 (H) 6 - 20 mg/dL    CREATININE 0.71 0.70 - 1.20 mg/dL    Bun/Cre Ratio NOT REPORTED 9 - 20    Calcium 7.8 (L) 8.6 - 10.4 mg/dL    Sodium 146 (H) 135 - 144 mmol/L    Potassium 4.1 3.7 - 5.3 mmol/L    Chloride 116 (H) 98 - 107 mmol/L    CO2 23 20 - 31 mmol/L    Anion Gap 7 (L) 9 - 17 mmol/L    GFR Non-African American >60 >60 mL/min    GFR African American >60 >60 mL/min    GFR Comment          GFR Staging NOT REPORTED    CBC WITH AUTO DIFFERENTIAL   Result Value Ref Range    WBC 12.8 (H) 3.5 - 11.3 k/uL    RBC 3.41 (L) 4.21 - 5.77 m/uL    Hemoglobin 10.0 (L) 13.0 - 17.0 g/dL    Hematocrit 30.3 (L) 40.7 - 50.3 %    MCV 88.9 82.6 - 102.9 fL    MCH 29.3 25.2 - 33.5 pg    MCHC 33.0 28.4 - 34.8 g/dL    RDW 14.9 (H) 11.8 - 14.4 %    Platelets 082 889 - 068 k/uL    MPV 10.9 8.1 - 13.5 fL    NRBC Automated 0.0 0.0 per 100 WBC    Differential Type NOT REPORTED     WBC Morphology NOT REPORTED     RBC Morphology NOT REPORTED     Platelet Estimate NOT REPORTED     Immature Granulocytes 0 0 %    Seg Neutrophils 79 (H) 36 - 66 %    Lymphocytes 9 (L) 24 - 44 %    Monocytes 11 (H) 1 - 7 %    Eosinophils % 1 1 - 4 %    Basophils 0 0 - 2 %    Absolute Immature Granulocyte 0.00 0.00 - 0.30 k/uL    Segs Absolute 10.11 (H) 1.8 - 7.7 k/uL    Absolute Lymph # 1.15 1.0 - 4.8 k/uL    Absolute Mono # 1.41 (H) 0.1 - 0.8 k/uL    Absolute Eos # 0.13 0.0 - 0.4 k/uL    Basophils Absolute 0.00 0.0 - 0.2 k/uL    Morphology ANISOCYTOSIS PRESENT    Hemoglobin A1C   Result Value Ref Range    Hemoglobin A1C 11.6 (H) 4.0 - 6.0 %    Estimated Avg Glucose 286 mg/dL   Basic Metabolic Panel w/ Reflex to MG   Result Value Ref Range    Glucose 128 (H) 70 - 99 mg/dL    BUN 29 (H) 6 - 20 mg/dL    CREATININE 0.64 (L) 0.70 - 1.20 mg/dL    Bun/Cre Ratio NOT REPORTED 9 - 20    Calcium 7.6 (L) 8.6 - 10.4 mg/dL    Sodium 151 (H) 135 - 144 mmol/L    Potassium 3.8 3.7 - 5.3 mmol/L    Chloride 119 (H) 98 - 107 mmol/L    CO2 21 20 - 31 mmol/L    Anion Gap 11 9 - 17 mmol/L    GFR Non-African American >60 >60 mL/min    GFR African American >60 >60 mL/min    GFR Comment          GFR Staging NOT REPORTED    CBC WITH AUTO DIFFERENTIAL   Result Value Ref Range    WBC 11.9 (H) 3.5 - 11.3 k/uL    RBC 3.47 (L) 4.21 - 5.77 m/uL    Hemoglobin 10.1 (L) 13.0 - 17.0 g/dL    Hematocrit 30.9 (L) 40.7 - 50.3 %    MCV 89.0 82.6 - 102.9 fL    MCH 29.1 25.2 - 33.5 pg    MCHC 32.7 28.4 - 34.8 g/dL    RDW 14.6 (H) 11.8 - 14.4 %    Platelets 687 622 - 173 k/uL    MPV 10.7 8.1 - 13.5 fL    NRBC Automated 0.0 0.0 per 100 WBC    Differential Type NOT REPORTED     WBC Morphology NOT REPORTED     RBC Morphology NOT REPORTED     Platelet Estimate NOT REPORTED     Immature Granulocytes 2 (H) 0 %    Seg Neutrophils 66 36 - 66 %    Lymphocytes 19 (L) 24 - 44 %    Monocytes 12 (H) 1 - 7 %    Eosinophils % 1 1 - 4 %    Basophils 0 0 - 2 %    Absolute Immature Granulocyte 0.24 0.00 - 0.30 k/uL    Segs Absolute 7.85 (H) 1.8 - 7.7 k/uL    Absolute Lymph # 2.26 1.0 - 4.8 k/uL    Absolute Mono # 1.43 (H) 0.1 - 0.8 k/uL    Absolute Eos # 0.12 0.0 - 0.4 k/uL    Basophils Absolute 0.00 0.0 - 0.2 k/uL    Morphology ANISOCYTOSIS PRESENT    AMMONIA   Result Value Ref Range    Ammonia 52 16 - 60 umol/L   BASIC METABOLIC PANEL   Result Value Ref Range    Glucose 151 (H) 70 - 99 mg/dL    BUN 26 (H) 6 - 20 mg/dL CREATININE 0.64 (L) 0.70 - 1.20 mg/dL    Bun/Cre Ratio NOT REPORTED 9 - 20    Calcium 7.6 (L) 8.6 - 10.4 mg/dL    Sodium 145 (H) 135 - 144 mmol/L    Potassium 3.4 (L) 3.7 - 5.3 mmol/L    Chloride 112 (H) 98 - 107 mmol/L    CO2 24 20 - 31 mmol/L    Anion Gap 9 9 - 17 mmol/L    GFR Non-African American >60 >60 mL/min    GFR African American >60 >60 mL/min    GFR Comment          GFR Staging NOT REPORTED    Basic Metabolic Panel w/ Reflex to MG   Result Value Ref Range    Glucose 179 (H) 70 - 99 mg/dL    BUN 24 (H) 6 - 20 mg/dL    CREATININE 0.56 (L) 0.70 - 1.20 mg/dL    Bun/Cre Ratio NOT REPORTED 9 - 20    Calcium 7.7 (L) 8.6 - 10.4 mg/dL    Sodium 140 135 - 144 mmol/L    Potassium 4.0 3.7 - 5.3 mmol/L    Chloride 108 (H) 98 - 107 mmol/L    CO2 22 20 - 31 mmol/L    Anion Gap 10 9 - 17 mmol/L    GFR Non-African American >60 >60 mL/min    GFR African American >60 >60 mL/min    GFR Comment          GFR Staging NOT REPORTED    CBC WITH AUTO DIFFERENTIAL   Result Value Ref Range    WBC 15.9 (H) 3.5 - 11.3 k/uL    RBC 3.45 (L) 4.21 - 5.77 m/uL    Hemoglobin 10.0 (L) 13.0 - 17.0 g/dL    Hematocrit 30.0 (L) 40.7 - 50.3 %    MCV 87.0 82.6 - 102.9 fL    MCH 29.0 25.2 - 33.5 pg    MCHC 33.3 28.4 - 34.8 g/dL    RDW 13.8 11.8 - 14.4 %    Platelets 173 540 - 454 k/uL    MPV 11.6 8.1 - 13.5 fL    NRBC Automated 0.0 0.0 per 100 WBC    Differential Type NOT REPORTED     WBC Morphology NOT REPORTED     RBC Morphology NOT REPORTED     Platelet Estimate NOT REPORTED     Immature Granulocytes 2 (H) 0 %    Seg Neutrophils 71 (H) 36 - 65 %    Lymphocytes 12 (L) 24 - 43 %    Monocytes 13 (H) 3 - 12 %    Eosinophils % 2 1 - 4 %    Basophils 0 0 - 2 %    Absolute Immature Granulocyte 0.32 (H) 0.00 - 0.30 k/uL    Segs Absolute 11.28 (H) 1.50 - 8.10 k/uL    Absolute Lymph # 1.91 1.10 - 3.70 k/uL    Absolute Mono # 2.07 (H) 0.10 - 1.20 k/uL    Absolute Eos # 0.32 0.00 - 0.44 k/uL    Basophils Absolute 0.00 0.00 - 0.20 k/uL    Morphology Normal URINALYSIS   Result Value Ref Range    Color, UA Yellow Yellow    Turbidity UA Clear Clear    Glucose, Ur 3+ (A) NEGATIVE    Bilirubin Urine NEGATIVE NEGATIVE    Ketones, Urine TRACE (A) NEGATIVE    Specific Gravity, UA 1.031 (H) 1.005 - 1.030    Urine Hgb NEGATIVE NEGATIVE    pH, UA 7.5 5.0 - 8.0    Protein, UA 1+ (A) NEGATIVE    Urobilinogen, Urine Normal Normal    Nitrite, Urine NEGATIVE NEGATIVE    Leukocyte Esterase, Urine NEGATIVE NEGATIVE    Urinalysis Comments NOT REPORTED    MYCOPLASMA PNEUMONIAE ANTIBODY, IGM   Result Value Ref Range    Mycoplasma pneumo IgM 0.15 <0.91   Microscopic Urinalysis   Result Value Ref Range    -          WBC, UA None 0 - 5 /HPF    RBC, UA 20 TO 50 0 - 4 /HPF    Casts UA  0 - 8 /LPF     0 TO 2 HYALINE Reference range defined for non-centrifuged specimen. Crystals, UA NOT REPORTED None /HPF    Epithelial Cells UA 0 TO 2 0 - 5 /HPF    Renal Epithelial, UA NOT REPORTED 0 /HPF    Bacteria, UA NOT REPORTED None    Mucus, UA NOT REPORTED None    Trichomonas, UA NOT REPORTED None    Amorphous, UA NOT REPORTED None    Other Observations UA NOT REPORTED NOT REQ.     Yeast, UA NOT REPORTED None   Basic Metabolic Panel w/ Reflex to MG   Result Value Ref Range    Glucose 175 (H) 70 - 99 mg/dL    BUN 24 (H) 6 - 20 mg/dL    CREATININE 0.60 (L) 0.70 - 1.20 mg/dL    Bun/Cre Ratio NOT REPORTED 9 - 20    Calcium 8.0 (L) 8.6 - 10.4 mg/dL    Sodium 141 135 - 144 mmol/L    Potassium 4.0 3.7 - 5.3 mmol/L    Chloride 109 (H) 98 - 107 mmol/L    CO2 22 20 - 31 mmol/L    Anion Gap 10 9 - 17 mmol/L    GFR Non-African American >60 >60 mL/min    GFR African American >60 >60 mL/min    GFR Comment          GFR Staging NOT REPORTED    CBC WITH AUTO DIFFERENTIAL   Result Value Ref Range    WBC 19.5 (H) 3.5 - 11.3 k/uL    RBC 3.23 (L) 4.21 - 5.77 m/uL    Hemoglobin 9.7 (L) 13.0 - 17.0 g/dL    Hematocrit 29.9 (L) 40.7 - 50.3 %    MCV 92.6 82.6 - 102.9 fL    MCH 30.0 25.2 - 33.5 pg    MCHC 32.4 28.4 - 34.8 g/dL RDW 13.9 11.8 - 14.4 %    Platelets 455 987 - 192 k/uL    MPV 11.5 8.1 - 13.5 fL    NRBC Automated 0.0 0.0 per 100 WBC    Differential Type NOT REPORTED     WBC Morphology NOT REPORTED     RBC Morphology NOT REPORTED     Platelet Estimate NOT REPORTED     Immature Granulocytes 2 (H) 0 %    Seg Neutrophils 75 (H) 36 - 65 %    Lymphocytes 11 (L) 24 - 43 %    Monocytes 10 3 - 12 %    Eosinophils % 2 1 - 4 %    Basophils 0 0 - 2 %    Absolute Immature Granulocyte 0.39 (H) 0.00 - 0.30 k/uL    Segs Absolute 14.62 (H) 1.50 - 8.10 k/uL    Absolute Lymph # 2.15 1.10 - 3.70 k/uL    Absolute Mono # 1.95 (H) 0.10 - 1.20 k/uL    Absolute Eos # 0.39 0.00 - 0.44 k/uL    Basophils Absolute 0.00 0.00 - 0.20 k/uL    Morphology Normal    SODIUM, URINE, RANDOM   Result Value Ref Range    Sodium,Ur 159 mmol/L   OSMOLALITY, URINE   Result Value Ref Range    Osmolality, Ur 656 80 - 1300 mOsm/kg   Basic Metabolic Panel w/ Reflex to MG   Result Value Ref Range    Glucose 137 (H) 70 - 99 mg/dL    BUN 17 6 - 20 mg/dL    CREATININE 0.44 (L) 0.70 - 1.20 mg/dL    Bun/Cre Ratio NOT REPORTED 9 - 20    Calcium 7.7 (L) 8.6 - 10.4 mg/dL    Sodium 138 135 - 144 mmol/L    Potassium 5.0 3.7 - 5.3 mmol/L    Chloride 108 (H) 98 - 107 mmol/L    CO2 22 20 - 31 mmol/L    Anion Gap 8 (L) 9 - 17 mmol/L    GFR Non-African American >60 >60 mL/min    GFR African American >60 >60 mL/min    GFR Comment          GFR Staging NOT REPORTED    CBC WITH AUTO DIFFERENTIAL   Result Value Ref Range    WBC 20.0 (H) 3.5 - 11.3 k/uL    RBC 3.26 (L) 4.21 - 5.77 m/uL    Hemoglobin 9.8 (L) 13.0 - 17.0 g/dL    Hematocrit 33.7 (L) 40.7 - 50.3 %    .4 (H) 82.6 - 102.9 fL    MCH 30.1 25.2 - 33.5 pg    MCHC 29.1 28.4 - 34.8 g/dL    RDW 14.3 11.8 - 14.4 %    Platelets 269 590 - 391 k/uL    MPV 12.1 8.1 - 13.5 fL    NRBC Automated 0.1 (H) 0.0 per 100 WBC    Differential Type NOT REPORTED     WBC Morphology NOT REPORTED     RBC Morphology NOT REPORTED     Platelet Estimate NOT REPORTED     Immature Granulocytes 1 (H) 0 %    Seg Neutrophils 73 (H) 36 - 65 %    Lymphocytes 13 (L) 24 - 43 %    Monocytes 11 3 - 12 %    Eosinophils % 1 1 - 4 %    Basophils 1 0 - 2 %    Absolute Immature Granulocyte 0.20 0.00 - 0.30 k/uL    Segs Absolute 14.60 (H) 1.50 - 8.10 k/uL    Absolute Lymph # 2.60 1.10 - 3.70 k/uL    Absolute Mono # 2.20 (H) 0.10 - 1.20 k/uL    Absolute Eos # 0.20 0.00 - 0.44 k/uL    Basophils Absolute 0.20 0.00 - 0.20 k/uL    Morphology MACROCYTOSIS PRESENT    SPECIMEN REJECTION   Result Value Ref Range    Specimen Source . BLOOD     Ordered Test LIVP     Reason for Rejection       Unable to perform testing: Specimen quantity not sufficient.    - NOT REPORTED    Hepatic Function Panel   Result Value Ref Range    Albumin 1.6 (L) 3.5 - 5.2 g/dL    Alkaline Phosphatase 110 40 - 129 U/L    ALT 25 5 - 41 U/L    AST 24 <40 U/L    Total Bilirubin 0.27 (L) 0.3 - 1.2 mg/dL    Bilirubin, Direct <0.08 <0.31 mg/dL    Bilirubin, Indirect Can not be calculated 0.00 - 1.00 mg/dL    Total Protein 5.2 (L) 6.4 - 8.3 g/dL    Globulin NOT REPORTED 1.5 - 3.8 g/dL    Albumin/Globulin Ratio 0.4 (L) 1.0 - 2.5   Basic Metabolic Panel w/ Reflex to MG   Result Value Ref Range    Glucose 99 70 - 99 mg/dL    BUN 18 6 - 20 mg/dL    CREATININE 0.48 (L) 0.70 - 1.20 mg/dL    Bun/Cre Ratio NOT REPORTED 9 - 20    Calcium 7.7 (L) 8.6 - 10.4 mg/dL    Sodium 137 135 - 144 mmol/L    Potassium 3.9 3.7 - 5.3 mmol/L    Chloride 108 (H) 98 - 107 mmol/L    CO2 22 20 - 31 mmol/L    Anion Gap 7 (L) 9 - 17 mmol/L    GFR Non-African American >60 >60 mL/min    GFR African American >60 >60 mL/min    GFR Comment          GFR Staging NOT REPORTED    CBC WITH AUTO DIFFERENTIAL   Result Value Ref Range    WBC 16.9 (H) 3.5 - 11.3 k/uL    RBC 3.13 (L) 4.21 - 5.77 m/uL    Hemoglobin 9.1 (L) 13.0 - 17.0 g/dL    Hematocrit 28.6 (L) 40.7 - 50.3 %    MCV 91.4 82.6 - 102.9 fL    MCH 29.1 25.2 - 33.5 pg    MCHC 31.8 28.4 - 34.8 g/dL    RDW 14.0 11.8 - 14.4 %    Platelets 510 (H) 632 - 453 k/uL    MPV 10.5 8.1 - 13.5 fL    NRBC Automated 0.0 0.0 per 100 WBC    Differential Type NOT REPORTED     WBC Morphology NOT REPORTED     RBC Morphology NOT REPORTED     Platelet Estimate NOT REPORTED     Immature Granulocytes 1 (H) 0 %    Seg Neutrophils 75 (H) 36 - 65 %    Lymphocytes 14 (L) 24 - 43 %    Monocytes 9 3 - 12 %    Eosinophils % 1 1 - 4 %    Basophils 0 0 - 2 %    Absolute Immature Granulocyte 0.17 0.00 - 0.30 k/uL    Segs Absolute 12.67 (H) 1.50 - 8.10 k/uL    Absolute Lymph # 2.37 1.10 - 3.70 k/uL    Absolute Mono # 1.52 (H) 0.10 - 1.20 k/uL    Absolute Eos # 0.17 0.00 - 0.44 k/uL    Basophils Absolute 0.00 0.00 - 0.20 k/uL    Morphology Normal    Basic Metabolic Panel w/ Reflex to MG   Result Value Ref Range    Glucose 130 (H) 70 - 99 mg/dL    BUN 14 6 - 20 mg/dL    CREATININE 0.43 (L) 0.70 - 1.20 mg/dL    Bun/Cre Ratio NOT REPORTED 9 - 20    Calcium 7.9 (L) 8.6 - 10.4 mg/dL    Sodium 143 135 - 144 mmol/L    Potassium 3.9 3.7 - 5.3 mmol/L    Chloride 108 (H) 98 - 107 mmol/L    CO2 20 20 - 31 mmol/L    Anion Gap 15 9 - 17 mmol/L    GFR Non-African American >60 >60 mL/min    GFR African American >60 >60 mL/min    GFR Comment          GFR Staging NOT REPORTED    CBC WITH AUTO DIFFERENTIAL   Result Value Ref Range    WBC 23.8 (H) 3.5 - 11.3 k/uL    RBC 3.60 (L) 4.21 - 5.77 m/uL    Hemoglobin 10.6 (L) 13.0 - 17.0 g/dL    Hematocrit 32.2 (L) 40.7 - 50.3 %    MCV 89.4 82.6 - 102.9 fL    MCH 29.4 25.2 - 33.5 pg    MCHC 32.9 28.4 - 34.8 g/dL    RDW 13.7 11.8 - 14.4 %    Platelets 537 (H) 877 - 453 k/uL    MPV 10.3 8.1 - 13.5 fL    NRBC Automated 0.0 0.0 per 100 WBC    Differential Type NOT REPORTED     WBC Morphology NOT REPORTED     RBC Morphology NOT REPORTED     Platelet Estimate NOT REPORTED     Immature Granulocytes 1 (H) 0 %    Seg Neutrophils 83 (H) 36 - 65 %    Lymphocytes 8 (L) 24 - 43 %    Monocytes 8 3 - 12 %    Eosinophils % 0 (L) 1 - 4 % Basophils 0 0 - 2 %    Absolute Immature Granulocyte 0.24 0.00 - 0.30 k/uL    Segs Absolute 19.76 (H) 1.50 - 8.10 k/uL    Absolute Lymph # 1.90 1.10 - 3.70 k/uL    Absolute Mono # 1.90 (H) 0.10 - 1.20 k/uL    Absolute Eos # 0.00 0.00 - 0.44 k/uL    Basophils Absolute 0.00 0.00 - 0.20 k/uL    Morphology Normal    Lactate, Sepsis   Result Value Ref Range    Lactic Acid, Sepsis NOT REPORTED 0.5 - 1.9 mmol/L    Lactic Acid, Sepsis, Whole Blood 1.7 0.5 - 1.9 mmol/L   URINALYSIS WITH MICROSCOPIC   Result Value Ref Range    Color, UA Yellow Yellow    Turbidity UA Clear Clear    Glucose, Ur 3+ (A) NEGATIVE    Bilirubin Urine NEGATIVE NEGATIVE    Ketones, Urine LARGE (A) NEGATIVE    Specific Gravity, UA 1.027 1.005 - 1.030    Urine Hgb NEGATIVE NEGATIVE    pH, UA 5.5 5.0 - 8.0    Protein, UA TRACE (A) NEGATIVE    Urobilinogen, Urine Normal Normal    Nitrite, Urine NEGATIVE NEGATIVE    Leukocyte Esterase, Urine NEGATIVE NEGATIVE    -          WBC, UA None 0 - 5 /HPF    RBC, UA None 0 - 2 /HPF    Casts UA NOT REPORTED 0 - 2 /LPF    Crystals, UA NOT REPORTED None /HPF    Epithelial Cells UA 2 TO 5 0 - 5 /HPF    Renal Epithelial, UA NOT REPORTED 0 /HPF    Bacteria, UA NOT REPORTED None    Mucus, UA 1+ (A) None    Trichomonas, UA NOT REPORTED None    Amorphous, UA NOT REPORTED None    Other Observations UA NOT REPORTED NOT REQ.     Yeast, UA FEW (A) None   Basic Metabolic Panel w/ Reflex to MG   Result Value Ref Range    Glucose 51 (L) 70 - 99 mg/dL    BUN 13 6 - 20 mg/dL    CREATININE 0.33 (L) 0.70 - 1.20 mg/dL    Bun/Cre Ratio NOT REPORTED 9 - 20    Calcium 7.7 (L) 8.6 - 10.4 mg/dL    Sodium 136 135 - 144 mmol/L    Potassium 3.3 (L) 3.7 - 5.3 mmol/L    Chloride 105 98 - 107 mmol/L    CO2 21 20 - 31 mmol/L    Anion Gap 10 9 - 17 mmol/L    GFR Non-African American >60 >60 mL/min    GFR African American >60 >60 mL/min    GFR Comment          GFR Staging NOT REPORTED    CBC WITH AUTO DIFFERENTIAL   Result Value Ref Range    WBC 18.5 (H) 3.5 - 11.3 k/uL    RBC 3.30 (L) 4.21 - 5.77 m/uL    Hemoglobin 9.5 (L) 13.0 - 17.0 g/dL    Hematocrit 29.2 (L) 40.7 - 50.3 %    MCV 88.5 82.6 - 102.9 fL    MCH 28.8 25.2 - 33.5 pg    MCHC 32.5 28.4 - 34.8 g/dL    RDW 13.6 11.8 - 14.4 %    Platelets 999 (H) 162 - 453 k/uL    MPV 9.8 8.1 - 13.5 fL    NRBC Automated 0.0 0.0 per 100 WBC    Differential Type NOT REPORTED     WBC Morphology NOT REPORTED     RBC Morphology NOT REPORTED     Platelet Estimate NOT REPORTED     Immature Granulocytes 0 0 %    Seg Neutrophils 77 (H) 36 - 66 %    Lymphocytes 8 (L) 24 - 44 %    Monocytes 13 (H) 1 - 7 %    Eosinophils % 1 1 - 4 %    Basophils 1 0 - 2 %    Absolute Immature Granulocyte 0.00 0.00 - 0.30 k/uL    Segs Absolute 14.23 (H) 1.8 - 7.7 k/uL    Absolute Lymph # 1.48 1.0 - 4.8 k/uL    Absolute Mono # 2.41 (H) 0.1 - 0.8 k/uL    Absolute Eos # 0.19 0.0 - 0.4 k/uL    Basophils Absolute 0.19 0.0 - 0.2 k/uL    Morphology Normal    Magnesium   Result Value Ref Range    Magnesium 2.2 1.6 - 2.6 mg/dL   Basic Metabolic Panel w/ Reflex to MG   Result Value Ref Range    Glucose 238 (H) 70 - 99 mg/dL    BUN 11 6 - 20 mg/dL    CREATININE 0.35 (L) 0.70 - 1.20 mg/dL    Bun/Cre Ratio NOT REPORTED 9 - 20    Calcium 7.8 (L) 8.6 - 10.4 mg/dL    Sodium 135 135 - 144 mmol/L    Potassium 4.3 3.7 - 5.3 mmol/L    Chloride 104 98 - 107 mmol/L    CO2 18 (L) 20 - 31 mmol/L    Anion Gap 13 9 - 17 mmol/L    GFR Non-African American >60 >60 mL/min    GFR African American >60 >60 mL/min    GFR Comment          GFR Staging NOT REPORTED    CBC WITH AUTO DIFFERENTIAL   Result Value Ref Range    WBC 16.2 (H) 3.5 - 11.3 k/uL    RBC 3.24 (L) 4.21 - 5.77 m/uL    Hemoglobin 9.8 (L) 13.0 - 17.0 g/dL    Hematocrit 29.0 (L) 40.7 - 50.3 %    MCV 89.5 82.6 - 102.9 fL    MCH 30.2 25.2 - 33.5 pg    MCHC 33.8 28.4 - 34.8 g/dL    RDW 13.4 11.8 - 14.4 %    Platelets See Reflexed IPF Result 138 - 453 k/uL    MPV NOT REPORTED 8.1 - 13.5 fL    NRBC Automated 0.0 0.0 per 100 WBC    Differential Type NOT REPORTED     WBC Morphology NOT REPORTED     RBC Morphology NOT REPORTED     Platelet Estimate NOT REPORTED     Immature Granulocytes 1 (H) 0 %    Seg Neutrophils 70 (H) 36 - 65 %    Lymphocytes 17 (L) 24 - 43 %    Monocytes 11 3 - 12 %    Eosinophils % 1 1 - 4 %    Basophils 0 0 - 2 %    Absolute Immature Granulocyte 0.16 0.00 - 0.30 k/uL    Segs Absolute 11.35 (H) 1.50 - 8.10 k/uL    Absolute Lymph # 2.75 1.10 - 3.70 k/uL    Absolute Mono # 1.78 (H) 0.10 - 1.20 k/uL    Absolute Eos # 0.16 0.00 - 0.44 k/uL    Basophils Absolute 0.00 0.00 - 0.20 k/uL    Morphology Normal    Immature Platelet Fraction   Result Value Ref Range    Platelet, Immature Fraction 1.4 1.1 - 10.3 %    Platelet, Fluorescence 1,008 (HH) 138 - 453 k/uL   VITAMIN B12 & FOLATE   Result Value Ref Range    Vitamin B-12 634 232 - 1245 pg/mL    Folate 11.3 >4.8 ng/mL   Iron and TIBC   Result Value Ref Range    Iron 36 (L) 59 - 158 ug/dL    TIBC 157 (L) 250 - 450 ug/dL    Iron Saturation 23 20 - 55 %    UIBC 121 112 - 347 ug/dL   Venous Blood Gas, POC   Result Value Ref Range    pH, [place] 6.912 (LL) 7.320 - 7.430    pCO2, [place] 22.2 (L) 41.0 - 51.0 mm Hg    pO2, [place] 48.5 30.0 - 50.0 mm Hg    HCO3, Venous 4.5 (L) 22.0 - 29.0 mmol/L    Total CO2, Venous NOT REPORTED 23.0 - 30.0 mmol/L    Negative Base Excess, [place] 27 (H) 0.0 - 2.0    Positive Base Excess, [place] NOT REPORTED 0.0 - 3.0    O2 Sat, [place] 58 (L) 60.0 - 85.0 %    O2 Device/Flow/% NOT REPORTED     Leo Test NOT REPORTED     Sample Site NOT REPORTED     Mode NOT REPORTED     FIO2 NOT REPORTED     Pt Temp NOT REPORTED     POC pH Temp NOT REPORTED     POC pCO2 Temp NOT REPORTED mm Hg    POC pO2 Temp NOT REPORTED mm Hg     *Note: Due to a large number of results and/or encounters for the requested time period, some results have not been displayed. A complete set of results can be found in Results Review.          IMAGING DATA:    XR CHEST (2 VW)    Result Date: 12/13/2021  EXAMINATION: TWO XRAY VIEWS OF THE CHEST 12/13/2021 12:22 pm COMPARISON: None. HISTORY: ORDERING SYSTEM PROVIDED HISTORY: aspiration pneumonia. Follow up TECHNOLOGIST PROVIDED HISTORY: aspiration pneumonia. Follow up FINDINGS: Overlying ECG monitor and gown snaps. Previous ETT/NG tube removed. Cardiac silhouette unchanged and WNL in size for AP technique. Mediastinal structures midline and unchanged. Interval improvement airspace opacities left base, with persistent airspace abnormalities right upper lung and both bases with probable small left pleural effusion. No pneumothorax. Bones unchanged. Interval extubation and NG tube removal; improved aeration left base. Persistent bibasilar and right upper opacities probable small left pleural effusion. XR CHEST PORTABLE    Result Date: 12/9/2021  EXAMINATION: ONE XRAY VIEW OF THE CHEST 12/9/2021 8:54 am COMPARISON: Chest 12/06/2021 HISTORY: ORDERING SYSTEM PROVIDED HISTORY: evaluate progression of consolidation FINDINGS: The cardiomediastinal and hilar silhouettes appear unremarkable. Scattered pulmonary opacities bilateral mid and lower lungs, increased in density and extent medial lower right lung and medial lower left lung, as well as lingula with the left cardiac apex not partially obscured. No pleural effusion evident. No pneumothorax is seen. No acute osseous abnormality is identified. Endotracheal tube is in place, tip at a level between that of the clavicular heads and aortic knob, ETT tip 5 cm superior to the dominick. NG tube extends below the left hemidiaphragm, out of the field of view. Interval progression nonspecific pulmonary infiltrates are typical for COVID-19 pneumonia, but can be seen with other atypical/viral pneumonia as well. Life support appliances appear appropriately positioned.      XR CHEST PORTABLE    Result Date: 12/6/2021  EXAMINATION: ONE XRAY VIEW OF THE CHEST 12/6/2021 10:55 am COMPARISON: December 4, 2021 HISTORY: ORDERING SYSTEM PROVIDED HISTORY: evaluate pulmonary opacities TECHNOLOGIST PROVIDED HISTORY: evaluate pulmonary opacities FINDINGS: Satisfactory position of the endotracheal and enteric tubes. There has been interval clearing of the parenchymal consolidation in the lateral left base. Otherwise, diffuse interstitial and somewhat ground-glass opacities in both lungs remain as well as left retrocardiac consolidation. No sizable pleural effusion. No pneumothorax. Stable cardiomediastinal silhouette. Interval clearing of the parenchymal consolidation in the lateral left base. Otherwise, diffuse bilateral interstitial and somewhat ground-glass opacities remain as well as left retrocardiac consolidation. XR CHEST PORTABLE    Result Date: 12/4/2021  EXAMINATION: ONE XRAY VIEW OF THE CHEST 12/4/2021 5:56 am COMPARISON: Chest radiograph performed 12/03/2021. HISTORY: ORDERING SYSTEM PROVIDED HISTORY: vent management TECHNOLOGIST PROVIDED HISTORY: vent management FINDINGS: There are bilateral effusions. There is left basilar consolidation. There is no pneumothorax. The mediastinal structures are stable. The upper abdomen is unremarkable. The extrathoracic soft tissues are unremarkable. There is endotracheal tube with the tip in the midtrachea. There is a gastric tube and the tip is not visualized. Bibasilar effusions with left basilar consolidation concerning for pneumonia. XR CHEST PORTABLE    Result Date: 12/3/2021  EXAMINATION: ONE XRAY VIEW OF THE CHEST 12/3/2021 6:18 am COMPARISON: 12/02/2021, 12/01/2021 HISTORY: ORDERING SYSTEM PROVIDED HISTORY: vent managment TECHNOLOGIST PROVIDED HISTORY: vent managment FINDINGS: The endotracheal tube terminates in appropriate position above the dominick. The enteric tube courses off the field of view in the upper abdomen. The cardiac and mediastinal contours appear unchanged. Bilateral airspace disease, left greater than right, is again demonstrated. These findings appear more prominent compared to yesterday's exam but less prominent in the left lung compared to the 12/01/2021 exam.     1. Endotracheal tube remains in appropriate position. 2. Bilateral airspace disease appears more prominent compared to yesterday's exam.     XR CHEST PORTABLE    Result Date: 12/2/2021  EXAMINATION: ONE XRAY VIEW OF THE CHEST 12/2/2021 6:27 am COMPARISON: 12/01/2021, 12/30/2021 HISTORY: ORDERING SYSTEM PROVIDED HISTORY: vent managment TECHNOLOGIST PROVIDED HISTORY: vent managment Reason for Exam: supine port Type of Exam: Subsequent/Follow-up FINDINGS: The endotracheal tube terminates in appropriate position above the dominick. The enteric tube courses off the field of view in the upper abdomen. The cardiac and mediastinal contours appear unchanged. Bilateral airspace disease again demonstrated with improved aeration of the left lung and decrease in the left effusion. No pneumothorax identified. 1. Endotracheal tube remains in appropriate position. 2. Interval improved aeration of the left lung and decrease in left pleural effusion. XR CHEST PORTABLE    Result Date: 12/1/2021  EXAMINATION: ONE XRAY VIEW OF THE CHEST 12/1/2021 5:30 am COMPARISON: November 30, 2021 HISTORY: ORDERING SYSTEM PROVIDED HISTORY: Acute respiratory failure TECHNOLOGIST PROVIDED HISTORY: ET position FINDINGS: Endotracheal tube terminates in the midthoracic trachea in satisfactory position. Enteric tube terminates in the stomach in satisfactory position. Cardiomediastinal silhouette appears unchanged. Multifocal left perihilar and basilar predominant airspace disease appears slightly worse. Left pleural effusion mass suspected. Subtle hazy opacities suspected of the right lung laterally. No pneumothorax or subdiaphragmatic free air. Satisfactory endotracheal and enteric tube placement. Multifocal left perihilar and basilar predominant airspace disease appears worse than prior study.   New small left pleural effusion suspected. XR CHEST PORTABLE    Result Date: 11/30/2021  EXAMINATION: ONE XRAY VIEW OF THE CHEST 11/30/2021 11:23 pm COMPARISON: Radiograph earlier today at 08:45 a.m. HISTORY: ORDERING SYSTEM PROVIDED HISTORY: Shortness of breath, diffuse rhonchi, r/o aspiration TECHNOLOGIST PROVIDED HISTORY: Diffuse rhonchi, r/o aspiration Reason for Exam: R/o aspiration  upright port FINDINGS: Cardiomediastinal silhouette appears unchanged. Multifocal left perihilar and basilar airspace disease. Subtle peripheral opacities suspected of the right lung laterally. No appreciable effusion. No pneumothorax or subdiaphragmatic free air. Interval development of multifocal left perihilar and basilar airspace disease with possible right lung involvement. Rapid interval development may indicate aspiration or pulmonary edema. Infectious process felt less likely given the time course. The findings were sent to the Radiology Results [id number] at 11:54 pm on 11/30/2021to be communicated to a licensed caregiver. XR CHEST PORTABLE    Result Date: 11/30/2021  EXAMINATION: ONE XRAY VIEW OF THE CHEST 11/30/2021 8:52 am COMPARISON: Chest radiograph performed 03/02/2009. HISTORY: ORDERING SYSTEM PROVIDED HISTORY: dka TECHNOLOGIST PROVIDED HISTORY: dka FINDINGS: There is no acute consolidation or effusion. There is no pneumothorax. The mediastinal structures are unremarkable. The upper abdomen is unremarkable. The extrathoracic soft tissues are unremarkable. There is no acute osseous abnormality. No acute cardiopulmonary process. XR ABDOMEN FOR NG/OG/NE TUBE PLACEMENT    Result Date: 12/1/2021  EXAMINATION: ONE SUPINE XRAY VIEW(S) OF THE ABDOMEN 12/1/2021 5:30 am COMPARISON: None.  HISTORY: ORDERING SYSTEM PROVIDED HISTORY: Confirmation of course of NG/OG/NE tube and location of tip of tube TECHNOLOGIST PROVIDED HISTORY: Confirmation of course of NG/OG/NE tube and location of tip of tube Portable? ->Yes FINDINGS: Nasogastric tube is coursing into the stomach with the side port below the diaphragm. The visualized loops of colon are not dilated. The pelvis is not included. No suspicious upper abdominal calcifications or soft tissue mass effect are seen. Opacification in the left lower chest is again noted. Nasogastric tube placed into the stomach and acceptable. Left lower lung opacification is noted. IMPRESSION:   Primary Problem  DKA (diabetic ketoacidosis) ([address]    Active Hospital Problems    Diagnosis Date Noted    DKA (diabetic ketoacidosis) ([address] [E11.10] 12/12/2021    Moderate malnutrition ([address] [E44.0] 12/06/2021     Class: Chronic    Acute respiratory failure with hypoxia (HCC) [X40.90]     Metabolic encephalopathy [P03.96]     Pneumonia of left lower lobe due to infectious organism [J18.9]     Hypernatremia [E87.0]     Type 1 diabetes mellitus with ketoacidosis without coma (HCC) [E10.10]        Thrombocytosis  Leukocytosis  Anemia  Aspiration pneumonia  DKA now resolving      RECOMMENDATIONS:  I personally reviewed results of lab work-up imaging studies and other relevant clinical data. Reviewed hospitalization record  Discussed differential diagnosis of thrombocytosis. We do not have any baseline CBC for comparison. Clinically I suspect patient's cell count abnormalities are likely reactive in nature and will resolve over time but as mentioned before we do not have baseline for comparison. We will work on obtaining patient baseline CBC and also recommend rechecking CBC after resolution of acute illness likely in 1 month time if cell counts remain abnormal will proceed with further work-up to rule out bone marrow disorder  Do not recommend antiplatelet therapy for thrombocytosis which is likely reactive in nature  I will obtain peripheral smear.   Discussed with Dr. Lisa Mustafa from internal medicine team  If patient is discharged I would recommend follow-up in the next 3 to 4 weeks. Discussed with patient and Nurse. Thank you for asking us to see this patient. Anju Williamson MD          This note is created with the assistance of a speech recognition program.  While intending to generate a document that actually reflects the content of the visit, the document can still have some errors including those of syntax and sound a like substitutions which may escape proof reading. It such instances, actual meaning can be extrapolated by contextual diversion.

## 2021-12-13 NOTE — PLAN OF CARE
Problem: Skin Integrity:  Goal: Will show no infection signs and symptoms  Description: Will show no infection signs and symptoms  12/13/2021 0321 by Lauro Oliveros  Outcome: Met This Shift  12/12/2021 1638 by Stephanie Jacob RN  Outcome: Ongoing  Goal: Absence of new skin breakdown  Description: Absence of new skin breakdown  12/13/2021 0321 by Lauro Oliveros  Outcome: Met This Shift  12/12/2021 1638 by Stephanie Jacob RN  Outcome: Ongoing     Problem: Falls - Risk of:  Goal: Will remain free from falls  Description: Will remain free from falls  12/13/2021 0321 by Lauro Oliveros  Outcome: Met This Shift  12/12/2021 1638 by Stephanie Jacob RN  Outcome: Ongoing  Goal: Absence of physical injury  Description: Absence of physical injury  12/13/2021 0321 by Lauro Oliveros  Outcome: Met This Shift  12/12/2021 1638 by Stephanie Jacob RN  Outcome: Ongoing     Problem: Fluid Volume - Imbalance:  Goal: Will remain free of signs and symptoms of dehydration  Description: Will remain free of signs and symptoms of dehydration  12/13/2021 0321 by Lauro Oliveros  Outcome: Met This Shift  12/12/2021 1638 by Stephanie Jacob RN  Outcome: Ongoing  Goal: Absence of imbalanced fluid volume signs and symptoms  Description: Absence of imbalanced fluid volume signs and symptoms  12/13/2021 0321 by Lauro Oliveros  Outcome: Met This Shift  12/12/2021 1638 by Stephanie Jacob RN  Outcome: Ongoing     Problem: Serum Glucose Level - Abnormal:  Goal: Ability to maintain appropriate glucose levels will improve  Description: Ability to maintain appropriate glucose levels will improve  12/13/2021 0321 by Lauro Oliveros  Outcome: Ongoing  12/12/2021 1638 by Stephanie Jacob RN  Outcome: Ongoing     Problem: Injury - Acid Base Imbalance:  Goal: Acid-base balance  Description: Acid-base balance  12/13/2021 0321 by Lauro Oliveros  Outcome: Met This Shift  12/12/2021 1638 by Stephanie Jacob RN  Outcome: Ongoing     Problem: OXYGENATION/RESPIRATORY FUNCTION  Goal: Patient will maintain patent airway  12/13/2021 0321 by Art Gladis  Outcome: Met This Shift  12/12/2021 1638 by Maximus Dugan RN  Outcome: Ongoing  Goal: Patient will achieve/maintain normal respiratory rate/effort  Description: Respiratory rate and effort will be within normal limits for the patient  12/13/2021 0321 by Art Gladis  Outcome: Met This Shift  12/12/2021 1638 by Maximus Dugan RN  Outcome: Ongoing     Problem: SKIN INTEGRITY  Goal: Skin integrity is maintained or improved  12/13/2021 0321 by Art Gladis  Outcome: Met This Shift  12/12/2021 1638 by Maximus Dugan RN  Outcome: Ongoing     Problem: NUTRITION  Goal: Nutritional status is improving  12/13/2021 0321 by Art Gladis  Outcome: Met This Shift  12/12/2021 1638 by Maximus Dugan RN  Outcome: Ongoing     Problem: Nutrition  Goal: Optimal nutrition therapy  12/13/2021 0321 by Art Griffith  Outcome: Met This Shift  12/12/2021 1638 by Maximus Dugan RN  Outcome: Ongoing     Problem: Discharge Planning:  Goal: Participates in care planning  Description: Participates in care planning  12/13/2021 0321 by Art Griffith  Outcome: Ongoing  12/12/2021 1638 by Maximus Dugan RN  Outcome: Ongoing  Goal: Discharged to appropriate level of care  Description: Discharged to appropriate level of care  12/13/2021 0321 by Art Griffith  Outcome: Ongoing  12/12/2021 1638 by Maximus Dugan RN  Outcome: Ongoing     Problem: Aspiration:  Goal: Absence of aspiration  Description: Absence of aspiration  12/13/2021 0321 by Art Griffith  Outcome: Ongoing  12/12/2021 1638 by Maximus Dugan RN  Outcome: Ongoing     Problem: Gas Exchange - Impaired:  Goal: Levels of oxygenation will improve  Description: Levels of oxygenation will improve  12/13/2021 0321 by Art Griffith  Outcome: Ongoing  12/12/2021 1638 by Maximus Dugan RN  Outcome: Ongoing

## 2021-12-13 NOTE — PROGRESS NOTES
Speech Language Pathology  Speech Language Pathology  [facility]    Dysphagia Treatment Note    Date: 12/13/2021  Patients Name: Aubree Reaves  MRN: [id number]  Diagnosis:   Patient Active Problem List   Diagnosis Code    Type 1 diabetes mellitus with ketoacidosis without coma ([facility] E10.10    Moderate malnutrition ([facility] E44.0    Acute respiratory failure with hypoxia (HCC) T19.62    Metabolic encephalopathy I87.25    Pneumonia of left lower lobe due to infectious organism J18.9    Hypernatremia E87.0    DKA (diabetic ketoacidosis) ([facility]) E11.10     Pain: 0    Dysphagia Treatment  Treatment time: 5211-0139    Subjective: [x] Alert [x] Cooperative     [] Confused     [] Agitated    [] Lethargic    Objective/Assessment:    Pt. Seen for diet tolerance monitoring. Pt was seen on 12/11 for BSSE, Dysphagia III: soft and Bite Sized diet with Mildly Thick (Nectar) liquids was recommended. This date (12/13), pt was observed with multiple trials of thin liquids and regular solids with no overt s/s of aspiration. Pt w/ prolonged yet functional mastication of regular solid, +min lingual residual which was cleared w/ liquid wash. ST recommends pt's diet upgrade to Regular diet with thin liquids at this time. Pt provided education re: safe swallowing strategies with good return. If s/s of aspiration occur, recommend d/c PO intake and complete MBSS to r/o/confirm aspiration. Recommendations reported to RN. Plan:  [x] Continue ST services    [] Discharge from ST:      Discharge recommendations: []  Further therapy recommended at discharge. The patient should be able to tolerate at least 3 hours of therapy per day over 5 days or 15 hours over 7 days. [] Further therapy recommended at discharge. [x] No therapy recommended at discharge.     Treatment completed by: Bianka Alberto, SLP

## 2021-12-13 NOTE — CARE COORDINATION
Transitional Planning  Reviewed PT OT notes spoke with pt at bedside about SNF. He is declining SNF states he would like in home PT OT. Attempted to explain why we felt SNF was more appropriate and he still declines. He states his bed and bath are on main level. His wife is at home she has MS but gets around ok. He has a daughter who works at [employer] who can help out as well. He gave me permission to call his wife. Called spoke with Katie Nick his spouse who also agrees with pt for home with home [facility] PT OT. She is unsure of who to refer to would like me to provide pt with the list and he can call her for assistance. He is currently off for a xray. He will need DME RW, (shower chair with a back wife will attempt to get the shower chair through [facility]) and they are requesting coccyx cushion for wheelchair she has at home. Will discuss home care choice with pt when he returns. 14:15  Pt did much better this afternoon with phys tx. Spoke with pt about Home Care choices he wanted [facility] to [facility] and [facility] care. E-referrals to [facility] and [facility] await call back. 14:40  Faxed face sheet,face to face H&P, DME orders for shower chair with back, RW and coccyx cushion for wheelchair to [facility] to be delivered to room 315 for discharge    15:15  [facility] called back they are unable to accept as they are no contracted with Insurance    17:00  [facility] called and the shower chair is not covered and they do not have the coccyx pad they will deliver the RW. Called his wife Katie Nick and she states she will get the shower chair from [address] and the pillow from [facility] for him    17:45  Called [facility] spoke with Carlton Howard and they can accept pt would like some supplies sent with pt for first visit.  Yifan Ponce

## 2021-12-13 NOTE — PROGRESS NOTES
[facility] - Progress Note    Today's Date and Time: 12/13/2021, 10:43 AM    Impression :   · Leukocytosis worsening  · Concern for aspiration pneumonia  · DM I  · DKA  · Altered mentation  · Acute hypoxic respiratory failure  · Sepsis high risk. · Reactive leukocytosis  · Thrombocytosis    Recommendations:   · Monitor off antibiotics  · Completed clindamycin. Stop date 12-11-21  · Repeat CXR 12-13-21  · Hematology to evaluate for thrombocytosis and leukocytosis  Medical Decision Making/Summary/Discussion:12/13/2021     ·   Infection Control Recommendations   · [place] Precautions      Antimicrobial Stewardship Recommendations     · Discontinuation of therapy  Coordination of Outpatient Care:   · Estimated Length of IV antimicrobials: TBD  · Patient will need Midline Catheter Insertion: no  · Patient will need PICC line Insertion:No  · Patient will need: Home IV , [place],  SNF,  LTAC: TBD  · Patient will need outpatient wound care:Yes    Chief complaint/reason for consultation:   · Leukocytosis with fevers. Aspiration pneumonia      History of Present Illness:   Dory Neely is a 63y.o.-year-old male who was initially admitted on 11/30/2021. Patient seen at the request of Dr. Toribio Aviles:    This patient with type 1 diabetes, initially presented to the ED on 11/30/2021 with altered mentation. According to the EHR, he had not been compliant with his insulin for the 3 days prior. Further work up revealed that the patient was experiencing toxic metabolic encephalopathy because of DKA. Lab work revealed:pH of 6.9, PCO2 22, bicarb 4.5 and a blood sugar over 700. His potassium was also elevated at 6.2 with peaked T waves on EKG. WBC was 18.1. Covid swab and MRSA nares were negative    He was also hypotensive and started on norepinephrine and intubated for airway protection.     Levaquin and Clindamycin were initiated 12/1/21 for worsening CXR and concern for aspiration pneumonia. Levaquin has since been discontinued out of concern for increased QT interval    Cultures have remained negative and CXR shows imrovement. Leukocytosis continues but improving    CURRENT EVALUATION 12/13/2021     Afebrile  VS stable    Extubated 12-10-21  Comfortable  On room air  RR 22-->21-->24-->16  02 sat 92-->97-->95    Repeat swallow test planned   Has thrombocytosis > 1 million platelets    Labs, X rays reviewed: 12/13/2021    BUN:24-->18-->13  Cr:0.6-->0.48-->0.33    WBC: 20-->16.9-->23.8-->18.5-->16.2  Hb:9.7-->9.8-->9.1-->10.6-->9.8  Plat: 362-->329-->497-->790-->940-->1008    Legionella and strep pneumo not detected    Cultures:  Urine:  · 11/6/21: No bacterial growth  Blood:  · 12/7/21 No growth   Suctioned Sputum :  · 12/7/21: PRESUMPTIVE CANDIDA ALBICANS LIGHT GROWTH Abnormal   Wound:  ·     IMAGING    12-9-21:         12/6/21        Discussed with patient, RN, CC. I have personally reviewed the past medical history, past surgical history, medications, social history, and family history, and I have updated the database accordingly. Past Medical History:     Past Medical History:   Diagnosis Date    Diabetes mellitus ([facility] [address]     Hypertension        Past Surgical  History:   History reviewed. No pertinent surgical history.     Medications:      insulin glargine  12 Units SubCUTAneous BID    aspirin  81 mg Oral Daily    insulin lispro  0-6 Units SubCUTAneous TID WC    insulin lispro  0-3 Units SubCUTAneous Nightly    lisinopril  20 mg Oral Daily    enoxaparin  40 mg SubCUTAneous Daily    FLUoxetine  40 mg Oral BID    famotidine  20 mg Per NG tube BID    sodium chloride flush  5-40 mL IntraVENous 2 times per day       Social History:     Social History     Socioeconomic History    Marital status:      Spouse name: Not on file    Number of children: Not on file    Years of education: Not on file    Highest education level: Not on file Occupational History    Not on file   Tobacco Use    Smoking status: Never Smoker    Smokeless tobacco: Never Used   Vaping Use    Vaping Use: Never used   Substance and Sexual Activity    Alcohol use: Not Currently    Drug use: Yes     Types: Marijuana Cem Rubi)     Comment: uses edibles nightly     Sexual activity: Not on file   Other Topics Concern    Not on file   Social History Narrative    Not on file     Social Determinants of Health     Financial Resource Strain:     Difficulty of Paying Living Expenses: Not on file   Food Insecurity:     Worried About Running Out of Food in the Last Year: Not on file    [place] of Food in the Last Year: Not on file   Transportation Needs:     Lack of Transportation (Medical): Not on file    Lack of Transportation (Non-Medical): Not on file   Physical Activity:     Days of Exercise per Week: Not on file    Minutes of Exercise per Session: Not on file   Stress:     Feeling of Stress : Not on file   Social Connections:     Frequency of Communication with Friends and Family: Not on file    Frequency of Social Gatherings with Friends and Family: Not on file    Attends Protestant Services: Not on file    Active Member of [address] [place] [address] or Organizations: Not on file    Attends Club or Organization Meetings: Not on file    Marital Status: Not on file   Intimate Partner Violence:     Fear of Current or Ex-Partner: Not on file    Emotionally Abused: Not on file    Physically Abused: Not on file    Sexually Abused: Not on file   Housing Stability:     Unable to Pay for Housing in the Last Year: Not on file    Number of [place] in the Last Year: Not on file    Unstable Housing in the Last Year: Not on file       Family History:   History reviewed. No pertinent family history. Allergies:   Patient has no known allergies. Review of Systems:     AURA-intubated and sedated 12/13/2021      Constitutional: No fevers or chills.  No systemic complaints  Head: No headaches  Eyes: No double vision or blurry vision. No conjunctival inflammation. ENT: No sore throat or runny nose. . No hearing loss, tinnitus or vertigo. Cardiovascular: No chest pain or palpitations. No shortness of breath. No MOLINA  Lung: No shortness of breath or cough. No sputum production  Abdomen: No nausea, vomiting, diarrhea, or abdominal pain. Lawernce Roughen No cramps. Genitourinary: No increased urinary frequency, or dysuria. No hematuria. No suprapubic or CVA pain  Musculoskeletal: No muscle aches or pains. No joint effusions, swelling or deformities  Hematologic: No bleeding or bruising. Neurologic: No headache, weakness, numbness, or tingling. Integument: No rash, no ulcers. Psychiatric: No depression. Endocrine: No polyuria, no polydipsia, no polyphagia. Physical Examination :     Patient Vitals for the past 8 hrs:   BP Temp Temp src Pulse Resp SpO2   12/13/21 0741 (!) 160/77 98.2 °F (36.8 °C) Oral 93 16 95 %     General Appearance: Intubated and sedated  Head:  Normocephalic, no trauma  Eyes: Pupils equal, round, reactive to light; sclera anicteric; conjunctivae pink. No embolic phenomena. ENT: Oropharynx clear, without erythema, exudate, or thrush. No tenderness of sinuses. Mouth/throat: mucosa pink and moist. No lesions. Dentition in good repair. Neck:Supple, without lymphadenopathy. Thyroid normal, No bruits. Pulmonary/Chest: Clear to auscultation, without wheezes, rales, or rhonchi. No dullness to percussion. Cardiovascular: Regular rate and rhythm without murmurs, rubs, or gallops. Abdomen: Soft, non tender. Bowel sounds normal. No organomegaly  All four Extremities: No cyanosis, clubbing, edema, or effusions. Neurologic: Not following commands at this time  Skin: Warm and dry with good turgor. No signs of peripheral arterial or venous insufficiency. No ulcerations. Scattered bruising.  Deep tissue injury to coccyx    Medical Decision Making -Laboratory:   I have independently reviewed/ordered the following labs:    CBC with Differential:   Recent Labs     12/12/21  0441 12/13/21  0517   WBC 18.5* 16.2*   HGB 9.5* 9.8*   HCT 29.2* 29.0*   * See Reflexed IPF Result   LYMPHOPCT 8* 17*   MONOPCT 13* 11     BMP:   Recent Labs     12/12/21  0441 12/13/21  0517    135   K 3.3* 4.3    104   CO2 21 18*   BUN 13 11   CREATININE 0.33* 0.35*   MG 2.2  --      Hepatic Function Panel:   No results for input(s): PROT, LABALBU, BILIDIR, IBILI, BILITOT, ALKPHOS, ALT, AST in the last 72 hours. No results for input(s): RPR in the last 72 hours. No results for input(s): HIV in the last 72 hours. No results for input(s): BC in the last 72 hours.   Lab Results   Component Value Date    MUCUS 1+ 12/11/2021    RBC 3.24 12/13/2021    TRICHOMONAS NOT REPORTED 12/11/2021    WBC 16.2 12/13/2021    YEAST FEW 12/11/2021    TURBIDITY Clear 12/11/2021     Lab Results   Component Value Date    CREATININE 0.35 12/13/2021    GLUCOSE 238 12/13/2021       Medical Decision Making-Imaging:     Narrative   EXAMINATION:   ONE XRAY VIEW OF THE CHEST       12/6/2021 10:55 am       COMPARISON:   December 4, 2021       HISTORY:   ORDERING SYSTEM PROVIDED HISTORY: evaluate pulmonary opacities   TECHNOLOGIST PROVIDED HISTORY:   evaluate pulmonary opacities       FINDINGS:   Satisfactory position of the endotracheal and enteric tubes.       There has been interval clearing of the parenchymal consolidation in the   lateral left base.  Otherwise, diffuse interstitial and somewhat ground-glass   opacities in both lungs remain as well as left retrocardiac consolidation.       No sizable pleural effusion.  No pneumothorax.       Stable cardiomediastinal silhouette.           Impression   Interval clearing of the parenchymal consolidation in the lateral left base.       Otherwise, diffuse bilateral interstitial and somewhat ground-glass opacities   remain as well as left retrocardiac consolidation.               Medical Decision Tdzimt-Kjmuzoan-Boquo:       Medical Decision Making-Other:     Note:  · Labs, medications, radiologic studies were reviewed with personal review of films  · Large amounts of data were reviewed  · Discussed with nursing Staff, Discharge planner  · Infection Control and Prevention measures reviewed  · All prior entries were reviewed  · Administer medications as ordered  · Prognosis: Guarded  · Discharge planning reviewed    Thank you for allowing us to participate in the care of this patient. Please call with questions.     Jeremy Odonnell MD.    Pager: [phone] - Office: [phone]

## 2021-12-13 NOTE — DISCHARGE INSTR - COC
Continuity of Care Form    Patient Name: Nile Tilley   :  1960  MRN:  [id number]    Admit date:  2021  Discharge date: 2021    Code Status Order: Full Code   Advance Directives:      Admitting Physician:  Debi Escalera MD  PCP: Janis Gibson    Discharging Nurse: [facility] Unit/Room#: 0315/0315-01  Discharging Unit Phone Number: [phone]    Emergency Contact:   Extended Emergency Contact Information  Primary Emergency Contact: Gypsy Segura Phone: [phone]  Mobile Phone: [phone]  Relation: Spouse  Secondary Emergency Contact: Bc Segura Phone: [phone]  Relation: Parent    Past Surgical History:  History reviewed. No pertinent surgical history.     Immunization History:   Immunization History   Administered Date(s) Administered    COVID-19, [place] Ser, Primary or Immunocompromised, PF, 100mcg/0.5mL 2021, 2021    Influenza, Quadv, IM, PF (6 mo and older Fluzone, Flulaval, Fluarix, and 3 yrs and older Afluria) 10/26/2017, 2019, 2020, 10/16/2021    Pneumococcal Polysaccharide ([id number]) 02/10/2011    Tdap (Boostrix, Adacel) 02/10/2011       Active Problems:  Patient Active Problem List   Diagnosis Code    Type 1 diabetes mellitus with ketoacidosis without coma ([address] E10.10    Moderate malnutrition ([address] E44.0    Acute respiratory failure with hypoxia (HCC) Z48.88    Metabolic encephalopathy U44.81    Pneumonia of left lower lobe due to infectious organism J18.9    Hypernatremia E87.0    DKA (diabetic ketoacidosis) ([address] E11.10       Isolation/Infection:   Isolation            No Isolation          Patient Infection Status       Infection Onset Added Last Indicated Last Indicated By Review Planned Expiration Resolved Resolved By    None active    Resolved    COVID-19 (Rule Out) 21 COVID-19, Rapid (Ordered)   21 Rule-Out Test Resulted            Nurse Assessment:  Last Vital Signs: BP (!) (1800kcals/day)    Routes of Feeding: Oral  Liquids: No Restrictions  Daily Fluid Restriction: no  Last Modified Barium Swallow with Video (Video Swallowing Test): not done    Treatments at the Time of Hospital Discharge:   Respiratory Treatments: ***  Oxygen Therapy:  is not on home oxygen therapy. Ventilator:    - No ventilator support    Rehab Therapies: Physical Therapy and Occupational Therapy  Weight Bearing Status/Restrictions: No weight bearing restirctions  Other Medical Equipment (for information only, NOT a DME order):  walker and bath bench  Other Treatments: Dressing change to coccyx    Patient's personal belongings (please select all that are sent with patient):  None    RN SIGNATURE:  Electronically signed by Roxanna Felix RN on 12/13/21 at 3:57 PM EST    CASE MANAGEMENT/SOCIAL WORK SECTION    Inpatient Status Date: 12/13/2021    Readmission Risk Assessment Score:  Readmission Risk              Risk of Unplanned Readmission:  18           Discharging to Facility/ Agency   Name:   Address:  [address] [place] [address],[address] Details  [address], [place] [address]       Phone: [phone]       Fax: [phone]        Phone:  Fax:    Dialysis Facility (if applicable)   Name:  Address:  Dialysis Schedule:  Phone:  Fax:    / signature: Electronically signed by Reuben Mcgee RN on 12/13/21 at 5:48 PM EST    PHYSICIAN SECTION    Prognosis: Fair    Condition at Discharge: Stable    Rehab Potential (if transferring to Rehab): Fair    Recommended Labs or Other Treatments After Discharge: PT/OT CXR in one week results to PCP   wound care. PT and OT eval and treat  Monitor vitals  Blood sugar checks premeals and bedtime    Physician Certification: I certify the above information and transfer of Brandon Louis  is necessary for the continuing treatment of the diagnosis listed and that he requires Home Care for greater 30 days.      Update Admission H&P: No change in H&P    PHYSICIAN SIGNATURE:  Electronically signed by Siddhartha Burton MD on 12/13/21 at 1:47 PM EST

## 2021-12-13 NOTE — PLAN OF CARE
Problem: Skin Integrity:  Goal: Will show no infection signs and symptoms  Description: Will show no infection signs and symptoms  12/13/2021 1714 by Juma Seaman RN  Outcome: Completed  12/13/2021 0321 by Davon Ferrari  Outcome: Met This Shift  Goal: Absence of new skin breakdown  Description: Absence of new skin breakdown  12/13/2021 1714 by Juma Seaman RN  Outcome: Completed  12/13/2021 0321 by Davon Ferrari  Outcome: Met This Shift     Problem: Falls - Risk of:  Goal: Will remain free from falls  Description: Will remain free from falls  12/13/2021 1714 by Juma Seaman RN  Outcome: Completed  12/13/2021 0321 by Davon Ferrari  Outcome: Met This Shift  Goal: Absence of physical injury  Description: Absence of physical injury  12/13/2021 1714 by Juma Seaman RN  Outcome: Completed  12/13/2021 0321 by Davon Ferrari  Outcome: Met This Shift     Problem: Fluid Volume - Imbalance:  Goal: Will remain free of signs and symptoms of dehydration  Description: Will remain free of signs and symptoms of dehydration  12/13/2021 1714 by Juma Seaman RN  Outcome: Completed  12/13/2021 0321 by Davon Ferrari  Outcome: Met This Shift  Goal: Absence of imbalanced fluid volume signs and symptoms  Description: Absence of imbalanced fluid volume signs and symptoms  12/13/2021 1714 by Juma Seaman RN  Outcome: Completed  12/13/2021 0321 by Davon Ferrari  Outcome: Met This Shift     Problem: Serum Glucose Level - Abnormal:  Goal: Ability to maintain appropriate glucose levels will improve  Description: Ability to maintain appropriate glucose levels will improve  12/13/2021 1714 by Juma Seaman RN  Outcome: Completed  12/13/2021 0321 by Davon Ferrari  Outcome: Ongoing     Problem: Injury - Acid Base Imbalance:  Goal: Acid-base balance  Description: Acid-base balance  12/13/2021 1714 by Juma Seaman RN  Outcome: Completed  12/13/2021 0321 by Davon Ferrari  Outcome: Met This Shift     Problem: OXYGENATION/RESPIRATORY FUNCTION  Goal: Patient will maintain patent airway  12/13/2021 1714 by Neela Wright RN  Outcome: Completed  12/13/2021 0321 by Trista Galindo  Outcome: Met This Shift  Goal: Patient will achieve/maintain normal respiratory rate/effort  Description: Respiratory rate and effort will be within normal limits for the patient  12/13/2021 1714 by Neela Wright RN  Outcome: Completed  12/13/2021 0321 by Trista Galindo  Outcome: Met This Shift     Problem: NUTRITION  Goal: Nutritional status is improving  12/13/2021 1714 by Neela Wright RN  Outcome: Completed  12/13/2021 0321 by Trista Galindo  Outcome: Met This Shift     Problem: Nutrition  Goal: Optimal nutrition therapy  12/13/2021 1714 by Neela Wright RN  Outcome: Completed  12/13/2021 0321 by Trista Galindo  Outcome: Met This Shift

## 2021-12-13 NOTE — PLAN OF CARE
Cynthia Watson was evaluated today and a DME order was entered for a wheeled walker with seat because he requires this to successfully complete daily living tasks of ambulating. A wheeled walker with seat is necessary due to the patient's unsteady gait, upper body weakness, inability to  and ambulation device, ambulating only short distances by pushing a walker, and the need to sit for a short time before resuming ambulation. These tasks cannot be completed with a lesser ambulation device such as a cane, crutch, or standard walker. The need for this equipment was discussed with the patient and he understands and is in agreement. Cristy Wagner MD  Internal Medicine Resident  [facility];  [place], [place]

## 2021-12-13 NOTE — PLAN OF CARE
Nile Tilley requires a shower chair with back due to being unstable, and is physically incapable of standing in the shower for long periods of time. Current body weight is Weight: 139 lb 5.3 oz (63.2 kg). Juanita Greer MD  Internal Medicine Resident  [address], [place]

## 2021-12-13 NOTE — PROGRESS NOTES
Physical Therapy  Facility/Department: [facility] RENAL//MED SURG  Daily Treatment Note  NAME: Kendall José  : 1960  MRN: [id number]    Date of Service: 2021    Discharge Recommendations:  Patient would benefit from continued therapy after discharge   PT Equipment Recommendations  Equipment Needed: Yes  Mobility Devices: Holly Jennings: Rolling    Assessment   Assessment: Pt performs bed mobility with CGA for safety. Pt is impulsive and stands without warning requiring repeated verbal cues for sequencing. Pt demos increased posterior lean upon standing and c/o dizziness during STS transfer. Pt ambulated 350 ft. with CGA/Min-A using a RW. Pt required 2 standing rest breaks t/o gait training due to fatigue. Pt demos posterior LOB x2 when turning requiring min-A to correct. Pt is a fall risk and is limited by decreased balance and endurance and would benefit from continued PT following discharge to address functional deficits. Prognosis: Good  Decision Making: Medium Complexity  PT Education: Goals; PT Role; Plan of Care  Barriers to Learning: none  REQUIRES PT FOLLOW UP: Yes  Activity Tolerance  Activity Tolerance: Patient limited by endurance; Patient limited by fatigue     Patient Diagnosis(es): The primary encounter diagnosis was Type 1 diabetes mellitus with ketoacidosis without coma ([facility] [address] Diagnoses of Pneumonia of left lower lobe due to infectious organism and Acute respiratory failure with hypoxia [facility]) were also pertinent to this visit. has a past medical history of Diabetes mellitus ([facility] [address] and Hypertension. has no past surgical history on file.     Restrictions  Restrictions/Precautions  Restrictions/Precautions: Fall Risk  Required Braces or Orthoses?: No  Position Activity Restriction  Other position/activity restrictions: aspiration precautions, intubated -Extubated 12/10  Subjective   General  Chart Reviewed: Yes  Response To Previous Treatment: Patient with no complaints from previous session. Family / Caregiver Present: No  Subjective  Subjective: RN and pt agreeable to PT. pt agreeable and pleasant. pt supine in bed at start of session. Pain Screening  Patient Currently in Pain: Denies  Vital Signs  Patient Currently in Pain: Denies       Orientation  Orientation  Overall Orientation Status: Within Functional Limits  Cognition   Cognition  Overall Cognitive Status: WFL  Objective   Bed mobility  Supine to Sit: Contact guard assistance  Sit to Supine: Contact guard assistance  Scooting: Contact guard assistance  Transfers  Sit to Stand: Contact guard assistance  Stand to sit: Contact guard assistance  Ambulation  Ambulation?: Yes  More Ambulation?: No  Ambulation 1  Surface: level tile  Device: Rolling Walker  Assistance: Contact guard assistance; Minimal assistance  Gait Deviations: Slow Clau; Decreased step length; Decreased step height  Distance: 350 ft. Comments: Pt unsteady, adducts BLE during ambulation, as if ambulating in tandem stance. Decreased JOSIE during ambulation. Verbal and tactile cues to progress task  Stairs/Curb  Stairs?: No     Balance  Posture: Good  Sitting - Static: Good; -  Sitting - Dynamic: Fair; -  Standing - Static: Fair  Standing - Dynamic: Fair; -  Comments: Standing balance assessed with RW  Exercises  Hip Flexion: x15 BLE  Knee Long Arc Quad: x15 BLE  Ankle Pumps: x15 BLE  Core Strengthening: Pt sat EOB for 8 mins with CGA with no LOB noted.   Other exercises  Other exercises?: No                                                AM-PAC Score  AM-PAC Inpatient Mobility Raw Score : 17 (12/13/21 1504)  AM-PAC Inpatient T-Scale Score : 42.13 (12/13/21 1504)  Mobility Inpatient CMS 0-100% Score: 50.57 (12/13/21 1504)  Mobility Inpatient CMS G-Code Modifier : CK (12/13/21 1504)          Goals  Short term goals  Time Frame for Short term goals: 14 visits  Short term goal 1: Complete transfers with mod I and least restrictive v no AD  Short term goal 2: Complete 300 ft of gait with mod I and least restrictive v no AD  Short term goal 3: Complete 2 steps with RHR and mod I  Short term goal 4: Participate in 30 minutes of therapy to promtoe endurance    Plan    Plan  Times per week: 5-6x  Current Treatment Recommendations: Strengthening, Transfer Training, Endurance Training, Neuromuscular Re-education, Patient/Caregiver Education & Training, ADL/Self-care Training, Equipment Evaluation, Education, & procurement, Balance Training, IADL Training, Gait Training, Home Exercise Program, Functional Mobility Training, Stair training, Safety Education & Training  Safety Devices  Type of devices:  All fall risk precautions in place, Gait belt, Call light within reach, Left in chair, Chair alarm in place, Nurse notified  Restraints  Initially in place: No     Therapy Time   Individual Concurrent Group Co-treatment   Time In 1324         Time Out 1354         Minutes 30         Timed Code Treatment Minutes: [address], [facility]

## 2021-12-13 NOTE — PROGRESS NOTES
Assessment Granulation tissue; Fibrin   [place]-wound Assessment Blanchable erythema  (very prominent bony structure)             Response to treatment:  Well tolerated by patient. Plan   Plan of Care: Wound 12/04/21 Coccyx-Dressing/Treatment: (P) Triad hydro/zinc oxide-based hydrophilic paste, Foam     Plan of Care: Turn every 2 hours  Float heels off of bed with pillows under calves. Monitor the heels carefully as they are both reddened.      Use lift sling to reposition patient to minimize potential for shear injury.     Triad hydrophilic Wound Dressing Paste to the sacrococcygeal ulcer. Cover with a silicone foam or ABD pad and change daily.      Routine incontinence care with incontinence barrier cloths and zinc oxide cream. Apply zinc oxide cream BID and prn incontinence. Moisture wicking under pads; avoid use of briefs in bed    Specialty Bed Required : Yes   [x] Low Air Loss   [x] Pressure Redistribution  [] Fluid Immersion  [] Bariatric  [] Total Pressure Relief  [] Other:     Current Diet: ADULT DIET;  Regular; 4 carb choices (60 gm/meal)       JO TORRES RN BSN, Deni Vieira

## 2021-12-13 NOTE — PROGRESS NOTES
[facility]  Internal Medicine Teaching Residency Program  Inpatient Daily Progress Note  ______________________________________________________________________________    Patient: Cynthia Watson  YOB: 1960   [place]:[id number]    Acct: [de-identified]     Room: [address] [place] [address]  Admit date: 11/30/2021  Today's date: 12/13/21  Number of days in the hospital: 15    SUBJECTIVE   Admitting Diagnosis: DKA (diabetic ketoacidosis) ([facility]  CC: AMS    - Pt examined at bedside. Chart & results reviewed. - No acute events overnight   - Pt upset this morning regarding diet and insulin therapy   - Pt denies any other complaints at this time   - K+ improved   - Glucose 314  - Platelet elevation   - Afebrile   - Pt hypertensive this am     Plan for today:  - Increase Lantus to home dose 12U BID   - Discontinue Seroquel   - Follow up on Heme/Onc recommendations   - Swallow evaluation > follow up on recommendations   - Re-check glucose in the afternoon   - If glucose and vital signs are stable, plan for discharge     ROS:  Constitutional:  negative for chills, fevers, sweats  Respiratory:  negative for cough, dyspnea on exertion, hemoptysis, shortness of breath, wheezing  Cardiovascular:  negative for chest pain, chest pressure/discomfort, lower extremity edema, palpitations  Gastrointestinal:  negative for abdominal pain, constipation, diarrhea, nausea, vomiting  Neurological:  negative for dizziness, headache    BRIEF HISTORY     The patient is a 61 y. o. male who was initially admitted on 11/30/2021 with DKA, type 1, not at goal [facility]) [E10.10]  Type 1 diabetes mellitus with ketoacidosis without coma ([facility] [address] [facility] [E10.10] and presented with altered mental status. Blood sugar were in the 1000's. Pt was found to be in DKA and intubated due to encephalopathy. Pt was started on the DKA protocol. Pt was also hypotensive and received IV fluids.  IVF did not improve blood pressure and pt was started on levophed. Pt was started on Levaquin and completed 7 doses for aspiration pneumonia. COVID testing was negative. MRSA testing was negative.     OBJECTIVE     Vital Signs:  BP (!) 160/77   Pulse 93   Temp 98.2 °F (36.8 °C) (Oral)   Resp 16   Ht 5' 9\" (1.753 m)   Wt 139 lb 5.3 oz (63.2 kg)   SpO2 95%   BMI 20.58 kg/m²     Temp (24hrs), Av.3 °F (36.8 °C), Min:98.2 °F (36.8 °C), Max:98.5 °F (36.9 °C)    In: 782   Out: 2100 [Urine:2100]    Physical Exam:  Physical Exam  Vitals and nursing note reviewed. Constitutional:       Appearance: Normal appearance. HENT:      Head: Normocephalic and atraumatic. Nose: Nose normal.      Mouth/Throat:      Mouth: Mucous membranes are moist.      Pharynx: Oropharynx is clear. Eyes:      Extraocular Movements: Extraocular movements intact. Conjunctiva/sclera: Conjunctivae normal.      Pupils: Pupils are equal, round, and reactive to light. Cardiovascular:      Rate and Rhythm: Normal rate and regular rhythm. Pulses: Normal pulses. Heart sounds: Normal heart sounds. No murmur heard. No friction rub. No gallop. Pulmonary:      Effort: Pulmonary effort is normal. No respiratory distress. Breath sounds: Normal breath sounds. No stridor. No wheezing, rhonchi or rales. Chest:      Chest wall: No tenderness. Abdominal:      General: Abdomen is flat. Bowel sounds are normal. There is no distension. Palpations: Abdomen is soft. There is no mass. Tenderness: There is no abdominal tenderness. There is no guarding or rebound. Hernia: No hernia is present. Musculoskeletal:         General: No swelling, tenderness, deformity or signs of injury. Normal range of motion. Cervical back: Normal range of motion. Right lower leg: No edema. Left lower leg: No edema. Skin:     General: Skin is warm. Neurological:      General: No focal deficit present.       Mental Status: He is alert and oriented to person, place, and time. Mental status is at baseline. Psychiatric:         Mood and Affect: Mood normal.         Behavior: Behavior normal.         Thought Content: Thought content normal.         Judgment: Judgment normal.           Medications:  Scheduled Medications:    QUEtiapine  12.5 mg Oral BID    insulin glargine  12 Units SubCUTAneous BID    insulin lispro  0-6 Units SubCUTAneous TID WC    insulin lispro  0-3 Units SubCUTAneous Nightly    lisinopril  20 mg Oral Daily    enoxaparin  40 mg SubCUTAneous Daily    FLUoxetine  40 mg Oral BID    famotidine  20 mg Per NG tube BID    sodium chloride flush  5-40 mL IntraVENous 2 times per day     Continuous Infusions:    dextrose      sodium chloride      dextrose 5 % and 0.45 % NaCl Stopped (12/01/21 0000)     PRN Medicationslabetalol, 5 mg, Q6H PRN  albuterol, 2.5 mg, Q4H PRN  glucose, 15 g, PRN  dextrose, 12.5 g, PRN  glucagon (rDNA), 1 mg, PRN  dextrose, 100 mL/hr, PRN  dextrose, 12.5 g, PRN  polyethylene glycol, 17 g, Daily PRN  sodium chloride flush, 5-40 mL, PRN  sodium chloride, 25 mL, PRN  ondansetron, 4 mg, Q8H PRN   Or  ondansetron, 4 mg, Q6H PRN  acetaminophen, 650 mg, Q6H PRN   Or  acetaminophen, 650 mg, Q6H PRN  dextrose 5 % and 0.45 % NaCl, , Continuous PRN        Diagnostic Labs:  CBC:   Recent Labs     12/11/21  0740 12/12/21  0441 12/13/21  0517   WBC 23.8* 18.5* 16.2*   RBC 3.60* 3.30* 3.24*   HGB 10.6* 9.5* 9.8*   HCT 32.2* 29.2* 29.0*   MCV 89.4 88.5 89.5   RDW 13.7 13.6 13.4   * 940* See Reflexed IPF Result     BMP:   Recent Labs     12/11/21  0740 12/12/21  0441 12/13/21  0517    136 135   K 3.9 3.3* 4.3   * 105 104   CO2 20 21 18*   BUN 14 13 11   CREATININE 0.43* 0.33* 0.35*     BNP: No results for input(s): BNP in the last 72 hours. PT/INR: No results for input(s): PROTIME, INR in the last 72 hours. APTT: No results for input(s): APTT in the last 72 hours.   CARDIAC ENZYMES: No results for input(s): CKMB, CKMBINDEX, TROPONINI in the last 72 hours. Invalid input(s): CKTOTAL;3  FASTING LIPID PANEL:No results found for: CHOL, HDL, TRIG  LIVER PROFILE: No results for input(s): AST, ALT, ALB, BILIDIR, BILITOT, ALKPHOS in the last 72 hours. MICROBIOLOGY:   Lab Results   Component Value Date/Time    CULTURE CULTURE IN PROGRESS 12/11/2021 05:57 PM       Imaging:    XR CHEST PORTABLE    Result Date: 12/9/2021  Interval progression nonspecific pulmonary infiltrates are typical for COVID-19 pneumonia, but can be seen with other atypical/viral pneumonia as well. Life support appliances appear appropriately positioned. XR CHEST PORTABLE    Result Date: 12/6/2021  Interval clearing of the parenchymal consolidation in the lateral left base. Otherwise, diffuse bilateral interstitial and somewhat ground-glass opacities remain as well as left retrocardiac consolidation. ASSESSMENT & PLAN     Assessment and Plan:    Principal Problem:    DKA (diabetic ketoacidosis) ([facility] [address]  Active Problems:    Type 1 diabetes mellitus with ketoacidosis without coma (HCC)    Moderate malnutrition (HCC)    Acute respiratory failure with hypoxia (HCC)    Metabolic encephalopathy    Pneumonia of left lower lobe due to infectious organism    Hypernatremia  Resolved Problems:    * No resolved hospital problems.  *    DKA with encephalopathy - improved   - Pt A&OX3   - Repeat swallow study   - Lantus 12U nightly   - High dose sliding scale       Aspiration pneumonia - improving   - Clindamycin completed   - Continue to monitor for fevers > afebrile   - Trend WBC   - Follow up on urine cultures    - ID following appreciate recommendations      Hypotension - resolved      Hypertension   - Continue with Lisinopril   - Continue with labetalol 5mg PRN      Type 1 diabetes mellitus - stable   - Continue to monitor glucose > follow up on repeat sugars and adjust insulin accordingly   - Lantus 12U nightly   - High dose sliding scale   - Recheck sugar this afternoon if WNL and vital signs are stable > clear for discharge      Diet: Carb controlled   DVT ppx: Lovenox  GI ppx: Pepcid      PT/OT/SW: Consulted    Discharge Planning: In process         Denise Beal MD  Internal Medicine Resident, PGY-1  [address], [place]   9:32 AM 12/13/2021

## 2021-12-14 LAB
CULTURE: ABNORMAL
CULTURE: ABNORMAL
Lab: ABNORMAL
SPECIMEN DESCRIPTION: ABNORMAL
SURGICAL PATHOLOGY REPORT: NORMAL

## 2021-12-15 LAB — PATHOLOGIST REVIEW: NORMAL

## 2021-12-16 LAB
CULTURE: NORMAL
CULTURE: NORMAL
Lab: NORMAL
Lab: NORMAL
SPECIMEN DESCRIPTION: NORMAL
SPECIMEN DESCRIPTION: NORMAL

## 2021-12-17 NOTE — DISCHARGE SUMMARY
[address]     Department of Internal Medicine - Staff Internal Medicine Teaching Service    INPATIENT DISCHARGE SUMMARY      Patient Identification:  Melina Garcia is a 63 y.o. male. :  1960  MRN: [id number]     Acct: [de-identified]   PCP: Jun De Leon  Admit Date:  2021  Discharge date and time: 2021  6:51 PM   Attending Provider: No att. providers found                                     [address] Problem Lists:  Principal Problem:    DKA (diabetic ketoacidosis) ([address]  Active Problems:    Type 1 diabetes mellitus with ketoacidosis without coma ([address]    Moderate malnutrition (HCC)    Acute respiratory failure with hypoxia (HCC)    Metabolic encephalopathy    Pneumonia of left lower lobe due to infectious organism    Hypernatremia  Resolved Problems:    * No resolved hospital problems. *      HOSPITAL STAY     Brief Inpatient course:   Melina Garcia is a 63 y.o. male who was admitted for the management of DKA (diabetic ketoacidosis) ([address] presented to the emergency department with AMS found to be in DKA. The patient is a 63 y.o. male who was initially admitted on 2021 with DKA, type 1, not at goal [facility]) [E10.10]  Type 1 diabetes mellitus with ketoacidosis without coma ([address] [E10.10] and presented with altered mental status. Blood sugar were in the 1000's. Pt was found to be in DKA and intubated due to encephalopathy. Pt was started on the DKA protocol. Pt was also hypotensive and received IV fluids. IVF did not improve blood pressure and pt was started on levophed. Pt was started on Levaquin and completed 7 doses for aspiration pneumonia. Pt also found to have elevated platelet count. He was evaluated by Heme/Onc and should follow up as outpatient. COVID testing was negative. MRSA testing was negative.        Procedures/ Significant Interventions:    Critical care intervention   Intubation for airway protection   Pressor support Consults:     Consults:     Final Specialist Recommendations/Findings:   IP CONSULT TO CRITICAL CARE  IP CONSULT TO INFECTIOUS DISEASES  IP CONSULT TO HEM/ONC  IP CONSULT TO HOME CARE NEEDS      Any Hospital Acquired Infections: none    Discharge Functional Status:  stable    DISCHARGE PLAN     Disposition: home    Patient Instructions:   Discharge Medication List as of 12/13/2021  6:19 PM      CONTINUE these medications which have CHANGED    Details   lisinopril (PRINIVIL;ZESTRIL) 20 MG tablet Take 1 tablet by mouth daily, Disp-30 tablet, R-3Normal      NOVOLOG FLEXPEN 100 UNIT/ML injection pen Inject 2 Units into the skin 3 times daily (before meals), Disp-5 pen, R-3, DAWNormal         CONTINUE these medications which have NOT CHANGED    Details   LANTUS 100 UNIT/ML injection vial 12 Units 2 times daily , DAWHistorical Med      FLUoxetine (PROZAC) 20 MG capsule Take 40 mg by mouth 2 times daily Historical Med      sildenafil (VIAGRA) 100 MG tablet Historical Med         STOP taking these medications       aspirin 81 MG chewable tablet Comments:   Reason for Stopping:               Activity: activity as tolerated    Diet: diabetic diet and renal diet    Follow-up:  [address]  [facility]    In 1 week  post hospital discharge    [place] [address]  [phone]    In 3 weeks  Abnormal blood cell counts      Patient Instructions: Take medications as prescribed   Follow up with PCP after discharge for hospital follow up. Follow up with your endocrinologist for better control of blood sugars. Repeat chest x-ray in 4 weeks to follow up on pulmonary infiltrates. Please follow up with PCP with results. Follow up with Dr. Eugenia Ormond at [facility] outpatient clinic in 3 weeks for elevated platelet counts.     Follow up labs: None   Follow up imaging: Chest x-ray in 4 weeks     Note that over 30 minutes was spent in preparing discharge papers, discussing discharge with patient, medication review, etc.    Thank you for allowing me to participate in your care. Yolie Garcia MD  Internal Medicine Resident, PGY-1  [facility];  [place], [place]  12/16/2021, 7:20 PM

## 2022-07-01 ENCOUNTER — HOSPITAL ENCOUNTER (EMERGENCY)
Age: 62
Discharge: HOME | End: 2022-07-01
Attending: EMERGENCY MEDICINE
Payer: MEDICAID

## 2022-07-01 VITALS
SYSTOLIC BLOOD PRESSURE: 98 MMHG | DIASTOLIC BLOOD PRESSURE: 69 MMHG | HEIGHT: 68 IN | TEMPERATURE: 98.4 F | RESPIRATION RATE: 16 BRPM | OXYGEN SATURATION: 97 % | BODY MASS INDEX: 19.85 KG/M2 | WEIGHT: 131 LBS | HEART RATE: 79 BPM

## 2022-07-01 DIAGNOSIS — S61.411A LACERATION OF RIGHT HAND WITHOUT FOREIGN BODY, INITIAL ENCOUNTER: Primary | ICD-10-CM

## 2022-07-01 PROCEDURE — 99284 EMERGENCY DEPT VISIT MOD MDM: CPT

## 2022-07-01 PROCEDURE — 90471 IMMUNIZATION ADMIN: CPT | Performed by: NURSE PRACTITIONER

## 2022-07-01 PROCEDURE — 2500000003 HC RX 250 WO HCPCS: Performed by: NURSE PRACTITIONER

## 2022-07-01 PROCEDURE — 90715 TDAP VACCINE 7 YRS/> IM: CPT | Performed by: NURSE PRACTITIONER

## 2022-07-01 PROCEDURE — 6360000002 HC RX W HCPCS: Performed by: NURSE PRACTITIONER

## 2022-07-01 PROCEDURE — 12041 INTMD RPR N-HF/GENIT 2.5CM/<: CPT

## 2022-07-01 RX ORDER — LIDOCAINE HYDROCHLORIDE 10 MG/ML
5 INJECTION, SOLUTION INFILTRATION; PERINEURAL ONCE
Status: COMPLETED | OUTPATIENT
Start: 2022-07-01 | End: 2022-07-01

## 2022-07-01 RX ADMIN — LIDOCAINE HYDROCHLORIDE 5 ML: 10 INJECTION, SOLUTION EPIDURAL; INFILTRATION; INTRACAUDAL; PERINEURAL at 10:52

## 2022-07-01 RX ADMIN — TETANUS TOXOID, REDUCED DIPHTHERIA TOXOID AND ACELLULAR PERTUSSIS VACCINE, ADSORBED 0.5 ML: 5; 2.5; 8; 8; 2.5 SUSPENSION INTRAMUSCULAR at 10:07

## 2022-07-01 ASSESSMENT — ENCOUNTER SYMPTOMS: COLOR CHANGE: 0

## 2022-07-01 ASSESSMENT — PAIN - FUNCTIONAL ASSESSMENT: PAIN_FUNCTIONAL_ASSESSMENT: 0-10

## 2022-07-01 NOTE — ED PROVIDER NOTES
eMERGENCY dEPARTMENT eNCOUnter   [address] [place] Name: Honorio Erwin  MRN: [id number]  [place] 1960  Date of evaluation: 7/1/22     Honorio Erwin is a 63 y.o. male with CC: Laceration (right hand, was cutting ryann with pocket knife and hit top of  hand )        This visit was performed by both a physician and an APC. I performed all aspects of the MDM as documented. The care is provided during an unprecedented national emergency due to the novel coronavirus, COVID 19.     Alicia Lr MD  Attending Emergency Physician            Alicia Lr MD  [id number]/[phone]

## 2022-07-01 NOTE — ED PROVIDER NOTES
[facility] ED  eMERGENCY dEPARTMENT eNCOUnter      Pt Name: Latrell Valentino  MRN: [id number]  [place] 1960  Date of evaluation: 7/1/2022  Provider: PAVAN Cheung [id number]       Chief Complaint   Patient presents with    Laceration     right hand, was cutting ryann with pocket knife and hit top of  hand          HISTORY OF PRESENT ILLNESS  (Location/Symptom, Timing/Onset, Context/Setting, Quality, Duration, Modifying Factors, Severity.)   Latrell Valentino is a 63 y.o. male who presents to the emergency department via private auto for a laceration to his right dorsal hand. He accidentally cut himself today with a pocket knife. Denies N/T. He has full ROM to his right hand and digits. Distal sensation intact. Rates his pain 0/10 at this time. Tetanus status is unknown. Nursing Notes were reviewed. ALLERGIES     Patient has no known allergies. CURRENT MEDICATIONS       Discharge Medication List as of 7/1/2022 10:43 AM      CONTINUE these medications which have NOT CHANGED    Details   lisinopril (PRINIVIL;ZESTRIL) 20 MG tablet Take 1 tablet by mouth daily, Disp-30 tablet, R-3Normal      NOVOLOG FLEXPEN 100 UNIT/ML injection pen Inject 2 Units into the skin 3 times daily (before meals), Disp-5 pen, R-3, DAWNormal      LANTUS 100 UNIT/ML injection vial 12 Units 2 times daily , DAWHistorical Med      FLUoxetine (PROZAC) 20 MG capsule Take 40 mg by mouth 2 times daily Historical Med      sildenafil (VIAGRA) 100 MG tablet Historical Med             PAST MEDICAL HISTORY         Diagnosis Date    Diabetes mellitus ([facility] [address]     Hypertension        SURGICAL HISTORY     No past surgical history on file. FAMILY HISTORY     No family history on file. No family status information on file. SOCIAL HISTORY      reports that he has never smoked. He has never used smokeless tobacco. He reports previous alcohol use. He reports current drug use.  Drug: Marijuana Onur Andrés. REVIEW OF SYSTEMS    (2-9 systems for level 4, 10 or more for level 5)     Review of Systems   Constitutional: Negative for chills, diaphoresis, fatigue and fever. Musculoskeletal: Negative for arthralgias and myalgias. Skin: Positive for wound. Negative for color change and rash. Neurological: Negative for weakness and numbness. Except as noted above the remainder of the review of systems was reviewed and negative. PHYSICAL EXAM    (up to 7 for level 4, 8 or more for level 5)     ED Triage Vitals   BP Temp Temp src Heart Rate Resp SpO2 Height Weight   07/01/22 0923 07/01/22 0924 -- 07/01/22 0923 07/01/22 0923 07/01/22 0923 07/01/22 0923 07/01/22 0923   98/69 98.4 °F (36.9 °C)  79 16 97 % 5' 8\" (1.727 m) 131 lb (59.4 kg)     Physical Exam  Vitals reviewed. Constitutional:       General: He is not in acute distress. Appearance: He is well-developed. He is not diaphoretic. Eyes:      Conjunctiva/sclera: Conjunctivae normal.   Cardiovascular:      Rate and Rhythm: Normal rate. Pulses: Normal pulses. Pulmonary:      Effort: Pulmonary effort is normal. No respiratory distress. Breath sounds: No stridor. Musculoskeletal:      Right wrist: No swelling, deformity, tenderness or bony tenderness. Normal range of motion. Normal pulse. Right hand: Laceration (2 cm dorsal proximal hand. No active bleeding.) present. No swelling or deformity. Normal range of motion. Normal strength. Normal sensation. There is no disruption of two-point discrimination. Normal capillary refill. Normal pulse. Skin:     General: Skin is warm and dry. Capillary Refill: Capillary refill takes less than 2 seconds. Findings: No rash. Neurological:      Mental Status: He is alert and oriented to person, place, and time.    Psychiatric:         Behavior: Behavior normal.           EMERGENCY DEPARTMENT COURSE and DIFFERENTIAL DIAGNOSIS/MDM:   Vitals:    Vitals:    07/01/22 2808 07/01/22 7635 BP: 98/69    Pulse: 79    Resp: 16    Temp:  98.4 °F (36.9 °C)   SpO2: 97%    Weight: 131 lb (59.4 kg)    Height: 5' 8\" (1.727 m)          MEDICATIONS GIVEN IN THE ED:  Medications   lidocaine 1 % injection 5 mL (5 mLs IntraDERmal Given 7/1/22 1052)   tetanus-diphth-acell pertussis (BOOSTRIX) injection 0.5 mL (0.5 mLs IntraMUSCular Given 7/1/22 1007)       CLINICAL DECISION MAKING:  The patient presented alert with a nontoxic appearance. His wound was irrigated and sutures placed. A dressing was applied. He was given extra dressing supplies for home. Follow up with pcp for a recheck. Evaluation and treatment course in the ED, and plan of care upon discharge was discussed in length with the patient. Patient had no further questions prior to being discharged and was instructed to return to the ED for new or worsening symptoms. Care was provided during an unprecedented national emergency due to the novel coronavirus, Covid-19. PROCEDURES:    Lac Repair    Date/Time: 7/1/2022 10:28 AM  Performed by: Tilmon Severance, APRN - CNP  Authorized by: Luigi Mcpherson MD     Consent:     Consent obtained:  Verbal    Consent given by:  Patient    Risks discussed:  Infection, pain, retained foreign body, vascular damage, tendon damage, poor wound healing, poor cosmetic result, nerve damage and need for additional repair  Anesthesia (see MAR for exact dosages):      Anesthesia method:  Local infiltration    Local anesthetic:  Lidocaine 1% w/o epi  Laceration details:     Location:  Hand    Hand location:  R hand, dorsum    Length (cm):  2  Repair type:     Repair type:  Simple  Pre-procedure details:     Preparation:  Patient was prepped and draped in usual sterile fashion  Exploration:     Wound exploration: wound explored through full range of motion and entire depth of wound probed and visualized      Contaminated: no    Treatment:     Area cleansed with:  Betadine and saline    Amount of cleaning:  Extensive Irrigation solution:  Sterile saline    Irrigation method:  Syringe    Visualized foreign bodies/material removed: no    Skin repair:     Repair method:  Sutures    Suture size:  5-0    Suture material:  Prolene    Suture technique:  Simple interrupted    Number of sutures:  6  Post-procedure details:     Patient tolerance of procedure: Tolerated well, no immediate complications        FINAL IMPRESSION      1.  Laceration of right hand without foreign body, initial encounter            Problem List  Patient Active Problem List   Diagnosis Code    Type 1 diabetes mellitus with ketoacidosis without coma ([address] E10.10    Moderate malnutrition ([address] E44.0    Acute respiratory failure with hypoxia ([address] X37.82    Metabolic encephalopathy Y79.71    Pneumonia of left lower lobe due to infectious organism J18.9    Hypernatremia E87.0    DKA (diabetic ketoacidosis) ([address] E11.10         DISPOSITION/PLAN   DISPOSITION Decision To Discharge 07/01/2022 10:25:17 AM      PATIENT REFERRED TO:   Simeon Mohamud  [facility] [address]  [facility]    Schedule an appointment as soon as possible for a visit   For suture removal in 7-10 days    [facility] ED  [address] [facility]  [phone]    As needed, If symptoms worsen      DISCHARGE MEDICATIONS:     Discharge Medication List as of 7/1/2022 10:43 AM              (Please note that portions of this note were completed with a voice recognition program.  Efforts were made to edit the dictations but occasionally words are mis-transcribed.)    Stephania Schirmer, APRN - CNP Stephania Schirmer, APRN - CNP  07/01/22 1057

## 2022-07-01 NOTE — ED NOTES
Pt to er with c/o laceration to right dorsal hand. Pt states he was trimming ryann with his pocket knife and cut himself. Pressure dressing applied. Pt a&ox3. Skin warm and dry. Respirations even and non-labored.       Denise Miles RN  07/01/22 7017

## 2022-10-28 ENCOUNTER — HOSPITAL ENCOUNTER (OUTPATIENT)
Age: 62
Discharge: HOME | End: 2022-10-28
Payer: MEDICAID

## 2022-10-28 LAB
ABSOLUTE EOS #: 0.32 K/UL (ref 0–0.44)
ABSOLUTE IMMATURE GRANULOCYTE: 0.05 K/UL (ref 0–0.3)
ABSOLUTE LYMPH #: 3.55 K/UL (ref 1.1–3.7)
ABSOLUTE MONO #: 0.99 K/UL (ref 0.1–1.2)
ALBUMIN SERPL-MCNC: 3.5 G/DL (ref 3.5–5.2)
ALBUMIN/GLOBULIN RATIO: 1.2 (ref 1–2.5)
ALP BLD-CCNC: 80 U/L (ref 40–129)
ALT SERPL-CCNC: 8 U/L (ref 5–41)
AMMONIA: 60 UMOL/L (ref 16–60)
AST SERPL-CCNC: 11 U/L
BASOPHILS # BLD: 0 % (ref 0–2)
BASOPHILS ABSOLUTE: 0.03 K/UL (ref 0–0.2)
BILIRUB SERPL-MCNC: 0.3 MG/DL (ref 0.3–1.2)
BILIRUBIN DIRECT: 0.1 MG/DL
BILIRUBIN, INDIRECT: 0.2 MG/DL (ref 0–1)
CHOLESTEROL, FASTING: 195 MG/DL
CHOLESTEROL/HDL RATIO: 3.9
EOSINOPHILS RELATIVE PERCENT: 3 % (ref 1–4)
ESTIMATED AVERAGE GLUCOSE: 298 MG/DL
GLUCOSE BLD-MCNC: 131 MG/DL (ref 70–99)
HBA1C MFR BLD: 12 % (ref 4–6)
HCT VFR BLD CALC: 38.6 % (ref 40.7–50.3)
HDLC SERPL-MCNC: 50 MG/DL
HEMOGLOBIN: 12.3 G/DL (ref 13–17)
IMMATURE GRANULOCYTES: 1 %
LDL CHOLESTEROL: 129 MG/DL (ref 0–130)
LYMPHOCYTES # BLD: 37 % (ref 24–43)
MCH RBC QN AUTO: 28.9 PG (ref 25.2–33.5)
MCHC RBC AUTO-ENTMCNC: 31.9 G/DL (ref 28.4–34.8)
MCV RBC AUTO: 90.6 FL (ref 82.6–102.9)
MONOCYTES # BLD: 10 % (ref 3–12)
NRBC AUTOMATED: 0 PER 100 WBC
PDW BLD-RTO: 12.7 % (ref 11.8–14.4)
PLATELET # BLD: 302 K/UL (ref 138–453)
PMV BLD AUTO: 10.4 FL (ref 8.1–13.5)
RBC # BLD: 4.26 M/UL (ref 4.21–5.77)
SEG NEUTROPHILS: 49 % (ref 36–65)
SEGMENTED NEUTROPHILS ABSOLUTE COUNT: 4.61 K/UL (ref 1.5–8.1)
THYROXINE, FREE: 0.94 NG/DL (ref 0.93–1.7)
TOTAL PROTEIN: 6.5 G/DL (ref 6.4–8.3)
TRIGLYCERIDE, FASTING: 80 MG/DL
TSH SERPL DL<=0.05 MIU/L-ACNC: 2.44 UIU/ML (ref 0.3–5)
VALPROIC ACID LEVEL: 63 UG/ML (ref 50–125)
VALPROIC DATE LAST DOSE: NORMAL
VALPROIC TIME LAST DOSE: 700
WBC # BLD: 9.6 K/UL (ref 3.5–11.3)

## 2022-10-28 PROCEDURE — 80076 HEPATIC FUNCTION PANEL: CPT

## 2022-10-28 PROCEDURE — 80164 ASSAY DIPROPYLACETIC ACD TOT: CPT

## 2022-10-28 PROCEDURE — 84443 ASSAY THYROID STIM HORMONE: CPT

## 2022-10-28 PROCEDURE — 80061 LIPID PANEL: CPT

## 2022-10-28 PROCEDURE — 82140 ASSAY OF AMMONIA: CPT

## 2022-10-28 PROCEDURE — 84481 FREE ASSAY (FT-3): CPT

## 2022-10-28 PROCEDURE — 85025 COMPLETE CBC W/AUTO DIFF WBC: CPT

## 2022-10-28 PROCEDURE — 84439 ASSAY OF FREE THYROXINE: CPT

## 2022-10-28 PROCEDURE — 36415 COLL VENOUS BLD VENIPUNCTURE: CPT

## 2022-10-28 PROCEDURE — 82947 ASSAY GLUCOSE BLOOD QUANT: CPT

## 2022-10-28 PROCEDURE — 83036 HEMOGLOBIN GLYCOSYLATED A1C: CPT

## 2022-10-29 LAB — T3 FREE: 2.92 PG/ML (ref 2.02–4.43)

## 2022-12-07 ENCOUNTER — HOSPITAL ENCOUNTER (INPATIENT)
Age: 62
LOS: 2 days | Discharge: HOME | End: 2022-12-09
Attending: EMERGENCY MEDICINE | Admitting: INTERNAL MEDICINE
Payer: MEDICAID

## 2022-12-07 ENCOUNTER — APPOINTMENT (OUTPATIENT)
Dept: GENERAL RADIOLOGY | Age: 62
End: 2022-12-07
Payer: MEDICAID

## 2022-12-07 ENCOUNTER — APPOINTMENT (OUTPATIENT)
Dept: CT IMAGING | Age: 62
End: 2022-12-07
Payer: MEDICAID

## 2022-12-07 DIAGNOSIS — E10.10 DIABETIC KETOACIDOSIS WITHOUT COMA ASSOCIATED WITH TYPE 1 DIABETES MELLITUS (HCC): Primary | ICD-10-CM
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
## 2022-12-07 DIAGNOSIS — G25.3 MYOCLONIC JERKING WHILE SLEEPING: ICD-10-CM

## 2022-12-07 PROBLEM — E11.10 DKA, TYPE 2, NOT AT GOAL (HCC): Status: RESOLVED | Noted: 2022-12-07 | Resolved: 2022-12-07

## 2022-12-07 PROBLEM — F31.62 BIPOLAR 1 DISORDER, MIXED, MODERATE (HCC): Status: ACTIVE | Noted: 2022-11-16

## 2022-12-07 PROBLEM — F12.90 CANNABIS USE DISORDER: Status: ACTIVE | Noted: 2022-11-06

## 2022-12-07 PROBLEM — S49.92XA INJURY OF LEFT SHOULDER: Status: ACTIVE | Noted: 2022-12-07

## 2022-12-07 PROBLEM — E11.10 DKA, TYPE 2, NOT AT GOAL (HCC): Status: ACTIVE | Noted: 2022-12-07

## 2022-12-07 LAB
ABSOLUTE EOS #: 0.03 K/UL (ref 0–0.44)
ABSOLUTE IMMATURE GRANULOCYTE: 0.03 K/UL (ref 0–0.3)
ABSOLUTE LYMPH #: 2.03 K/UL (ref 1.1–3.7)
ABSOLUTE MONO #: 0.51 K/UL (ref 0.1–1.2)
ANION GAP SERPL CALCULATED.3IONS-SCNC: 15 MMOL/L (ref 9–17)
ANION GAP SERPL CALCULATED.3IONS-SCNC: 18 MMOL/L (ref 9–17)
ANION GAP SERPL CALCULATED.3IONS-SCNC: 21 MMOL/L (ref 9–17)
ANION GAP SERPL CALCULATED.3IONS-SCNC: 23 MMOL/L (ref 9–17)
ANION GAP: 13 MMOL/L (ref 7–16)
BASOPHILS # BLD: 0 % (ref 0–2)
BASOPHILS ABSOLUTE: <0.03 K/UL (ref 0–0.2)
BETA-HYDROXYBUTYRATE: 4.03 MMOL/L (ref 0.02–0.27)
BILIRUBIN URINE: NEGATIVE
BUN BLDV-MCNC: 26 MG/DL (ref 8–23)
BUN BLDV-MCNC: 26 MG/DL (ref 8–23)
BUN BLDV-MCNC: 28 MG/DL (ref 8–23)
BUN BLDV-MCNC: 33 MG/DL (ref 8–23)
CALCIUM SERPL-MCNC: 7.4 MG/DL (ref 8.6–10.4)
CALCIUM SERPL-MCNC: 8.1 MG/DL (ref 8.6–10.4)
CALCIUM SERPL-MCNC: 8.5 MG/DL (ref 8.6–10.4)
CALCIUM SERPL-MCNC: 8.8 MG/DL (ref 8.6–10.4)
CHLORIDE BLD-SCNC: 101 MMOL/L (ref 98–107)
CHLORIDE BLD-SCNC: 106 MMOL/L (ref 98–107)
CHLORIDE BLD-SCNC: 94 MMOL/L (ref 98–107)
CHLORIDE BLD-SCNC: 95 MMOL/L (ref 98–107)
CHP ED QC CHECK: YES
CO2: 13 MMOL/L (ref 20–31)
CO2: 14 MMOL/L (ref 20–31)
CO2: 14 MMOL/L (ref 20–31)
CO2: 17 MMOL/L (ref 20–31)
COLOR: YELLOW
CREAT SERPL-MCNC: 0.76 MG/DL (ref 0.7–1.2)
CREAT SERPL-MCNC: 0.92 MG/DL (ref 0.7–1.2)
CREAT SERPL-MCNC: 1.03 MG/DL (ref 0.7–1.2)
CREAT SERPL-MCNC: 1.14 MG/DL (ref 0.7–1.2)
EGFR, POC: >60 ML/MIN/1.73M2
EOSINOPHILS RELATIVE PERCENT: 0 % (ref 1–4)
EPITHELIAL CELLS UA: ABNORMAL /HPF (ref 0–5)
ESTIMATED AVERAGE GLUCOSE: 295 MG/DL
GFR SERPL CREATININE-BSD FRML MDRD: >60 ML/MIN/1.73M2
GLUCOSE BLD-MCNC: 157 MG/DL (ref 75–110)
GLUCOSE BLD-MCNC: 170 MG/DL (ref 75–110)
GLUCOSE BLD-MCNC: 205 MG/DL
GLUCOSE BLD-MCNC: 205 MG/DL (ref 75–110)
GLUCOSE BLD-MCNC: 261 MG/DL (ref 70–99)
GLUCOSE BLD-MCNC: 294 MG/DL (ref 70–99)
GLUCOSE BLD-MCNC: 295 MG/DL (ref 75–110)
GLUCOSE BLD-MCNC: 327 MG/DL
GLUCOSE BLD-MCNC: 327 MG/DL (ref 75–110)
GLUCOSE BLD-MCNC: 347 MG/DL
GLUCOSE BLD-MCNC: 347 MG/DL (ref 75–110)
GLUCOSE BLD-MCNC: 417 MG/DL
GLUCOSE BLD-MCNC: 417 MG/DL (ref 75–110)
GLUCOSE BLD-MCNC: 461 MG/DL (ref 75–110)
GLUCOSE BLD-MCNC: 475 MG/DL (ref 74–100)
GLUCOSE BLD-MCNC: 480 MG/DL
GLUCOSE BLD-MCNC: 480 MG/DL (ref 75–110)
GLUCOSE BLD-MCNC: 483 MG/DL (ref 70–99)
GLUCOSE BLD-MCNC: 485 MG/DL (ref 70–99)
GLUCOSE URINE: ABNORMAL
HBA1C MFR BLD: 11.9 % (ref 4–6)
HCO3 VENOUS: 13 MMOL/L (ref 22–29)
HCT VFR BLD CALC: 35.9 % (ref 40.7–50.3)
HEMOGLOBIN: 11.8 G/DL (ref 13–17)
IMMATURE GRANULOCYTES: 0 %
KETONES, URINE: ABNORMAL
LACTIC ACID, WHOLE BLOOD: 1.7 MMOL/L (ref 0.7–2.1)
LEUKOCYTE ESTERASE, URINE: NEGATIVE
LYMPHOCYTES # BLD: 21 % (ref 24–43)
MAGNESIUM: 1.9 MG/DL (ref 1.6–2.6)
MAGNESIUM: 1.9 MG/DL (ref 1.6–2.6)
MCH RBC QN AUTO: 29.4 PG (ref 25.2–33.5)
MCHC RBC AUTO-ENTMCNC: 32.9 G/DL (ref 28.4–34.8)
MCV RBC AUTO: 89.3 FL (ref 82.6–102.9)
MONOCYTES # BLD: 5 % (ref 3–12)
NEGATIVE BASE EXCESS, VEN: 9 (ref 0–2)
NITRITE, URINE: NEGATIVE
NRBC AUTOMATED: 0 PER 100 WBC
O2 SAT, VEN: 92 % (ref 60–85)
PCO2, VEN: 19.5 MM HG (ref 41–51)
PDW BLD-RTO: 13.2 % (ref 11.8–14.4)
PH UA: 5.5 (ref 5–8)
PH VENOUS: 7.43 (ref 7.32–7.43)
PHOSPHORUS: 2.1 MG/DL (ref 2.5–4.5)
PHOSPHORUS: 3.2 MG/DL (ref 2.5–4.5)
PLATELET # BLD: ABNORMAL K/UL (ref 138–453)
PLATELET, FLUORESCENCE: NORMAL K/UL (ref 138–453)
PO2, VEN: 60.5 MM HG (ref 30–50)
POC BUN: 38 MG/DL (ref 8–26)
POC CHLORIDE: 103 MMOL/L (ref 98–107)
POC CREATININE: 1.13 MG/DL (ref 0.51–1.19)
POC HEMATOCRIT: 38 % (ref 41–53)
POC HEMOGLOBIN: 12.8 G/DL (ref 13.5–17.5)
POC IONIZED CALCIUM: 0.89 MMOL/L (ref 1.15–1.33)
POC LACTIC ACID: 2.92 MMOL/L (ref 0.56–1.39)
POC POTASSIUM: 5.5 MMOL/L (ref 3.5–4.5)
POC SODIUM: 127 MMOL/L (ref 138–146)
POC TCO2: 12 MMOL/L (ref 22–30)
POTASSIUM SERPL-SCNC: 3.7 MMOL/L (ref 3.7–5.3)
POTASSIUM SERPL-SCNC: 4.1 MMOL/L (ref 3.7–5.3)
POTASSIUM SERPL-SCNC: 4.5 MMOL/L (ref 3.7–5.3)
POTASSIUM SERPL-SCNC: 5 MMOL/L (ref 3.7–5.3)
PRO-BNP: 78 PG/ML
PROCALCITONIN: 0.44 NG/ML
PROLACTIN: 35.39 NG/ML (ref 4.04–15.2)
PROTEIN UA: NEGATIVE
RBC # BLD: 4.02 M/UL (ref 4.21–5.77)
RBC UA: ABNORMAL /HPF (ref 0–4)
REASON FOR REJECTION: NORMAL
SEG NEUTROPHILS: 73 % (ref 36–65)
SEGMENTED NEUTROPHILS ABSOLUTE COUNT: 6.94 K/UL (ref 1.5–8.1)
SODIUM BLD-SCNC: 130 MMOL/L (ref 135–144)
SODIUM BLD-SCNC: 131 MMOL/L (ref 135–144)
SODIUM BLD-SCNC: 133 MMOL/L (ref 135–144)
SODIUM BLD-SCNC: 137 MMOL/L (ref 135–144)
SPECIFIC GRAVITY UA: 1.03 (ref 1–1.03)
TROPONIN, HIGH SENSITIVITY: 10 NG/L (ref 0–22)
TROPONIN, HIGH SENSITIVITY: 8 NG/L (ref 0–22)
TURBIDITY: CLEAR
URINE HGB: NEGATIVE
UROBILINOGEN, URINE: NORMAL
WBC # BLD: 9.6 K/UL (ref 3.5–11.3)
WBC UA: ABNORMAL /HPF (ref 0–5)
ZZ NTE CLEAN UP: ORDERED TEST: NORMAL
ZZ NTE WITH NAME CLEAN UP: SPECIMEN SOURCE: NORMAL

## 2022-12-07 PROCEDURE — 6370000000 HC RX 637 (ALT 250 FOR IP)

## 2022-12-07 PROCEDURE — 6360000002 HC RX W HCPCS

## 2022-12-07 PROCEDURE — 83735 ASSAY OF MAGNESIUM: CPT

## 2022-12-07 PROCEDURE — 84484 ASSAY OF TROPONIN QUANT: CPT

## 2022-12-07 PROCEDURE — 73030 X-RAY EXAM OF SHOULDER: CPT

## 2022-12-07 PROCEDURE — 99223 1ST HOSP IP/OBS HIGH 75: CPT | Performed by: INTERNAL MEDICINE

## 2022-12-07 PROCEDURE — 84146 ASSAY OF PROLACTIN: CPT

## 2022-12-07 PROCEDURE — 83605 ASSAY OF LACTIC ACID: CPT

## 2022-12-07 PROCEDURE — 81001 URINALYSIS AUTO W/SCOPE: CPT

## 2022-12-07 PROCEDURE — 80048 BASIC METABOLIC PNL TOTAL CA: CPT

## 2022-12-07 PROCEDURE — 2580000003 HC RX 258

## 2022-12-07 PROCEDURE — 84145 PROCALCITONIN (PCT): CPT

## 2022-12-07 PROCEDURE — 82803 BLOOD GASES ANY COMBINATION: CPT

## 2022-12-07 PROCEDURE — 72170 X-RAY EXAM OF PELVIS: CPT

## 2022-12-07 PROCEDURE — 2580000003 HC RX 258: Performed by: STUDENT IN AN ORGANIZED HEALTH CARE EDUCATION/TRAINING PROGRAM

## 2022-12-07 PROCEDURE — 84100 ASSAY OF PHOSPHORUS: CPT

## 2022-12-07 PROCEDURE — 71045 X-RAY EXAM CHEST 1 VIEW: CPT

## 2022-12-07 PROCEDURE — 82947 ASSAY GLUCOSE BLOOD QUANT: CPT

## 2022-12-07 PROCEDURE — 83880 ASSAY OF NATRIURETIC PEPTIDE: CPT

## 2022-12-07 PROCEDURE — 87040 BLOOD CULTURE FOR BACTERIA: CPT

## 2022-12-07 PROCEDURE — 84520 ASSAY OF UREA NITROGEN: CPT

## 2022-12-07 PROCEDURE — 85025 COMPLETE CBC W/AUTO DIFF WBC: CPT

## 2022-12-07 PROCEDURE — 82565 ASSAY OF CREATININE: CPT

## 2022-12-07 PROCEDURE — 70450 CT HEAD/BRAIN W/O DYE: CPT

## 2022-12-07 PROCEDURE — 83036 HEMOGLOBIN GLYCOSYLATED A1C: CPT

## 2022-12-07 PROCEDURE — 99285 EMERGENCY DEPT VISIT HI MDM: CPT

## 2022-12-07 PROCEDURE — 85014 HEMATOCRIT: CPT

## 2022-12-07 PROCEDURE — 2060000000 HC ICU INTERMEDIATE R&B

## 2022-12-07 PROCEDURE — 93005 ELECTROCARDIOGRAM TRACING: CPT | Performed by: EMERGENCY MEDICINE

## 2022-12-07 PROCEDURE — 82010 KETONE BODYS QUAN: CPT

## 2022-12-07 PROCEDURE — 85055 RETICULATED PLATELET ASSAY: CPT

## 2022-12-07 PROCEDURE — 82330 ASSAY OF CALCIUM: CPT

## 2022-12-07 PROCEDURE — G0108 DIAB MANAGE TRN  PER INDIV: HCPCS

## 2022-12-07 PROCEDURE — 72125 CT NECK SPINE W/O DYE: CPT

## 2022-12-07 PROCEDURE — 2500000003 HC RX 250 WO HCPCS: Performed by: STUDENT IN AN ORGANIZED HEALTH CARE EDUCATION/TRAINING PROGRAM

## 2022-12-07 PROCEDURE — 99223 1ST HOSP IP/OBS HIGH 75: CPT | Performed by: PSYCHIATRY & NEUROLOGY

## 2022-12-07 PROCEDURE — 80051 ELECTROLYTE PANEL: CPT

## 2022-12-07 PROCEDURE — 2580000003 HC RX 258: Performed by: EMERGENCY MEDICINE

## 2022-12-07 RX ORDER — SODIUM CHLORIDE 450 MG/100ML
INJECTION, SOLUTION INTRAVENOUS CONTINUOUS
Status: DISCONTINUED | OUTPATIENT
Start: 2022-12-07 | End: 2022-12-07

## 2022-12-07 RX ORDER — DEXTROSE, SODIUM CHLORIDE, AND POTASSIUM CHLORIDE 5; .9; .15 G/100ML; G/100ML; G/100ML
INJECTION INTRAVENOUS CONTINUOUS PRN
Status: DISCONTINUED | OUTPATIENT
Start: 2022-12-07 | End: 2022-12-09 | Stop reason: HOSPADM

## 2022-12-07 RX ORDER — QUETIAPINE FUMARATE 150 MG/1
150 TABLET, FILM COATED ORAL DAILY
Status: ON HOLD | COMMUNITY
Start: 2022-10-06 | End: 2022-12-08

## 2022-12-07 RX ORDER — SODIUM CHLORIDE 450 MG/100ML
INJECTION, SOLUTION INTRAVENOUS
Status: COMPLETED
Start: 2022-12-07 | End: 2022-12-07

## 2022-12-07 RX ORDER — PALIPERIDONE 3 MG/1
3 TABLET, EXTENDED RELEASE ORAL EVERY MORNING
COMMUNITY
Start: 2022-11-22 | End: 2022-12-22

## 2022-12-07 RX ORDER — DULOXETIN HYDROCHLORIDE 60 MG/1
60 CAPSULE, DELAYED RELEASE ORAL DAILY
COMMUNITY
Start: 2022-11-08

## 2022-12-07 RX ORDER — POLYETHYLENE GLYCOL 3350 17 G/17G
17 POWDER, FOR SOLUTION ORAL DAILY PRN
Status: DISCONTINUED | OUTPATIENT
Start: 2022-12-07 | End: 2022-12-09 | Stop reason: HOSPADM

## 2022-12-07 RX ORDER — DEXTROSE, SODIUM CHLORIDE, AND POTASSIUM CHLORIDE 5; .45; .15 G/100ML; G/100ML; G/100ML
INJECTION INTRAVENOUS CONTINUOUS PRN
Status: DISCONTINUED | OUTPATIENT
Start: 2022-12-07 | End: 2022-12-07

## 2022-12-07 RX ORDER — SODIUM CHLORIDE 9 MG/ML
INJECTION, SOLUTION INTRAVENOUS CONTINUOUS
Status: DISCONTINUED | OUTPATIENT
Start: 2022-12-07 | End: 2022-12-09 | Stop reason: HOSPADM

## 2022-12-07 RX ORDER — LISINOPRIL 20 MG/1
20 TABLET ORAL DAILY
Status: DISCONTINUED | OUTPATIENT
Start: 2022-12-08 | End: 2022-12-08

## 2022-12-07 RX ORDER — 0.9 % SODIUM CHLORIDE 0.9 %
1000 INTRAVENOUS SOLUTION INTRAVENOUS ONCE
Status: COMPLETED | OUTPATIENT
Start: 2022-12-07 | End: 2022-12-07

## 2022-12-07 RX ORDER — DEXTROSE MONOHYDRATE 100 MG/ML
INJECTION, SOLUTION INTRAVENOUS CONTINUOUS PRN
Status: DISCONTINUED | OUTPATIENT
Start: 2022-12-07 | End: 2022-12-09 | Stop reason: HOSPADM

## 2022-12-07 RX ORDER — BENZTROPINE MESYLATE 1 MG/1
1 TABLET ORAL 3 TIMES DAILY
COMMUNITY
Start: 2022-11-04 | End: 2023-11-04

## 2022-12-07 RX ORDER — TRAZODONE HYDROCHLORIDE 50 MG/1
50 TABLET ORAL NIGHTLY PRN
COMMUNITY
Start: 2022-11-21 | End: 2022-12-21

## 2022-12-07 RX ORDER — MAGNESIUM SULFATE 1 G/100ML
1000 INJECTION INTRAVENOUS PRN
Status: DISCONTINUED | OUTPATIENT
Start: 2022-12-07 | End: 2022-12-09 | Stop reason: HOSPADM

## 2022-12-07 RX ORDER — ERGOCALCIFEROL (VITAMIN D2) 1250 MCG
1 CAPSULE ORAL WEEKLY
COMMUNITY
Start: 2022-05-20

## 2022-12-07 RX ORDER — PANTOPRAZOLE SODIUM 40 MG/1
40 TABLET, DELAYED RELEASE ORAL
Status: DISCONTINUED | OUTPATIENT
Start: 2022-12-08 | End: 2022-12-09 | Stop reason: HOSPADM

## 2022-12-07 RX ORDER — DEXTROSE, SODIUM CHLORIDE, AND POTASSIUM CHLORIDE 5; .9; .15 G/100ML; G/100ML; G/100ML
INJECTION INTRAVENOUS
Status: DISCONTINUED
Start: 2022-12-07 | End: 2022-12-07 | Stop reason: WASHOUT

## 2022-12-07 RX ORDER — POTASSIUM CHLORIDE 7.45 MG/ML
10 INJECTION INTRAVENOUS PRN
Status: DISCONTINUED | OUTPATIENT
Start: 2022-12-07 | End: 2022-12-09 | Stop reason: HOSPADM

## 2022-12-07 RX ORDER — ENOXAPARIN SODIUM 100 MG/ML
40 INJECTION SUBCUTANEOUS DAILY
Status: DISCONTINUED | OUTPATIENT
Start: 2022-12-07 | End: 2022-12-09 | Stop reason: HOSPADM

## 2022-12-07 RX ORDER — DEXTROSE AND SODIUM CHLORIDE 5; .45 G/100ML; G/100ML
INJECTION, SOLUTION INTRAVENOUS
Status: COMPLETED
Start: 2022-12-07 | End: 2022-12-07

## 2022-12-07 RX ORDER — OMEPRAZOLE 40 MG/1
40 CAPSULE, DELAYED RELEASE ORAL DAILY
COMMUNITY

## 2022-12-07 RX ORDER — DIVALPROEX SODIUM 250 MG/1
750 TABLET, DELAYED RELEASE ORAL 2 TIMES DAILY
Status: ON HOLD | COMMUNITY
Start: 2022-10-06 | End: 2022-12-08

## 2022-12-07 RX ADMIN — SODIUM CHLORIDE 0.05 UNITS/KG/HR: 9 INJECTION, SOLUTION INTRAVENOUS at 21:50

## 2022-12-07 RX ADMIN — SODIUM CHLORIDE 0.1 UNITS/KG/HR: 9 INJECTION, SOLUTION INTRAVENOUS at 16:27

## 2022-12-07 RX ADMIN — POTASSIUM CHLORIDE, DEXTROSE MONOHYDRATE AND SODIUM CHLORIDE: 150; 5; 450 INJECTION, SOLUTION INTRAVENOUS at 22:15

## 2022-12-07 RX ADMIN — SODIUM CHLORIDE: 450 INJECTION, SOLUTION INTRAVENOUS at 15:22

## 2022-12-07 RX ADMIN — SODIUM CHLORIDE 0.11 UNITS/KG/HR: 9 INJECTION, SOLUTION INTRAVENOUS at 19:46

## 2022-12-07 RX ADMIN — ENOXAPARIN SODIUM 40 MG: 100 INJECTION SUBCUTANEOUS at 13:00

## 2022-12-07 RX ADMIN — SODIUM CHLORIDE: 4.5 INJECTION, SOLUTION INTRAVENOUS at 15:22

## 2022-12-07 RX ADMIN — SODIUM CHLORIDE: 9 INJECTION, SOLUTION INTRAVENOUS at 18:20

## 2022-12-07 RX ADMIN — SODIUM CHLORIDE 1000 ML: 9 INJECTION, SOLUTION INTRAVENOUS at 11:11

## 2022-12-07 ASSESSMENT — ENCOUNTER SYMPTOMS
DIARRHEA: 0
CONSTIPATION: 0
ABDOMINAL PAIN: 0
SORE THROAT: 0
EYE DISCHARGE: 0
BACK PAIN: 0
EYE PAIN: 0
TROUBLE SWALLOWING: 0
VOICE CHANGE: 0
NAUSEA: 0
COUGH: 0
EYE REDNESS: 0
VOMITING: 0
COLOR CHANGE: 0
BLOOD IN STOOL: 0
PHOTOPHOBIA: 0
EYES NEGATIVE: 1
SHORTNESS OF BREATH: 0
RHINORRHEA: 0
WHEEZING: 0

## 2022-12-07 ASSESSMENT — PAIN - FUNCTIONAL ASSESSMENT: PAIN_FUNCTIONAL_ASSESSMENT: NONE - DENIES PAIN

## 2022-12-07 NOTE — CONSULTS
[facility]   IN-PATIENT SERVICE      NEUROLOGY  CONSULT  NOTE            Date:   12/7/2022  Patient name:  Xavier Leonard  Date of admission:  12/7/2022  YOB: 1960      Chief Complaint:     Chief Complaint   Patient presents with    Fall    Blood Sugar Problem    Other     Hypotension per EMS     Reason for Consult:      Falls, twitching, concern for seizures    History of Present Illness: The patient is a 63 y.o. male with PMH of type 1 DM and HTN. Patient brought in by EMS after wife called them to evaluate patient. Patient reports that he woke up and went to sit in the living room. He was sitting with his wife and decided to get up to grab coffee in the kitchen. On the way to the kitchen, he was ambulating through the dining room and felt bilateral lower extremity weakness and fell. Patient reports that his legs have felt weak intermittently over the last several months. Denies any bowel or bladder incontinence. Patient mentioned to admitting service that he feels some leg twitching. Upon my questioning, he states that when his legs shake, he knows they are about to get weaker and lose strength. Denies any seizure-like activity. Past Medical History:     Past Medical History:   Diagnosis Date    Diabetes mellitus ([address]     Hypertension         Past Surgical History:     History reviewed. No pertinent surgical history. Medications Prior to Admission:     Prior to Admission medications    Medication Sig Start Date End Date Taking?  Authorizing Provider   lisinopril (PRINIVIL;ZESTRIL) 20 MG tablet Take 1 tablet by mouth daily 12/14/21   Saratha Sever, MD   NOVOLOG FLEXPEN 100 UNIT/ML injection pen Inject 2 Units into the skin 3 times daily (before meals) 12/13/21   Saratha Sever, MD   LANTUS 100 UNIT/ML injection vial 12 Units 2 times daily  8/19/21      sildenafil (VIAGRA) 100 MG tablet  8/19/21   Historical Provider, MD        Allergies:     Patient has no known allergies. Social History:     Tobacco:    reports that he has never smoked. He has never used smokeless tobacco.  Alcohol:      reports that he does not currently use alcohol. Drug Use:  reports current drug use. Drug: Marijuana Luz Maria Coad). Family History:     History reviewed. No pertinent family history. Review of Systems:     As per HPI  Constitutional Negative for fever and chills   HEENT Negative for ear discharge, ear pain, nosebleed   Eyes Negative for photophobia, pain and discharge   Respiratory Negative for hemoptysis and sputum   Cardiovascular Negative for orthopnea, claudication and PND   Gastrointestinal Negative for abdominal pain, diarrhea, blood in stool   Musculoskeletal Negative for joint pain, negative for myalgia   Skin Negative for rash or itching   hematology Negative for ecchymosis, anemia   Psychiatric Negative for suicidal ideation, anxiety, depression, hallucinations       Physical Exam:   /75   Pulse (!) 110   Temp 97.9 °F (36.6 °C) (Oral)   Resp 12   Ht 5' 8\" (1.727 m)   Wt 143 lb (64.9 kg)   BMI 21.74 kg/m²   Temp (24hrs), Av.9 °F (36.6 °C), Min:97.9 °F (36.6 °C), Max:97.9 °F (36.6 °C)        General examination:      General Appearance:  alert, well appearing, and in no acute distress  HEENT: Normocephalic, atraumatic, moist mucus membranes  Neck: supple, no carotid bruits, (-) nuchal rigidity  Lungs:  Respirations unlabored, chest wall no deformity, BS normal  Cardiovascular: normal rate, regular rhythm  Abdomen: Soft, nontender, nondistended, normal bowel sounds  Skin: No gross lesions, rashes, bruising or bleeding on exposed skin area  Extremities:  peripheral pulses palpable, no cyanosis, clubbing or edema  Psych: normal affect      Neurological examination:      Mental status   Alert and oriented x 3; following all commands;   speech is fluent, no dysarthria, aphasia.       Cranial nerves   II - visual fields intact to confrontation; pupils reactive  III, IV, VI - extraocular muscles intact; no ARCELIA; no nystagmus; no ptosis   V - normal facial sensation                                                               VII - normal facial symmetry                                                             VIII - intact hearing                                                                             IX, X - symmetrical palate elevation                                               XI - symmetrical shoulder shrug                                                       XII - midline tongue without atrophy or fasciculation     Motor function  Strength:   5/5 RUE, 5/5 RLE  5/5 LUE, 5/5  LLE  Normal bulk and tone. Sensory function Intact to touch, pin, vibration, proprioception throughout     Cerebellar Intact finger-nose-finger testing. Intact heel-shin testing. No dysdiadochokinesia present. No tremors                        Reflex function 2/4 symmetric throughout . Downgoing plantar response bilaterally. (-)Reeves's sign bilaterally      Gait                  Normal station and gait           Diagnostics:      Laboratory Testing:  CBC:   Recent Labs     12/07/22  0950   WBC 9.6   HGB 11.8*   PLT See Reflexed IPF Result     BMP:    Recent Labs     12/07/22  0918 12/07/22  0922 12/07/22  1113   NA  --  130*  --    K  --  4.5  --    CL  --  95*  --    CO2  --  14*  --    BUN  --  33*  --    CREATININE 1.13 1.14  --    GLUCOSE  --  485* 327         Lab Results   Component Value Date    LDLCHOLESTEROL 129 10/28/2022    HDL 50 10/28/2022    ALT 8 10/28/2022    AST 11 10/28/2022    TSH 2.44 10/28/2022    LABA1C 12.0 (H) 10/28/2022    [id number] 634 12/13/2021       Lab Results   Component Value Date    VALPROATE 63 10/28/2022         Imaging/Diagnostics:  XR PELVIS (1-2 VIEWS)    Result Date: 12/7/2022  EXAMINATION: ONE XRAY VIEW OF THE PELVIS 12/7/2022 10:03 am COMPARISON: None.  HISTORY: ORDERING SYSTEM PROVIDED HISTORY: fall TECHNOLOGIST PROVIDED HISTORY: fall FINDINGS: No fracture or dislocation. Hip joint spaces are preserved. No acute fracture or dislocation. CT HEAD WO CONTRAST    Result Date: 12/7/2022  EXAMINATION: CT OF THE HEAD WITHOUT CONTRAST  12/7/2022 9:49 am TECHNIQUE: CT of the head was performed without the administration of intravenous contrast. Automated exposure control, iterative reconstruction, and/or weight based adjustment of the mA/kV was utilized to reduce the radiation dose to as low as reasonably achievable. COMPARISON: 03/02/2009 HISTORY: ORDERING SYSTEM PROVIDED HISTORY: fall, syncope TECHNOLOGIST PROVIDED HISTORY: fall, syncope Decision Support Exception - unselect if not a suspected or confirmed emergency medical condition->Emergency Medical Condition (MA) Reason for Exam: fall FINDINGS: BRAIN/VENTRICLES: There is no acute intracranial hemorrhage, mass effect or midline shift. No abnormal extra-axial fluid collection. The gray-white differentiation is maintained without evidence of an acute infarct. There is no evidence of hydrocephalus. ORBITS: The visualized portion of the orbits demonstrate no acute abnormality. SINUSES: There is some mucous noted in the the left maxillary sinus. SOFT TISSUES/SKULL:  No acute abnormality of the visualized skull or soft tissues. No acute intracranial abnormality. CT CERVICAL SPINE WO CONTRAST    Result Date: 12/7/2022  EXAMINATION: CT OF THE CERVICAL SPINE WITHOUT CONTRAST 12/7/2022 9:49 am TECHNIQUE: CT of the cervical spine was performed without the administration of intravenous contrast. Multiplanar reformatted images are provided for review. Automated exposure control, iterative reconstruction, and/or weight based adjustment of the mA/kV was utilized to reduce the radiation dose to as low as reasonably achievable. COMPARISON: None.  HISTORY: ORDERING SYSTEM PROVIDED HISTORY: fall TECHNOLOGIST PROVIDED HISTORY: fall Decision Support Exception - unselect if not a suspected or confirmed emergency medical condition->Emergency Medical Condition (MA) Reason for Exam: fall FINDINGS: BONES/ALIGNMENT: There is no acute fracture or traumatic malalignment. DEGENERATIVE CHANGES: There is degenerative disc disease of the C4-C5 disc with some endplate irregularities and some osteophytes. SOFT TISSUES: There is no prevertebral soft tissue swelling. No acute abnormality of the cervical spine. XR CHEST PORTABLE    Result Date: 12/7/2022  EXAMINATION: ONE XRAY VIEW OF THE CHEST 12/7/2022 10:03 am COMPARISON: None. HISTORY: ORDERING SYSTEM PROVIDED HISTORY: fall TECHNOLOGIST PROVIDED HISTORY: fall FINDINGS: Normal cardiomediastinal silhouette. No acute airspace infiltrate. No pneumothorax or pleural effusion. No acute fractures     No acute cardiopulmonary findings           Impression/Plan:      The patient is a 63 y.o. male with PMH of type 1 DM and HTN. Patient was brought in by EMS after wife called them to evaluate him after a fall. Bilateral lower extremity weakness    Will obtain MRI Brain w/o contrast.     PT/OT eval    Further recommendations may follow after discussing with attending physician. Thank you for allowing us to participate in the care of this patient.       Electronically signed by   Archana Ross MD  Neurology PGY-4  12/7/2022  11:59 AM

## 2022-12-07 NOTE — PROGRESS NOTES
Inpatient Diabetes Education    Chioma Whitehead was seen for evaluation and education on     Lab Results   Component Value Date    LABA1C 12.0 (H) 10/28/2022    LABA1C 11.6 (H) 12/05/2021     Chioma Whitehead was seen for evaluation and education on Type 1 Diabetes. Norma Patel states that he was diagnosed with Type 1 diabetes at the age of 27. He states that he and his wife both have had diabetes education in the past. I did review Survival Skill as listed below with Norma Patel. Patient confirms that he is on a basal/bolus home insulin regimen. He reports that his current lantus dose is 10 units bid (I saw 12 unit bid on a previous med list and asked patient about it but patient states 10 is current) and that he takes Novolog according to a scale. He does not carb count, rather focuses on portion size strategy for eating. He reports that he does eat 3 meals daily and has snacks. He states that he avoids sugar beverages and likes water. Norma Patel states that he has a glucometer and supplies at home although he is currently using a Dexcom CGM. He states that he has a sensor currently in place on his abdomen and that he uses a reader to see his BG values. He states that his high alarm set at 300 and his low alarm set at 100. I let the patient know that a typical high alarm is set at 240. Norma Patel states that he has considered changing his alarm settings. I let the patient know that his current A1c is pending but that his last A1c noted from Oct '22 was 12.0%. Discussed with patient about increasing his time in range (TIR) to improve A1c. Prior to talking with the patient, I called [facility]. They report that they have record of an upcoming appt for the patient on Feb 9, 2023. I asked the patient about this and he confirmed it. He also reports that he is current with his PCP, also at [facility].        Verbally reviewed following information with patient  Survival skills Diagnosis, A1C, Blood glucose targets, hypo and hyperglycemia, importance of home blood glucose monitoring, heathy eating  plate method for CHO control portions, be active as recommended by health care providers, take medications insulin as directed      Following Support materials were provided for patient to take home:  __X_  Folder with Type 1 diabetes education materials   __X_ [facility] Diabetes Education brochure and contact card    Patient verbalizes understanding  of survival skills education. Diabetes Education / HCP follow -up:    __X_ Follow -up with HCP / PCP within one week.     GAGAN WRIGHT RN

## 2022-12-07 NOTE — ED PROVIDER NOTES
[address] \[facility]\"" ED  eMERGENCY dEPARTMENT eNCOUnter      Pt Name: Lili Estes  MRN: [id number]  [place] 1960  Date of evaluation: 12/7/22      CHIEF COMPLAINT       Chief Complaint   Patient presents with    Fall    Blood Sugar Problem    Other     Hypotension per EMS         HISTORY OF PRESENT ILLNESS    Lili Estes is a 63 y.o. male who presents after he had a fall at home today. Patient says that he was walking to give himself his insulin when his legs became weak and gave out on him and he fell. He says he did not lose consciousness either before or after the fall. He says he remembers everything. He denies any pain. He says he does not think he hit his head. Patient is type I diabetic. He says he did drink some water this morning but had not yet had anything to eat. Location/Symptom: fall, weakness  Timing/Onset: just prior to arrival  Context/Setting: hx DM I  Quality: weak  Duration: just prior to arrival  Modifying Factors: none  Severity: moderate      REVIEW OF SYSTEMS       Review of Systems   Constitutional:  Negative for chills and fever. HENT:  Negative for congestion, ear pain and sore throat. Eyes:  Negative for pain and discharge. Respiratory:  Negative for cough and shortness of breath. Cardiovascular:  Negative for chest pain. Gastrointestinal:  Negative for abdominal pain, diarrhea, nausea and vomiting. Genitourinary:  Negative for difficulty urinating and flank pain. Musculoskeletal:  Negative for back pain, myalgias and neck pain. Skin:  Negative for color change and rash. Neurological:  Positive for weakness and numbness. Negative for dizziness, light-headedness and headaches. Psychiatric/Behavioral:  Negative for suicidal ideas. The patient is not nervous/anxious. PAST MEDICAL HISTORY    has a past medical history of Diabetes mellitus ([facility] [address] and Hypertension. SURGICAL HISTORY      has no past surgical history on file.     CURRENT MEDICATIONS       Previous Medications    LANTUS 100 UNIT/ML INJECTION VIAL    12 Units 2 times daily     LISINOPRIL (PRINIVIL;ZESTRIL) 20 MG TABLET    Take 1 tablet by mouth daily    NOVOLOG FLEXPEN 100 UNIT/ML INJECTION PEN    Inject 2 Units into the skin 3 times daily (before meals)    SILDENAFIL (VIAGRA) 100 MG TABLET           ALLERGIES     has No Known Allergies. FAMILY HISTORY     has no family status information on file. family history is not on file. SOCIAL HISTORY      reports that he has never smoked. He has never used smokeless tobacco. He reports that he does not currently use alcohol. He reports current drug use. Drug: Marijuana Daril Amrit). PHYSICAL EXAM     INITIAL VITALS:  height is 5' 8\" (1.727 m) and weight is 143 lb (64.9 kg). His oral temperature is 97.9 °F (36.6 °C). His blood pressure is 121/75 and his pulse is 110 (abnormal). His respiration is 12. Physical Exam  Vitals and nursing note reviewed. Constitutional:       Appearance: Normal appearance. Comments: Patient is resting comfortably in the bed and appears well   HENT:      Head: Normocephalic and atraumatic. Eyes:      Extraocular Movements: Extraocular movements intact. Conjunctiva/sclera: Conjunctivae normal.   Cardiovascular:      Rate and Rhythm: Regular rhythm. Tachycardia present. Pulmonary:      Effort: Pulmonary effort is normal. No respiratory distress. Breath sounds: Normal breath sounds. Abdominal:      Palpations: Abdomen is soft. Tenderness: There is no abdominal tenderness. Musculoskeletal:      Right lower leg: No edema. Left lower leg: No edema. Skin:     General: Skin is warm and dry. Neurological:      Mental Status: He is alert and oriented to person, place, and time. Comments: There is weakness to the bilateral lower extremities. No focal neurologic deficits.    Psychiatric:         Mood and Affect: Mood normal.         Behavior: Behavior normal. Thought Content: Thought content normal.         Judgment: Judgment normal.       DIFFERENTIAL DIAGNOSIS/ MDM:     Will get CT scan of the head and cervical spine as well as chest x-ray and pelvis x-ray. We will also check labs and reassess. Initial labs show elevated blood glucose with a normal pH but with an anion gap and low bicarb. Will administer IV fluids and start an insulin drip. I spoke with internal medicine resident who accepts patient for admission. Nurse states that patient's blood sugar is now down into the 300s with the fluids just being started insulin drip not having been started yet. We will hold off on the insulin drip. We will repeat a BMP after liter of fluids given. DIAGNOSTIC RESULTS     EKG: All EKG's are interpreted by the Emergency Department Physician who either signs or Co-signs this chart in the absence of a cardiologist.    EKG Interpretation    Interpreted by emergency department physician    Rhythm: sinus tachycardia  Rate: 100-110  Axis: normal  Ectopy: none  Conduction: normal  ST Segments: normal  T Waves: non specific changes  Q Waves: none    Clinical Impression: non-specific EKG and sinus tachycardia    Guillermo Church MD      RADIOLOGY:   I directly visualized the following  images and reviewed the radiologist interpretations:   XR PELVIS (1-2 VIEWS)    Result Date: 12/7/2022  EXAMINATION: ONE XRAY VIEW OF THE PELVIS 12/7/2022 10:03 am COMPARISON: None. HISTORY: ORDERING SYSTEM PROVIDED HISTORY: fall TECHNOLOGIST PROVIDED HISTORY: fall FINDINGS: No fracture or dislocation. Hip joint spaces are preserved. No acute fracture or dislocation.      CT HEAD WO CONTRAST    Result Date: 12/7/2022  EXAMINATION: CT OF THE HEAD WITHOUT CONTRAST  12/7/2022 9:49 am TECHNIQUE: CT of the head was performed without the administration of intravenous contrast. Automated exposure control, iterative reconstruction, and/or weight based adjustment of the mA/kV was utilized to reduce the radiation dose to as low as reasonably achievable. COMPARISON: 03/02/2009 HISTORY: ORDERING SYSTEM PROVIDED HISTORY: fall, syncope TECHNOLOGIST PROVIDED HISTORY: fall, syncope Decision Support Exception - unselect if not a suspected or confirmed emergency medical condition->Emergency Medical Condition (MA) Reason for Exam: fall FINDINGS: BRAIN/VENTRICLES: There is no acute intracranial hemorrhage, mass effect or midline shift. No abnormal extra-axial fluid collection. The gray-white differentiation is maintained without evidence of an acute infarct. There is no evidence of hydrocephalus. ORBITS: The visualized portion of the orbits demonstrate no acute abnormality. SINUSES: There is some mucous noted in the the left maxillary sinus. SOFT TISSUES/SKULL:  No acute abnormality of the visualized skull or soft tissues. No acute intracranial abnormality. CT CERVICAL SPINE WO CONTRAST    Result Date: 12/7/2022  EXAMINATION: CT OF THE CERVICAL SPINE WITHOUT CONTRAST 12/7/2022 9:49 am TECHNIQUE: CT of the cervical spine was performed without the administration of intravenous contrast. Multiplanar reformatted images are provided for review. Automated exposure control, iterative reconstruction, and/or weight based adjustment of the mA/kV was utilized to reduce the radiation dose to as low as reasonably achievable. COMPARISON: None. HISTORY: ORDERING SYSTEM PROVIDED HISTORY: fall TECHNOLOGIST PROVIDED HISTORY: fall Decision Support Exception - unselect if not a suspected or confirmed emergency medical condition->Emergency Medical Condition (MA) Reason for Exam: fall FINDINGS: BONES/ALIGNMENT: There is no acute fracture or traumatic malalignment. DEGENERATIVE CHANGES: There is degenerative disc disease of the C4-C5 disc with some endplate irregularities and some osteophytes. SOFT TISSUES: There is no prevertebral soft tissue swelling. No acute abnormality of the cervical spine.      XR CHEST PORTABLE    Result Date: 12/7/2022  EXAMINATION: ONE XRAY VIEW OF THE CHEST 12/7/2022 10:03 am COMPARISON: None. HISTORY: ORDERING SYSTEM PROVIDED HISTORY: fall TECHNOLOGIST PROVIDED HISTORY: fall FINDINGS: Normal cardiomediastinal silhouette. No acute airspace infiltrate. No pneumothorax or pleural effusion.   No acute fractures     No acute cardiopulmonary findings      ED BEDSIDE ULTRASOUND:   Not indicated    LABS:  Labs Reviewed   BASIC METABOLIC PANEL - Abnormal; Notable for the following components:       Result Value    Glucose 485 (*)     BUN 33 (*)     Sodium 130 (*)     Chloride 95 (*)     CO2 14 (*)     Anion Gap 21 (*)     All other components within normal limits   ELECTROLYTES PLUS - Abnormal; Notable for the following components:    POC Sodium 127 (*)     POC Potassium 5.5 (*)     POC TCO2 12 (*)     All other components within normal limits   HGB/HCT - Abnormal; Notable for the following components:    POC Hemoglobin 12.8 (*)     POC Hematocrit 38 (*)     All other components within normal limits   CALCIUM, IONIC (POC) - Abnormal; Notable for the following components:    POC Ionized Calcium 0.89 (*)     All other components within normal limits   CBC WITH AUTO DIFFERENTIAL - Abnormal; Notable for the following components:    RBC 4.02 (*)     Hemoglobin 11.8 (*)     Hematocrit 35.9 (*)     Seg Neutrophils 73 (*)     Lymphocytes 21 (*)     Eosinophils % 0 (*)     All other components within normal limits   VENOUS BLOOD GAS, POINT OF CARE - Abnormal; Notable for the following components:    pH, Vidal 7.431 (*)     pCO2, Vidal 19.5 (*)     pO2, Vidal 60.5 (*)     HCO3, Venous 13.0 (*)     Negative Base Excess, Vidal 9 (*)     O2 Sat, Vidal 92 (*)     All other components within normal limits   UREA N (POC) - Abnormal; Notable for the following components:    POC BUN 38 (*)     All other components within normal limits   LACTIC ACID,POINT OF CARE - Abnormal; Notable for the following components:    POC Lactic Acid 2.92 (*)     All other components within normal limits   POCT GLUCOSE - Abnormal; Notable for the following components:    POC Glucose 475 (*)     All other components within normal limits   POC GLUCOSE FINGERSTICK - Abnormal; Notable for the following components:    POC Glucose 327 (*)     All other components within normal limits   POCT GLUCOSE - Normal   BRAIN NATRIURETIC PEPTIDE   TROPONIN   SPECIMEN REJECTION   IMMATURE PLATELET FRACTION   TROPONIN   URINALYSIS WITH MICROSCOPIC   PREVIOUS SPECIMEN   BETA-HYDROXYBUTYRATE   CREATININE W/GFR POINT OF CARE   POCT GLUCOSE   POCT GLUCOSE   POCT GLUCOSE   POCT GLUCOSE   POCT GLUCOSE   POCT GLUCOSE   POCT GLUCOSE   POCT GLUCOSE   POCT GLUCOSE   POCT GLUCOSE   POCT GLUCOSE   POCT GLUCOSE   POCT GLUCOSE       Elevated glucose and anion gap. Normal pH but decreased HCO3    EMERGENCY DEPARTMENT COURSE:   Vitals:    Vitals:    12/07/22 0856   BP: 121/75   Pulse: (!) 110   Resp: 12   Temp: 97.9 °F (36.6 °C)   TempSrc: Oral   Weight: 143 lb (64.9 kg)   Height: 5' 8\" (1.727 m)     -------------------------  BP: 121/75, Temp: 97.9 °F (36.6 °C), Heart Rate: (!) 110, Resp: 12    tachy      FINAL IMPRESSION      1. Diabetic ketoacidosis without coma associated with type 1 diabetes mellitus ([facility] [address]    2. Fall, initial encounter          DISPOSITION/PLAN     Admit    PATIENT REFERRED TO:  No follow-up provider specified.     DISCHARGE MEDICATIONS:  New Prescriptions    No medications on file       (Please note that portions of this note were completed with a voice recognition program.  Efforts were made to edit the dictations but occasionally words are mis-transcribed.)    Suasna Walker MD  Attending Emergency Physician                   Susana Walker MD  12/07/22 2042
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
## 2022-12-07 NOTE — H&P
[address]     Department of Internal Medicine - Staff Internal Medicine Teaching Service          ADMISSION NOTE/HISTORY AND PHYSICAL EXAMINATION   Date: 12/7/2022  Patient Name: Dianna Friedman  Date of admission: 12/7/2022  8:50 AM  YOB: 1960  PCP: Elvis ORTEGA  History Obtained From:  patient, electronic medical record    CHIEF COMPLAINT     Chief complaint:   Chief Complaint   Patient presents with    Fall    Blood Sugar Problem    Other     Hypotension per EMS         HISTORY OF PRESENTING ILLNESS     The patient is a pleasant 63 y.o. male with a past medical history significant for type 1 diabetes mellitus and HTN presents with a chief complaint of fall at home today morning. Patient states that he was going to give himself insulin but he felt weakness in his legs, and reported that this started twitching and shaking vigorously to a point where he was not able to stand on them anymore leading to fall. Patient also reported having abdominal pain, nausea and clear vomiting. Patient checks his glucose regularly at home and it was high today. He has a history of admission to the hospital on 11/30/2021 with the DKA during which time he was intubated due to encephalopathy. He states that he did not lose his consciousness either before or after the fall. He states that he does not think he hit his head. Although patient stated that he fell 4 times this week and did hit his head few times. Today he hit his left shoulder which is paining and there is decreased range of motion. On arrival to the ED he is awake, alert and oriented. He is afebrile and is hemodynamically stable but tachycardic with . Labs reviewed and it shows hyperglycemia with glucose at 327, elevated anion gap of 21, elevated beta hydroxybutyrate to 4.03, elevated prolactin of 35.39 and VBG shows pH of 7.43, PCO2 4019.5, PO2 60.5 and HCO3 13.       CT head without contrast shows no acute intracranial abnormalities. CT cervical spine without contrast also was done which shows no acute abnormality. Chest x-ray and pelvis x-ray did not show any acute abnormalities. Internal medicine has been consulted for the management of DKA type I. Review of Systems:  Review of Systems   Constitutional:  Negative for activity change, appetite change, chills, fatigue, fever and unexpected weight change. HENT:  Negative for congestion, rhinorrhea, sneezing, sore throat, trouble swallowing and voice change. Eyes: Negative. Negative for photophobia, discharge, redness and visual disturbance. Respiratory:  Negative for cough, shortness of breath and wheezing. Cardiovascular:  Negative for chest pain, palpitations and leg swelling. Gastrointestinal:  Negative for abdominal pain, blood in stool, constipation, diarrhea, nausea and vomiting. Endocrine: Negative for cold intolerance and heat intolerance. Genitourinary:  Negative for dysuria, frequency, hematuria and urgency. Musculoskeletal:  Negative for arthralgias, back pain, joint swelling and neck pain. Left shoulder pain with decreased range of motion   Skin:  Negative for pallor, rash and wound. Allergic/Immunologic: Negative for environmental allergies and food allergies. Neurological:  Positive for weakness and numbness. Negative for dizziness, seizures, light-headedness and headaches. Hematological:  Negative for adenopathy. Does not bruise/bleed easily. Psychiatric/Behavioral:  Negative for agitation, confusion, hallucinations and sleep disturbance. The patient is not nervous/anxious. PAST MEDICAL HISTORY     Past Medical History:   Diagnosis Date    Diabetes mellitus ([facility] [address]     Hypertension        PAST SURGICAL HISTORY     History reviewed. No pertinent surgical history. ALLERGIES     Patient has no known allergies.     MEDICATIONS PRIOR TO ADMISSION     Prior to Admission medications    Medication Sig Start Date End Date Taking? Authorizing Provider   lisinopril (PRINIVIL;ZESTRIL) 20 MG tablet Take 1 tablet by mouth daily 21   Suyapa Bennett MD   NOVOLOG FLEXPEN 100 UNIT/ML injection pen Inject 2 Units into the skin 3 times daily (before meals) 21   Suyapa Bennett MD   LANTUS 100 UNIT/ML injection vial 12 Units 2 times daily  21      sildenafil (VIAGRA) 100 MG tablet  21   Historical Provider, MD       SOCIAL HISTORY     Social History       Tobacco History       Smoking Status  Never      Smokeless Tobacco Use  Never              Alcohol History       Alcohol Use Status  Not Currently              Drug Use       Drug Use Status  Yes Types  Marijuana (Weed) Comment  uses edibles nightly               Sexual Activity       Sexually Active  Not Asked                     FAMILY HISTORY     History reviewed. No pertinent family history. PHYSICAL EXAM     Vitals: /75   Pulse (!) 110   Temp 97.9 °F (36.6 °C) (Oral)   Resp 12   Ht 5' 8\" (1.727 m)   Wt 143 lb (64.9 kg)   BMI 21.74 kg/m²   Tmax: Temp (24hrs), Av.9 °F (36.6 °C), Min:97.9 °F (36.6 °C), Max:97.9 °F (36.6 °C)    Last Body weight:   Wt Readings from Last 3 Encounters:   22 143 lb (64.9 kg)   22 131 lb (59.4 kg)   21 139 lb 5.3 oz (63.2 kg)     Body Mass Index : Body mass index is 21.74 kg/m². PHYSICAL EXAMINATION:  Physical Exam  Constitutional:       Appearance: Normal appearance. Comments: Patient has a fruity odor   HENT:      Head: Normocephalic and atraumatic. Nose: Nose normal. No congestion. Mouth/Throat:      Mouth: Mucous membranes are moist.   Eyes:      Extraocular Movements: Extraocular movements intact. Pupils: Pupils are equal, round, and reactive to light. Cardiovascular:      Rate and Rhythm: Normal rate and regular rhythm. Pulses: Normal pulses. Heart sounds: Normal heart sounds. No murmur heard.   Pulmonary:      Effort: Pulmonary effort is normal. No respiratory distress. Breath sounds: Normal breath sounds. No wheezing. Chest:      Chest wall: No tenderness. Abdominal:      General: Abdomen is flat. Bowel sounds are normal. There is no distension. Palpations: Abdomen is soft. Tenderness: There is no abdominal tenderness. There is no guarding or rebound. Musculoskeletal:         General: No tenderness. Cervical back: Normal range of motion. No rigidity. Right lower leg: No edema. Left lower leg: No edema. Comments: Left shoulder tender and has decreased range of motion. There is no edema. Skin:     General: Skin is warm. Neurological:      General: No focal deficit present. Mental Status: He is alert and oriented to person, place, and time. Mental status is at baseline.    Psychiatric:         Mood and Affect: Mood normal.          INVESTIGATIONS     Laboratory Testing:     Recent Results (from the past 24 hour(s))   Venous Blood Gas, POC    Collection Time: 12/07/22  9:18 AM   Result Value Ref Range    pH, Vidal 7.431 (H) 7.320 - 7.430    pCO2, Vidal 19.5 (L) 41.0 - 51.0 mm Hg    pO2, Vidal 60.5 (H) 30.0 - 50.0 mm Hg    HCO3, Venous 13.0 (L) 22.0 - 29.0 mmol/L    Negative Base Excess, Vidal 9 (H) 0.0 - 2.0    O2 Sat, Vidal 92 (H) 60.0 - 85.0 %   ELECTROLYTES PLUS    Collection Time: 12/07/22  9:18 AM   Result Value Ref Range    POC Sodium 127 (L) 138 - 146 mmol/L    POC Potassium 5.5 (H) 3.5 - 4.5 mmol/L    POC Chloride 103 98 - 107 mmol/L    POC TCO2 12 (L) 22 - 30 mmol/L    Anion Gap 13 7 - 16 mmol/L   Hemoglobin and hematocrit, blood    Collection Time: 12/07/22  9:18 AM   Result Value Ref Range    POC Hemoglobin 12.8 (L) 13.5 - 17.5 g/dL    POC Hematocrit 38 (L) 41 - 53 %   Creatinine W/GFR Point of Care    Collection Time: 12/07/22  9:18 AM   Result Value Ref Range    POC Creatinine 1.13 0.51 - 1.19 mg/dL    eGFR, POC >60 mL/min/1.73m2   CALCIUM, IONIC (POC)    Collection Time: 12/07/22  9:18 AM   Result Value Ref Range    POC Ionized Calcium 0.89 (L) 1.15 - 1.33 mmol/L   POCT urea (BUN)    Collection Time: 12/07/22  9:18 AM   Result Value Ref Range    POC BUN 38 (H) 8 - 26 mg/dL   Lactic Acid, POC    Collection Time: 12/07/22  9:18 AM   Result Value Ref Range    POC Lactic Acid 2.92 (H) 0.56 - 1.39 mmol/L   POCT Glucose    Collection Time: 12/07/22  9:18 AM   Result Value Ref Range    POC Glucose 475 (HH) 74 - 100 mg/dL   Basic Metabolic Panel    Collection Time: 12/07/22  9:22 AM   Result Value Ref Range    Glucose 485 (HH) 70 - 99 mg/dL    BUN 33 (H) 8 - 23 mg/dL    Creatinine 1.14 0.70 - 1.20 mg/dL    Est, Glom Filt Rate >60 >60 mL/min/1.73m2    Calcium 8.8 8.6 - 10.4 mg/dL    Sodium 130 (L) 135 - 144 mmol/L    Potassium 4.5 3.7 - 5.3 mmol/L    Chloride 95 (L) 98 - 107 mmol/L    CO2 14 (L) 20 - 31 mmol/L    Anion Gap 21 (H) 9 - 17 mmol/L   Brain Natriuretic Peptide    Collection Time: 12/07/22  9:22 AM   Result Value Ref Range    Pro-BNP 78 <300 pg/mL   Troponin    Collection Time: 12/07/22  9:22 AM   Result Value Ref Range    Troponin, High Sensitivity 10 0 - 22 ng/L   SPECIMEN REJECTION    Collection Time: 12/07/22  9:22 AM   Result Value Ref Range    Specimen Source . BLOOD     Ordered Test CDP     Reason for Rejection Unable to perform testing: Specimen clotted.     CBC with Auto Differential    Collection Time: 12/07/22  9:50 AM   Result Value Ref Range    WBC 9.6 3.5 - 11.3 k/uL    RBC 4.02 (L) 4.21 - 5.77 m/uL    Hemoglobin 11.8 (L) 13.0 - 17.0 g/dL    Hematocrit 35.9 (L) 40.7 - 50.3 %    MCV 89.3 82.6 - 102.9 fL    MCH 29.4 25.2 - 33.5 pg    MCHC 32.9 28.4 - 34.8 g/dL    RDW 13.2 11.8 - 14.4 %    Platelets See Reflexed IPF Result 138 - 453 k/uL    NRBC Automated 0.0 0.0 per 100 WBC    Seg Neutrophils 73 (H) 36 - 65 %    Lymphocytes 21 (L) 24 - 43 %    Monocytes 5 3 - 12 %    Eosinophils % 0 (L) 1 - 4 %    Basophils 0 0 - 2 %    Immature Granulocytes 0 0 %    Segs Absolute 6.94 1.50 - 8.10 k/uL    Absolute Lymph # 2.03 1.10 - 3.70 k/uL    Absolute Mono # 0.51 0.10 - 1.20 k/uL    Absolute Eos # 0.03 0.00 - 0.44 k/uL    Basophils Absolute <0.03 0.00 - 0.20 k/uL    Absolute Immature Granulocyte 0.03 0.00 - 0.30 k/uL   Immature Platelet Fraction    Collection Time: 12/07/22  9:50 AM   Result Value Ref Range    Platelet, Fluorescence Platelet clumps present, count appears adequate. 138 - 453 k/uL   Beta-Hydroxybutyrate    Collection Time: 12/07/22  9:50 AM   Result Value Ref Range    Beta-Hydroxybutyrate 4.03 (H) 0.02 - 0.27 mmol/L   POC Glucose Fingerstick    Collection Time: 12/07/22 11:10 AM   Result Value Ref Range    POC Glucose 327 (H) 75 - 110 mg/dL   POCT Glucose    Collection Time: 12/07/22 11:13 AM   Result Value Ref Range    Glucose 327 mg/dL    QC OK? yes    Troponin    Collection Time: 12/07/22 11:52 AM   Result Value Ref Range    Troponin, High Sensitivity 8 0 - 22 ng/L   Procalcitonin    Collection Time: 12/07/22 11:52 AM   Result Value Ref Range    Procalcitonin 0.44 (H) <0.09 ng/mL   Prolactin    Collection Time: 12/07/22 11:52 AM   Result Value Ref Range    Prolactin 35.39 (H) 4.04 - 15.20 ng/mL       Imaging:   XR PELVIS (1-2 VIEWS)    Result Date: 12/7/2022  No acute fracture or dislocation. CT HEAD WO CONTRAST    Result Date: 12/7/2022  No acute intracranial abnormality. CT CERVICAL SPINE WO CONTRAST    Result Date: 12/7/2022  No acute abnormality of the cervical spine. XR CHEST PORTABLE    Result Date: 12/7/2022  No acute cardiopulmonary findings       ASSESSMENT & PLAN     ASSESSMENT / PLAN:     Principal Problem:    Type 1 diabetes mellitus with ketoacidosis without coma ([facility] [address]  Active Problems:    Injury of left shoulder  Resolved Problems:    * No resolved hospital problems.  *       IMPRESSION  This is a 63 y.o. male with a past medical history significant for type 1 diabetes mellitus, DKA with hospital admission in November 2021 requiring intubation for encephalopathy and HTN who presented with fall due to twitching of legs and abdominal pain along with nausea and vomiting and found to have diabetic ketoacidosis without coma. Type 1 diabetes mellitus with ketoacidosis without coma: 1. Diabetic ketoacidosis. Start DKA protocol. Diet NPO effective now. Insulin regular 0.1 Units/Kg/hr, Intravenous continuous. Shekhar Mering 0.9 % sodium chloride bolus 15 ml/kg once followed by 0.9 % sodium chloride infusion, Intravenous, 150 ml/hr. Change IV fluid when blood glucose reaches 250 mg/dl, discontinue saline IV fluid and start using the dextrose containing IV fluids. POCT glucose-every hour. Check BMP, Mag and phosphorus every 4 hours. Replace electrolytes as needed. Notify physician if blood glucose less than 200 mg/dl and two of the following three criteria are met on two consecutive BMPs; Anion gap normalized ( less or equal to 12), serum bicarb HCO3 greater than 15, and/or pH greater than 7.3. Then subcutaneous insulin and discontinue insulin infusion 2 hours after the first subcutaneous injection of insulin. Injury of left shoulder: X-ray left shoulder has been taken which shows no acute fractures. He does have chronic healed fracture deformity of the proximal left humerus. DVT ppx: LOVENOX 40 mg SC daily  GI ppx: PROTONIX 40 mg oral daily    PT/OT: Consulted  Discharge Planning:  to assist with     Milo Mendosa MD  Internal Medicine Resident,   [address] [place] [address]  12/7/2022, 1:28 PM     Attestation and add on       I have discussed the care of Michael Isidro , including pertinent history and exam findings,                      12/7/22    with the resident. I have seen and examined the patient and the key elements of all parts of the encounter have been performed by me . I agree with the assessment, plan and orders as documented by the resident.     ---- ;     MD PIERRE Sherman [address], [address] [place] [address].    Phone [phone]   Fax: [phone]  Answering Service: [phone]

## 2022-12-07 NOTE — ED TRIAGE NOTES
Pt brought to ED by EMS. Pt wife called after pt allegedly fell and was \"face down\" in their dining room this morning. Pt states that their legs became weak and numb and then they fell. Pt states that they do not remember the fall and they feel drowsy. Pt is alert and oriented. Pt states that they are a diabetic and have HTN. Pt states that their blood sugar read \"high\" this morning. Pt connected to monitor. EKG done.

## 2022-12-08 ENCOUNTER — APPOINTMENT (OUTPATIENT)
Dept: MRI IMAGING | Age: 62
End: 2022-12-08
Payer: MEDICAID

## 2022-12-08 PROBLEM — D64.89 OTHER SPECIFIED ANEMIAS: Status: ACTIVE | Noted: 2022-12-08

## 2022-12-08 PROBLEM — R90.89 ABNORMAL FINDING ON MRI OF BRAIN: Status: ACTIVE | Noted: 2022-12-08

## 2022-12-08 PROBLEM — I95.1 ORTHOSTATIC HYPOTENSION: Status: ACTIVE | Noted: 2022-12-08

## 2022-12-08 PROBLEM — R29.898 BILATERAL LEG WEAKNESS: Status: ACTIVE | Noted: 2022-12-08

## 2022-12-08 LAB
ABSOLUTE EOS #: 0.2 K/UL (ref 0–0.44)
ABSOLUTE IMMATURE GRANULOCYTE: <0.03 K/UL (ref 0–0.3)
ABSOLUTE LYMPH #: 3.44 K/UL (ref 1.1–3.7)
ABSOLUTE MONO #: 0.72 K/UL (ref 0.1–1.2)
ANION GAP SERPL CALCULATED.3IONS-SCNC: 10 MMOL/L (ref 9–17)
ANION GAP SERPL CALCULATED.3IONS-SCNC: 7 MMOL/L (ref 9–17)
ANION GAP SERPL CALCULATED.3IONS-SCNC: 7 MMOL/L (ref 9–17)
ANION GAP SERPL CALCULATED.3IONS-SCNC: 8 MMOL/L (ref 9–17)
ANION GAP SERPL CALCULATED.3IONS-SCNC: 9 MMOL/L (ref 9–17)
BASOPHILS # BLD: 0 % (ref 0–2)
BASOPHILS ABSOLUTE: <0.03 K/UL (ref 0–0.2)
BUN BLDV-MCNC: 23 MG/DL (ref 8–23)
BUN BLDV-MCNC: 24 MG/DL (ref 8–23)
BUN BLDV-MCNC: 26 MG/DL (ref 8–23)
CALCIUM SERPL-MCNC: 7.9 MG/DL (ref 8.6–10.4)
CALCIUM SERPL-MCNC: 8.1 MG/DL (ref 8.6–10.4)
CALCIUM SERPL-MCNC: 8.2 MG/DL (ref 8.6–10.4)
CHLORIDE BLD-SCNC: 105 MMOL/L (ref 98–107)
CHLORIDE BLD-SCNC: 106 MMOL/L (ref 98–107)
CHLORIDE BLD-SCNC: 107 MMOL/L (ref 98–107)
CHLORIDE BLD-SCNC: 108 MMOL/L (ref 98–107)
CHLORIDE BLD-SCNC: 109 MMOL/L (ref 98–107)
CO2: 20 MMOL/L (ref 20–31)
CO2: 20 MMOL/L (ref 20–31)
CO2: 21 MMOL/L (ref 20–31)
CO2: 22 MMOL/L (ref 20–31)
CO2: 22 MMOL/L (ref 20–31)
CREAT SERPL-MCNC: 0.76 MG/DL (ref 0.7–1.2)
CREAT SERPL-MCNC: 0.82 MG/DL (ref 0.7–1.2)
CREAT SERPL-MCNC: 0.87 MG/DL (ref 0.7–1.2)
CREAT SERPL-MCNC: 0.88 MG/DL (ref 0.7–1.2)
CREAT SERPL-MCNC: 1.01 MG/DL (ref 0.7–1.2)
EOSINOPHILS RELATIVE PERCENT: 2 % (ref 1–4)
FOLATE: 6.8 NG/ML
GFR SERPL CREATININE-BSD FRML MDRD: >60 ML/MIN/1.73M2
GLUCOSE BLD-MCNC: 105 MG/DL (ref 70–99)
GLUCOSE BLD-MCNC: 117 MG/DL (ref 75–110)
GLUCOSE BLD-MCNC: 140 MG/DL (ref 75–110)
GLUCOSE BLD-MCNC: 146 MG/DL (ref 70–99)
GLUCOSE BLD-MCNC: 161 MG/DL (ref 75–110)
GLUCOSE BLD-MCNC: 194 MG/DL (ref 75–110)
GLUCOSE BLD-MCNC: 214 MG/DL (ref 70–99)
GLUCOSE BLD-MCNC: 241 MG/DL (ref 75–110)
GLUCOSE BLD-MCNC: 268 MG/DL (ref 75–110)
GLUCOSE BLD-MCNC: 275 MG/DL (ref 70–99)
GLUCOSE BLD-MCNC: 67 MG/DL (ref 70–99)
GLUCOSE BLD-MCNC: 88 MG/DL (ref 75–110)
GLUCOSE BLD-MCNC: 95 MG/DL (ref 75–110)
GLUCOSE BLD-MCNC: 98 MG/DL (ref 75–110)
HCT VFR BLD CALC: 32.8 % (ref 40.7–50.3)
HEMOGLOBIN: 10.6 G/DL (ref 13–17)
IMMATURE GRANULOCYTES: 0 %
LACTIC ACID, WHOLE BLOOD: 0.9 MMOL/L (ref 0.7–2.1)
LACTIC ACID, WHOLE BLOOD: 1 MMOL/L (ref 0.7–2.1)
LYMPHOCYTES # BLD: 36 % (ref 24–43)
MAGNESIUM: 1.8 MG/DL (ref 1.6–2.6)
MAGNESIUM: 1.9 MG/DL (ref 1.6–2.6)
MAGNESIUM: 2 MG/DL (ref 1.6–2.6)
MCH RBC QN AUTO: 29.3 PG (ref 25.2–33.5)
MCHC RBC AUTO-ENTMCNC: 32.3 G/DL (ref 28.4–34.8)
MCV RBC AUTO: 90.6 FL (ref 82.6–102.9)
MONOCYTES # BLD: 8 % (ref 3–12)
NRBC AUTOMATED: 0 PER 100 WBC
PDW BLD-RTO: 13.5 % (ref 11.8–14.4)
PHOSPHORUS: 2.5 MG/DL (ref 2.5–4.5)
PHOSPHORUS: 2.6 MG/DL (ref 2.5–4.5)
PHOSPHORUS: 2.7 MG/DL (ref 2.5–4.5)
PHOSPHORUS: 2.7 MG/DL (ref 2.5–4.5)
PHOSPHORUS: 2.8 MG/DL (ref 2.5–4.5)
PLATELET # BLD: 295 K/UL (ref 138–453)
PMV BLD AUTO: 9 FL (ref 8.1–13.5)
POTASSIUM SERPL-SCNC: 3.8 MMOL/L (ref 3.7–5.3)
POTASSIUM SERPL-SCNC: 3.8 MMOL/L (ref 3.7–5.3)
POTASSIUM SERPL-SCNC: 3.9 MMOL/L (ref 3.7–5.3)
POTASSIUM SERPL-SCNC: 4.3 MMOL/L (ref 3.7–5.3)
POTASSIUM SERPL-SCNC: 4.4 MMOL/L (ref 3.7–5.3)
RBC # BLD: 3.62 M/UL (ref 4.21–5.77)
SEG NEUTROPHILS: 54 % (ref 36–65)
SEGMENTED NEUTROPHILS ABSOLUTE COUNT: 5.14 K/UL (ref 1.5–8.1)
SODIUM BLD-SCNC: 134 MMOL/L (ref 135–144)
SODIUM BLD-SCNC: 136 MMOL/L (ref 135–144)
SODIUM BLD-SCNC: 136 MMOL/L (ref 135–144)
SODIUM BLD-SCNC: 137 MMOL/L (ref 135–144)
SODIUM BLD-SCNC: 138 MMOL/L (ref 135–144)
VITAMIN B-12: >2000 PG/ML (ref 232–1245)
WBC # BLD: 9.5 K/UL (ref 3.5–11.3)

## 2022-12-08 PROCEDURE — 36415 COLL VENOUS BLD VENIPUNCTURE: CPT

## 2022-12-08 PROCEDURE — 84100 ASSAY OF PHOSPHORUS: CPT

## 2022-12-08 PROCEDURE — 6360000002 HC RX W HCPCS

## 2022-12-08 PROCEDURE — 85025 COMPLETE CBC W/AUTO DIFF WBC: CPT

## 2022-12-08 PROCEDURE — 2060000000 HC ICU INTERMEDIATE R&B

## 2022-12-08 PROCEDURE — APPSS30 APP SPLIT SHARED TIME 16-30 MINUTES: Performed by: NURSE PRACTITIONER

## 2022-12-08 PROCEDURE — 83605 ASSAY OF LACTIC ACID: CPT

## 2022-12-08 PROCEDURE — 6370000000 HC RX 637 (ALT 250 FOR IP): Performed by: STUDENT IN AN ORGANIZED HEALTH CARE EDUCATION/TRAINING PROGRAM

## 2022-12-08 PROCEDURE — 82746 ASSAY OF FOLIC ACID SERUM: CPT

## 2022-12-08 PROCEDURE — 82607 VITAMIN B-12: CPT

## 2022-12-08 PROCEDURE — 82947 ASSAY GLUCOSE BLOOD QUANT: CPT

## 2022-12-08 PROCEDURE — 83735 ASSAY OF MAGNESIUM: CPT

## 2022-12-08 PROCEDURE — 99232 SBSQ HOSP IP/OBS MODERATE 35: CPT | Performed by: PSYCHIATRY & NEUROLOGY

## 2022-12-08 PROCEDURE — 94761 N-INVAS EAR/PLS OXIMETRY MLT: CPT

## 2022-12-08 PROCEDURE — 2500000003 HC RX 250 WO HCPCS: Performed by: STUDENT IN AN ORGANIZED HEALTH CARE EDUCATION/TRAINING PROGRAM

## 2022-12-08 PROCEDURE — 6370000000 HC RX 637 (ALT 250 FOR IP): Performed by: NURSE PRACTITIONER

## 2022-12-08 PROCEDURE — 99232 SBSQ HOSP IP/OBS MODERATE 35: CPT | Performed by: INTERNAL MEDICINE

## 2022-12-08 PROCEDURE — 6370000000 HC RX 637 (ALT 250 FOR IP)

## 2022-12-08 PROCEDURE — 80048 BASIC METABOLIC PNL TOTAL CA: CPT

## 2022-12-08 PROCEDURE — 70551 MRI BRAIN STEM W/O DYE: CPT

## 2022-12-08 PROCEDURE — 84425 ASSAY OF VITAMIN B-1: CPT

## 2022-12-08 RX ORDER — LISINOPRIL 10 MG/1
10 TABLET ORAL DAILY
Status: DISCONTINUED | OUTPATIENT
Start: 2022-12-09 | End: 2022-12-09 | Stop reason: HOSPADM

## 2022-12-08 RX ORDER — INSULIN LISPRO 100 [IU]/ML
0-4 INJECTION, SOLUTION INTRAVENOUS; SUBCUTANEOUS
Status: DISCONTINUED | OUTPATIENT
Start: 2022-12-08 | End: 2022-12-09 | Stop reason: HOSPADM

## 2022-12-08 RX ORDER — INSULIN LISPRO 100 [IU]/ML
0-4 INJECTION, SOLUTION INTRAVENOUS; SUBCUTANEOUS NIGHTLY
Status: DISCONTINUED | OUTPATIENT
Start: 2022-12-08 | End: 2022-12-09 | Stop reason: HOSPADM

## 2022-12-08 RX ORDER — ATORVASTATIN CALCIUM 40 MG/1
40 TABLET, FILM COATED ORAL NIGHTLY
Status: DISCONTINUED | OUTPATIENT
Start: 2022-12-08 | End: 2022-12-09 | Stop reason: HOSPADM

## 2022-12-08 RX ORDER — INSULIN GLARGINE 100 [IU]/ML
8 INJECTION, SOLUTION SUBCUTANEOUS 2 TIMES DAILY
Status: DISCONTINUED | OUTPATIENT
Start: 2022-12-08 | End: 2022-12-09 | Stop reason: HOSPADM

## 2022-12-08 RX ORDER — BENZTROPINE MESYLATE 1 MG/1
1 TABLET ORAL 3 TIMES DAILY
Status: DISCONTINUED | OUTPATIENT
Start: 2022-12-08 | End: 2022-12-09 | Stop reason: HOSPADM

## 2022-12-08 RX ORDER — PALIPERIDONE 3 MG/1
3 TABLET, EXTENDED RELEASE ORAL EVERY MORNING
Status: DISCONTINUED | OUTPATIENT
Start: 2022-12-09 | End: 2022-12-09 | Stop reason: HOSPADM

## 2022-12-08 RX ORDER — DULOXETIN HYDROCHLORIDE 30 MG/1
60 CAPSULE, DELAYED RELEASE ORAL DAILY
Status: DISCONTINUED | OUTPATIENT
Start: 2022-12-08 | End: 2022-12-09 | Stop reason: HOSPADM

## 2022-12-08 RX ORDER — INSULIN LISPRO 100 [IU]/ML
2 INJECTION, SOLUTION INTRAVENOUS; SUBCUTANEOUS
Status: DISCONTINUED | OUTPATIENT
Start: 2022-12-08 | End: 2022-12-09 | Stop reason: HOSPADM

## 2022-12-08 RX ORDER — ASPIRIN 81 MG/1
81 TABLET, CHEWABLE ORAL DAILY
Status: DISCONTINUED | OUTPATIENT
Start: 2022-12-08 | End: 2022-12-09 | Stop reason: HOSPADM

## 2022-12-08 RX ADMIN — ASPIRIN 81 MG: 81 TABLET, CHEWABLE ORAL at 16:57

## 2022-12-08 RX ADMIN — ENOXAPARIN SODIUM 40 MG: 100 INJECTION SUBCUTANEOUS at 08:03

## 2022-12-08 RX ADMIN — DULOXETINE HYDROCHLORIDE 60 MG: 30 CAPSULE, DELAYED RELEASE ORAL at 16:56

## 2022-12-08 RX ADMIN — BENZTROPINE MESYLATE 1 MG: 1 TABLET ORAL at 20:13

## 2022-12-08 RX ADMIN — POTASSIUM CHLORIDE, DEXTROSE MONOHYDRATE AND SODIUM CHLORIDE: 150; 5; 900 INJECTION, SOLUTION INTRAVENOUS at 04:04

## 2022-12-08 RX ADMIN — INSULIN GLARGINE 8 UNITS: 100 INJECTION, SOLUTION SUBCUTANEOUS at 01:41

## 2022-12-08 RX ADMIN — INSULIN GLARGINE 8 UNITS: 100 INJECTION, SOLUTION SUBCUTANEOUS at 20:46

## 2022-12-08 RX ADMIN — INSULIN LISPRO 2 UNITS: 100 INJECTION, SOLUTION INTRAVENOUS; SUBCUTANEOUS at 16:44

## 2022-12-08 RX ADMIN — BENZTROPINE MESYLATE 1 MG: 1 TABLET ORAL at 16:57

## 2022-12-08 RX ADMIN — INSULIN LISPRO 2 UNITS: 100 INJECTION, SOLUTION INTRAVENOUS; SUBCUTANEOUS at 08:03

## 2022-12-08 RX ADMIN — DESMOPRESSIN ACETATE 40 MG: 0.2 TABLET ORAL at 20:13

## 2022-12-08 RX ADMIN — PANTOPRAZOLE SODIUM 40 MG: 40 TABLET, DELAYED RELEASE ORAL at 05:48

## 2022-12-08 RX ADMIN — INSULIN GLARGINE 8 UNITS: 100 INJECTION, SOLUTION SUBCUTANEOUS at 08:03

## 2022-12-08 RX ADMIN — INSULIN LISPRO 2 UNITS: 100 INJECTION, SOLUTION INTRAVENOUS; SUBCUTANEOUS at 12:45

## 2022-12-08 RX ADMIN — INSULIN LISPRO 2 UNITS: 100 INJECTION, SOLUTION INTRAVENOUS; SUBCUTANEOUS at 16:43

## 2022-12-08 NOTE — ED NOTES
Pharmacy contacted with request to send PRN fluids for DKA protocol.       Nemo Agustin RN  12/07/22 1035

## 2022-12-08 NOTE — ED NOTES
The following labs were labeled with appropriate pt sticker and tubed to lab:     [] Blue     [] Lavender   [] on ice  [x] Green/yellow  [] Green/black [] on ice  [] Abbi Blood  [] on ice  [] Yellow  [] Red  [] Type/ Screen  [] ABG  [] VBG    [] COVID-19 swab    [] Rapid  [] PCR  [] Flu swab  [] Peds Viral Panel     [] Urine Sample  [] Fecal Sample  [] Pelvic Cultures  [] Blood Cultures  [] X 2  [] STREP Cultures       Yahir Last  12/07/22 2024

## 2022-12-08 NOTE — PROGRESS NOTES
Writer informed IM Resident via [facility] of 2/3 orthostatic bp results

## 2022-12-08 NOTE — PLAN OF CARE
Pt was seen and examined at bedside with Dr. Alba Burroughs    History of being on vent 1 year back and pt has persistent upper and lower extremity essential tremors since then which are intermittent. MRI with no new findings. Neurology consulted appreciate recommendations. Jose Ramon Cordoba MD  Internal Medicine Resident, PGY- [address], [place]  12/8/2022, 12:10 PM

## 2022-12-08 NOTE — PROGRESS NOTES
0140 no change to insulin gtt bs 88  0238 no change to insulin gtt bs 95  0333 gtt stopped per orderes  0540 blood sugar 98

## 2022-12-08 NOTE — PROGRESS NOTES
NEUROLOGY INPATIENT PROGRESS NOTE    12/8/2022         Current Exam:     Chart reviewed. Discussed with RN. MRI brain with question of late subacute infarct to left corpus striatum, no convincing evidence upon review however patient does remain at stroke risk given his risk factors. He reports feeling well today, feeling better than yesterday. Orthostatics are positive. Brief History:    Christopher Bazzi is a  63 y.o. male with H/O HTN, IDDM, bipolar disorder, who was admitted on 12/7/2022 with falls and concern of seizures. She reports intermittent weakness of bilateral lower extremities leading to falls. Denies any loss of consciousness or head injury with these falls. She does report back pain with occasional groin pain but denied bladder or bowel dysfunction. She also reports intermittent twitching of her lower extremities. No tongue bite or bladder incontinence reported. No witnessed staring spells or witnessed seizure-like activity. CT head was done with no acute intracranial abnormality. He reports that these episodes happen only upon standing and it causes a burning in his hip area, following by unsteadiness due to this burning sensation and sometimes results in a fall. He reports he can sit back down and the feeling will pass. No current facility-administered medications on file prior to encounter.      Current Outpatient Medications on File Prior to Encounter   Medication Sig Dispense Refill    benztropine (COGENTIN) 1 MG tablet Take 1 mg by mouth 3 times daily      Cyanocobalamin 1000 MCG SUBL Place 1,000 mcg under the tongue daily      divalproex (DEPAKOTE) 250 MG DR tablet Take 750 mg by mouth 2 times daily      DULoxetine (CYMBALTA) 60 MG extended release capsule Take 60 mg by mouth daily      ergocalciferol (ERGOCALCIFEROL) 1.25 MG (95202 UT) capsule Take 1 capsule by mouth once a week      paliperidone (INVEGA) 3 MG extended release tablet Take 3 mg by mouth daily      QUEtiapine Fumarate 150 MG TABS Take 150 mg by mouth daily      traZODone (DESYREL) 50 MG tablet Take 50 mg by mouth nightly as needed      omeprazole (PRILOSEC) 40 MG delayed release capsule Take 40 mg by mouth daily      lisinopril (PRINIVIL;ZESTRIL) 20 MG tablet Take 1 tablet by mouth daily 30 tablet 3    NOVOLOG FLEXPEN 100 UNIT/ML injection pen Inject 2 Units into the skin 3 times daily (before meals) 5 pen 3    LANTUS 100 UNIT/ML injection vial 12 Units 2 times daily       sildenafil (VIAGRA) 100 MG tablet          Allergies: Mary Santiago has No Known Allergies. Past Medical History:   Diagnosis Date    Diabetes mellitus ([address]     Hypertension        History reviewed. No pertinent surgical history. Social History: Mary Santiago  reports that he has never smoked. He has never used smokeless tobacco. He reports that he does not currently use alcohol. He reports current drug use. Drug: Marijuana Adrianne Forte). History reviewed. No pertinent family history. Objective:   /64   Pulse 96   Temp 97.4 °F (36.3 °C) (Oral)   Resp 12   Ht 5' 9\" (1.753 m)   Wt 146 lb 13.2 oz (66.6 kg)   SpO2 99%   BMI 21.68 kg/m²     Blood pressure range: Systolic (56ELG), LWR:510 , Min:87 , ZFA:904   ; Diastolic (07TKW), WLN:96, Min:48, Max:75      Review of Systems:  Constitutional  Negative for fever and chills    HEENT  Negative for ear discharge, ear pain, nosebleed    Eyes  Negative for photophobia, pain and discharge    Respiratory  Negative for hemoptysis and sputum    Cardiovascular  Negative for orthopnea, claudication and PND    Gastrointestinal  Negative for abdominal pain, diarrhea, blood in stool    Musculoskeletal  Negative for joint pain, negative for myalgia    Skin  Negative for rash or itching    Endo/heme/allergies  Negative for polydipsia, environmental allergy    Psychiatric/behavioral  Negative for suicidal ideation.  Patient is not anxious        NEUROLOGIC EXAMINATION  GENERAL  Appears comfortable and in no distress   HEENT  NC/ AT   NECK  Supple   MENTAL STATUS:  Alert, oriented, intact memory, no confusion, normal speech, normal language, no hallucination or delusion   CRANIAL NERVES: II     -      Visual fields intact to confrontation  III,IV,VI -  EOMs full, no afferent defect, no ARCELIA, no ptosis  V     -     Normal facial sensation  VII    -     Normal facial symmetry  VIII   -     Intact hearing  IX,X -     Symmetrical palate  XI    -     Symmetrical shoulder shrug  XII   -     Midline tongue, no atrophy    MOTOR FUNCTION:  significant for good strength of grade 5/5 in bilateral proximal and distal muscle groups of both upper and lower extremities with normal bulk, normal tone and no involuntary movements, no tremor   SENSORY FUNCTION:  Normal touch, normal pin   CEREBELLAR FUNCTION:  Intact fine motor control over upper limbs   REFLEX FUNCTION:  Symmetric, no perverted reflex, no Babinski sign   STATION and GAIT  Not tested       Data:    Lab Results:   CBC:   Recent Labs     12/07/22  0950 12/08/22  0743   WBC 9.6 9.5   HGB 11.8* 10.6*   PLT See Reflexed IPF Result 295     BMP:    Recent Labs     12/08/22  0005 12/08/22  0409 12/08/22  0743    137 138   K 3.9 3.8 3.8    109* 108*   CO2 20 21 20   BUN 26* 24* 23   CREATININE 0.88 0.76 0.82   GLUCOSE 146* 67* 105*         Lab Results   Component Value Date    LDLCHOLESTEROL 129 10/28/2022    HDL 50 10/28/2022    ALT 8 10/28/2022    AST 11 10/28/2022    TSH 2.44 10/28/2022    LABA1C 11.9 (H) 12/07/2022    [id number] 634 12/13/2021           Diagnostic data reviewed:  CT head: No acute intracranial abnormalities. CT cervical spine: No acute abnormality of the cervical spine. X-ray chest: No acute cardiopulmonary findings. MRI BRAIN (12/8/22) -  No acute infarct, hemorrhage or intracranial mass effect. 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                  Impression:  -Recurrent falls   -Orthostatic hypotension  -Encephalopathy in the setting of diabetic ketoacidosis, resolved  -Question of late subacute infarct to left corpus striatum    Plan:  -MRI brain with no obvious reason behind reported BLE weakness and falls. Questionable area of late subacute lacunar infarct to the left corpus striatum, although films are not convincing of infarct. Patient does remain at stroke risk given his comorbid risk factors and because of this will initiate ASA and Lipitor daily  -Orthostatic blood pressures are positive; recommend slow positional changes, adequate fluid hydration, and compression stockings  -PT/OT evals  -Neurologically clear for discharge. Recommend outpatient follow up in the neurology clinic. Please note that this note was generated using a voice recognition dictation software. Although every effort was made to ensure the accuracy of this automated transcription, some errors in transcription may have occurred.

## 2022-12-08 NOTE — ED NOTES
Jacqui Jones RN on 4A updated on insulin drip dose change.       Herberth Kimbrough RN  12/07/22 2626

## 2022-12-08 NOTE — PLAN OF CARE
Internal medicine senior note    80-year-old male with past medical history of diabetes type 1, bipolar disease, hyperlipidemia, hypertension presents after a fall landing on his left shoulder and not losing consciousness and is complaining of abdominal pain nausea and vomiting. Also stating his sugars have been over 500 for the last few days. Also states he has been having 16 episodes of bilateral lower extremity shaking and twitching per month since March. We were contacted by Lexii Moreno patient's outpatient psychiatrist who recommended psych evaluation as patient apparently has been attributing his psychiatric medications to his hyperglycemia and has not been taking them and has history of bipolar disease with violent tendencies. Work-up in ED significant for tachycardic, hemodynamically stable, laboratory values consistent with DKA with a anion gap elevated to 21. Anemia of chronic disease. Pro-Doc 0.44, prolactin 35.39, A1c 11.9, beta hydroxybutyrate 4.03, UA positive for ketones. CT head negative, CT C-spine negative, chest x-ray negative, x-ray pelvis negative for fracture. #DKA  #Diabetes type 1  #High anion gap metabolic acidosis secondary to above  -DKA protocol. Insulin drip until gap closes then bridged with long-acting insulin. Patient takes 12 units Lantus twice daily  -BMP every 4  -No obvious sign of infection. Suspect patient was not taking insulin appropriately. States this feels like when he is in DKA      #Recurrent falls  #Multiple episodes of \"shaking\" per month  -Prolactin elevated. Neurology consulted. Appreciate recommendations   -MRI brain pending  -Orthostatics pending  -Initial imaging negative. X-ray shoulder negative for acute fracture. #Hypotension  -Holding home antihypertensives. Fluids per DKA protocol    #Bipolar disease with aggressive tendencies  -Psych evaluation pending.   Requested by outpatient psychiatry on admission    DVT prophylaxis: Lovenox  IV fluids: DKA protocol  Consults: Psych, neurology  Diet: N.p.o. for now    Matilda Campo, DO  Internal Medicine, PGY2  Please Perfect Serve with any questions or concerns

## 2022-12-08 NOTE — ED NOTES
Report given to Yuli Cassidy, [place]. All questions answered.       Avelino Kathleen RN  12/07/22 2128

## 2022-12-08 NOTE — PROGRESS NOTES
Husam Xavier in regards to PRN electrolyte replacement orders that are in place for pt receiving Q4 hour labs; informed Dr. Bashir Xavier that pt hasnt been on insulin gtt since 12/8 0344 and is receiving sliding scale & Lantus protocol;per Dr. Zurita Beams labs to daily\" & \"Ok hold the replacement will check k in morning and replace accordingly ty\"

## 2022-12-08 NOTE — CARE COORDINATION
Case Management Assessment  Initial Evaluation    Date/Time of Evaluation: 12/8/2022 5:17 PM  Assessment Completed by: Jamil Long RN    If patient is discharged prior to next notation, then this note serves as note for discharge by case management. Patient Name: Jeni Hernandez                   YOB: 1960  Diagnosis: DKA, type 2, not at goal [facility]) [E11.10]  Fall, initial encounter [W19. XXXA]  Diabetic ketoacidosis without coma associated with type 1 diabetes mellitus ([facility] [address] [E10.10]                   Date / Time: 12/7/2022  8:50 AM    Patient Admission Status: Inpatient   Readmission Risk (Low < 19, Mod (19-27), High > 27): Readmission Risk Score: 10.6    Current PCP: Gillian Woods  PCP verified by CM? Yes    Chart Reviewed: Yes      History Provided by: Patient  Patient Orientation: Alert and Oriented, Person, Place, Situation    Patient Cognition: Alert    Hospitalization in the last 30 days (Readmission):  No    If yes, Readmission Assessment in CM Navigator will be completed. Advance Directives:      Code Status: Full Code   Patient's Primary Decision Maker is:      Primary Decision MakerMalrene Katz Spouse - [phone]    Discharge Planning:    Patient lives with: Spouse/Significant Other Type of Home: House  Primary Care Giver: Self  Patient Support Systems include: Spouse/Significant Other   Current Financial resources: Medicaid  Current community resources:    Current services prior to admission: Durable Medical Equipment            Current DME: Maribell Robert, Wheelchair, Glucometer (walker, wheelchair, glucometer (states needs strips and lancets))            Type of Home Care services:  None    ADLS  Prior functional level: Independent in ADLs/IADLs  Current functional level:  Independent in ADLs/IADLs    PT AM-PAC:   /24  OT AM-PAC:   /24    Family can provide assistance at DC: Yes (spouse)  Would you like Case Management to discuss the discharge plan with any other family members/significant others, and if so, who? No  Plans to Return to Present Housing: Yes  Other Identified Issues/Barriers to RETURNING to current housing: na  Potential Assistance needed at discharge: N/A            Potential DME:    Patient expects to discharge to: [address] [place] [address] for transportation at discharge:  patient states he need a cab ride home. His wife is disabled and he has no other family locally    Financial    Payor: MEDICAID [place] / Plan: Michelle Gaines DEPT OF JOB / Product Type: *No Product type* /     Does insurance require precert for SNF: No    Potential assistance Purchasing Medications: No  Meds-to-Beds request: Yes      [id number] - [address] [address]  Phone: [phone] Fax: [phone]      Notes:    Factors facilitating achievement of predicted outcomes: Family support, Cooperative, and Pleasant    Barriers to discharge: no transportation home    Additional Case Management Notes: met with patient to discuss transitional planning. Plan is home with spouse. Patient reports that he has a walker, wheelchair, and glucometer at home. Patient reports that he needs a script for test strips and lancets for his glucometer at discharge. Patient also reports he will need a cab ride home as his wife does not drive and he has no family or friends locally that could provide ride home. Patient is agreeable to plan    The Plan for Transition of Care is related to the following treatment goals of DKA, type 2, not at goal [facility]) [E11.10]  Fall, initial encounter [W19. XXXA]  Diabetic ketoacidosis without coma associated with type 1 diabetes mellitus ([facility] [address] [D66.16]    IF APPLICABLE: The Patient and/or patient representative Alhaji Vidal and his family were provided with a choice of provider and agrees with the discharge plan.  Freedom of choice list with basic dialogue that supports the patient's individualized plan of care/goals and shares the quality data associated with the providers was provided to:     Patient Representative Name:       The Patient and/or Patient Representative Agree with the Discharge Plan?  yes    Alexis Antoine RN  Case Management Department  Ph: [phone] Fax: Lu Green

## 2022-12-08 NOTE — PROGRESS NOTES
[facility]  Internal Medicine Teaching Residency Program  Inpatient Daily Progress Note  ______________________________________________________________________________    Patient: Jessica Faust  YOB: 1960   RUV:[id number]    Acct: [de-identified]     Room: [address] [place], [place] [address]  Admit date: 12/7/2022  Today's date: 12/08/22  Number of days in the hospital: 1    SUBJECTIVE   Admitting Diagnosis: Type 1 diabetes mellitus with ketoacidosis without coma ([facility] [address]  CC: Fall  Pt examined at bedside. Chart & results reviewed. No acute event overnight  Remained afebrile and hemodynamically stable  Has been bridged with subcu insulin. Eating diet-tolerating well. No weakness in extremities currently-power 5/5. Will be discharged today possibly with outpatient neurology follow-up. ROS:  Constitutional:  negative for chills, fevers, sweats  Respiratory:  negative for cough, dyspnea on exertion, hemoptysis, shortness of breath, wheezing  Cardiovascular:  negative for chest pain, chest pressure/discomfort, lower extremity edema, palpitations  Gastrointestinal:  negative for abdominal pain, constipation, diarrhea, nausea, vomiting  Neurological:  negative for dizziness, headache  BRIEF HISTORY     The patient is a pleasant 63 y.o. male with a past medical history significant for type 1 diabetes mellitus and HTN presents with a chief complaint of fall at home today morning. Patient states that he was going to give himself insulin but he felt weakness in his legs, and reported that this started twitching and shaking vigorously to a point where he was not able to stand on them anymore leading to fall. Patient also reported having abdominal pain, nausea and clear vomiting. Patient checks his glucose regularly at home and it was high today. He has a history of admission to the hospital on 11/30/2021 with the DKA during which time he was intubated due to encephalopathy.   He states that he did not lose his consciousness either before or after the fall. He states that he does not think he hit his head. Although patient stated that he fell 4 times this week and did hit his head few times. Today he hit his left shoulder which is paining and there is decreased range of motion. On arrival to the ED he is awake, alert and oriented. He is afebrile and is hemodynamically stable but tachycardic with . Labs reviewed and it shows hyperglycemia with glucose at 327, elevated anion gap of 21, elevated beta hydroxybutyrate to 4.03, elevated prolactin of 35.39 and VBG shows pH of 7.43, PCO2 4019.5, PO2 60.5 and HCO3 13. CT head without contrast shows no acute intracranial abnormalities. CT cervical spine without contrast also was done which shows no acute abnormality. Chest x-ray and pelvis x-ray did not show any acute abnormalities. Internal medicine has been consulted for the management of DKA type I.    OBJECTIVE     Vital Signs:  /64   Pulse 96   Temp 97.4 °F (36.3 °C) (Oral)   Resp 12   Ht 5' 9\" (1.753 m)   Wt 146 lb 13.2 oz (66.6 kg)   SpO2 99%   BMI 21.68 kg/m²     Temp (24hrs), Av.9 °F (36.6 °C), Min:97.4 °F (36.3 °C), Max:98.3 °F (36.8 °C)    In: 1000   Out: 900 [Urine:800]    Physical Exam:  Constitutional: This is a well developed, well nourished, 18.5-24.9 - Normal 63y.o. year old male who is alert, oriented, cooperative and in no apparent distress. Head:normocephalic and atraumatic. EENT:  PERRLA. No conjunctival injections. Septum was midline, mucosa was without erythema, exudates or cobblestoning. No thrush was noted. Neck: Supple without thyromegaly. No elevated JVP. Trachea was midline. Respiratory: Chest was symmetrical without dullness to percussion. Breath sounds bilaterally were clear to auscultation. There were no wheezes, rhonchi or rales. There is no intercostal retraction or use of accessory muscles. No egophony noted. Cardiovascular: Regular without murmur, clicks, gallops or rubs. Abdomen: Slightly rounded and soft without organomegaly. No rebound, rigidity or guarding was appreciated. Lymphatic: No lymphadenopathy. Musculoskeletal: Normal curvature of the spine. No gross muscle weakness. Extremities:  No lower extremity edema, ulcerations, tenderness, varicosities or erythema. Muscle size, tone and strength are normal.  No involuntary movements are noted. Skin:  Warm and dry. Good color, turgor and pigmentation. No lesions or scars.   No cyanosis or clubbing  Neurological/Psychiatric: The patient's general behavior, level of consciousness, thought content and emotional status is normal.        Medications:  Scheduled Medications:    insulin glargine  8 Units SubCUTAneous BID    insulin lispro  2 Units SubCUTAneous TID WC    insulin lispro  0-4 Units SubCUTAneous TID WC    insulin lispro  0-4 Units SubCUTAneous Nightly    enoxaparin  40 mg SubCUTAneous Daily    lisinopril  20 mg Oral Daily    pantoprazole  40 mg Oral QAM AC     Continuous Infusions:    dextrose      insulin Stopped (12/08/22 0341)    sodium chloride Stopped (12/07/22 2159)    dextrose 5% and 0.9% NaCl with KCl 20 mEq 150 mL/hr at 12/08/22 0404     PRN Medicationsglucose, 4 tablet, PRN  glucagon (rDNA), 1 mg, PRN  dextrose, , Continuous PRN  dextrose bolus, 125 mL, PRN   Or  dextrose bolus, 250 mL, PRN  potassium chloride, 10 mEq, PRN  magnesium sulfate, 1,000 mg, PRN  sodium phosphate IVPB, 10 mmol, PRN   Or  sodium phosphate IVPB, 15 mmol, PRN   Or  sodium phosphate IVPB, 20 mmol, PRN  polyethylene glycol, 17 g, Daily PRN  dextrose 5% and 0.9% NaCl with KCl 20 mEq, , Continuous PRN        Diagnostic Labs:  CBC:   Recent Labs     12/07/22  0950 12/08/22  0743   WBC 9.6 9.5   RBC 4.02* 3.62*   HGB 11.8* 10.6*   HCT 35.9* 32.8*   MCV 89.3 90.6   RDW 13.2 13.5   PLT See Reflexed IPF Result 295     BMP:   Recent Labs     12/08/22  0005 12/08/22 0409 follow-up. Anemia-likely dilutional due to IV fluids. Fall-trauma work-up was negative. No acute signs and symptoms currently. DVT ppx: LOVENOX 40 mg SC daily  GI ppx: PROTONIX 40 mg oral daily     PT/OT: Consulted  Discharge Planning:  to assist with     This is a 63 y.o. male with a past medical history significant for type 1 diabetes mellitus, DKA with hospital admission in November 2021 requiring intubation for encephalopathy and HTN who presented with fall due to twitching of legs and abdominal pain along with nausea and vomiting and found to have diabetic ketoacidosis without coma. Patient was treated with DKA protocol. has been bridged overnight tolerating diet well. blood sugar well controlled. electrolytes within range. Neurology recommendations pending for bilateral lower extremity weakness. Patient will be possibly discharged today with outpatient neurology follow-up. Laquita Krabbe, MD  Internal Medicine Resident, PGY-1  [address] [facility], [facility]  12/8/2022, 10:39 AM     Attestation and add on       I have discussed the care of Rocío Crockett , including pertinent history and exam findings,      12/8/22    with the resident. I have seen and examined the patient and the key elements of all parts of the encounter have been performed by me . I agree with the assessment, plan and orders as documented by the resident. Most likely myoclonic jerks . These have persisted since her hospitalistion a year ago   Reduce lisinoprildose to 10 . Remy Mcintyre MD      [address], [address] [place] [address].    Phone [phone]   Fax: [phone]  Answering Service: [phone]

## 2022-12-08 NOTE — ED NOTES
Pt resting on stretcher. A&Ox4. RR even and unlabored. No distress noted. Pt denies any needs at this time. Call light within reach.       [facility]  12/07/22 2004

## 2022-12-09 VITALS
WEIGHT: 146.83 LBS | RESPIRATION RATE: 17 BRPM | BODY MASS INDEX: 21.75 KG/M2 | HEIGHT: 69 IN | HEART RATE: 81 BPM | TEMPERATURE: 98.4 F | DIASTOLIC BLOOD PRESSURE: 64 MMHG | OXYGEN SATURATION: 99 % | SYSTOLIC BLOOD PRESSURE: 131 MMHG

## 2022-12-09 PROBLEM — R27.8 SENSORY ATAXIA: Status: ACTIVE | Noted: 2022-12-09

## 2022-12-09 PROBLEM — E11.10 DKA (DIABETIC KETOACIDOSIS) (HCC): Status: RESOLVED | Noted: 2021-12-12 | Resolved: 2022-12-09

## 2022-12-09 PROBLEM — I95.1 ORTHOSTATIC HYPOTENSION: Status: RESOLVED | Noted: 2022-12-08 | Resolved: 2022-12-09

## 2022-12-09 PROBLEM — R29.898 BILATERAL LEG WEAKNESS: Status: RESOLVED | Noted: 2022-12-08 | Resolved: 2022-12-09
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
## 2022-12-09 LAB
ABSOLUTE EOS #: 0.26 K/UL (ref 0–0.44)
ABSOLUTE IMMATURE GRANULOCYTE: <0.03 K/UL (ref 0–0.3)
ABSOLUTE LYMPH #: 3.44 K/UL (ref 1.1–3.7)
ABSOLUTE MONO #: 0.63 K/UL (ref 0.1–1.2)
ANION GAP SERPL CALCULATED.3IONS-SCNC: 7 MMOL/L (ref 9–17)
BASOPHILS # BLD: 0 % (ref 0–2)
BASOPHILS ABSOLUTE: <0.03 K/UL (ref 0–0.2)
BUN BLDV-MCNC: 19 MG/DL (ref 8–23)
CALCIUM SERPL-MCNC: 8 MG/DL (ref 8.6–10.4)
CHLORIDE BLD-SCNC: 109 MMOL/L (ref 98–107)
CO2: 22 MMOL/L (ref 20–31)
CREAT SERPL-MCNC: 0.69 MG/DL (ref 0.7–1.2)
EKG ATRIAL RATE: 109 BPM
EKG P AXIS: 71 DEGREES
EKG P-R INTERVAL: 138 MS
EKG Q-T INTERVAL: 344 MS
EKG QRS DURATION: 86 MS
EKG QTC CALCULATION (BAZETT): 463 MS
EKG R AXIS: 2 DEGREES
EKG T AXIS: 46 DEGREES
EKG VENTRICULAR RATE: 109 BPM
EOSINOPHILS RELATIVE PERCENT: 4 % (ref 1–4)
GFR SERPL CREATININE-BSD FRML MDRD: >60 ML/MIN/1.73M2
GLUCOSE BLD-MCNC: 138 MG/DL (ref 75–110)
GLUCOSE BLD-MCNC: 152 MG/DL (ref 75–110)
GLUCOSE BLD-MCNC: 94 MG/DL (ref 70–99)
HCT VFR BLD CALC: 27.7 % (ref 40.7–50.3)
HEMOGLOBIN: 10.2 G/DL (ref 13–17)
IMMATURE GRANULOCYTES: 0 %
LYMPHOCYTES # BLD: 49 % (ref 24–43)
MCH RBC QN AUTO: 33.7 PG (ref 25.2–33.5)
MCHC RBC AUTO-ENTMCNC: 36.8 G/DL (ref 28.4–34.8)
MCV RBC AUTO: 91.4 FL (ref 82.6–102.9)
MONOCYTES # BLD: 9 % (ref 3–12)
NRBC AUTOMATED: 1.4 PER 100 WBC
PDW BLD-RTO: 13.9 % (ref 11.8–14.4)
PLATELET # BLD: 774 K/UL (ref 138–453)
PMV BLD AUTO: 10.4 FL (ref 8.1–13.5)
POTASSIUM SERPL-SCNC: 3.9 MMOL/L (ref 3.7–5.3)
RBC # BLD: 3.03 M/UL (ref 4.21–5.77)
SEG NEUTROPHILS: 38 % (ref 36–65)
SEGMENTED NEUTROPHILS ABSOLUTE COUNT: 2.68 K/UL (ref 1.5–8.1)
SODIUM BLD-SCNC: 138 MMOL/L (ref 135–144)
WBC # BLD: 7 K/UL (ref 3.5–11.3)

## 2022-12-09 PROCEDURE — 97530 THERAPEUTIC ACTIVITIES: CPT

## 2022-12-09 PROCEDURE — 97116 GAIT TRAINING THERAPY: CPT

## 2022-12-09 PROCEDURE — 97162 PT EVAL MOD COMPLEX 30 MIN: CPT

## 2022-12-09 PROCEDURE — 6370000000 HC RX 637 (ALT 250 FOR IP): Performed by: NURSE PRACTITIONER

## 2022-12-09 PROCEDURE — 99239 HOSP IP/OBS DSCHRG MGMT >30: CPT | Performed by: INTERNAL MEDICINE

## 2022-12-09 PROCEDURE — 85025 COMPLETE CBC W/AUTO DIFF WBC: CPT

## 2022-12-09 PROCEDURE — 99233 SBSQ HOSP IP/OBS HIGH 50: CPT | Performed by: PSYCHIATRY & NEUROLOGY

## 2022-12-09 PROCEDURE — 80048 BASIC METABOLIC PNL TOTAL CA: CPT

## 2022-12-09 PROCEDURE — 6370000000 HC RX 637 (ALT 250 FOR IP)

## 2022-12-09 PROCEDURE — 6370000000 HC RX 637 (ALT 250 FOR IP): Performed by: INTERNAL MEDICINE

## 2022-12-09 PROCEDURE — 6370000000 HC RX 637 (ALT 250 FOR IP): Performed by: STUDENT IN AN ORGANIZED HEALTH CARE EDUCATION/TRAINING PROGRAM

## 2022-12-09 PROCEDURE — 6360000002 HC RX W HCPCS

## 2022-12-09 PROCEDURE — 93010 ELECTROCARDIOGRAM REPORT: CPT | Performed by: INTERNAL MEDICINE

## 2022-12-09 PROCEDURE — 82947 ASSAY GLUCOSE BLOOD QUANT: CPT

## 2022-12-09 PROCEDURE — 36415 COLL VENOUS BLD VENIPUNCTURE: CPT

## 2022-12-09 PROCEDURE — G0108 DIAB MANAGE TRN  PER INDIV: HCPCS

## 2022-12-09 RX ORDER — INSULIN GLARGINE 100 [IU]/ML
12 INJECTION, SOLUTION SUBCUTANEOUS 2 TIMES DAILY
Qty: 10 ML | Refills: 3 | Status: SHIPPED | OUTPATIENT
Start: 2022-12-09

## 2022-12-09 RX ORDER — CLONAZEPAM 0.5 MG/1
0.5 TABLET ORAL NIGHTLY PRN
Qty: 30 TABLET | Refills: 0 | Status: SHIPPED | OUTPATIENT
Start: 2022-12-09 | End: 2023-01-08

## 2022-12-09 RX ORDER — GLUCOSAMINE HCL/CHONDROITIN SU 500-400 MG
CAPSULE ORAL
Qty: 100 STRIP | Refills: 11 | Status: SHIPPED | OUTPATIENT
Start: 2022-12-09

## 2022-12-09 RX ORDER — INSULIN ASPART 100 [IU]/ML
2 INJECTION, SOLUTION INTRAVENOUS; SUBCUTANEOUS
Qty: 1 ADJUSTABLE DOSE PRE-FILLED PEN SYRINGE | Refills: 2 | Status: SHIPPED | OUTPATIENT
Start: 2022-12-09

## 2022-12-09 RX ORDER — INSULIN GLARGINE 100 [IU]/ML
12 INJECTION, SOLUTION SUBCUTANEOUS NIGHTLY
Qty: 10 ML | Refills: 3 | Status: SHIPPED | OUTPATIENT
Start: 2022-12-09 | End: 2022-12-09 | Stop reason: SDUPTHER

## 2022-12-09 RX ORDER — BLOOD-GLUCOSE METER
1 KIT MISCELLANEOUS DAILY
Qty: 1 KIT | Refills: 0 | Status: SHIPPED | OUTPATIENT
Start: 2022-12-09

## 2022-12-09 RX ORDER — LEVETIRACETAM 500 MG/1
250 TABLET ORAL 2 TIMES DAILY
Qty: 60 TABLET | Refills: 3 | Status: SHIPPED | OUTPATIENT
Start: 2022-12-09

## 2022-12-09 RX ORDER — LISINOPRIL 10 MG/1
10 TABLET ORAL DAILY
Qty: 30 TABLET | Refills: 3 | Status: SHIPPED | OUTPATIENT
Start: 2022-12-10

## 2022-12-09 RX ORDER — ASPIRIN 81 MG/1
81 TABLET, CHEWABLE ORAL DAILY
Qty: 30 TABLET | Refills: 3 | Status: SHIPPED | OUTPATIENT
Start: 2022-12-10

## 2022-12-09 RX ORDER — ATORVASTATIN CALCIUM 40 MG/1
40 TABLET, FILM COATED ORAL NIGHTLY
Qty: 30 TABLET | Refills: 3 | Status: SHIPPED | OUTPATIENT
Start: 2022-12-09

## 2022-12-09 RX ORDER — LANCETS 30 GAUGE
EACH MISCELLANEOUS
Qty: 100 EACH | Refills: 11 | Status: SHIPPED | OUTPATIENT
Start: 2022-12-09

## 2022-12-09 RX ADMIN — INSULIN LISPRO 2 UNITS: 100 INJECTION, SOLUTION INTRAVENOUS; SUBCUTANEOUS at 12:00

## 2022-12-09 RX ADMIN — PALIPERIDONE 3 MG: 3 TABLET, EXTENDED RELEASE ORAL at 07:48

## 2022-12-09 RX ADMIN — ASPIRIN 81 MG: 81 TABLET, CHEWABLE ORAL at 07:48

## 2022-12-09 RX ADMIN — ENOXAPARIN SODIUM 40 MG: 100 INJECTION SUBCUTANEOUS at 07:46

## 2022-12-09 RX ADMIN — PANTOPRAZOLE SODIUM 40 MG: 40 TABLET, DELAYED RELEASE ORAL at 06:33

## 2022-12-09 RX ADMIN — INSULIN LISPRO 2 UNITS: 100 INJECTION, SOLUTION INTRAVENOUS; SUBCUTANEOUS at 07:48

## 2022-12-09 RX ADMIN — BENZTROPINE MESYLATE 1 MG: 1 TABLET ORAL at 07:47

## 2022-12-09 RX ADMIN — DULOXETINE HYDROCHLORIDE 60 MG: 30 CAPSULE, DELAYED RELEASE ORAL at 07:47

## 2022-12-09 RX ADMIN — LISINOPRIL 10 MG: 10 TABLET ORAL at 07:46

## 2022-12-09 RX ADMIN — BENZTROPINE MESYLATE 1 MG: 1 TABLET ORAL at 14:31

## 2022-12-09 RX ADMIN — INSULIN GLARGINE 8 UNITS: 100 INJECTION, SOLUTION SUBCUTANEOUS at 07:48

## 2022-12-09 ASSESSMENT — ENCOUNTER SYMPTOMS
NAUSEA: 0
VOICE CHANGE: 0
VOMITING: 0
PHOTOPHOBIA: 0

## 2022-12-09 NOTE — PROGRESS NOTES
CLINICAL PHARMACY NOTE: MEDS TO BEDS    Total # of Prescriptions Filled: 10   The following medications were delivered to the patient:  Aspirin  Atorvastatin  Pen needles  Lancets  Test strips  Glucometer  Lantus  Novolog  Clonazepam  levetiracetam    Additional Documentation:

## 2022-12-09 NOTE — PROGRESS NOTES
[facility]  Internal Medicine Teaching Residency Program  Inpatient Daily Progress Note  ______________________________________________________________________________    Patient: Scottie Chan  YOB: 1960   KAYLYNN:[id number]    Acct: [de-identified]     Room: [address] [place] [address]  Admit date: 12/7/2022  Today's date: 12/09/22  Number of days in the hospital: 2    SUBJECTIVE   Admitting Diagnosis: Type 1 diabetes mellitus with ketoacidosis without coma ([facility] [address]  CC: Fall  Pt examined at bedside. Chart & results reviewed. No acute event overnight  Remained afebrile and hemodynamically stable  Has been bridged with subcu insulin. Eating diet-tolerating well. No weakness in extremities currently-power 5/5. No more myoclonic jerks. Will be discharged today possibly with outpatient neurology follow-up. ROS:  Constitutional:  negative for chills, fevers, sweats  Respiratory:  negative for cough, dyspnea on exertion, hemoptysis, shortness of breath, wheezing  Cardiovascular:  negative for chest pain, chest pressure/discomfort, lower extremity edema, palpitations  Gastrointestinal:  negative for abdominal pain, constipation, diarrhea, nausea, vomiting  Neurological:  negative for dizziness, headache    BRIEF HISTORY     The patient is a pleasant 63 y.o. male with a past medical history significant for type 1 diabetes mellitus and HTN presents with a chief complaint of fall at home today morning. Patient states that he was going to give himself insulin but he felt weakness in his legs, and reported that this started twitching and shaking vigorously to a point where he was not able to stand on them anymore leading to fall. Patient also reported having abdominal pain, nausea and clear vomiting. Patient checks his glucose regularly at home and it was high today.   He has a history of admission to the hospital on 11/30/2021 with the DKA during which time he was intubated due to encephalopathy. He states that he did not lose his consciousness either before or after the fall. He states that he does not think he hit his head. Although patient stated that he fell 4 times this week and did hit his head few times. Today he hit his left shoulder which is paining and there is decreased range of motion. On arrival to the ED he is awake, alert and oriented. He is afebrile and is hemodynamically stable but tachycardic with . Labs reviewed and it shows hyperglycemia with glucose at 327, elevated anion gap of 21, elevated beta hydroxybutyrate to 4.03, elevated prolactin of 35.39 and VBG shows pH of 7.43, PCO2 4019.5, PO2 60.5 and HCO3 13. CT head without contrast shows no acute intracranial abnormalities. CT cervical spine without contrast also was done which shows no acute abnormality. Chest x-ray and pelvis x-ray did not show any acute abnormalities. Internal medicine has been consulted for the management of DKA type I.    22- no more myoclonic jerks , evaluated by neurology and pending Discharge today    OBJECTIVE     Vital Signs:  /64   Pulse 81   Temp 98.4 °F (36.9 °C) (Oral)   Resp 17   Ht 5' 9\" (1.753 m)   Wt 146 lb 13.2 oz (66.6 kg)   SpO2 99%   BMI 21.68 kg/m²     Temp (24hrs), Av.3 °F (36.8 °C), Min:98.1 °F (36.7 °C), Max:98.4 °F (36.9 °C)    In: -   Out: 1750 [Urine:1750]    Physical Exam:  Constitutional: This is a well developed, well nourished, 18.5-24.9 - Normal 63y.o. year old male who is alert, oriented, cooperative and in no apparent distress. Head:normocephalic and atraumatic. EENT:  PERRLA. No conjunctival injections. Septum was midline, mucosa was without erythema, exudates or cobblestoning. No thrush was noted. Neck: Supple without thyromegaly. No elevated JVP. Trachea was midline. Respiratory: Chest was symmetrical without dullness to percussion. Breath sounds bilaterally were clear to auscultation.  There were no wheezes, rhonchi or rales. There is no intercostal retraction or use of accessory muscles. No egophony noted. Cardiovascular: Regular without murmur, clicks, gallops or rubs. Abdomen: Slightly rounded and soft without organomegaly. No rebound, rigidity or guarding was appreciated. Lymphatic: No lymphadenopathy. Musculoskeletal: Normal curvature of the spine. No gross muscle weakness. Extremities:  No lower extremity edema, ulcerations, tenderness, varicosities or erythema. Muscle size, tone and strength are normal.  No involuntary movements are noted. Skin:  Warm and dry. Good color, turgor and pigmentation. No lesions or scars.   No cyanosis or clubbing  Neurological/Psychiatric: The patient's general behavior, level of consciousness, thought content and emotional status is normal.        Medications:  Scheduled Medications:      insulin glargine  8 Units SubCUTAneous BID    insulin lispro  2 Units SubCUTAneous TID WC    insulin lispro  0-4 Units SubCUTAneous TID WC    insulin lispro  0-4 Units SubCUTAneous Nightly    aspirin  81 mg Oral Daily    atorvastatin  40 mg Oral Nightly    DULoxetine  60 mg Oral Daily    benztropine  1 mg Oral TID    paliperidone  3 mg Oral QAM    lisinopril  10 mg Oral Daily    enoxaparin  40 mg SubCUTAneous Daily    pantoprazole  40 mg Oral QAM AC     Continuous Infusions:    dextrose      sodium chloride Stopped (12/07/22 2159)    dextrose 5% and 0.9% NaCl with KCl 20 mEq 150 mL/hr at 12/08/22 0404     PRN Medicationsglucose, 4 tablet, PRN  glucagon (rDNA), 1 mg, PRN  dextrose, , Continuous PRN  dextrose bolus, 125 mL, PRN   Or  dextrose bolus, 250 mL, PRN  potassium chloride, 10 mEq, PRN  magnesium sulfate, 1,000 mg, PRN  sodium phosphate IVPB, 10 mmol, PRN   Or  sodium phosphate IVPB, 15 mmol, PRN   Or  sodium phosphate IVPB, 20 mmol, PRN  polyethylene glycol, 17 g, Daily PRN  dextrose 5% and 0.9% NaCl with KCl 20 mEq, , Continuous PRN      Diagnostic Labs:  CBC: Recent Labs     12/07/22  0950 12/08/22  0743 12/09/22  0514   WBC 9.6 9.5 7.0   RBC 4.02* 3.62* 3.03*   HGB 11.8* 10.6* 10.2*   HCT 35.9* 32.8* 27.7*   MCV 89.3 90.6 91.4   RDW 13.2 13.5 13.9   PLT See Reflexed IPF Result 295 774*     BMP:   Recent Labs     12/08/22  0743 12/08/22  1157 12/08/22  1608 12/09/22  0514    134* 136 138   K 3.8 4.3 4.4 3.9   * 105 106 109*   CO2 20 22 22 22   PHOS 2.8 2.7 2.6  --    BUN 23 24* 24* 19   CREATININE 0.82 1.01 0.87 0.69*     BNP: No results for input(s): BNP in the last 72 hours. PT/INR: No results for input(s): PROTIME, INR in the last 72 hours. APTT: No results for input(s): APTT in the last 72 hours. CARDIAC ENZYMES: No results for input(s): CKMB, CKMBINDEX, TROPONINI in the last 72 hours. Invalid input(s): CKTOTAL;3  FASTING LIPID PANEL:  Lab Results   Component Value Date    HDL 50 10/28/2022     LIVER PROFILE: No results for input(s): AST, ALT, ALB, BILIDIR, BILITOT, ALKPHOS in the last 72 hours. MICROBIOLOGY:   Lab Results   Component Value Date/Time    CULTURE NO GROWTH 2 DAYS 12/07/2022 01:40 PM       Imaging:    XR PELVIS (1-2 VIEWS)    Result Date: 12/7/2022  No acute fracture or dislocation. CT HEAD WO CONTRAST    Result Date: 12/7/2022  No acute intracranial abnormality. CT CERVICAL SPINE WO CONTRAST    Result Date: 12/7/2022  No acute abnormality of the cervical spine. XR SHOULDER LEFT (MIN 2 VIEWS)    Result Date: 12/7/2022  No acute osseous abnormality in the left shoulder. Chronic healed fracture deformity of the proximal left humerus. XR CHEST PORTABLE    Result Date: 12/7/2022  No acute cardiopulmonary findings       ASSESSMENT & PLAN     ASSESSMENT / PLAN:      Type 1 diabetes mellitus with ketoacidosis without coma: Treated with DKA protocol. Has been bridged overnight. Tolerating diet well. We will replace electrolytes potassium, magnesium, phosphorus as needed.   Lantus 8 units BID increased to home dose of 12 on discharge. injury of left shoulder: X-ray left shoulder has been taken which shows no acute fractures. He does have chronic healed fracture deformity of the proximal left humerus. Bilateral lower extremity weakness-has resolved patient has 5/5 strength. Neurology has been consulted recommendations pending. Will be possibly discharged with outpatient neurology follow-up. Neuurology eval for myoclonic jerks appreciate recommendations. Anemia-likely dilutional due to IV fluids. Fall-trauma work-up was negative. No acute signs and symptoms currently. DVT ppx: LOVENOX 40 mg SC daily  GI ppx: PROTONIX 40 mg oral daily     PT/OT: Consulted  Discharge Planning:  to assist with     This is a 63 y.o. male with a past medical history significant for type 1 diabetes mellitus, DKA with hospital admission in November 2021 requiring intubation for encephalopathy and HTN who presented with fall and abdominal pain along with nausea and vomiting and found to have diabetic ketoacidosis without coma. Patient was treated with DKA protocol. has been bridged overnight tolerating diet well. blood sugar well controlled. electrolytes within range. Neurology recommendations pending for bilateral lower extremity weakness and myoclonic jerks Patient will be possibly discharged today with outpatient neurology follow-up. George Johnson MD  Internal Medicine Resident, PGY-3  [facility];  [place], [place]  12/9/2022, 3:01 PM

## 2022-12-09 NOTE — PROGRESS NOTES
Writer returned pt spouse, Phyllis's phone call;she was inquiring about the meds & dosages her  received this morning;she also stated that he needs diabetic education as he does not eat healthy & snacks all the time

## 2022-12-09 NOTE — CONSULTS
[facility] Neurology   [address] [facility]    Progress Note             Date:   12/9/2022  Patient name:  Lencho Funes  Date of admission:  12/7/2022  8:50 AM  MRN:   [id number]  Account:  [de-identified]  YOB: 1960  PCP:    Deann Guajardo  Room:   0402/0402-01  Code Status:    Full Code    Chief Complaint:     Chief Complaint   Patient presents with    Fall    Blood Sugar Problem    Other     Hypotension per EMS           Brief History of Present Illness:     61-year-old male with past medical history of hypertension,IDDM,, bipolar disorder admitted on 12/7 with concerns of fall and seizure. he also reported intermittent weakness of bilateral lower extremities leading to fall. Denies any loss of consciousness or head injury with these falls. he also reported back pain with occasional groin pain but denied bladder or bowel dysfunction. He also reported intermittent twitching of his lower extremities. No tongue bite or bladder incontinence was noticed. No witnessed staring spells or witnessed seizure-like activity. CT head without contrast was unremarkable for any acute abnormality. As per patient these episodes happen only when standing up and it causes a burning in his hip area of falling and unsteadiness due to his burning sensation and sometimes results in a fall. Most likely concern of diabetic ketoacidosis causing encephalopathy with orthostatic hypotension. Advised compression stockings and mild hydration and also orthostatic precautions. MRI brain without contrast was negative for any acute infarct. It only showed late subacute appearing lacunar infarct in the left corpus stratum but upon my review did not demonstrate any significant abnormal lesions. But because of comorbid risk factors for stroke patient is started on aspirin and atorvastatin for secondary stroke prevention. 12/9: Neurology was requested to reevaluate patient.   Gege noticed patient felt weakness in his legs and reported that this started twitching and shaking vigorously to point that he was not able to stand. Past Medical History:     Past Medical History:   Diagnosis Date    Diabetes mellitus ([address]     Hypertension         Past Surgical History:     History reviewed. No pertinent surgical history. Medications Prior to Admission:     Prior to Admission medications    Medication Sig Start Date End Date Taking? Authorizing Provider   benztropine (COGENTIN) 1 MG tablet Take 1 mg by mouth 3 times daily 11/4/22 11/4/23 Yes Historical Provider, MD   Cyanocobalamin 1000 MCG SUBL Place 1,000 mcg under the tongue daily 5/6/22  Yes Historical Provider, MD   DULoxetine (CYMBALTA) 60 MG extended release capsule Take 60 mg by mouth daily 11/8/22  Yes Historical Provider, MD   ergocalciferol (ERGOCALCIFEROL) 1.25 MG (96656 UT) capsule Take 1 capsule by mouth once a week 5/20/22  Yes Historical Provider, MD   paliperidone (INVEGA) 3 MG extended release tablet Take 3 mg by mouth every morning 11/22/22 12/22/22 Yes Historical Provider, MD   traZODone (DESYREL) 50 MG tablet Take 50 mg by mouth nightly as needed 11/21/22 12/21/22 Yes Historical Provider, MD   omeprazole (PRILOSEC) 40 MG delayed release capsule Take 40 mg by mouth daily    Historical Provider, MD   lisinopril (PRINIVIL;ZESTRIL) 20 MG tablet Take 1 tablet by mouth daily  Patient taking differently: Take 5 mg by mouth daily 12/14/21   Cary Huddleston MD   NOVOLOG FLEXPEN 100 UNIT/ML injection pen Inject 2 Units into the skin 3 times daily (before meals) 12/13/21   Cary Huddleston MD   LANTUS 100 UNIT/ML injection vial 12 Units 2 times daily  8/19/21      sildenafil (VIAGRA) 100 MG tablet  8/19/21   Historical Provider, MD        Allergies:     Patient has no known allergies. Social History:     Tobacco:    reports that he has never smoked.  He has never used smokeless tobacco.  Alcohol:      reports that he does not currently use alcohol. Drug Use:  reports current drug use. Drug: Marijuana Ce Esparza). Family History:     History reviewed. No pertinent family history. Review of Systems:     Review of Systems   Constitutional:  Negative for fatigue. HENT:  Negative for voice change. Eyes:  Negative for photophobia and visual disturbance. Gastrointestinal:  Negative for nausea and vomiting. Endocrine: Negative for polyuria. Musculoskeletal:  Positive for gait problem. Negative for neck stiffness. Neurological:  Negative for dizziness, tremors, facial asymmetry, speech difficulty, weakness, numbness and headaches. Psychiatric/Behavioral:  Negative for confusion and decreased concentration.       Physical Exam:   /65   Pulse 86   Temp 98.4 °F (36.9 °C) (Oral)   Resp 17   Ht 5' 9\" (1.753 m)   Wt 146 lb 13.2 oz (66.6 kg)   SpO2 99%   BMI 21.68 kg/m²   Temp (24hrs), Av.3 °F (36.8 °C), Min:98.1 °F (36.7 °C), Max:98.4 °F (36.9 °C)    Recent Labs     22  1143 22  1640 22  2046 22  0744   POCGLU 194* 268* 241* 138*       Intake/Output Summary (Last 24 hours) at 2022 1100  Last data filed at 2022 0743  Gross per 24 hour   Intake --   Output 1750 ml   Net -1750 ml         Neurologic Exam     GENERAL  Appears comfortable and in no distress   HEENT  NC/ AT   HEART  S1 and S2 heard; palpation of pulses: radial pulse    NECK  Supple and no bruits heard   MENTAL STATUS:  Alert but slow to respond, oriented, intact memory, no confusion, normal speech, normal language, no hallucination or delusion   CRANIAL NERVES: II     -      Visual fields intact to confrontation  III,IV,VI -  PERR, EOMs full, no ptosis  V     -     Normal facial sensation   VII    -     Normal facial symmetry  VIII   -     Intact hearing   IX,X -     Symmetrical palate  XI    -     Symmetrical shoulder shrug  XII   -     Midline tongue, no atrophy    MOTOR FUNCTION: RUE: Significant for good strength of grade 5/5 in proximal and distal muscle groups   LUE: Significant for good strength of grade 5/5 in proximal and distal muscle groups   RLE: Significant for good strength of grade 5/5 in proximal and distal muscle groups   LLE: Significant for good strength of grade 5/5 in proximal and distal muscle groups      Normal bulk, normal tone and no involuntary movements, no tremor   SENSORY FUNCTION:  Normal touch, normal pinprick, normal vibration, normal proprioception   CEREBELLAR FUNCTION:  Intact fine motor control over upper limbs and lower limbs   REFLEX FUNCTION:  Symmetric in upper and lower extremities, no Babinski sign   STATION and GAIT  Normal gait but slow pace and tandem station, normal tip toes and heel walking       Investigations:      Laboratory Testing:  Recent Results (from the past 24 hour(s))   POC Glucose Fingerstick    Collection Time: 12/08/22 11:43 AM   Result Value Ref Range    POC Glucose 194 (H) 75 - 110 mg/dL   Basic Metabolic Panel    Collection Time: 12/08/22 11:57 AM   Result Value Ref Range    Glucose 214 (H) 70 - 99 mg/dL    BUN 24 (H) 8 - 23 mg/dL    Creatinine 1.01 0.70 - 1.20 mg/dL    Est, Glom Filt Rate >60 >60 mL/min/1.73m2    Calcium 8.1 (L) 8.6 - 10.4 mg/dL    Sodium 134 (L) 135 - 144 mmol/L    Potassium 4.3 3.7 - 5.3 mmol/L    Chloride 105 98 - 107 mmol/L    CO2 22 20 - 31 mmol/L    Anion Gap 7 (L) 9 - 17 mmol/L   Magnesium    Collection Time: 12/08/22 11:57 AM   Result Value Ref Range    Magnesium 1.9 1.6 - 2.6 mg/dL   Phosphorus    Collection Time: 12/08/22 11:57 AM   Result Value Ref Range    Phosphorus 2.7 2.5 - 4.5 mg/dL   Basic Metabolic Panel    Collection Time: 12/08/22  4:08 PM   Result Value Ref Range    Glucose 275 (H) 70 - 99 mg/dL    BUN 24 (H) 8 - 23 mg/dL    Creatinine 0.87 0.70 - 1.20 mg/dL    Est, Glom Filt Rate >60 >60 mL/min/1.73m2    Calcium 8.2 (L) 8.6 - 10.4 mg/dL    Sodium 136 135 - 144 mmol/L    Potassium 4.4 3.7 - 5.3 mmol/L    Chloride 106 98 - 107 mmol/L    CO2 22 20 - 31 mmol/L    Anion Gap 8 (L) 9 - 17 mmol/L   Magnesium    Collection Time: 12/08/22  4:08 PM   Result Value Ref Range    Magnesium 1.8 1.6 - 2.6 mg/dL   Phosphorus    Collection Time: 12/08/22  4:08 PM   Result Value Ref Range    Phosphorus 2.6 2.5 - 4.5 mg/dL   Vitamin B12 & Folate    Collection Time: 12/08/22  4:08 PM   Result Value Ref Range    Vitamin B-12 >2000 (H) 232 - 1245 pg/mL    Folate 6.8 >4.8 ng/mL   POC Glucose Fingerstick    Collection Time: 12/08/22  4:40 PM   Result Value Ref Range    POC Glucose 268 (H) 75 - 110 mg/dL   POC Glucose Fingerstick    Collection Time: 12/08/22  8:46 PM   Result Value Ref Range    POC Glucose 241 (H) 75 - 110 mg/dL   CBC with Auto Differential    Collection Time: 12/09/22  5:14 AM   Result Value Ref Range    WBC 7.0 3.5 - 11.3 k/uL    RBC 3.03 (L) 4.21 - 5.77 m/uL    Hemoglobin 10.2 (L) 13.0 - 17.0 g/dL    Hematocrit 27.7 (L) 40.7 - 50.3 %    MCV 91.4 82.6 - 102.9 fL    MCH 33.7 (H) 25.2 - 33.5 pg    MCHC 36.8 (H) 28.4 - 34.8 g/dL    RDW 13.9 11.8 - 14.4 %    Platelets 179 (H) 066 - 453 k/uL    MPV 10.4 8.1 - 13.5 fL    NRBC Automated 1.4 (H) 0.0 per 100 WBC    Seg Neutrophils 38 36 - 65 %    Lymphocytes 49 (H) 24 - 43 %    Monocytes 9 3 - 12 %    Eosinophils % 4 1 - 4 %    Basophils 0 0 - 2 %    Immature Granulocytes 0 0 %    Segs Absolute 2.68 1.50 - 8.10 k/uL    Absolute Lymph # 3.44 1.10 - 3.70 k/uL    Absolute Mono # 0.63 0.10 - 1.20 k/uL    Absolute Eos # 0.26 0.00 - 0.44 k/uL    Basophils Absolute <0.03 0.00 - 0.20 k/uL    Absolute Immature Granulocyte <0.03 0.00 - 0.30 k/uL   Basic Metabolic Panel w/ Reflex to MG    Collection Time: 12/09/22  5:14 AM   Result Value Ref Range    Glucose 94 70 - 99 mg/dL    BUN 19 8 - 23 mg/dL    Creatinine 0.69 (L) 0.70 - 1.20 mg/dL    Est, Glom Filt Rate >60 >60 mL/min/1.73m2    Calcium 8.0 (L) 8.6 - 10.4 mg/dL    Sodium 138 135 - 144 mmol/L    Potassium 3.9 3.7 - 5.3 mmol/L    Chloride 109 (H) 98 - 107 mmol/L    CO2 22 20 - 31 mmol/L    Anion Gap 7 (L) 9 - 17 mmol/L   POC Glucose Fingerstick    Collection Time: 12/09/22  7:44 AM   Result Value Ref Range    POC Glucose 138 (H) 75 - 110 mg/dL     Recent Labs     12/09/22  0514   WBC 7.0   RBC 3.03*   HGB 10.2*   HCT 27.7*   MCV 91.4   MCH 33.7*   MCHC 36.8*   RDW 13.9   *   MPV 10.4     Recent Labs     12/09/22  0514      K 3.9   *   CO2 22   BUN 19   CREATININE 0.69*   GLUCOSE 94   CALCIUM 8.0*     Hemoglobin A1C   Date Value Ref Range Status   12/07/2022 11.9 (H) 4.0 - 6.0 % Final       Assessment :      Primary Problem  Type 1 diabetes mellitus with ketoacidosis without coma [facility])    Active Hospital Problems    Diagnosis Date Noted    Bilateral leg weakness [R29.898] 12/08/2022     Priority: Medium    Other specified anemias [D64.89] 12/08/2022     Priority: Medium    Orthostatic hypotension [I95.1] 12/08/2022     Priority: Medium    Abnormal finding on MRI of brain [R90.89] 12/08/2022     Priority: Medium    Injury of left shoulder [S49.92XA] 12/07/2022     Priority: Medium    Fall [W19. XXXA] 12/07/2022     Priority: Medium    Bipolar 1 disorder, mixed, moderate ([address] [F31.62] 11/16/2022     Priority: Medium    DKA (diabetic ketoacidosis) ([address] [E11.10] 12/12/2021    Moderate malnutrition ([address] [E44.0] 12/06/2021     Class: Chronic    Type 1 diabetes mellitus with ketoacidosis without coma ([address] [E10.10]        Patient is a 63 y.o. male past medical history of hypertension,IDDM,, bipolar disorder admitted on 12/7 with concerns of fall. Recurrent falls  Orthostatic hypotension  Encephalopathy in the setting of diabetic ketoacidosis, resolved  Late subacute lacunar infarct in the left corpus stratum  Negative for any tremors    Plan:     CT head: No acute intracranial abnormalities. CT cervical spine: No acute abnormality of the cervical spine. X-ray chest: No acute cardiopulmonary findings. MRI BRAIN (12/8/22) -was negative for any acute infarct.   It only showed late subacute appearing lacunar infarct in the left corpus stratum but upon my review did not demonstrate any significant abnormal lesions. Orthostatic blood pressure a positive for hypertension recommend slow positional changes, adequate fluid hydration and compression stockings. Given comorbid risk factors for stroke will initiate aspirin and Lipitor  PT/OT eval  No additional work-up recommended from neurology    Follow-up further recommendations after discussing the case with attending  The plan was discussed with the patient, patient's family and the medical staff. Consultations:   IP CONSULT TO INTERNAL MEDICINE  IP CONSULT TO NEUROLOGY  IP CONSULT TO DIABETES EDUCATOR  IP CONSULT TO PSYCHOLOGY  IP CONSULT TO DIABETES EDUCATOR    Patient is admitted as inpatient status because of co-morbidities listed above, severity of signs and symptoms as outlined, requirement for current medical therapies and most importantly because of direct risk to patient if care not provided in a hospital setting.     Monique Diaz MD  Neurology Resident PGY-3   12/9/2022  11:00 AM    Copy sent to Dr. Scotty Main

## 2022-12-09 NOTE — PROGRESS NOTES
Inpatient Diabetes Education    Lab Results   Component Value Date    LABA1C 11.9 (H) 12/07/2022    LABA1C 12.0 (H) 10/28/2022    LABA1C 11.6 (H) 12/05/2021     Christianne Conde was seen for follow up. Please see previous note from 12-7-22 for more information. I let the patient know that his A1c is now resulted and that his current value is 11.9%. I also let him know that the lantus dosing on his discharge summary is 12 units bid. He verbalized understanding. [facility] to beds delivery came to deliver medications and glucometer while I was in the room. They were unable to leave the delivery but states that they will return. The plan is that his wife will bring the approximately $2 copay with her at time of pt discharge. Patient does use a Dexcom CGM but additionally knows how to use a glucometer. We spoke more in depth about Dexcom CGM. I gave him a list of helpful videos to refer to. The patient states that he may set it up so that his wife is able to see his BG readings as well. I gave him the website and encouraged him to call their tech support prn. Verbally reviewed following information with patient - again reviewed survival skills with patient. Survival skills Diagnosis, A1C, Blood glucose targets, hypo and hyperglycemia, importance of home blood glucose monitoring, heathy eating  plate method for CHO control portions, be active as recommended by health care providers, take insulin as directed        Following Support materials were provided for patient to take home:  __X_ added Dexcom video handout to his previously given Diabetes Education Folder    Patient verbalizes understanding  of survival skills education. Diabetes Education / HCP follow -up:   _x_ Would recommend follow -up education at outpatient diabetes education at [place] [address]. An ordered is needed for this service and can be placed via EHR.  Discharge Navigator --- Med Reconciliation -- New orders for discharge tab -- search diabetic ed - REF20 -  [facility]  - review and sign     __X_ Follow -up with HCP / PCP within one week.     GAGAN WRIGHT RN

## 2022-12-09 NOTE — CARE COORDINATION
[place] [facility]  Written by: Taz Lee RN    Patient Name: Matheus Munson  Attending Provider: Jenny Brandt MD  Admit Date: 2022  8:50 AM  MRN: [id number]  Account: [de-identified]                     : 1960  Discharge Date: 22      Disposition: home    Met with patient to discuss transitional planning. Plan is home with spouse. Patient also has order for rollator walker. Patient requests order to be faxed to [employer] for home delivery. Orders faxed to [employer] as requested and patient instructed to call [facility] to arrange for delivery of rollator when he gets home. Patient will need cab ride home as he does not have transportation services through [facility] and his wife is disabled and does not drive.      Taz Lee RN

## 2022-12-09 NOTE — DISCHARGE INSTR - COC
Continuity of Care Form    Patient Name: Chioma Whitehead   :  1960  MRN:  [id number]    Admit date:  2022  Discharge date:  ***    Code Status Order: Full Code   Advance Directives:     Admitting Physician:  Cody Knapp MD  PCP: Faustino Pereira    Discharging Nurse: [facility] Unit/Room#: 7019/6522-25  Discharging Unit Phone Number: ***    Emergency Contact:   Extended Emergency Contact Information  Primary Emergency Contact: [address] [facility] Phone: [phone]  Mobile Phone: [phone]  Relation: Spouse    Past Surgical History:  History reviewed. No pertinent surgical history. Immunization History:   Immunization History   Administered Date(s) Administered    COVID-19, MODERNA BLUE border, Primary or Immunocompromised, (age 12y+), IM, 100 mcg/0.5mL 2021, 2021    Influenza, FLUARIX, FLULAVAL, FLUZONE (age 10 mo+) AND AFLURIA, (age 1 y+), PF, 0.5mL 10/26/2017, 2019, 2020, 10/16/2021    Pneumococcal Polysaccharide (Oghlqefor13) 02/10/2011    Tdap (Boostrix, Adacel) 02/10/2011, 2022       Active Problems:  Patient Active Problem List   Diagnosis Code    Type 1 diabetes mellitus with ketoacidosis without coma ([facility] [address] E10.10    Moderate malnutrition (HCC) E44.0    Acute respiratory failure with hypoxia (HCC) X27.18    Metabolic encephalopathy Z39.19    Pneumonia of left lower lobe due to infectious organism J18.9    Hypernatremia E87.0    Bipolar 1 disorder, mixed, moderate (HCC) F31.62    Cannabis use disorder F12.90    Injury of left shoulder S49. 92XA    Other specified anemias D64.89    Abnormal finding on MRI of brain R90.89       Isolation/Infection:   Isolation            No Isolation          Patient Infection Status       Infection Onset Added Last Indicated Last Indicated By Review Planned Expiration Resolved Resolved By    None active    Resolved    COVID-19 (Rule Out) 21 COVID-19, Rapid (Ordered)   21 Rule-Out Test Resulted Nurse Assessment:  Last Vital Signs: /64   Pulse 81   Temp 98.4 °F (36.9 °C) (Oral)   Resp 17   Ht 5' 9\" (1.753 m)   Wt 146 lb 13.2 oz (66.6 kg)   SpO2 99%   BMI 21.68 kg/m²     Last documented pain score (0-10 scale):    Last Weight:   Wt Readings from Last 1 Encounters:   12/07/22 146 lb 13.2 oz (66.6 kg)     Mental Status:  {IP PT MENTAL STATUS:20030}    IV Access:  { AISLINN IV ACCESS:988876214}    Nursing Mobility/ADLs:  Walking   {CHP DME IFAZ:997007799}  Transfer  {CHP DME BRPA:977575005}  Bathing  {CHP DME ZMVR:965883553}  Dressing  {CHP DME AWJB:013061098}  Toileting  {CHP DME OEBX:516784298}  Feeding  {CHP DME EQSP:519666362}  Med Admin  {CHP DME EMZV:847170010}  Med Delivery   { AISLINN MED Delivery:682609853}    Wound Care Documentation and Therapy:  Wound 12/04/21 Coccyx (Active)   Number of days: 369        Elimination:  Continence:    Bowel: {YES / IQ:77828}  Bladder: {YES / UT:59116}  Urinary Catheter: {Urinary Catheter:291517113}   Colostomy/Ileostomy/Ileal Conduit: {YES / FP:41902}       Date of Last BM: ***    Intake/Output Summary (Last 24 hours) at 12/9/2022 1442  Last data filed at 12/9/2022 0743  Gross per 24 hour   Intake --   Output 1750 ml   Net -1750 ml     I/O last 3 completed shifts:  In: -   Out: 1200 [Urine:1200]    Safety Concerns:     [address] Was it Filmed Safety Concerns:744097568}    Impairments/Disabilities:      [address] Where Was it [address]med Impairments/Disabilities:707964598}    Nutrition Therapy:  Current Nutrition Therapy:   [address] Was it [address]med Diet List:981826190}    Routes of Feeding: {CHP DME Other Feedings:138687791}  Liquids: {Slp liquid thickness:63207}  Daily Fluid Restriction: {CHP DME Yes amt example:029161653}  Last Modified Barium Swallow with Video (Video Swallowing Test): {Done Not Done TBSF:217525214}    Treatments at the Time of Hospital Discharge:   Respiratory Treatments: ***  Oxygen Therapy:  {Therapy; copd oxygen:22087}  Ventilator:    {MH CC Vent MLZZ:928578332}    Rehab Therapies: {THERAPEUTIC INTERVENTION:5970928832}  Weight Bearing Status/Restrictions: 50Tristin Childs CC Weight Bearin}  Other Medical Equipment (for information only, NOT a DME order):  {EQUIPMENT:261391492}  Other Treatments: ***    Patient's personal belongings (please select all that are sent with patient):  {CHP DME Belongings:172803228}    RN SIGNATURE:  {Esignature:215791868}    CASE MANAGEMENT/SOCIAL WORK SECTION    Inpatient Status Date: ***    Readmission Risk Assessment Score:  Readmission Risk              Risk of Unplanned Readmission:  29           Discharging to Facility/ Agency   Name:   Address:  Phone:  Fax:    Dialysis Facility (if applicable)   Name:  Address:  Dialysis Schedule:  Phone:  Fax:    / signature: {Esignature:429445254}    PHYSICIAN SECTION    Prognosis: {Prognosis:2967742078}    Condition at Discharge: Willis Childs Patient Condition:345723305}    Rehab Potential (if transferring to Rehab): {Prognosis:3916166440}    Recommended Labs or Other Treatments After Discharge: ***    Physician Certification: I certify the above information and transfer of Cena Bosworth  is necessary for the continuing treatment of the diagnosis listed and that he requires {Admit to Appropriate Level of Care:27132} for {GREATER/LESS:523178277} 30 days.      Update Admission H&P: {CHP DME Changes in ZGD:742588521}    PHYSICIAN SIGNATURE:  {Esignature:061314463}

## 2022-12-09 NOTE — PLAN OF CARE
Patsy Chu was evaluated today and a DME order was entered for a wheeled walker with seat because he requires this to successfully complete daily living tasks of eating, bathing, toileting, personal cares, ambulating, and meal preparation. A wheeled walker with seat is necessary due to the patient's unsteady gait, upper body weakness, inability to  and ambulation device, ambulating only short distances by pushing a walker, and the need to sit for a short time before resuming ambulation. These tasks cannot be completed with a lesser ambulation device such as a cane, crutch, or standard walker. The need for this equipment was discussed with the patient and he understands and is in agreement.

## 2022-12-09 NOTE — PROGRESS NOTES
Physical Therapy  Facility/Department: [facility]  Physical Therapy Initial Assessment    Name: Nena La  : 1960  MRN: [id number]  Date of Service: 2022  Chief Complaint   Patient presents with    Fall    Blood Sugar Problem    Other     Hypotension per EMS      Discharge Recommendations:  Patient would benefit from continued therapy after discharge   PT Equipment Recommendations  Equipment Needed: Yes  Mobility Devices: Manny Critz: Rolling      Patient Diagnosis(es): The primary encounter diagnosis was Diabetic ketoacidosis without coma associated with type 1 diabetes mellitus ([facility] [address] A diagnosis of Fall, initial encounter was also pertinent to this visit. Past Medical History:  has a past medical history of Diabetes mellitus ([facility] [address] and Hypertension. Past Surgical History:  has no past surgical history on file. Assessment   Body Structures, Functions, Activity Limitations Requiring Skilled Therapeutic Intervention: Decreased functional mobility ; Decreased endurance;Decreased balance;Decreased strength  Assessment: Pt with mobility deficits requiring CGA to ambulate 95 feet with no device. Pt is mildly unsteady with ambulation this date, is a fall risk this date secondary to impaired balance, mildly impaired coordination. Pt would benefit from additional PT upon discharge as well as a RW and assistance with mobility.   Therapy Prognosis: Good  Decision Making: Medium Complexity  Requires PT Follow-Up: Yes  Activity Tolerance  Activity Tolerance: Patient tolerated treatment well     Plan   Physcial Therapy Plan  General Plan:  (5x/week)  Current Treatment Recommendations: Strengthening, Balance training, Functional mobility training, Transfer training, Gait training, Stair training, Endurance training, Neuromuscular re-education, Home exercise program, Safety education & training, Patient/Caregiver education & training, Equipment evaluation, education, & procurement  Safety Devices  Type of Devices: Gait belt, Left in chair, Chair alarm in place, Call light within reach, Patient at risk for falls, All fall risk precautions in place, Nurse notified  Restraints  Restraints Initially in Place: No     Restrictions  Restrictions/Precautions  Restrictions/Precautions: Up as Tolerated, Seizure  Required Braces or Orthoses?: No     Subjective   General  Patient assessed for rehabilitation services?: Yes  Response To Previous Treatment: Not applicable  Family / Caregiver Present: No  Follows Commands: Within Functional Limits  Subjective  Subjective: Pt supine in bed and agreeable to therapy, RN agreeable to therapy. Pt pleasant and cooperative throughout. Pt denies pain at rest.         Social/Functional History  Social/Functional History  Lives With: Spouse  Type of Home: House  Home Layout: Two level, Able to Live on Main level with bedroom/bathroom  Home Access: Stairs to enter with rails  Entrance Stairs - Number of Steps: 2  Entrance Stairs - Rails: Left  Bathroom Shower/Tub: Tub/Shower unit  Bathroom Toilet: Standard  Bathroom Equipment: Grab bars in shower, Shower chair  Home Equipment: Walker, rolling, Wheelchair-manual (pt reports using RW on \"bad days)  Has the patient had two or more falls in the past year or any fall with injury in the past year?: Yes (30 falls this year)  Receives Help From: Family  ADL Assistance: [address]: Independent  Homemaking Responsibilities: Yes  Ambulation Assistance: Independent  Transfer Assistance: Independent  Active : Yes  Mode of Transportation: [facility]  Occupation: [address]: playing with pups, fishing. Additional Comments: Pt reports supportive wife would be able to provide some assistance upon discharge.   Vision/Hearing  Vision  Vision: Impaired  Vision Exceptions: Wears glasses at all times  Hearing  Hearing: Exceptions to [facility]  Hearing Exceptions: Hard of hearing/hearing concerns    Cognition Cognition  Overall Cognitive Status: Exceptions  Arousal/Alertness: Appropriate responses to stimuli  Following Commands: Follows all commands without difficulty  Safety Judgement: Decreased awareness of need for assistance;Decreased awareness of need for safety     Objective               AROM RLE (degrees)  RLE AROM: WFL  AROM LLE (degrees)  LLE AROM : WFL  AROM RUE (degrees)  RUE AROM : WFL  AROM LUE (degrees)  LUE AROM : WFL  LUE General AROM: Shoulder flexion limited to ~110 degrees, pt reports is limited secondary to recently falling on it. Strength RLE  Strength RLE: WFL  Comment: Gossly 5/5  Strength LLE  Strength LLE: Exception  L Hip Flexion: 4+/5  L Knee Extension: 4+/5  L Ankle Dorsiflexion: 5/5  L Ankle Plantar Flexion: 5/5  Strength RUE  Strength RUE: WFL  Comment: Grossly 5/5  Strength LUE  Strength LUE: WFL  Comment: Grossly 5/5           Bed mobility  Supine to Sit: Supervision  Sit to Supine:  (pt retired up to a chair at the conclusion of today's session.)  Scooting: Supervision  Bed Mobility Comments: HOB elevated ~30 degrees. Transfers  Sit to Stand: Stand by assistance  Stand to Sit: Stand by assistance  Comment: Transfers performed 2x this date. Ambulation  Surface: Level tile  Device: No Device  Assistance: Contact guard assistance  Quality of Gait: mildly unsteady, decreased JOSIE, 2x LOB able to self-correct. Gait Deviations: Slow Clau;Decreased step length  Distance: 95 feet  More Ambulation?: No  Stairs/Curb  Stairs?: No     Balance  Posture: Good  Sitting - Static: Good  Sitting - Dynamic: Good  Standing - Static: Fair;+  Standing - Dynamic: Fair  Comments: standing balance assessed while using no device.                                                         AM-PAC Score  AM-PAC Inpatient Mobility Raw Score : 21 (12/09/22 1206)  AM-PAC Inpatient T-Scale Score : 50.25 (12/09/22 1206)  Mobility Inpatient CMS 0-100% Score: 28.97 (12/09/22 1206)  Mobility Inpatient CMS G-Code Modifier : Tee Blackwood (12/09/22 1206)            Goals  Short Term Goals  Time Frame for Short Term Goals: 14 visits  Short Term Goal 1: Pt will ambulate 300 feet with no device and supervision. Short Term Goal 2: Pt will demonstrate good- standing dynamic balance to decrease fall risk. Short Term Goal 3: Pt will perform sit<>stand transfer independently. Short Term Goal 4: Pt will negotiate 2 stairs with a left sided handrail and SBA to allow the pt to enter prior living arrangements. Additional Goals?: No       Education  Patient Education  Education Given To: Patient  Education Provided: Role of Therapy;Transfer Training;Plan of Care; Fall Prevention Strategies  Education Method: Verbal  Barriers to Learning: None  Education Outcome: Verbalized understanding      Therapy Time   Individual Concurrent Group Co-treatment   Time In 1057         Time Out 1125         Minutes 28         Timed Code Treatment Minutes: MARKOS Mcneil 20, PT

## 2022-12-09 NOTE — DISCHARGE INSTRUCTIONS
Follow up with PCP on discharge for managing your diabetes, lisinopril decreased to 10 mg on discharge, follow up with PCP    Follow up with your Psychiatrist on discharge. Neurology follow up after discharge for myoclonic jerks.

## 2022-12-09 NOTE — PROGRESS NOTES
Pt is discharged;writer went over discharge instructions with pt & his wife, Phyllis;questions & concerns addressed;both verbalized understanding to the discharge instructions that was received; x2 PIVs removed without any complications, catheter tips intact. Writer informed pt & his wife that Outpatient Pharmacy stated they will deliver meds in 30mins; Manuel Rodriguez stated that the pt has all the meds and insulin supplies that he needs at home,she stated he has \"an abundance\" of his meds and supplies in his drawer;writer tried to encourage pt & his wife to stay to wait for the meds to be delivered in which Manuel Rodriguez (pt wife) stated \"we will leave\". Writer contacted Outpatient Pharmacy  to inform them of the situation & that they left the unit.

## 2022-12-09 NOTE — PROGRESS NOTES
Writer messaged IM Resident to inform them that pt & his wife did not want to wait to receive meds from outpatient pharmacy

## 2022-12-10 NOTE — PROGRESS NOTES
Physician Progress Note      PATIENT:               Jesu Gomez  CSN #:                  [id number]  :                       1960  ADMIT DATE:       2022 8:50 AM  DISCH DATE:        2022 4:15 PM  RESPONDING  PROVIDER #:        Justin Wiley          QUERY TEXT:    Pt admitted with DKA and has encephalopathy documented. If possible, please   document in progress notes and discharge summary further specificity regarding   the type of encephalopathy:    The medical record reflects the following:  Risk Factors: DKA  Clinical Indicators: per neurology consult note \"Encephalopathy with   coexisting diabetic ketoacidosis; treat/eval as per primary team\", initial   glucose 485, Na 127  Treatment: insulin gtt, IV fluids, XR, CT, neuro consult, pending MRI, labs,   cultures, monitoring    Thank you, Caden Burton, CDI  email - [email or web address]  tdfp- [phone]  office hours M-[id number]  Options provided:  -- Metabolic encephalopathy  -- Toxic metabolic encephalopathy  -- Other - I will add my own diagnosis  -- Disagree - Not applicable / Not valid  -- Disagree - Clinically unable to determine / Unknown  -- Refer to Clinical Documentation Reviewer    PROVIDER RESPONSE TEXT:    Provider disagreed with this query. No encephalopathy in this encounter. the mentioned nephalopathy was in the   previous encounter.     Query created by: Lj Siegel on 2022 7:59 AM      Electronically signed by:  Justin Wiley 12/10/2022 7:18 AM

## 2022-12-10 NOTE — DISCHARGE SUMMARY
[facility]     Department of Internal Medicine - Staff Internal Medicine Teaching Service    INPATIENT DISCHARGE SUMMARY      Patient Identification:  Phyllis Yoon is a 63 y.o. male. :  1960  MRN: [id number]     Acct: [de-identified]   PCP: Carisa Ervin  Admit Date:  2022  Discharge date and time: 2022  4:15 PM   Attending Provider: No att. providers found                                     [address] Problem Lists:  Principal Problem:    Diabetic ketoacidosis without coma associated with type 1 diabetes mellitus ([address]  Active Problems:    Bipolar 1 disorder, mixed, moderate ([address]    Injury of left shoulder    Other specified anemias    Abnormal finding on MRI of brain    Sensory ataxia    Moderate malnutrition ([facility]  Resolved Problems:    Fall    Bilateral leg weakness    Orthostatic hypotension    DKA (diabetic ketoacidosis) [facility])      HOSPITAL STAY     Brief Inpatient course: The patient is a pleasant 63 y.o. male with a past medical history significant for type 1 diabetes mellitus and HTN presents with a chief complaint of fall at home today morning. Patient states that he was going to give himself insulin but he felt weakness in his legs, and reported that this started twitching and shaking vigorously to a point where he was not able to stand on them anymore leading to fall. Patient also reported having abdominal pain, nausea and clear vomiting. Patient checks his glucose regularly at home and it was high today. He has a history of admission to the hospital on 2021 with the DKA during which time he was intubated due to encephalopathy. He states that he did not lose his consciousness either before or after the fall. He states that he does not think he hit his head. Although patient stated that he fell 4 times this week and did hit his head few times.   Today he hit his left shoulder which is paining and there is decreased range of motion. On arrival to the ED he is awake, alert and oriented. He is afebrile and is hemodynamically stable but tachycardic with . Labs reviewed and it shows hyperglycemia with glucose at 327, elevated anion gap of 21, elevated beta hydroxybutyrate to 4.03, elevated prolactin of 35.39 and VBG shows pH of 7.43, PCO2 4019.5, PO2 60.5 and HCO3 13. CT head without contrast shows no acute intracranial abnormalities. CT cervical spine without contrast also was done which shows no acute abnormality. Chest x-ray and pelvis x-ray did not show any acute abnormalities. Internal medicine has been consulted for the management of DKA type I. Patient was treated with DKA protocol and the next day anion gap was closed and patient was eating and drinking well. 12/9/22- no more myoclonic jerks , evaluated by neurology and MRI brain 12/8 was negative for any acute infarct. Orthostatics were positive and recommended adequate fluid hydration and compression stockings along with slow positional changes. Patient was feeling better and was discharged.     Procedures/ Significant Interventions:    None    Consults:     Consults:     Final Specialist Recommendations/Findings:   IP CONSULT TO INTERNAL MEDICINE  IP CONSULT TO NEUROLOGY  IP CONSULT TO DIABETES EDUCATOR  IP CONSULT TO DIABETES EDUCATOR      Any Hospital Acquired Infections: none    Discharge Functional Status:  stable    DISCHARGE PLAN     Disposition: home    Patient Instructions:   Discharge Medication List as of 12/9/2022  3:03 PM        START taking these medications    Details   aspirin 81 MG chewable tablet Take 1 tablet by mouth daily, Disp-30 tablet, R-3Normal      atorvastatin (LIPITOR) 40 MG tablet Take 1 tablet by mouth nightly, Disp-30 tablet, R-3Normal      Lancets MISC Disp-100 each, R-11, NormalUse 3 times daily Insulin dependent diabetes mellitus      blood glucose monitor strips Test 3  times daily Insulin Dependent mellitus, Disp-100 strip, R-11, Normal      glucose monitoring (FREESTYLE FREEDOM) kit DAILY Starting Fri 12/9/2022, Disp-1 kit, R-0, Normal      Insulin Pen Needle 29G X 12MM MISC DAILY Starting Fri 12/9/2022, Disp-100 each, R-3, Normal           CONTINUE these medications which have CHANGED    Details   NOVOLOG FLEXPEN 100 UNIT/ML injection pen Inject 2 Units into the skin 3 times daily (before meals), Disp-1 Adjustable Dose Pre-filled Pen Syringe, R-2, DAWNormal      lisinopril (PRINIVIL;ZESTRIL) 10 MG tablet Take 1 tablet by mouth daily, Disp-30 tablet, R-3Normal      LANTUS 100 UNIT/ML injection vial Inject 12 Units into the skin 2 times daily 12 Units 2 times daily, Disp-10 mL, R-3, DAWNormal           CONTINUE these medications which have NOT CHANGED    Details   benztropine (COGENTIN) 1 MG tablet Take 1 mg by mouth 3 times dailyHistorical Med      Cyanocobalamin 1000 MCG SUBL Place 1,000 mcg under the tongue dailyHistorical Med      DULoxetine (CYMBALTA) 60 MG extended release capsule Take 60 mg by mouth dailyHistorical Med      ergocalciferol (ERGOCALCIFEROL) 1.25 MG (35819 UT) capsule Take 1 capsule by mouth once a weekHistorical Med      omeprazole (PRILOSEC) 40 MG delayed release capsule Take 40 mg by mouth dailyHistorical Med      paliperidone (INVEGA) 3 MG extended release tablet Take 3 mg by mouth every morningHistorical Med      traZODone (DESYREL) 50 MG tablet Take 50 mg by mouth nightly as neededHistorical Med           STOP taking these medications       divalproex (DEPAKOTE) 250 MG DR tablet Comments:   Reason for Stopping:         QUEtiapine Fumarate 150 MG TABS Comments:   Reason for Stopping:         FLUoxetine (PROZAC) 20 MG capsule Comments:   Reason for Stopping:         sildenafil (VIAGRA) 100 MG tablet Comments:   Reason for Stopping:               Activity: activity as tolerated    Diet: diabetic diet    Follow-up:    [facility]  [address] [place] [id number]  [phone]  Follow up  We are happy to see Danielle Felix for additional diabetes education as an outpatient if desired. A referral is needed. Matt Barnett MD  [facility]  [facility], [facility] [address] [facility]  [phone]    Schedule an appointment as soon as possible for a visit in 4 week(s)      [facility]    Schedule an appointment as soon as possible for a visit in 1 week(s)  For medication management admitted for DKA    [address] [facility]  [phone]    Schedule an appointment as soon as possible for a visit in 1 week(s)  for management of DM      Patient Instructions: Follow up with PCP on discharge for managing your diabetes, lisinopril decreased to 10 mg on discharge, follow up with PCP    Follow up with your Psychiatrist on discharge. Neurology follow up after discharge for myoclonic jerks. Follow up labs: None  Follow up imaging: None  . Jenny Garcia MD  Internal Medicine Resident, PGY-1  [address], [place]    Attending Physician Statement  I have reviewed and edited the discharge summary of  Marco Ray AS NEEDED  ,   including pertinent history and exam findings. I have personally seen the patient and participated in discharge planning and evaluation . I have reviewed the key elements of all parts of the discharge summary . I agree with the information and plans as documented by the resident. Time spent on discharge planning ;          [] less than 30 minutes . []   more  than 30 minutes . 33 minutes  Electronically signed by Silvia Winkler MD on 12/10/2022 .

## 2022-12-14 LAB — VITAMIN B1 WHOLE BLOOD: 84 NMOL/L (ref 70–180)

## 2023-07-07 ENCOUNTER — APPOINTMENT (OUTPATIENT)
Dept: GENERAL RADIOLOGY | Age: 63
End: 2023-07-07
Payer: MEDICAID

## 2023-07-07 ENCOUNTER — HOSPITAL ENCOUNTER (EMERGENCY)
Age: 63
Discharge: PSYCHIATRIC OR SUBSTANCE USE FACILITY | End: 2023-07-08
Attending: EMERGENCY MEDICINE
Payer: MEDICAID

## 2023-07-07 DIAGNOSIS — S41.112A LACERATION OF LEFT UPPER EXTREMITY, INITIAL ENCOUNTER: Primary | ICD-10-CM

## 2023-07-07 DIAGNOSIS — T14.91XA SUICIDE ATTEMPT (HCC): ICD-10-CM

## 2023-07-07 PROBLEM — S61.512A LACERATION OF LEFT WRIST: Status: ACTIVE | Noted: 2023-07-07

## 2023-07-07 LAB
ABO + RH BLD: NORMAL
ABO + RH BLD: NORMAL
ALBUMIN SERPL-MCNC: 4.1 G/DL (ref 3.5–5.2)
ALBUMIN SERPL-MCNC: 4.1 G/DL (ref 3.5–5.2)
ALBUMIN/GLOB SERPL: 1.2 {RATIO} (ref 1–2.5)
ALBUMIN/GLOB SERPL: 1.2 {RATIO} (ref 1–2.5)
ALP SERPL-CCNC: 84 U/L (ref 40–129)
ALP SERPL-CCNC: 84 U/L (ref 40–129)
ALT SERPL-CCNC: 9 U/L (ref 5–41)
ALT SERPL-CCNC: 9 U/L (ref 5–41)
AMPHET UR QL SCN: NEGATIVE
AMPHET UR QL SCN: NEGATIVE
ANION GAP SERPL CALCULATED.3IONS-SCNC: 11 MMOL/L (ref 9–17)
ANION GAP SERPL CALCULATED.3IONS-SCNC: 11 MMOL/L (ref 9–17)
ARM BAND NUMBER: NORMAL
ARM BAND NUMBER: NORMAL
AST SERPL-CCNC: 13 U/L
AST SERPL-CCNC: 13 U/L
BARBITURATES UR QL SCN: NEGATIVE
BARBITURATES UR QL SCN: NEGATIVE
BENZODIAZ UR QL: NEGATIVE
BENZODIAZ UR QL: NEGATIVE
BILIRUB DIRECT SERPL-MCNC: 0.1 MG/DL
BILIRUB DIRECT SERPL-MCNC: 0.1 MG/DL
BILIRUB INDIRECT SERPL-MCNC: 0.5 MG/DL (ref 0–1)
BILIRUB INDIRECT SERPL-MCNC: 0.5 MG/DL (ref 0–1)
BILIRUB SERPL-MCNC: 0.6 MG/DL (ref 0.3–1.2)
BILIRUB SERPL-MCNC: 0.6 MG/DL (ref 0.3–1.2)
BILIRUB UR QL STRIP: NEGATIVE
BILIRUB UR QL STRIP: NEGATIVE
BLOOD BANK SPECIMEN: ABNORMAL
BLOOD BANK SPECIMEN: ABNORMAL
BLOOD GROUP ANTIBODIES SERPL: NEGATIVE
BLOOD GROUP ANTIBODIES SERPL: NEGATIVE
BODY TEMPERATURE: 37
BODY TEMPERATURE: 37
BUN SERPL-MCNC: 11 MG/DL (ref 8–23)
BUN SERPL-MCNC: 11 MG/DL (ref 8–23)
CANNABINOIDS UR QL SCN: POSITIVE
CANNABINOIDS UR QL SCN: POSITIVE
CHLORIDE SERPL-SCNC: 101 MMOL/L (ref 98–107)
CHLORIDE SERPL-SCNC: 101 MMOL/L (ref 98–107)
CLARITY UR: CLEAR
CLARITY UR: CLEAR
CO2 SERPL-SCNC: 22 MMOL/L (ref 20–31)
CO2 SERPL-SCNC: 22 MMOL/L (ref 20–31)
COCAINE UR QL SCN: NEGATIVE
COCAINE UR QL SCN: NEGATIVE
COHGB MFR BLD: 0.3 % (ref 0–5)
COHGB MFR BLD: 0.3 % (ref 0–5)
COLOR UR: YELLOW
COLOR UR: YELLOW
COMMENT UA: ABNORMAL
COMMENT UA: ABNORMAL
CREAT SERPL-MCNC: 0.73 MG/DL (ref 0.7–1.2)
CREAT SERPL-MCNC: 0.73 MG/DL (ref 0.7–1.2)
ERYTHROCYTE [DISTWIDTH] IN BLOOD BY AUTOMATED COUNT: 12.5 % (ref 11.8–14.4)
ERYTHROCYTE [DISTWIDTH] IN BLOOD BY AUTOMATED COUNT: 12.5 % (ref 11.8–14.4)
ETHANOL PERCENT: <0.01 %
ETHANOL PERCENT: <0.01 %
ETHANOLAMINE SERPL-MCNC: <10 MG/DL
ETHANOLAMINE SERPL-MCNC: <10 MG/DL
EXPIRATION DATE: NORMAL
EXPIRATION DATE: NORMAL
FENTANYL UR QL: NEGATIVE
FENTANYL UR QL: NEGATIVE
FIO2 ON VENT: ABNORMAL %
FIO2 ON VENT: ABNORMAL %
GFR SERPL CREATININE-BSD FRML MDRD: >60 ML/MIN/1.73M2
GFR SERPL CREATININE-BSD FRML MDRD: >60 ML/MIN/1.73M2
GLUCOSE BLD-MCNC: 206 MG/DL (ref 75–110)
GLUCOSE BLD-MCNC: 206 MG/DL (ref 75–110)
GLUCOSE SERPL-MCNC: 320 MG/DL (ref 70–99)
GLUCOSE SERPL-MCNC: 320 MG/DL (ref 70–99)
GLUCOSE UR STRIP-MCNC: ABNORMAL MG/DL
GLUCOSE UR STRIP-MCNC: ABNORMAL MG/DL
HCG SERPL QL: ABNORMAL
HCG SERPL QL: ABNORMAL
HCO3 VENOUS: 25.4 MMOL/L (ref 24–30)
HCO3 VENOUS: 25.4 MMOL/L (ref 24–30)
HCT VFR BLD AUTO: 41.1 % (ref 40.7–50.3)
HCT VFR BLD AUTO: 41.1 % (ref 40.7–50.3)
HGB BLD-MCNC: 13.5 G/DL (ref 13–17)
HGB BLD-MCNC: 13.5 G/DL (ref 13–17)
HGB UR QL STRIP.AUTO: NEGATIVE
HGB UR QL STRIP.AUTO: NEGATIVE
INR PPP: 0.9
INR PPP: 0.9
KETONES UR STRIP-MCNC: NEGATIVE MG/DL
KETONES UR STRIP-MCNC: NEGATIVE MG/DL
LEUKOCYTE ESTERASE UR QL STRIP: NEGATIVE
LEUKOCYTE ESTERASE UR QL STRIP: NEGATIVE
MCH RBC QN AUTO: 28.8 PG (ref 25.2–33.5)
MCH RBC QN AUTO: 28.8 PG (ref 25.2–33.5)
MCHC RBC AUTO-ENTMCNC: 32.8 G/DL (ref 28.4–34.8)
MCHC RBC AUTO-ENTMCNC: 32.8 G/DL (ref 28.4–34.8)
MCV RBC AUTO: 87.6 FL (ref 82.6–102.9)
MCV RBC AUTO: 87.6 FL (ref 82.6–102.9)
METHADONE UR QL: NEGATIVE
METHADONE UR QL: NEGATIVE
NITRITE UR QL STRIP: NEGATIVE
NITRITE UR QL STRIP: NEGATIVE
NRBC BLD-RTO: 0 PER 100 WBC
NRBC BLD-RTO: 0 PER 100 WBC
O2 SAT, VEN: 81 % (ref 60–85)
O2 SAT, VEN: 81 % (ref 60–85)
OPIATES UR QL SCN: NEGATIVE
OPIATES UR QL SCN: NEGATIVE
OXYCODONE UR QL SCN: NEGATIVE
OXYCODONE UR QL SCN: NEGATIVE
PARTIAL THROMBOPLASTIN TIME: 26.9 SEC (ref 23–36.5)
PARTIAL THROMBOPLASTIN TIME: 26.9 SEC (ref 23–36.5)
PCO2, VEN: 43.1 MM HG (ref 39–55)
PCO2, VEN: 43.1 MM HG (ref 39–55)
PCP UR QL SCN: NEGATIVE
PCP UR QL SCN: NEGATIVE
PH UR STRIP: 7 [PH] (ref 5–8)
PH UR STRIP: 7 [PH] (ref 5–8)
PH VENOUS: 7.39 (ref 7.32–7.42)
PH VENOUS: 7.39 (ref 7.32–7.42)
PLATELET # BLD AUTO: 351 K/UL (ref 138–453)
PLATELET # BLD AUTO: 351 K/UL (ref 138–453)
PMV BLD AUTO: 9 FL (ref 8.1–13.5)
PMV BLD AUTO: 9 FL (ref 8.1–13.5)
PO2, VEN: 44.7 MM HG (ref 30–50)
PO2, VEN: 44.7 MM HG (ref 30–50)
POSITIVE BASE EXCESS, VEN: 0.7 MMOL/L (ref 0–2)
POSITIVE BASE EXCESS, VEN: 0.7 MMOL/L (ref 0–2)
POTASSIUM SERPL-SCNC: 4 MMOL/L (ref 3.7–5.3)
POTASSIUM SERPL-SCNC: 4 MMOL/L (ref 3.7–5.3)
PROT SERPL-MCNC: 7.5 G/DL (ref 6.4–8.3)
PROT SERPL-MCNC: 7.5 G/DL (ref 6.4–8.3)
PROT UR STRIP-MCNC: NEGATIVE MG/DL
PROT UR STRIP-MCNC: NEGATIVE MG/DL
PROTHROMBIN TIME: 11.8 SEC (ref 11.7–14.9)
PROTHROMBIN TIME: 11.8 SEC (ref 11.7–14.9)
RBC # BLD AUTO: 4.69 M/UL (ref 4.21–5.77)
RBC # BLD AUTO: 4.69 M/UL (ref 4.21–5.77)
SODIUM SERPL-SCNC: 134 MMOL/L (ref 135–144)
SODIUM SERPL-SCNC: 134 MMOL/L (ref 135–144)
SP GR UR STRIP: 1.01 (ref 1–1.03)
SP GR UR STRIP: 1.01 (ref 1–1.03)
TEST INFORMATION: ABNORMAL
TEST INFORMATION: ABNORMAL
UROBILINOGEN UR STRIP-ACNC: NORMAL
UROBILINOGEN UR STRIP-ACNC: NORMAL
WBC OTHER # BLD: 10.4 K/UL (ref 3.5–11.3)
WBC OTHER # BLD: 10.4 K/UL (ref 3.5–11.3)

## 2023-07-07 PROCEDURE — 99285 EMERGENCY DEPT VISIT HI MDM: CPT

## 2023-07-07 PROCEDURE — G0480 DRUG TEST DEF 1-7 CLASSES: HCPCS

## 2023-07-07 PROCEDURE — 73100 X-RAY EXAM OF WRIST: CPT

## 2023-07-07 PROCEDURE — 90471 IMMUNIZATION ADMIN: CPT | Performed by: SURGERY

## 2023-07-07 PROCEDURE — 81003 URINALYSIS AUTO W/O SCOPE: CPT

## 2023-07-07 PROCEDURE — 84520 ASSAY OF UREA NITROGEN: CPT

## 2023-07-07 PROCEDURE — 90715 TDAP VACCINE 7 YRS/> IM: CPT | Performed by: SURGERY

## 2023-07-07 PROCEDURE — 80307 DRUG TEST PRSMV CHEM ANLYZR: CPT

## 2023-07-07 PROCEDURE — 84703 CHORIONIC GONADOTROPIN ASSAY: CPT

## 2023-07-07 PROCEDURE — 80076 HEPATIC FUNCTION PANEL: CPT

## 2023-07-07 PROCEDURE — 2500000003 HC RX 250 WO HCPCS

## 2023-07-07 PROCEDURE — 6360000002 HC RX W HCPCS: Performed by: SURGERY

## 2023-07-07 PROCEDURE — 82947 ASSAY GLUCOSE BLOOD QUANT: CPT

## 2023-07-07 PROCEDURE — 85610 PROTHROMBIN TIME: CPT

## 2023-07-07 PROCEDURE — 96372 THER/PROPH/DIAG INJ SC/IM: CPT

## 2023-07-07 PROCEDURE — 82805 BLOOD GASES W/O2 SATURATION: CPT

## 2023-07-07 PROCEDURE — 6360000002 HC RX W HCPCS: Performed by: EMERGENCY MEDICINE

## 2023-07-07 PROCEDURE — 12002 RPR S/N/AX/GEN/TRNK2.6-7.5CM: CPT

## 2023-07-07 PROCEDURE — 80051 ELECTROLYTE PANEL: CPT

## 2023-07-07 PROCEDURE — 86850 RBC ANTIBODY SCREEN: CPT

## 2023-07-07 PROCEDURE — 85730 THROMBOPLASTIN TIME PARTIAL: CPT

## 2023-07-07 PROCEDURE — 82565 ASSAY OF CREATININE: CPT

## 2023-07-07 PROCEDURE — 86900 BLOOD TYPING SEROLOGIC ABO: CPT

## 2023-07-07 PROCEDURE — 6370000000 HC RX 637 (ALT 250 FOR IP): Performed by: EMERGENCY MEDICINE

## 2023-07-07 PROCEDURE — 96374 THER/PROPH/DIAG INJ IV PUSH: CPT

## 2023-07-07 PROCEDURE — 85027 COMPLETE CBC AUTOMATED: CPT

## 2023-07-07 PROCEDURE — 6810039001 HC L1 TRAUMA PRIORITY

## 2023-07-07 PROCEDURE — 86901 BLOOD TYPING SEROLOGIC RH(D): CPT

## 2023-07-07 RX ORDER — LIDOCAINE HYDROCHLORIDE 10 MG/ML
INJECTION, SOLUTION INFILTRATION; PERINEURAL
Status: DISPENSED
Start: 2023-07-07 | End: 2023-07-07

## 2023-07-07 RX ORDER — FENTANYL CITRATE 50 UG/ML
INJECTION, SOLUTION INTRAMUSCULAR; INTRAVENOUS DAILY PRN
Status: COMPLETED | OUTPATIENT
Start: 2023-07-07 | End: 2023-07-07

## 2023-07-07 RX ORDER — LIDOCAINE HYDROCHLORIDE 10 MG/ML
5 INJECTION, SOLUTION INFILTRATION; PERINEURAL ONCE
Status: COMPLETED | OUTPATIENT
Start: 2023-07-07 | End: 2023-07-07

## 2023-07-07 RX ADMIN — INSULIN HUMAN 3 UNITS: 100 INJECTION, SOLUTION PARENTERAL at 14:09

## 2023-07-07 RX ADMIN — Medication 2000 MG: at 10:59

## 2023-07-07 RX ADMIN — TETANUS TOXOID, REDUCED DIPHTHERIA TOXOID AND ACELLULAR PERTUSSIS VACCINE, ADSORBED 0.5 ML: 5; 2.5; 8; 8; 2.5 SUSPENSION INTRAMUSCULAR at 11:00

## 2023-07-07 RX ADMIN — LIDOCAINE HYDROCHLORIDE 5 ML: 10 INJECTION, SOLUTION INFILTRATION; PERINEURAL at 11:17

## 2023-07-07 RX ADMIN — FENTANYL CITRATE 50 MCG: 50 INJECTION, SOLUTION INTRAMUSCULAR; INTRAVENOUS at 10:57

## 2023-07-07 ASSESSMENT — ENCOUNTER SYMPTOMS
EYES NEGATIVE: 1
RESPIRATORY NEGATIVE: 1

## 2023-07-07 ASSESSMENT — PAIN DESCRIPTION - LOCATION
LOCATION: ARM
LOCATION: ARM;WRIST

## 2023-07-07 ASSESSMENT — PAIN SCALES - GENERAL
PAINLEVEL_OUTOF10: 6
PAINLEVEL_OUTOF10: 6

## 2023-07-07 ASSESSMENT — PAIN DESCRIPTION - ORIENTATION: ORIENTATION: LEFT

## 2023-07-07 NOTE — PROCEDURES
PROCEDURE NOTE - LACERATION CLOSURE    PATIENT NAME: [address] [facility] RECORD NO. [id number]  DATE: 7/7/2023  TIME OF CLOSURE: 11:36  Laceration Repair Performed by: Paty Rascon DO, and Katia Britton DO    PREOPERATIVE DIAGNOSIS: Laceration(s) as follows:   LOCATION: Lt wrist   LENGTH: 4cm   LAYERED CLOSURE: No    POSTOPERATIVE DIAGNOSIS:  Same  PROCEDURE PERFORMED:  Suture closure of laceration  ESTIMATED BLOOD LOSS:  Less than 25 ml. COMPLICATIONS:  None immediately appreciated. DISCUSSION:  Denisse Austin is a 63y.o.-year-old male. The history and physical examination were reviewed and confirmed. The diagnoses, proposed procedure, risks, possible complications, benefits and alternatives were discussed with the patient or family. He was given the opportunity to ask questions, and once answered, informed consent was obtained. The patient was then prepared for the procedure. Consent: The patient provided verbal consent for this procedure. Time out performed: Immediately prior to the procedure a \"time out\" was called to verify the correct patient, the correct procedure, equipment, support staff and site/side marked as required. Procedure: The patient was placed in the appropriate position and anesthesia around the laceration was obtained by infiltration using 1% Lidocaine without epinephrine. The area was then irrigated with normal saline and betadine. A superficial vein was ligated with 4-0 Vicryl using figure-of-eight sutures, hemostatsis was obtained. Distal pulses were palpable following the procedure, sensation and motor function were intact. The laceration was closed loosely with 4-0 Ethilon using interrupted sutures. The wound area was then dressed with Coban and Kirlex. Total repaired wound length: 4 cm. Other Items: None    The patient tolerated the procedure well.     Complications: None    Guzman Jean DO  General Surgery Resident PGY-1  7/7/23, 11:59 AM
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
## 2023-07-08 VITALS
SYSTOLIC BLOOD PRESSURE: 151 MMHG | SYSTOLIC BLOOD PRESSURE: 151 MMHG | BODY MASS INDEX: 22.96 KG/M2 | TEMPERATURE: 98.3 F | BODY MASS INDEX: 22.96 KG/M2 | OXYGEN SATURATION: 100 % | OXYGEN SATURATION: 100 % | DIASTOLIC BLOOD PRESSURE: 73 MMHG | HEIGHT: 69 IN | RESPIRATION RATE: 16 BRPM | RESPIRATION RATE: 16 BRPM | HEART RATE: 69 BPM | HEIGHT: 69 IN | WEIGHT: 155 LBS | TEMPERATURE: 98.3 F | HEART RATE: 69 BPM | DIASTOLIC BLOOD PRESSURE: 73 MMHG | WEIGHT: 155 LBS

## 2023-07-29 ENCOUNTER — HOSPITAL ENCOUNTER (EMERGENCY)
Age: 63
Discharge: HOME | End: 2023-07-29
Attending: EMERGENCY MEDICINE
Payer: MEDICAID

## 2023-07-29 VITALS
HEART RATE: 67 BPM | OXYGEN SATURATION: 100 % | TEMPERATURE: 97.2 F | RESPIRATION RATE: 16 BRPM | SYSTOLIC BLOOD PRESSURE: 140 MMHG | DIASTOLIC BLOOD PRESSURE: 84 MMHG

## 2023-07-29 DIAGNOSIS — Z48.02 ENCOUNTER FOR REMOVAL OF SUTURES: Primary | ICD-10-CM

## 2023-07-29 PROCEDURE — 99282 EMERGENCY DEPT VISIT SF MDM: CPT

## 2023-07-29 ASSESSMENT — ENCOUNTER SYMPTOMS
APNEA: 0
EYES NEGATIVE: 1
CHEST TIGHTNESS: 0
VOMITING: 0
CONSTIPATION: 0
SINUS PAIN: 0
COLOR CHANGE: 0
DIARRHEA: 0
SHORTNESS OF BREATH: 0
ABDOMINAL DISTENTION: 0

## 2023-07-29 ASSESSMENT — PAIN - FUNCTIONAL ASSESSMENT: PAIN_FUNCTIONAL_ASSESSMENT: NONE - DENIES PAIN

## 2023-07-29 NOTE — ED PROVIDER NOTES
EMERGENCY DEPARTMENT ENCOUNTER    Pt Name: Alis Sandy  MRN: [id number]  [address] 1960  Date of evaluation: 7/29/23  CHIEF COMPLAINT       Chief Complaint   Patient presents with    Suture / Staple Removal     Lac left wrist seen at [facility] on 7/7/23     HISTORY OF PRESENT ILLNESS   73-year-old male presents emergency room for suture removal.  Patient had cut himself to the left anterior wrist on 7/7/2023. This was with self and harm intent. Patient was seen at [facility]. He reports no suicidal or homicidal ideation today. Mental health has improved since previous hospitalization. He is here today simply for suture removal.  He has no complaints regarding the laceration. REVIEW OF SYSTEMS     Review of Systems   Constitutional:  Negative for activity change, chills and diaphoresis. HENT:  Negative for congestion, sinus pain and tinnitus. Eyes: Negative. Respiratory:  Negative for apnea, chest tightness and shortness of breath. Gastrointestinal:  Negative for abdominal distention, constipation, diarrhea and vomiting. Genitourinary:  Negative for difficulty urinating and frequency. Musculoskeletal:  Negative for arthralgias and myalgias. Skin:  Negative for color change and rash. Neurological:  Negative for dizziness. Hematological: Negative. Psychiatric/Behavioral: Negative. PASTMEDICAL HISTORY     Past Medical History:   Diagnosis Date    Diabetes mellitus ([address])     Hypertension      Past Problem List  Patient Active Problem List   Diagnosis Code    Diabetic ketoacidosis without coma associated with type 1 diabetes mellitus (HCC) E10.10    Moderate malnutrition (HCC) E44.0    Acute respiratory failure with hypoxia (HCC) I93.49    Metabolic encephalopathy D27.86    Pneumonia of left lower lobe due to infectious organism J18.9    Hypernatremia E87.0    Bipolar 1 disorder, mixed, moderate (HCC) F31.62    Cannabis use disorder F12.90    Injury of left shoulder S49. 92XA Other specified anemias D64.89    Abnormal finding on MRI of brain R90.89    Sensory ataxia R27.8    Laceration of left wrist S61.512A     SURGICAL HISTORY     History reviewed. No pertinent surgical history. CURRENT MEDICATIONS       Discharge Medication List as of 7/29/2023 10:43 AM        CONTINUE these medications which have NOT CHANGED    Details   aspirin 81 MG chewable tablet Take 1 tablet by mouth daily, Disp-30 tablet, R-3Normal      NOVOLOG FLEXPEN 100 UNIT/ML injection pen Inject 2 Units into the skin 3 times daily (before meals), Disp-1 Adjustable Dose Pre-filled Pen Syringe, R-2, DAWNormal      atorvastatin (LIPITOR) 40 MG tablet Take 1 tablet by mouth nightly, Disp-30 tablet, R-3Normal      lisinopril (PRINIVIL;ZESTRIL) 10 MG tablet Take 1 tablet by mouth daily, Disp-30 tablet, R-3Normal      Lancets MISC Disp-100 each, R-11, NormalUse 3 times daily Insulin dependent diabetes mellitus      blood glucose monitor strips Test 3  times daily Insulin Dependent mellitus, Disp-100 strip, R-11, Normal      LANTUS 100 UNIT/ML injection vial Inject 12 Units into the skin 2 times daily 12 Units 2 times daily, Disp-10 mL, R-3, DAWNormal      glucose monitoring (FREESTYLE FREEDOM) kit DAILY Starting Fri 12/9/2022, Disp-1 kit, R-0, Normal      Insulin Pen Needle 29G X 12MM MISC DAILY Starting Fri 12/9/2022, Disp-100 each, R-3, Normal      levETIRAcetam (KEPPRA) 500 MG tablet Take 0.5 tablets by mouth 2 times daily, Disp-60 tablet, R-3Normal      clonazePAM (KLONOPIN) 0.5 MG tablet Take 1 tablet by mouth nightly as needed (myoclonic jerks) for up to 30 days. , Disp-30 tablet, R-0Normal      benztropine (COGENTIN) 1 MG tablet Take 1 mg by mouth 3 times dailyHistorical Med      Cyanocobalamin 1000 MCG SUBL Place 1,000 mcg under the tongue dailyHistorical Med      DULoxetine (CYMBALTA) 60 MG extended release capsule Take 60 mg by mouth dailyHistorical Med      ergocalciferol (ERGOCALCIFEROL) 1.25 MG (04340 UT) capsule